# Patient Record
Sex: FEMALE | Race: WHITE | NOT HISPANIC OR LATINO | Employment: FULL TIME | URBAN - METROPOLITAN AREA
[De-identification: names, ages, dates, MRNs, and addresses within clinical notes are randomized per-mention and may not be internally consistent; named-entity substitution may affect disease eponyms.]

---

## 2018-10-05 ENCOUNTER — OFFICE VISIT (OUTPATIENT)
Dept: FAMILY MEDICINE CLINIC | Facility: CLINIC | Age: 38
End: 2018-10-05

## 2018-10-05 VITALS
BODY MASS INDEX: 29.86 KG/M2 | TEMPERATURE: 98 F | WEIGHT: 185.8 LBS | DIASTOLIC BLOOD PRESSURE: 60 MMHG | RESPIRATION RATE: 16 BRPM | HEIGHT: 66 IN | HEART RATE: 105 BPM | SYSTOLIC BLOOD PRESSURE: 118 MMHG | OXYGEN SATURATION: 97 %

## 2018-10-05 DIAGNOSIS — F41.9 ANXIETY: Primary | ICD-10-CM

## 2018-10-05 DIAGNOSIS — Z23 IMMUNIZATION DUE: ICD-10-CM

## 2018-10-05 DIAGNOSIS — F51.01 PRIMARY INSOMNIA: ICD-10-CM

## 2018-10-05 DIAGNOSIS — Z87.891 PERSONAL HISTORY OF TOBACCO USE, PRESENTING HAZARDS TO HEALTH: ICD-10-CM

## 2018-10-05 PROCEDURE — 99406 BEHAV CHNG SMOKING 3-10 MIN: CPT | Performed by: NURSE PRACTITIONER

## 2018-10-05 PROCEDURE — 90471 IMMUNIZATION ADMIN: CPT | Performed by: NURSE PRACTITIONER

## 2018-10-05 PROCEDURE — 90715 TDAP VACCINE 7 YRS/> IM: CPT | Performed by: NURSE PRACTITIONER

## 2018-10-05 PROCEDURE — 99204 OFFICE O/P NEW MOD 45 MIN: CPT | Performed by: NURSE PRACTITIONER

## 2018-10-05 RX ORDER — METOPROLOL SUCCINATE 25 MG/1
25 TABLET, EXTENDED RELEASE ORAL DAILY
Qty: 30 TABLET | Refills: 5 | Status: SHIPPED | OUTPATIENT
Start: 2018-10-05 | End: 2018-11-08 | Stop reason: SINTOL

## 2018-10-05 RX ORDER — TRAZODONE HYDROCHLORIDE 50 MG/1
50 TABLET ORAL NIGHTLY
Qty: 30 TABLET | Refills: 2 | Status: SHIPPED | OUTPATIENT
Start: 2018-10-05 | End: 2018-10-26 | Stop reason: SINTOL

## 2018-10-05 RX ORDER — FLUCONAZOLE 150 MG/1
150 TABLET ORAL ONCE
COMMUNITY
End: 2018-10-26

## 2018-10-05 NOTE — PROGRESS NOTES
Subjective   Evelia Foster is a 38 y.o. female. Here to establish care.    History of Present Illness Last month seen at [facility] for chest pain. Diagnosed with anxiety and insomnia. Prescribed hydroxyzine but she has itching with it.   Has had anxiety for years. Has been on multiple meds including zoloft, prozac, and buspar in the past and she had side effects or meds were ineffective. Describes episodes of palpitations, sob, diaphoresis and anxiety with duration of 5 minutes. This is interfering with her work. 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  Has had insomnia for years. Has tried multiple OTC meds without relief. Has a hard time going to sleep and can't stay a sleep.  Has been diagnosed with RLS treated with ropinorole in the past. It did not help.  She is a smoker and would like to quit but is very addictive. One previous attempt while pregnant and only lasted one day.    The following portions of the patient's history were reviewed and updated as appropriate: allergies, current medications, past family history, past medical history, past social history, past surgical history and problem list.    Review of Systems   Constitutional: Negative for appetite change, fever, unexpected weight gain and unexpected weight loss.   HENT: Negative for congestion, nosebleeds, sore throat and trouble swallowing.    Eyes: Negative for visual disturbance.   Respiratory: Negative for cough, shortness of breath and wheezing.    Cardiovascular: Positive for palpitations. Negative for chest pain and leg swelling.   Gastrointestinal: Negative for abdominal pain, blood in stool, constipation, diarrhea, nausea and vomiting.   Endocrine: Negative for polydipsia, polyphagia and polyuria.   Genitourinary: Negative for dysuria, frequency and hematuria.   Musculoskeletal: Negative for arthralgias, joint swelling and myalgias.   Skin: Negative for rash.   Neurological: Negative for dizziness, seizures, syncope and numbness.    Hematological: Negative for adenopathy. Does not bruise/bleed easily.   Psychiatric/Behavioral: Positive for sleep disturbance. Negative for behavioral problems and depressed mood. The patient is nervous/anxious.        Objective   Physical Exam   Constitutional: She is oriented to person, place, and time. She appears well-developed and well-nourished. No distress.   HENT:   Head: Normocephalic and atraumatic.   Right Ear: Tympanic membrane and external ear normal.   Left Ear: Tympanic membrane and external ear normal.   Nose: Nose normal.   Mouth/Throat: Oropharynx is clear and moist. No oropharyngeal exudate.   Eyes: Pupils are equal, round, and reactive to light. Conjunctivae are normal. Right eye exhibits no discharge. Left eye exhibits no discharge. No scleral icterus.   Neck: Neck supple. No tracheal deviation present. No thyromegaly present.   Cardiovascular: Normal rate, regular rhythm and normal heart sounds.  Exam reveals no gallop and no friction rub.    No murmur heard.  Pulmonary/Chest: Effort normal and breath sounds normal. No respiratory distress. She has no wheezes.   Abdominal: Soft. Bowel sounds are normal. She exhibits no distension and no mass. There is no tenderness.   Musculoskeletal: She exhibits no edema or deformity.   Lymphadenopathy:     She has no cervical adenopathy.   Neurological: She is alert and oriented to person, place, and time. Coordination normal.   Skin: Skin is warm and dry. Capillary refill takes less than 2 seconds. No rash noted. No erythema.   Psychiatric: She has a normal mood and affect. Her speech is normal and behavior is normal. Judgment and thought content normal.   Nursing note and vitals reviewed.        Assessment/Plan   Evelia was seen today for establish care.    Diagnoses and all orders for this visit:    Anxiety  -     metoprolol succinate XL (TOPROL-XL) 25 MG 24 hr tablet; Take 1 tablet by mouth Daily.    Immunization due  -     Tdap Vaccine Greater Than or  Equal To 6yo IM    Primary insomnia  -     traZODone (DESYREL) 50 MG tablet; Take 1 tablet by mouth Every Night.    Personal history of tobacco use, presenting hazards to health    Discussed the nature of the disease including, risks, complications, implications, management, safe and proper use of medications. Encouraged therapeutic lifestyle changes including low calorie diet with plenty of fruits and vegetables, daily exercise, medication compliance, and keeping scheduled follow up appointments with me and any other providers. Encouraged patient to have appointment for complete physical, fasting labs, appropriate screenings, and immunizations on an annual basis.  Discuss extended office hours and appropriate use of the ER. Discussed no controlled substances prescribed from this office. Appropriate referrals will be made to pain management and psychiatry if needed. Stressed the importance and expectation of medical compliance with plan of care, medications, and follow up appointments.  Encouraged smoking cessation. Patient counseled for 3-10 minutes on the risks, complications and implications of long term use including heart disease, stroke and increased risk of many cancers. Offered behavioral therapy, social support, and medication therapies. Patient will consider and discuss at next visit.  VIS provided.  Trial of beta blocker and trazodone. Discussed she may need to see psych.  Follow up for cpx and labs.

## 2018-10-26 ENCOUNTER — TELEPHONE (OUTPATIENT)
Dept: FAMILY MEDICINE CLINIC | Facility: CLINIC | Age: 38
End: 2018-10-26

## 2018-10-26 ENCOUNTER — OFFICE VISIT (OUTPATIENT)
Dept: FAMILY MEDICINE CLINIC | Facility: CLINIC | Age: 38
End: 2018-10-26

## 2018-10-26 VITALS
TEMPERATURE: 98.6 F | DIASTOLIC BLOOD PRESSURE: 70 MMHG | WEIGHT: 188 LBS | OXYGEN SATURATION: 99 % | RESPIRATION RATE: 16 BRPM | BODY MASS INDEX: 30.22 KG/M2 | SYSTOLIC BLOOD PRESSURE: 120 MMHG | HEART RATE: 88 BPM | HEIGHT: 66 IN

## 2018-10-26 DIAGNOSIS — D50.8 OTHER IRON DEFICIENCY ANEMIA: Primary | ICD-10-CM

## 2018-10-26 DIAGNOSIS — Z12.31 VISIT FOR SCREENING MAMMOGRAM: ICD-10-CM

## 2018-10-26 DIAGNOSIS — F51.01 PRIMARY INSOMNIA: ICD-10-CM

## 2018-10-26 DIAGNOSIS — Z80.3 FAMILY HISTORY OF BREAST CANCER IN MOTHER: ICD-10-CM

## 2018-10-26 DIAGNOSIS — Z12.4 ENCOUNTER FOR PAPANICOLAOU SMEAR FOR CERVICAL CANCER SCREENING: ICD-10-CM

## 2018-10-26 DIAGNOSIS — E55.9 VITAMIN D DEFICIENCY: Primary | ICD-10-CM

## 2018-10-26 DIAGNOSIS — N85.8 UTERINE MASS: ICD-10-CM

## 2018-10-26 DIAGNOSIS — N63.20 LEFT BREAST MASS: ICD-10-CM

## 2018-10-26 DIAGNOSIS — D50.8 OTHER IRON DEFICIENCY ANEMIA: ICD-10-CM

## 2018-10-26 DIAGNOSIS — Z23 IMMUNIZATION DUE: ICD-10-CM

## 2018-10-26 DIAGNOSIS — Z00.00 HEALTH CARE MAINTENANCE: Primary | ICD-10-CM

## 2018-10-26 LAB
25(OH)D3 SERPL-MCNC: 14.6 NG/ML
ALBUMIN SERPL-MCNC: 4.39 G/DL (ref 3.2–4.8)
ALBUMIN/GLOB SERPL: 2.1 G/DL (ref 1.5–2.5)
ALP SERPL-CCNC: 64 U/L (ref 25–100)
ALT SERPL W P-5'-P-CCNC: 19 U/L (ref 7–40)
ANION GAP SERPL CALCULATED.3IONS-SCNC: 7 MMOL/L (ref 3–11)
ARTICHOKE IGE QN: 101 MG/DL (ref 0–130)
AST SERPL-CCNC: 31 U/L (ref 0–33)
BILIRUB BLD-MCNC: NEGATIVE MG/DL
BILIRUB SERPL-MCNC: 0.4 MG/DL (ref 0.3–1.2)
BUN BLD-MCNC: 5 MG/DL (ref 9–23)
BUN/CREAT SERPL: 7.4 (ref 7–25)
CALCIUM SPEC-SCNC: 9.2 MG/DL (ref 8.7–10.4)
CHLORIDE SERPL-SCNC: 105 MMOL/L (ref 99–109)
CHOLEST SERPL-MCNC: 173 MG/DL (ref 0–200)
CLARITY, POC: CLEAR
CO2 SERPL-SCNC: 25 MMOL/L (ref 20–31)
COLOR UR: YELLOW
CREAT BLD-MCNC: 0.68 MG/DL (ref 0.6–1.3)
DEPRECATED RDW RBC AUTO: 48 FL (ref 37–54)
ERYTHROCYTE [DISTWIDTH] IN BLOOD BY AUTOMATED COUNT: 18.7 % (ref 11.3–14.5)
FOLATE SERPL-MCNC: 7.95 NG/ML (ref 3.2–20)
GFR SERPL CREATININE-BSD FRML MDRD: 98 ML/MIN/1.73
GLOBULIN UR ELPH-MCNC: 2.1 GM/DL
GLUCOSE BLD-MCNC: 80 MG/DL (ref 70–100)
GLUCOSE UR STRIP-MCNC: NEGATIVE MG/DL
HAV IGM SERPL QL IA: NORMAL
HBV CORE IGM SERPL QL IA: NORMAL
HBV SURFACE AG SERPL QL IA: NORMAL
HCT VFR BLD AUTO: 29.1 % (ref 34.5–44)
HCV AB SER DONR QL: NORMAL
HDLC SERPL-MCNC: 70 MG/DL (ref 40–60)
HGB BLD-MCNC: 8.4 G/DL (ref 11.5–15.5)
HIV1+2 AB SER QL: NORMAL
HYPOCHROMIA BLD QL: NORMAL
IRON 24H UR-MRATE: 12 MCG/DL (ref 50–175)
IRON 24H UR-MRATE: 12 MCG/DL (ref 50–175)
IRON SATN MFR SERPL: 3 % (ref 15–50)
KETONES UR QL: NEGATIVE
LEUKOCYTE EST, POC: ABNORMAL
MCH RBC QN AUTO: 20.4 PG (ref 27–31)
MCHC RBC AUTO-ENTMCNC: 28.9 G/DL (ref 32–36)
MCV RBC AUTO: 70.8 FL (ref 80–99)
MICROCYTES BLD QL: NORMAL
NITRITE UR-MCNC: NEGATIVE MG/ML
PH UR: 7.5 [PH] (ref 5–8)
PLAT MORPH BLD: NORMAL
PLATELET # BLD AUTO: 369 10*3/MM3 (ref 150–450)
PMV BLD AUTO: 9.2 FL (ref 6–12)
POTASSIUM BLD-SCNC: 4.4 MMOL/L (ref 3.5–5.5)
PROT SERPL-MCNC: 6.5 G/DL (ref 5.7–8.2)
PROT UR STRIP-MCNC: NEGATIVE MG/DL
RBC # BLD AUTO: 4.11 10*6/MM3 (ref 3.89–5.14)
RBC # UR STRIP: NEGATIVE /UL
RETICS/RBC NFR AUTO: 1.63 % (ref 0.5–1.5)
SODIUM BLD-SCNC: 137 MMOL/L (ref 132–146)
SP GR UR: 1.01 (ref 1–1.03)
T4 FREE SERPL-MCNC: 1.18 NG/DL (ref 0.89–1.76)
TIBC SERPL-MCNC: 402 MCG/DL (ref 250–450)
TRIGL SERPL-MCNC: 72 MG/DL (ref 0–150)
TSH SERPL DL<=0.05 MIU/L-ACNC: 1.35 MIU/ML (ref 0.35–5.35)
UROBILINOGEN UR QL: NORMAL
VIT B12 BLD-MCNC: 292 PG/ML (ref 211–911)
WBC MORPH BLD: NORMAL
WBC NRBC COR # BLD: 7.13 10*3/MM3 (ref 3.5–10.8)

## 2018-10-26 PROCEDURE — 82746 ASSAY OF FOLIC ACID SERUM: CPT | Performed by: NURSE PRACTITIONER

## 2018-10-26 PROCEDURE — 86592 SYPHILIS TEST NON-TREP QUAL: CPT | Performed by: NURSE PRACTITIONER

## 2018-10-26 PROCEDURE — 90471 IMMUNIZATION ADMIN: CPT | Performed by: NURSE PRACTITIONER

## 2018-10-26 PROCEDURE — 90632 HEPA VACCINE ADULT IM: CPT | Performed by: NURSE PRACTITIONER

## 2018-10-26 PROCEDURE — 83540 ASSAY OF IRON: CPT | Performed by: NURSE PRACTITIONER

## 2018-10-26 PROCEDURE — 85045 AUTOMATED RETICULOCYTE COUNT: CPT | Performed by: NURSE PRACTITIONER

## 2018-10-26 PROCEDURE — 83550 IRON BINDING TEST: CPT | Performed by: NURSE PRACTITIONER

## 2018-10-26 PROCEDURE — 84443 ASSAY THYROID STIM HORMONE: CPT | Performed by: NURSE PRACTITIONER

## 2018-10-26 PROCEDURE — 85007 BL SMEAR W/DIFF WBC COUNT: CPT | Performed by: NURSE PRACTITIONER

## 2018-10-26 PROCEDURE — 80074 ACUTE HEPATITIS PANEL: CPT | Performed by: NURSE PRACTITIONER

## 2018-10-26 PROCEDURE — 85025 COMPLETE CBC W/AUTO DIFF WBC: CPT | Performed by: NURSE PRACTITIONER

## 2018-10-26 PROCEDURE — G0432 EIA HIV-1/HIV-2 SCREEN: HCPCS | Performed by: NURSE PRACTITIONER

## 2018-10-26 PROCEDURE — 82306 VITAMIN D 25 HYDROXY: CPT | Performed by: NURSE PRACTITIONER

## 2018-10-26 PROCEDURE — 80061 LIPID PANEL: CPT | Performed by: NURSE PRACTITIONER

## 2018-10-26 PROCEDURE — 81003 URINALYSIS AUTO W/O SCOPE: CPT | Performed by: NURSE PRACTITIONER

## 2018-10-26 PROCEDURE — 80053 COMPREHEN METABOLIC PANEL: CPT | Performed by: NURSE PRACTITIONER

## 2018-10-26 PROCEDURE — 36415 COLL VENOUS BLD VENIPUNCTURE: CPT | Performed by: NURSE PRACTITIONER

## 2018-10-26 PROCEDURE — 82607 VITAMIN B-12: CPT | Performed by: NURSE PRACTITIONER

## 2018-10-26 PROCEDURE — 84439 ASSAY OF FREE THYROXINE: CPT | Performed by: NURSE PRACTITIONER

## 2018-10-26 PROCEDURE — 99395 PREV VISIT EST AGE 18-39: CPT | Performed by: NURSE PRACTITIONER

## 2018-10-26 RX ORDER — HYDROXYZINE HYDROCHLORIDE 25 MG/1
TABLET, FILM COATED ORAL
Qty: 60 TABLET | Refills: 1 | Status: SHIPPED | OUTPATIENT
Start: 2018-10-26 | End: 2018-11-08 | Stop reason: SINTOL

## 2018-10-26 RX ORDER — FERROUS SULFATE 325(65) MG
325 TABLET ORAL
Qty: 90 TABLET | Refills: 2 | Status: SHIPPED | OUTPATIENT
Start: 2018-10-26 | End: 2019-01-16

## 2018-10-26 NOTE — PROGRESS NOTES
Subjective   Evelia Foster is a 38 y.o. female and is here for a comprehensive physical exam. The patient reports no problems. Patient reports last physical date of     Patient rates their health as fair. Describes diet as Unhealthy Diet. Exercises not at all. Employed employed. Lives with boyfriend. Dental exam every 6 months-no. Brushes teeth twice a day-yes. Vision exam in last 12 months-yes.    Do you take any herbs or supplements that were not prescribed by a doctor? no  Are you taking aspirin daily? no  Family history of ovarian cancer? no  Family history of breast cancer? yes  FH of endometrial cancer? no  FH of cervical cancer? yes  FH of colon cancer? no    Cancer Screening  Mammogram up-to-date?  no  BMD up-to-date? no  Colonoscopy up-to-date? no      History:  LMP: Patient's last menstrual period was 10/01/2018 (within weeks).  Menopause at na years  Last pap date:   Abnormal pap? no  : 2  Para: 2  Method of birth control none    Immunization History  Tdap? yes  HPV? not applicable  Pneumonia? not applicable  Shingles? not applicable    The following portions of the patient's history were reviewed and updated as appropriate: allergies, current medications, past family history, past medical history, past social history, past surgical history and problem list.    Past Medical History:   Diagnosis Date   • Anxiety    • Insomnia    • PTSD (post-traumatic stress disorder)    • Restless leg syndrome        Family History   Problem Relation Age of Onset   • Cervical cancer Mother 30   • Breast cancer Mother    • Lymphoma Mother    • Congenital heart disease Mother    • Cancer Father    • Breast cancer Sister         marker-proph   • No Known Problems Brother    • Dementia Maternal Grandmother    • Graves' disease Sister        Past Surgical History:   Procedure Laterality Date   • TONSILLECTOMY AND ADENOIDECTOMY     • TUBAL ABDOMINAL LIGATION         Social History     Social History   • Marital  status:      Spouse name: N/A   • Number of children: N/A   • Years of education: N/A     Occupational History   • [employer]      Social History Main Topics   • Smoking status: Current Every Day Smoker     Packs/day: 1.00     Years: 24.00     Types: Cigarettes   • Smokeless tobacco: Never Used      Comment: tried w preg   • Alcohol use No   • Drug use: No   • Sexual activity: Yes     Partners: Male     Other Topics Concern   • Not on file     Social History Narrative   • No narrative on file       Review of Systems  Do you have pain that bothers you in your daily life? no  Review of Systems   Constitutional: Negative for fatigue, fever and unexpected weight change.   HENT: Negative for congestion, hearing loss, nosebleeds, rhinorrhea, sore throat, trouble swallowing and voice change.    Eyes: Negative for discharge, redness and visual disturbance.   Respiratory: Negative for cough, chest tightness, shortness of breath and wheezing.    Cardiovascular: Negative for chest pain, palpitations and leg swelling.   Gastrointestinal: Negative for abdominal pain, blood in stool, constipation, diarrhea, nausea and vomiting.   Endocrine: Negative for polydipsia, polyphagia and polyuria.   Genitourinary: Negative for dysuria, flank pain, frequency and hematuria.   Musculoskeletal: Negative for arthralgias, joint swelling and myalgias.   Skin: Negative for color change and rash.   Neurological: Negative for dizziness, seizures, syncope, weakness and headaches.   Hematological: Negative for adenopathy. Does not bruise/bleed easily.   Psychiatric/Behavioral: Positive for sleep disturbance. Negative for dysphoric mood. The patient is not nervous/anxious.        Objective   Physical Exam   Constitutional: She is oriented to person, place, and time. She appears well-developed and well-nourished. She does not appear ill. No distress.   HENT:   Head: Normocephalic and atraumatic.   Right Ear: Hearing, tympanic membrane and external  ear normal.   Left Ear: Hearing, tympanic membrane and external ear normal.   Nose: Nose normal.   Mouth/Throat: Oropharynx is clear and moist. No oropharyngeal exudate.   Eyes: Pupils are equal, round, and reactive to light. Conjunctivae and lids are normal.   Neck: Trachea normal and normal range of motion. Neck supple. No JVD present. Carotid bruit is not present. No thyromegaly present.   Cardiovascular: Normal rate, regular rhythm, S1 normal, S2 normal and intact distal pulses.  Exam reveals no gallop and no friction rub.    No murmur heard.  Pulmonary/Chest: Breath sounds normal. No accessory muscle usage. No respiratory distress. She exhibits no mass. Right breast exhibits no mass, no nipple discharge and no skin change. Left breast exhibits mass. Left breast exhibits no nipple discharge and no skin change. Breasts are symmetrical.   Left breast 12 o'clock 5 cm from nipple is 1 cm firm mass   Abdominal: Soft. Normal appearance and bowel sounds are normal. She exhibits no mass. There is no hepatosplenomegaly. There is no tenderness. There is no rebound, no guarding and no CVA tenderness.   Genitourinary: No breast bleeding. There is no lesion on the right labia. There is no lesion on the left labia. Uterus is enlarged. Cervix exhibits no motion tenderness and no discharge. Right adnexum displays no mass and no tenderness. Left adnexum displays no mass and no tenderness. No bleeding in the vagina. No vaginal discharge found.   Genitourinary Comments: Uterus with large firm mass left posterior aspect   Musculoskeletal: Normal range of motion.   FROM. No joint tenderness or erythema.   Lymphadenopathy:     She has no cervical adenopathy.   Neurological: She is alert and oriented to person, place, and time. She has normal strength. No sensory deficit. Coordination and gait normal.   Reflex Scores:       Patellar reflexes are 2+ on the right side and 2+ on the left side.  Skin: Skin is warm, dry and intact. No rash  noted.   Psychiatric: She has a normal mood and affect. Her speech is normal and behavior is normal. Judgment and thought content normal. Her mood appears not anxious. Cognition and memory are normal.   Vitals reviewed.       Evelia was seen today for annual exam.    Diagnoses and all orders for this visit:    Health care maintenance  -     POC Urinalysis Dipstick, Automated  -     CBC & Differential  -     Comprehensive Metabolic Panel  -     Lipid Panel  -     Vitamin D 25 Hydroxy  -     TSH  -     T4, Free  -     Hepatitis Panel, Acute  -     HIV-1 / O / 2 Ag / Antibody 4th Generation  -     RPR  -     CBC Auto Differential    Encounter for Papanicolaou smear for cervical cancer screening  -     Liquid-based Pap Smear, Screening    Visit for screening mammogram    Immunization due  -     Hepatitis A Vaccine Adult IM    Family history of breast cancer in mother  -     Ambulatory Referral to Genetics  -     Mammo diagnostic digital tomosynthesis bilateral w CAD; Future    Primary insomnia  -     hydrOXYzine (ATARAX) 25 MG tablet; Take 1-2 pills at night    Uterine mass  -     US Non-ob Transvaginal; Future        1. VIS provided.    2. Patient Counseling:  --Nutrition: Stressed importance of moderation in sodium/caffeine intake, saturated fat and cholesterol, caloric balance, sufficient intake of fresh fruits, vegetables, fiber, calcium, iron, and 1 mg of folate supplement per day (for females capable of pregnancy).  --Exercise: Stressed the importance of exercise 5 times a week  --Substance Abuse: Discussed cessation/primary prevention of tobacco, alcohol, or other drug use; driving or other dangerous activities under the influence; availability of treatment for abuse.    --Sexuality: Discussed sexually transmitted diseases, partner selection, use of condoms, avoidance of unintended pregnancy  and contraceptive alternatives.   --Injury prevention: Discussed safety belts, safety helmets, smoke detector, smoking near  bedding or upholstery.   --Dental health: Discussed importance of regular tooth brushing, flossing, and dental visits.  --Immunizations reviewed.  --Discussed benefits of screening colonoscopy.  --Discussed benefits of screening mammogram.  --After hours service discussed with patient, and appropriate use of the ER.  --Discussed the importance of medication compliance, follow up with me and other health care providers, annual physical, fasting labs, and age appropriate screenings.    3. Discussed the patient's BMI with her.  The BMI is above average; BMI management plan is completed  4. Follow up in one year  5. Discussed the nature of the disease including, risks, complications, implications, management, safe and proper use of medications. Encouraged therapeutic lifestyle changes including low calorie diet with plenty of fruits and vegetables, daily exercise, medication compliance, and keeping scheduled follow up appointments with me and any other providers. Encouraged patient to have appointment for complete physical, fasting labs, appropriate screenings, and immunizations on an annual basis.  6. Sister is BRACA positive. Patient has not had genetic testing or mammogram.  7. Follow up in 3 weeks to review labs.

## 2018-10-29 ENCOUNTER — TELEPHONE (OUTPATIENT)
Dept: GENETICS | Facility: HOSPITAL | Age: 38
End: 2018-10-29

## 2018-10-29 LAB — RPR SER QL: NORMAL

## 2018-10-30 ENCOUNTER — TELEPHONE (OUTPATIENT)
Dept: FAMILY MEDICINE CLINIC | Facility: CLINIC | Age: 38
End: 2018-10-30

## 2018-10-30 NOTE — TELEPHONE ENCOUNTER
Spoke with pt. After she takes the Iron pill and she can not keep anything down after she takes it. Should pt stop taking it or would she be able to take something for nausea and vomiting?   Please advise

## 2018-10-30 NOTE — TELEPHONE ENCOUNTER
----- Message from Rosibel Russell MA sent at 10/30/2018  1:15 PM EDT -----  Regarding: FW: PLEASE  Contact: [phone]      ----- Message -----  From: Nupur Horn  Sent: 10/29/2018   4:28 PM  To: Rosibel Russell MA  Subject: PLEASE                                           GUZMAN PUT HER ON AN IRON PILL AND SHE IS THROWING EVERYTHING UP. PLEASE CALL

## 2018-11-01 ENCOUNTER — HOSPITAL ENCOUNTER (OUTPATIENT)
Dept: ULTRASOUND IMAGING | Facility: HOSPITAL | Age: 38
Discharge: HOME | End: 2018-11-01
Admitting: NURSE PRACTITIONER

## 2018-11-01 DIAGNOSIS — N85.8 UTERINE MASS: ICD-10-CM

## 2018-11-01 PROCEDURE — 76830 TRANSVAGINAL US NON-OB: CPT

## 2018-11-02 ENCOUNTER — TELEPHONE (OUTPATIENT)
Dept: FAMILY MEDICINE CLINIC | Facility: CLINIC | Age: 38
End: 2018-11-02

## 2018-11-02 DIAGNOSIS — N85.8 UTERINE MASS: Primary | ICD-10-CM

## 2018-11-02 NOTE — TELEPHONE ENCOUNTER
Pt notified and voiced understanding   ----- Message from Ashtyn Morin sent at 11/2/2018  1:45 PM EDT -----  Regarding: PLEASE CALL   Contact: [phone]  PT IS WONDERING WHY SHE HAS A REFERRAL TO GYN

## 2018-11-06 ENCOUNTER — TELEPHONE (OUTPATIENT)
Dept: FAMILY MEDICINE CLINIC | Facility: CLINIC | Age: 38
End: 2018-11-06

## 2018-11-06 NOTE — TELEPHONE ENCOUNTER
Discussed [facility] pap with patient. Sent a message to Dr Lewis as well. She is scheduled to see him on 11/20 for uterine mass.

## 2018-11-08 ENCOUNTER — OFFICE VISIT (OUTPATIENT)
Dept: FAMILY MEDICINE CLINIC | Facility: CLINIC | Age: 38
End: 2018-11-08

## 2018-11-08 VITALS
HEIGHT: 66 IN | DIASTOLIC BLOOD PRESSURE: 68 MMHG | BODY MASS INDEX: 29.41 KG/M2 | WEIGHT: 183 LBS | HEART RATE: 95 BPM | TEMPERATURE: 97.1 F | RESPIRATION RATE: 18 BRPM | OXYGEN SATURATION: 97 % | SYSTOLIC BLOOD PRESSURE: 104 MMHG

## 2018-11-08 DIAGNOSIS — D50.8 OTHER IRON DEFICIENCY ANEMIA: ICD-10-CM

## 2018-11-08 DIAGNOSIS — E55.9 VITAMIN D DEFICIENCY: Primary | ICD-10-CM

## 2018-11-08 DIAGNOSIS — R55 VASOVAGAL SYNCOPE: ICD-10-CM

## 2018-11-08 PROCEDURE — 99214 OFFICE O/P EST MOD 30 MIN: CPT | Performed by: NURSE PRACTITIONER

## 2018-11-08 RX ORDER — ERGOCALCIFEROL 1.25 MG/1
50000 CAPSULE ORAL WEEKLY
Qty: 12 CAPSULE | Refills: 0 | Status: SHIPPED | OUTPATIENT
Start: 2018-11-08 | End: 2019-01-07

## 2018-11-08 NOTE — PROGRESS NOTES
Subjective   Evelia Foster is a 38 y.o. female. Here to follow up but had a syncopal episode this am.    History of Present Illness Had a syncopal episode this am. This am she woke up feeling shaky. Got up to get a water and walked to the kitchen. Walked about 10 feet and bent over to get a water and blacked out. She had taken her beta blocker 3 hours before she blacked out. No palpitations, chest pain prior to episode that were severe. No injury. Did not hit her head. No seizure activity. No loss of control of bladder or bowels. She is currently having a menstrual cycle.    She has appt with Dr Lewis for uterine mass, abnormal pap.  She was anemic and is to be taking iron bid.  She has not started taking iron yet.     The following portions of the patient's history were reviewed and updated as appropriate: allergies, current medications, past family history, past medical history, past social history, past surgical history and problem list.    Review of Systems   Constitutional: Negative for appetite change, fever, unexpected weight gain and unexpected weight loss.   HENT: Negative for congestion, nosebleeds, sore throat and trouble swallowing.    Eyes: Negative for visual disturbance.   Respiratory: Negative for cough, shortness of breath and wheezing.    Cardiovascular: Negative for chest pain, palpitations and leg swelling.   Gastrointestinal: Negative for abdominal pain, blood in stool, constipation, diarrhea, nausea and vomiting.   Endocrine: Negative for polydipsia, polyphagia and polyuria.   Genitourinary: Negative for dysuria, frequency and hematuria.   Musculoskeletal: Negative for arthralgias, joint swelling and myalgias.   Skin: Negative for rash.   Neurological: Positive for syncope. Negative for dizziness, seizures and numbness.   Hematological: Negative for adenopathy. Does not bruise/bleed easily.   Psychiatric/Behavioral: Negative for behavioral problems, sleep disturbance and depressed mood. The  patient is not nervous/anxious.        Objective   Physical Exam   Constitutional: She is oriented to person, place, and time. She appears well-developed and well-nourished. No distress.   HENT:   Head: Normocephalic and atraumatic.   Right Ear: Tympanic membrane and external ear normal.   Left Ear: Tympanic membrane and external ear normal.   Nose: Nose normal.   Mouth/Throat: Oropharynx is clear and moist. No oropharyngeal exudate.   Eyes: Pupils are equal, round, and reactive to light. Conjunctivae are normal. Right eye exhibits no discharge. Left eye exhibits no discharge. No scleral icterus.   Neck: Neck supple. No tracheal deviation present. No thyromegaly present.   Cardiovascular: Normal rate, regular rhythm and normal heart sounds.  Exam reveals no gallop and no friction rub.    No murmur heard.  Pulmonary/Chest: Effort normal and breath sounds normal. No respiratory distress. She has no wheezes.   Abdominal: Soft. Bowel sounds are normal. She exhibits no distension and no mass. There is no tenderness.   Musculoskeletal: She exhibits no edema or deformity.   Lymphadenopathy:     She has no cervical adenopathy.   Neurological: She is alert and oriented to person, place, and time. Coordination normal.   Skin: Skin is warm and dry. Capillary refill takes less than 2 seconds. No rash noted. No erythema.   Psychiatric: She has a normal mood and affect. Her speech is normal and behavior is normal. Judgment and thought content normal.   Nursing note and vitals reviewed.        Assessment/Plan   Evelia was seen today for follow-up.    Diagnoses and all orders for this visit:    Vitamin D deficiency  -     vitamin D (ERGOCALCIFEROL) 99076 units capsule capsule; Take 1 capsule by mouth 1 (One) Time Per Week.    Vasovagal syncope    Other iron deficiency anemia    I think she had a vasovagal episode from being anemic and having low bp. She has no complications from the syncopal episode. We reviewed her labs. Will have  her stop the beta blocker for now since bp is 104 systolic. Increase fluids to at least 64 oz a day. Change positions slowly. If she has any further episodes will start a more invasive work up.  Talked about taking the iron and importance of follow up labs.  She verbalized understanding.  I personally spent over half of a total 25 minutes face to face with the patient in counseling and discussion and/or coordination of care as described above for syncope, anemia and hypotension.

## 2018-11-20 ENCOUNTER — OFFICE VISIT (OUTPATIENT)
Dept: OBSTETRICS AND GYNECOLOGY | Facility: CLINIC | Age: 38
End: 2018-11-20

## 2018-11-20 VITALS
DIASTOLIC BLOOD PRESSURE: 70 MMHG | WEIGHT: 188 LBS | SYSTOLIC BLOOD PRESSURE: 118 MMHG | RESPIRATION RATE: 16 BRPM | BODY MASS INDEX: 30.34 KG/M2

## 2018-11-20 DIAGNOSIS — B37.9 CANDIDIASIS: ICD-10-CM

## 2018-11-20 DIAGNOSIS — N87.0 MILD DYSPLASIA OF CERVIX: Primary | ICD-10-CM

## 2018-11-20 DIAGNOSIS — N94.6 DYSMENORRHEA: ICD-10-CM

## 2018-11-20 DIAGNOSIS — N92.1 MENORRHAGIA WITH IRREGULAR CYCLE: ICD-10-CM

## 2018-11-20 DIAGNOSIS — N39.3 SUI (STRESS URINARY INCONTINENCE, FEMALE): ICD-10-CM

## 2018-11-20 DIAGNOSIS — D21.9 FIBROID: ICD-10-CM

## 2018-11-20 PROBLEM — D50.9 IRON DEFICIENCY ANEMIA: Status: ACTIVE | Noted: 2018-11-20

## 2018-11-20 PROBLEM — Z98.51 S/P TUBAL LIGATION: Status: ACTIVE | Noted: 2018-11-20

## 2018-11-20 PROCEDURE — 99202 OFFICE O/P NEW SF 15 MIN: CPT | Performed by: OBSTETRICS & GYNECOLOGY

## 2018-11-20 PROCEDURE — 57455 BIOPSY OF CERVIX W/SCOPE: CPT | Performed by: OBSTETRICS & GYNECOLOGY

## 2018-11-20 RX ORDER — FLUCONAZOLE 150 MG/1
150 TABLET ORAL DAILY
Qty: 1 TABLET | Refills: 0 | Status: SHIPPED | OUTPATIENT
Start: 2018-11-20 | End: 2019-01-07

## 2018-11-20 NOTE — PROGRESS NOTES
Subjective   Chief Complaint   Patient presents with   • Colposcopy     LGSIL with positive 18 hpv     Evelia Foster is a 38 y.o. year old No obstetric history on file..  Patient's last menstrual period was 2018 (lmp unknown).  She presents to be seen because of mild dysplasia.  This was her first abnormal Pap smear.  However she had not had a passer about 15 years.  She had been a patient Dr. Eyal Miller before he .  She really didn't know her to go to after that unfortunately.  Upon review she also has fibroid uterus with multiple fibroids on all some largest being 5.6 cm.  Recent blood work but Dangelo Kumar reveals  anemia iron deficiency and low vitamin D.  Patient also has bad cramping during her period which may last from 4-10 days and very in duration.  She is status post tubal ligation.  Options discussed regarding fibroid including follow with multiple vitamins and iron to build blood count back up, birth control pills, IUD, ablation or hysterectomy.    She also has issues with stress urinary incontinence when she coughs last sneezes.  Doesn't do Kegel exercises.  Will asked her to do these along with timed voiding.  She works as a  doesn't have a lot of time to get out and empty her bladder etc. she wears a daily pad because she leaks almost daily.  Not sure she's doing Kegel's properly.    Patient's last menstrual period was 2018 (lmp unknown).  Cycle Frequency: unpredictable   Menstrual cycle character: flow is typically moderately heavy and flow is typically excessive   Cycle Duration: 4 - 10; 4 heavy days.,  Occasionally soils her clothing                    The following portions of the patient's history were reviewed and updated as appropriate:problem list, current medications and allergies   History 3 vaginal deliveries between  and ; weight between 7 lbs. 5 oz. 8 lbs. 3 oz.    Review of Systems normal bowels stress urinary incontinence mild  dysplasia please see note; sexually active.  No pain     Objective   /70   Resp 16   Wt 85.3 kg (188 lb)   LMP 11/08/2018 (LMP Unknown)   BMI 30.34 kg/m²     General:  well developed; well nourished  no acute distress  appears stated age   Skin:  No suspicious lesions seen   Thyroid: not examined   Lungs:  breathing is unlabored   Heart:  Not performed.   Abdomen: soft, non-tender; no masses  no umbilical or inginual hernias are present  no hepato-splenomegaly   Pelvis: Clinical staff was present for exam  External genitalia:  normal appearance of the external genitalia including Bartholin's and [place]'s glands.  :  urethral meatus normal; urethra hypermobile;  Vaginal:  normal pink mucosa without prolapse or lesions. discharge present -  white and thick;  Cervix:  normal appearance. Please see colposcopy note  Uterus:  multiple fibroids are present; Guarding difficult to assess exact size of uterus  Adnexa:  non palpable bilaterally.  Rectal:  digital rectal exam not performed; anus visually normal appearing.   She was able to perform a good Kegel response upon request.       Lab Review   CBC, CMP and TSH    Imaging   Pelvic ultrasound report       Assessment   1. Fibroid uterus largest 5.6 x 5 cm  2. Anemia probably secondary menorrhagia secondary to above  3. Dysmenorrhea secondary #1  4. Probable yeast vaginitis we'll treat with Diflucan  5. Stress urinary incontinence with good Kegel response  6. Status post tubal ligation     Plan   1. We will follow-up biopsy results from colposcopy  2. Information given regarding fibroids I think she is leaning towards hysterectomy.  If so take her tubes out as well  3. Timed voiding, Kegel's for 5 minutes daily and as needed  4. Continue iron and vitamin D replacement  5. Sample of the Balcoltra OCPs to taking her period starts.  Hopefully this will decrease some overflow and let her build her blood count back up.  6. Once the biopsy results are back we can  discuss further what to do about all of her problems including menorrhagia and dysmenorrhea anemia fibroids and stress urinary incontinence.  If surgery she would like to combine the 2 procedures.    Medications Rx this encounter:  New Medications Ordered This Visit   Medications   • fluconazole (DIFLUCAN) 150 MG tablet     Sig: Take 1 tablet by mouth Daily.     Dispense:  1 tablet     Refill:  0          This note was electronically signed.    Markie Lewis MD  November 20, 2018

## 2018-11-26 ENCOUNTER — PREP FOR SURGERY (OUTPATIENT)
Dept: OTHER | Facility: HOSPITAL | Age: 38
End: 2018-11-26

## 2018-11-26 ENCOUNTER — DOCUMENTATION (OUTPATIENT)
Dept: OBSTETRICS AND GYNECOLOGY | Facility: CLINIC | Age: 38
End: 2018-11-26

## 2018-11-26 DIAGNOSIS — D21.9 FIBROIDS: ICD-10-CM

## 2018-11-26 DIAGNOSIS — D50.0 IRON DEFICIENCY ANEMIA DUE TO CHRONIC BLOOD LOSS: ICD-10-CM

## 2018-11-26 DIAGNOSIS — N39.3 SUI (STRESS URINARY INCONTINENCE, FEMALE): Primary | ICD-10-CM

## 2018-11-26 DIAGNOSIS — D06.1 CARCINOMA IN SITU OF EXOCERVIX: Primary | ICD-10-CM

## 2018-11-26 RX ORDER — SODIUM CHLORIDE 0.9 % (FLUSH) 0.9 %
3 SYRINGE (ML) INJECTION EVERY 12 HOURS SCHEDULED
Status: CANCELLED | OUTPATIENT
Start: 2018-11-26

## 2018-11-26 RX ORDER — CEFAZOLIN SODIUM 2 G/100ML
2 INJECTION, SOLUTION INTRAVENOUS
Status: CANCELLED | OUTPATIENT
Start: 2018-11-27

## 2018-11-26 RX ORDER — SODIUM CHLORIDE 0.9 % (FLUSH) 0.9 %
1-10 SYRINGE (ML) INJECTION AS NEEDED
Status: CANCELLED | OUTPATIENT
Start: 2018-11-26

## 2018-11-26 RX ORDER — SODIUM CHLORIDE, SODIUM LACTATE, POTASSIUM CHLORIDE, CALCIUM CHLORIDE 600; 310; 30; 20 MG/100ML; MG/100ML; MG/100ML; MG/100ML
125 INJECTION, SOLUTION INTRAVENOUS CONTINUOUS
Status: CANCELLED | OUTPATIENT
Start: 2018-11-26

## 2018-11-26 RX ORDER — CEFAZOLIN SODIUM 2 G/100ML
2 INJECTION, SOLUTION INTRAVENOUS
Status: CANCELLED | OUTPATIENT
Start: 2018-11-26

## 2018-11-26 NOTE — PROGRESS NOTES
I spoke with Evelia regarding her urinary incontinence issues.  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  Discussed potential side effects of urinary retention and had to catheterize herself.  She's had to do that in the past and is comfortable with that as a potential side effect.  I discussed that have to do some testing in the office such as a Q-tip test postvoid residual test before we went ahead and did the surgery.  The TVT would be about 80-85% effective but there could be some frequency issues and the postop urinary retention.  She is wondering when we can get this all set up.  Also in this to tyree our ; told her she should be getting back with her this week

## 2018-11-28 ENCOUNTER — TELEPHONE (OUTPATIENT)
Dept: OBSTETRICS AND GYNECOLOGY | Facility: CLINIC | Age: 38
End: 2018-11-28

## 2018-11-28 ENCOUNTER — DOCUMENTATION (OUTPATIENT)
Dept: OBSTETRICS AND GYNECOLOGY | Facility: CLINIC | Age: 38
End: 2018-11-28

## 2018-11-28 NOTE — TELEPHONE ENCOUNTER
Spoke with pt re: scheduling surgery. Explained the need to have her see Dr Lewis to evaluate bladder issues in case additional procedures need to be added. Will plan procedure on 01/9/19 at pt request.

## 2018-11-28 NOTE — PROGRESS NOTES
Ann-Marie wanted to know if it is okay for her to use tampons as she started her period.  She also had some gray filmy discharge and tissue which I reassured her problems associated with silver nitrate that I placed on the biopsy site.

## 2018-12-05 ENCOUNTER — OFFICE VISIT (OUTPATIENT)
Dept: OBSTETRICS AND GYNECOLOGY | Facility: CLINIC | Age: 38
End: 2018-12-05

## 2018-12-05 VITALS
SYSTOLIC BLOOD PRESSURE: 130 MMHG | DIASTOLIC BLOOD PRESSURE: 70 MMHG | BODY MASS INDEX: 29.38 KG/M2 | WEIGHT: 182 LBS | RESPIRATION RATE: 16 BRPM

## 2018-12-05 DIAGNOSIS — N39.3 SUI (STRESS URINARY INCONTINENCE, FEMALE): Primary | ICD-10-CM

## 2018-12-05 PROCEDURE — 99212 OFFICE O/P EST SF 10 MIN: CPT | Performed by: OBSTETRICS & GYNECOLOGY

## 2018-12-05 NOTE — PROGRESS NOTES
Subjective   Chief Complaint   Patient presents with   • Follow-up     Q tip test     Evelia Foster is a 38 y.o. year old No obstetric history on file..  Patient's last menstrual period was 11/26/2018.  She presents to be seen because of   History of stress urinary incontinence.  She is planning hysterectomy due to fibroids anemia menorrhagia and dysmenorrhea and carcinoma in situ of the cervix.  Discussed that I would like to be a Q-tip test prior to the addition of TVT cystoscopy to be sure that it is necessary.  She actually reports of doing Kegel exercises have helped that she's not had a problem with stress urinary incontinence.  Told her that may be we will be a little to her she will be able to avoid the TVT procedure.                      The following portions of the patient's history were reviewed and updated as appropriate:problem list, current medications and allergies    Review of Systems   Normal bowels bladder improved as above     Objective   /70   Resp 16   Wt 82.6 kg (182 lb)   LMP 11/26/2018   BMI 29.38 kg/m²     General:  well developed; well nourished  no acute distress  appears stated age   Skin:  No suspicious lesions seen   Thyroid: not examined   Lungs:  breathing is unlabored   Heart:  Not performed.   Abdomen: soft, non-tender; no masses  no umbilical or inguinal hernias are present  no hepato-splenomegaly   Pelvis: Clinical staff was present for exam  External genitalia:  normal appearance of the external genitalia including Bartholin's and [place]'s glands.  :  urethral meatus normal; urethral hypermobility is absent. Sterile Q-tip placed and the bladder tenderness despite Xylocaine jelly.  With Valsalva the Q-tip only went up about 10°.  Vaginal:  normal pink mucosa without prolapse or lesions. she is able to perform a Kegel contraction upon request; Strong response to Kegel.  Uterus:  normal size, shape and consistency. multiple fibroids are present;     Lab Review   No  data reviewed    Imaging   No data reviewed       Assessment   1.  urinary incontinence which has improved with Kegel exercises.  2.   No evidence of urethral hypermobility therefore given these 2 situations I would not do tension-free vaginal taping at this point.     Plan   1.   Continue Kegel exercises  2.  hysterectomy planned in January for above reasons we'll cancel any TVT that may have been scheduled.    Medications Rx this encounter:  No orders of the defined types were placed in this encounter.         This note was electronically signed.    Markie Lewis MD  December 5, 2018

## 2018-12-10 ENCOUNTER — PREP FOR SURGERY (OUTPATIENT)
Dept: OTHER | Facility: HOSPITAL | Age: 38
End: 2018-12-10

## 2019-01-07 ENCOUNTER — OFFICE VISIT (OUTPATIENT)
Dept: OBSTETRICS AND GYNECOLOGY | Facility: CLINIC | Age: 39
End: 2019-01-07

## 2019-01-07 ENCOUNTER — APPOINTMENT (OUTPATIENT)
Dept: PREADMISSION TESTING | Facility: HOSPITAL | Age: 39
End: 2019-01-07

## 2019-01-07 VITALS
SYSTOLIC BLOOD PRESSURE: 118 MMHG | HEIGHT: 66 IN | DIASTOLIC BLOOD PRESSURE: 70 MMHG | BODY MASS INDEX: 29.25 KG/M2 | WEIGHT: 182 LBS

## 2019-01-07 VITALS — WEIGHT: 183.86 LBS | BODY MASS INDEX: 29.55 KG/M2 | HEIGHT: 66 IN

## 2019-01-07 DIAGNOSIS — D06.1 CARCINOMA IN SITU OF EXOCERVIX: ICD-10-CM

## 2019-01-07 DIAGNOSIS — N92.1 MENORRHAGIA WITH IRREGULAR CYCLE: ICD-10-CM

## 2019-01-07 DIAGNOSIS — D50.0 IRON DEFICIENCY ANEMIA DUE TO CHRONIC BLOOD LOSS: ICD-10-CM

## 2019-01-07 DIAGNOSIS — D21.9 FIBROIDS: ICD-10-CM

## 2019-01-07 DIAGNOSIS — Z01.818 PRE-OP EXAM: Primary | ICD-10-CM

## 2019-01-07 LAB
B-HCG UR QL: NEGATIVE
BASOPHILS # BLD AUTO: 0.02 10*3/MM3 (ref 0–0.2)
BASOPHILS NFR BLD AUTO: 0.3 % (ref 0–1)
DEPRECATED RDW RBC AUTO: 46.5 FL (ref 37–54)
EOSINOPHIL # BLD AUTO: 0.1 10*3/MM3 (ref 0–0.3)
EOSINOPHIL NFR BLD AUTO: 1.7 % (ref 0–3)
ERYTHROCYTE [DISTWIDTH] IN BLOOD BY AUTOMATED COUNT: 18.6 % (ref 11.3–14.5)
HBA1C MFR BLD: 5.5 % (ref 4.8–5.6)
HCT VFR BLD AUTO: 28.4 % (ref 34.5–44)
HGB BLD-MCNC: 8.2 G/DL (ref 11.5–15.5)
IMM GRANULOCYTES # BLD AUTO: 0.01 10*3/MM3 (ref 0–0.03)
IMM GRANULOCYTES NFR BLD AUTO: 0.2 % (ref 0–0.6)
LYMPHOCYTES # BLD AUTO: 1.4 10*3/MM3 (ref 0.6–4.8)
LYMPHOCYTES NFR BLD AUTO: 23.3 % (ref 24–44)
MCH RBC QN AUTO: 19.7 PG (ref 27–31)
MCHC RBC AUTO-ENTMCNC: 28.9 G/DL (ref 32–36)
MCV RBC AUTO: 68.1 FL (ref 80–99)
MONOCYTES # BLD AUTO: 0.45 10*3/MM3 (ref 0–1)
MONOCYTES NFR BLD AUTO: 7.5 % (ref 0–12)
NEUTROPHILS # BLD AUTO: 4.03 10*3/MM3 (ref 1.5–8.3)
NEUTROPHILS NFR BLD AUTO: 67 % (ref 41–71)
PLATELET # BLD AUTO: 380 10*3/MM3 (ref 150–450)
PMV BLD AUTO: 8.4 FL (ref 6–12)
RBC # BLD AUTO: 4.17 10*6/MM3 (ref 3.89–5.14)
WBC NRBC COR # BLD: 6.01 10*3/MM3 (ref 3.5–10.8)

## 2019-01-07 PROCEDURE — 36415 COLL VENOUS BLD VENIPUNCTURE: CPT

## 2019-01-07 PROCEDURE — 85025 COMPLETE CBC W/AUTO DIFF WBC: CPT | Performed by: OBSTETRICS & GYNECOLOGY

## 2019-01-07 PROCEDURE — S0260 H&P FOR SURGERY: HCPCS | Performed by: OBSTETRICS & GYNECOLOGY

## 2019-01-07 PROCEDURE — 83036 HEMOGLOBIN GLYCOSYLATED A1C: CPT | Performed by: ANESTHESIOLOGY

## 2019-01-07 PROCEDURE — 81025 URINE PREGNANCY TEST: CPT | Performed by: OBSTETRICS & GYNECOLOGY

## 2019-01-07 RX ORDER — IBUPROFEN 200 MG
600 TABLET ORAL EVERY 6 HOURS PRN
COMMUNITY
End: 2019-01-23

## 2019-01-07 RX ORDER — ERGOCALCIFEROL 1.25 MG/1
50000 CAPSULE ORAL WEEKLY
COMMUNITY
End: 2019-12-02

## 2019-01-07 NOTE — DISCHARGE INSTRUCTIONS

## 2019-01-07 NOTE — H&P
Subjective   Evelia Foster is a 38 y.o. year old No obstetric history on file. who is scheduled  for surgery due to heavy menses interfering with activities of daily living.  She's had an ultrasound confirmed a fibroid uterus.  She also had abnormal Pap testing with colposcopy confirming carcinoma in situ of the cervix.  She is status post tubal ligation not interested in any further childbearing.  Options discussed decision may proceed with more definitive therapy in the form of hysterectomy.  Decision may proceed with laparoscopic hysterectomy with removal of her tubes to reduce risk for tubal cancer in the future.  She had a problem with some stress urinary incontinence but this hasn't improved with Kegel exercises.  Hypermobility was not demonstrated on exam so this will not be addressed at the current surgery.  She had googled the procedure when I asked her what she thought were going to do.  She said that Dr. knutson said we would scramble things up to remove them.  I clarified that we would remove the uterus and tubes.  I may have to  a fibroid or Tuesday depending on the size to remove them vaginally.  Do not want to do a subtotal hysterectomy as she has carcinoma in situ of the cervix.  Same day discharge and discussed.  Postop recovery discussed taking it easy the first 5-7 days and gradual return to normal by the next 10-14 days postop.  She does repetitive heavy lifting at work 100 pounds or more so may need to take a little more time off.  We will schedule 2 week postop appointment and reassess at that time.    Past Medical History:   Diagnosis Date   • Anxiety    • HPV (human papilloma virus) infection     positive 18   • Insomnia    • LGSIL on Pap smear of cervix    • PTSD (post-traumatic stress disorder)    • Restless leg syndrome      Past Surgical History:   Procedure Laterality Date   • TONSILLECTOMY AND ADENOIDECTOMY     • TUBAL ABDOMINAL LIGATION       Social History     Socioeconomic  "History   • Marital status:      Spouse name: Not on file   • Number of children: Not on file   • Years of education: Not on file   • Highest education level: Not on file   Occupational History   • Occupation: [employer]   Tobacco Use   • Smoking status: Current Every Day Smoker     Packs/day: 1.00     Years: 24.00     Pack years: 24.00     Types: Cigarettes   • Smokeless tobacco: Never Used   • Tobacco comment: tried w preg   Substance and Sexual Activity   • Alcohol use: No   • Drug use: No   • Sexual activity: Yes     Partners: Male       Current Outpatient Medications:   •  ferrous sulfate 325 (65 FE) MG tablet, Take 1 tablet by mouth 3 (Three) Times a Day With Meals., Disp: 90 tablet, Rfl: 2    Allergies   Allergen Reactions   • Benadryl [Diphenhydramine Hcl] Itching   • Buspar [Buspirone] Confusion     Social History    Tobacco Use      Smoking status: Current Every Day Smoker        Packs/day: 1.00        Years: 24.00        Pack years: 24        Types: Cigarettes      Smokeless tobacco: Never Used      Tobacco comment: tried w preg    Review of Systems      Objective   /70   Ht 166.4 cm (65.5\")   Wt 82.6 kg (182 lb)   LMP 12/18/2018   BMI 29.83 kg/m²   General: well developed; well nourished  no acute distress  appears stated age   Thyroid within normal limits neck supple    Heart: regular rate and rhythm   Lungs: breathing is unlabored  clear to auscultation bilaterally   Abdomen: soft, non-tender; no masses  no umbilical or inguinal hernias are present  no hepato-splenomegaly   Pelvis:: Not performed.  Multiple fibroids noted on exam and confirmed on prior ultrasound.        Assessment   1. Menorrhagia with fibroid uterus, history of anemia on iron replacement therapy  2. Carcinoma in situ cervix confirmed colposcopically directed biopsies  3. Family completed status post tubal ligation  4. Stress urinary incontinence has improved with behavioral changes     Plan   1. Da Breanne-assisted total " laparoscopic hysterectomy and removal of fallopian tubes scheduled for Wednesday, January 9 at 8 AM  2. Preadmission testing today  3. Nothing by mouth after midnight etc.  4. Schedule 2 week postop appointment      Markie Lewis M.D.  1/7/2019

## 2019-01-09 ENCOUNTER — HOSPITAL ENCOUNTER (OUTPATIENT)
Facility: HOSPITAL | Age: 39
Discharge: HOME | End: 2019-01-09
Attending: OBSTETRICS & GYNECOLOGY | Admitting: OBSTETRICS & GYNECOLOGY

## 2019-01-09 ENCOUNTER — ANESTHESIA EVENT (OUTPATIENT)
Dept: PERIOP | Facility: HOSPITAL | Age: 39
End: 2019-01-09

## 2019-01-09 ENCOUNTER — ANESTHESIA (OUTPATIENT)
Dept: PERIOP | Facility: HOSPITAL | Age: 39
End: 2019-01-09

## 2019-01-09 VITALS
SYSTOLIC BLOOD PRESSURE: 115 MMHG | DIASTOLIC BLOOD PRESSURE: 60 MMHG | OXYGEN SATURATION: 95 % | RESPIRATION RATE: 16 BRPM | HEART RATE: 60 BPM | TEMPERATURE: 97.9 F

## 2019-01-09 DIAGNOSIS — N39.3 SUI (STRESS URINARY INCONTINENCE, FEMALE): ICD-10-CM

## 2019-01-09 DIAGNOSIS — D50.0 IRON DEFICIENCY ANEMIA DUE TO CHRONIC BLOOD LOSS: ICD-10-CM

## 2019-01-09 DIAGNOSIS — D21.9 FIBROIDS: ICD-10-CM

## 2019-01-09 DIAGNOSIS — D06.1 CARCINOMA IN SITU OF EXOCERVIX: ICD-10-CM

## 2019-01-09 PROBLEM — D64.9 ANEMIA: Status: ACTIVE | Noted: 2019-01-09

## 2019-01-09 PROBLEM — N92.0 MENORRHAGIA: Status: ACTIVE | Noted: 2019-01-09

## 2019-01-09 PROBLEM — D06.9 CIS (CARCINOMA IN SITU OF CERVIX): Status: ACTIVE | Noted: 2019-01-09

## 2019-01-09 LAB
ABO GROUP BLD: NORMAL
BLD GP AB SCN SERPL QL: NEGATIVE
RH BLD: POSITIVE
T&S EXPIRATION DATE: NORMAL

## 2019-01-09 PROCEDURE — 25010000002 PROPOFOL 10 MG/ML EMULSION: Performed by: NURSE ANESTHETIST, CERTIFIED REGISTERED

## 2019-01-09 PROCEDURE — P9041 ALBUMIN (HUMAN),5%, 50ML: HCPCS | Performed by: NURSE ANESTHETIST, CERTIFIED REGISTERED

## 2019-01-09 PROCEDURE — 86920 COMPATIBILITY TEST SPIN: CPT

## 2019-01-09 PROCEDURE — 86901 BLOOD TYPING SEROLOGIC RH(D): CPT | Performed by: ANESTHESIOLOGY

## 2019-01-09 PROCEDURE — 86900 BLOOD TYPING SEROLOGIC ABO: CPT | Performed by: ANESTHESIOLOGY

## 2019-01-09 PROCEDURE — 25010000002 ALBUMIN HUMAN 5% PER 50 ML: Performed by: NURSE ANESTHETIST, CERTIFIED REGISTERED

## 2019-01-09 PROCEDURE — 88309 TISSUE EXAM BY PATHOLOGIST: CPT | Performed by: OBSTETRICS & GYNECOLOGY

## 2019-01-09 PROCEDURE — 25010000002 DEXAMETHASONE PER 1 MG: Performed by: NURSE ANESTHETIST, CERTIFIED REGISTERED

## 2019-01-09 PROCEDURE — 25010000002 FENTANYL CITRATE (PF) 100 MCG/2ML SOLUTION: Performed by: NURSE ANESTHETIST, CERTIFIED REGISTERED

## 2019-01-09 PROCEDURE — 25010000002 PROPOFOL 1000 MG/ML EMULSION: Performed by: NURSE ANESTHETIST, CERTIFIED REGISTERED

## 2019-01-09 PROCEDURE — 25010000002 NEOSTIGMINE 10 MG/10ML SOLUTION: Performed by: NURSE ANESTHETIST, CERTIFIED REGISTERED

## 2019-01-09 PROCEDURE — 25010000002 ONDANSETRON PER 1 MG: Performed by: NURSE ANESTHETIST, CERTIFIED REGISTERED

## 2019-01-09 PROCEDURE — 86850 RBC ANTIBODY SCREEN: CPT | Performed by: ANESTHESIOLOGY

## 2019-01-09 PROCEDURE — 58573 TLH W/T/O UTERUS OVER 250 G: CPT | Performed by: OBSTETRICS & GYNECOLOGY

## 2019-01-09 PROCEDURE — 25010000002 MIDAZOLAM PER 1 MG: Performed by: ANESTHESIOLOGY

## 2019-01-09 DEVICE — SEALANT FIBRIN TISSEEL FZ 4ML: Type: IMPLANTABLE DEVICE | Status: FUNCTIONAL

## 2019-01-09 RX ORDER — SODIUM CHLORIDE 9 MG/ML
INJECTION, SOLUTION INTRAVENOUS AS NEEDED
Status: DISCONTINUED | OUTPATIENT
Start: 2019-01-09 | End: 2019-01-09 | Stop reason: HOSPADM

## 2019-01-09 RX ORDER — SODIUM CHLORIDE 0.9 % (FLUSH) 0.9 %
3 SYRINGE (ML) INJECTION EVERY 12 HOURS SCHEDULED
Status: DISCONTINUED | OUTPATIENT
Start: 2019-01-09 | End: 2019-01-09 | Stop reason: HOSPADM

## 2019-01-09 RX ORDER — ATRACURIUM BESYLATE 10 MG/ML
INJECTION, SOLUTION INTRAVENOUS AS NEEDED
Status: DISCONTINUED | OUTPATIENT
Start: 2019-01-09 | End: 2019-01-09 | Stop reason: SURG

## 2019-01-09 RX ORDER — PROMETHAZINE HYDROCHLORIDE 25 MG/1
25 SUPPOSITORY RECTAL ONCE AS NEEDED
Status: DISCONTINUED | OUTPATIENT
Start: 2019-01-09 | End: 2019-01-09 | Stop reason: HOSPADM

## 2019-01-09 RX ORDER — LIDOCAINE HYDROCHLORIDE 10 MG/ML
0.5 INJECTION, SOLUTION EPIDURAL; INFILTRATION; INTRACAUDAL; PERINEURAL ONCE AS NEEDED
Status: COMPLETED | OUTPATIENT
Start: 2019-01-09 | End: 2019-01-09

## 2019-01-09 RX ORDER — FENTANYL CITRATE 50 UG/ML
50 INJECTION, SOLUTION INTRAMUSCULAR; INTRAVENOUS
Status: DISCONTINUED | OUTPATIENT
Start: 2019-01-09 | End: 2019-01-09 | Stop reason: HOSPADM

## 2019-01-09 RX ORDER — IBUPROFEN 600 MG/1
600 TABLET ORAL EVERY 6 HOURS PRN
Status: DISCONTINUED | OUTPATIENT
Start: 2019-01-09 | End: 2019-01-09 | Stop reason: HOSPADM

## 2019-01-09 RX ORDER — DEXAMETHASONE SODIUM PHOSPHATE 4 MG/ML
INJECTION, SOLUTION INTRA-ARTICULAR; INTRALESIONAL; INTRAMUSCULAR; INTRAVENOUS; SOFT TISSUE AS NEEDED
Status: DISCONTINUED | OUTPATIENT
Start: 2019-01-09 | End: 2019-01-09 | Stop reason: SURG

## 2019-01-09 RX ORDER — PROMETHAZINE HYDROCHLORIDE 25 MG/ML
6.25 INJECTION, SOLUTION INTRAMUSCULAR; INTRAVENOUS ONCE AS NEEDED
Status: DISCONTINUED | OUTPATIENT
Start: 2019-01-09 | End: 2019-01-09 | Stop reason: HOSPADM

## 2019-01-09 RX ORDER — FAMOTIDINE 20 MG/1
20 TABLET, FILM COATED ORAL
Status: DISCONTINUED | OUTPATIENT
Start: 2019-01-09 | End: 2019-01-09 | Stop reason: HOSPADM

## 2019-01-09 RX ORDER — NEOSTIGMINE METHYLSULFATE 1 MG/ML
INJECTION, SOLUTION INTRAVENOUS AS NEEDED
Status: DISCONTINUED | OUTPATIENT
Start: 2019-01-09 | End: 2019-01-09 | Stop reason: SURG

## 2019-01-09 RX ORDER — ONDANSETRON 2 MG/ML
INJECTION INTRAMUSCULAR; INTRAVENOUS AS NEEDED
Status: DISCONTINUED | OUTPATIENT
Start: 2019-01-09 | End: 2019-01-09 | Stop reason: SURG

## 2019-01-09 RX ORDER — GLYCOPYRROLATE 0.2 MG/ML
INJECTION INTRAMUSCULAR; INTRAVENOUS AS NEEDED
Status: DISCONTINUED | OUTPATIENT
Start: 2019-01-09 | End: 2019-01-09 | Stop reason: SURG

## 2019-01-09 RX ORDER — HYDROMORPHONE HYDROCHLORIDE 1 MG/ML
0.5 INJECTION, SOLUTION INTRAMUSCULAR; INTRAVENOUS; SUBCUTANEOUS
Status: DISCONTINUED | OUTPATIENT
Start: 2019-01-09 | End: 2019-01-09 | Stop reason: HOSPADM

## 2019-01-09 RX ORDER — IBUPROFEN 600 MG/1
600 TABLET ORAL EVERY 6 HOURS PRN
Qty: 30 TABLET | Refills: 1 | Status: SHIPPED | OUTPATIENT
Start: 2019-01-09 | End: 2019-12-02

## 2019-01-09 RX ORDER — FENTANYL CITRATE 50 UG/ML
INJECTION, SOLUTION INTRAMUSCULAR; INTRAVENOUS AS NEEDED
Status: DISCONTINUED | OUTPATIENT
Start: 2019-01-09 | End: 2019-01-09 | Stop reason: SURG

## 2019-01-09 RX ORDER — CEFAZOLIN SODIUM 2 G/100ML
2 INJECTION, SOLUTION INTRAVENOUS
Status: DISCONTINUED | OUTPATIENT
Start: 2019-01-09 | End: 2019-01-09 | Stop reason: HOSPADM

## 2019-01-09 RX ORDER — BUPIVACAINE HYDROCHLORIDE AND EPINEPHRINE 5; 5 MG/ML; UG/ML
INJECTION, SOLUTION PERINEURAL AS NEEDED
Status: DISCONTINUED | OUTPATIENT
Start: 2019-01-09 | End: 2019-01-09 | Stop reason: HOSPADM

## 2019-01-09 RX ORDER — HYDROCODONE BITARTRATE AND ACETAMINOPHEN 5; 325 MG/1; MG/1
1 TABLET ORAL EVERY 6 HOURS PRN
Qty: 15 TABLET | Refills: 0 | Status: SHIPPED | OUTPATIENT
Start: 2019-01-09 | End: 2019-01-16

## 2019-01-09 RX ORDER — MIDAZOLAM HYDROCHLORIDE 1 MG/ML
2 INJECTION INTRAMUSCULAR; INTRAVENOUS ONCE
Status: COMPLETED | OUTPATIENT
Start: 2019-01-09 | End: 2019-01-09

## 2019-01-09 RX ORDER — PROPOFOL 10 MG/ML
VIAL (ML) INTRAVENOUS AS NEEDED
Status: DISCONTINUED | OUTPATIENT
Start: 2019-01-09 | End: 2019-01-09 | Stop reason: SURG

## 2019-01-09 RX ORDER — PROMETHAZINE HYDROCHLORIDE 25 MG/1
25 TABLET ORAL ONCE AS NEEDED
Status: DISCONTINUED | OUTPATIENT
Start: 2019-01-09 | End: 2019-01-09 | Stop reason: HOSPADM

## 2019-01-09 RX ORDER — SODIUM CHLORIDE, SODIUM LACTATE, POTASSIUM CHLORIDE, CALCIUM CHLORIDE 600; 310; 30; 20 MG/100ML; MG/100ML; MG/100ML; MG/100ML
125 INJECTION, SOLUTION INTRAVENOUS CONTINUOUS
Status: DISCONTINUED | OUTPATIENT
Start: 2019-01-09 | End: 2019-01-09 | Stop reason: HOSPADM

## 2019-01-09 RX ORDER — SODIUM CHLORIDE 0.9 % (FLUSH) 0.9 %
1-10 SYRINGE (ML) INJECTION AS NEEDED
Status: DISCONTINUED | OUTPATIENT
Start: 2019-01-09 | End: 2019-01-09 | Stop reason: HOSPADM

## 2019-01-09 RX ORDER — MEPERIDINE HYDROCHLORIDE 25 MG/ML
12.5 INJECTION INTRAMUSCULAR; INTRAVENOUS; SUBCUTANEOUS
Status: DISCONTINUED | OUTPATIENT
Start: 2019-01-09 | End: 2019-01-09 | Stop reason: HOSPADM

## 2019-01-09 RX ORDER — MAGNESIUM HYDROXIDE 1200 MG/15ML
LIQUID ORAL AS NEEDED
Status: DISCONTINUED | OUTPATIENT
Start: 2019-01-09 | End: 2019-01-09 | Stop reason: HOSPADM

## 2019-01-09 RX ORDER — SODIUM CHLORIDE, SODIUM LACTATE, POTASSIUM CHLORIDE, CALCIUM CHLORIDE 600; 310; 30; 20 MG/100ML; MG/100ML; MG/100ML; MG/100ML
9 INJECTION, SOLUTION INTRAVENOUS CONTINUOUS PRN
Status: DISCONTINUED | OUTPATIENT
Start: 2019-01-09 | End: 2019-01-09 | Stop reason: HOSPADM

## 2019-01-09 RX ORDER — ALBUMIN, HUMAN INJ 5% 5 %
SOLUTION INTRAVENOUS CONTINUOUS PRN
Status: DISCONTINUED | OUTPATIENT
Start: 2019-01-09 | End: 2019-01-09 | Stop reason: SURG

## 2019-01-09 RX ORDER — HYDROCODONE BITARTRATE AND ACETAMINOPHEN 5; 325 MG/1; MG/1
1 TABLET ORAL ONCE AS NEEDED
Status: DISCONTINUED | OUTPATIENT
Start: 2019-01-09 | End: 2019-01-09 | Stop reason: HOSPADM

## 2019-01-09 RX ORDER — LIDOCAINE HYDROCHLORIDE 10 MG/ML
INJECTION, SOLUTION EPIDURAL; INFILTRATION; INTRACAUDAL; PERINEURAL AS NEEDED
Status: DISCONTINUED | OUTPATIENT
Start: 2019-01-09 | End: 2019-01-09 | Stop reason: SURG

## 2019-01-09 RX ORDER — SODIUM CHLORIDE 0.9 % (FLUSH) 0.9 %
3-10 SYRINGE (ML) INJECTION AS NEEDED
Status: DISCONTINUED | OUTPATIENT
Start: 2019-01-09 | End: 2019-01-09 | Stop reason: HOSPADM

## 2019-01-09 RX ADMIN — FENTANYL CITRATE 50 MCG: 50 INJECTION, SOLUTION INTRAMUSCULAR; INTRAVENOUS at 10:14

## 2019-01-09 RX ADMIN — PROPOFOL 30 MCG/KG/MIN: 10 INJECTION, EMULSION INTRAVENOUS at 08:02

## 2019-01-09 RX ADMIN — HYDROCODONE BITARTRATE AND ACETAMINOPHEN 1 TABLET: 5; 325 TABLET ORAL at 12:20

## 2019-01-09 RX ADMIN — DEXAMETHASONE SODIUM PHOSPHATE 8 MG: 4 INJECTION, SOLUTION INTRAMUSCULAR; INTRAVENOUS at 08:08

## 2019-01-09 RX ADMIN — MIDAZOLAM HYDROCHLORIDE 2 MG: 1 INJECTION, SOLUTION INTRAMUSCULAR; INTRAVENOUS at 07:34

## 2019-01-09 RX ADMIN — SODIUM CHLORIDE, POTASSIUM CHLORIDE, SODIUM LACTATE AND CALCIUM CHLORIDE: 600; 310; 30; 20 INJECTION, SOLUTION INTRAVENOUS at 08:00

## 2019-01-09 RX ADMIN — FENTANYL CITRATE 50 MCG: 50 INJECTION, SOLUTION INTRAMUSCULAR; INTRAVENOUS at 10:16

## 2019-01-09 RX ADMIN — SODIUM CHLORIDE, POTASSIUM CHLORIDE, SODIUM LACTATE AND CALCIUM CHLORIDE: 600; 310; 30; 20 INJECTION, SOLUTION INTRAVENOUS at 10:17

## 2019-01-09 RX ADMIN — ALBUMIN HUMAN: 0.05 INJECTION, SOLUTION INTRAVENOUS at 08:54

## 2019-01-09 RX ADMIN — FENTANYL CITRATE 50 MCG: 50 INJECTION, SOLUTION INTRAMUSCULAR; INTRAVENOUS at 11:15

## 2019-01-09 RX ADMIN — ALBUMIN HUMAN: 0.05 INJECTION, SOLUTION INTRAVENOUS at 08:46

## 2019-01-09 RX ADMIN — LIDOCAINE HYDROCHLORIDE 0.5 ML: 10 INJECTION, SOLUTION EPIDURAL; INFILTRATION; INTRACAUDAL; PERINEURAL at 07:15

## 2019-01-09 RX ADMIN — ONDANSETRON 4 MG: 2 INJECTION INTRAMUSCULAR; INTRAVENOUS at 10:06

## 2019-01-09 RX ADMIN — FENTANYL CITRATE 50 MCG: 50 INJECTION, SOLUTION INTRAMUSCULAR; INTRAVENOUS at 11:48

## 2019-01-09 RX ADMIN — LIDOCAINE HYDROCHLORIDE 50 MG: 10 INJECTION, SOLUTION EPIDURAL; INFILTRATION; INTRACAUDAL; PERINEURAL at 08:00

## 2019-01-09 RX ADMIN — SODIUM CHLORIDE, POTASSIUM CHLORIDE, SODIUM LACTATE AND CALCIUM CHLORIDE 125 ML/HR: 600; 310; 30; 20 INJECTION, SOLUTION INTRAVENOUS at 07:15

## 2019-01-09 RX ADMIN — PROPOFOL 150 MG: 10 INJECTION, EMULSION INTRAVENOUS at 08:00

## 2019-01-09 RX ADMIN — ATRACURIUM BESYLATE 40 MG: 10 INJECTION, SOLUTION INTRAVENOUS at 08:00

## 2019-01-09 RX ADMIN — GLYCOPYRROLATE 0.4 MG: 0.2 INJECTION, SOLUTION INTRAMUSCULAR; INTRAVENOUS at 10:14

## 2019-01-09 RX ADMIN — FAMOTIDINE 20 MG: 20 TABLET, FILM COATED ORAL at 07:23

## 2019-01-09 RX ADMIN — FENTANYL CITRATE 100 MCG: 50 INJECTION, SOLUTION INTRAMUSCULAR; INTRAVENOUS at 08:00

## 2019-01-09 RX ADMIN — NEOSTIGMINE METHYLSULFATE 4 MG: 1 INJECTION, SOLUTION INTRAVENOUS at 10:14

## 2019-01-09 NOTE — ANESTHESIA PREPROCEDURE EVALUATION
Anesthesia Evaluation     Patient summary reviewed and Nursing notes reviewed   no history of anesthetic complications:  NPO Solid Status: > 8 hours             Airway   Mallampati: II  TM distance: >3 FB  Neck ROM: full  No difficulty expected  Dental    (+) edentulous    Pulmonary    (+) a smoker Current Abstained day of surgery, COPD mild, decreased breath sounds,   Cardiovascular   Exercise tolerance: good (4-7 METS)    Rhythm: regular  Rate: normal        Neuro/Psych  (+) headaches, dizziness/light headedness, psychiatric history Anxiety,     GI/Hepatic/Renal/Endo    (+) obesity,       Musculoskeletal     Abdominal   (+) obese,     Abdomen: soft.   Substance History      OB/GYN          Other   (+) blood dyscrasia   history of cancer active    ROS/Med Hx Other: ANEMIA                  Anesthesia Plan    ASA 3     general     intravenous induction   Anesthetic plan, all risks, benefits, and alternatives have been provided, discussed and informed consent has been obtained with: patient.    Plan discussed with CRNA.

## 2019-01-09 NOTE — ANESTHESIA POSTPROCEDURE EVALUATION
Patient: Evelia Foster    Procedure Summary     Date:  01/09/19 Room / Location:   [facility] /  [facility]    Anesthesia Start:  0753 Anesthesia Stop:  1030    Procedure:  TOTAL LAPAROSCOPIC HYSTERECTOMY, BILATERAL SALPINGECTOMY WITH DAVINCI ROBOT (N/A Abdomen) Diagnosis:       ELLEN (stress urinary incontinence, female)      (ELLEN (stress urinary incontinence, female) [N39.3])    Surgeon:  Markie Lewis MD Provider:  Markie Forde MD    Anesthesia Type:  general ASA Status:  3          Anesthesia Type: general  Last vitals  BP   102/50   Temp   98.1   Pulse   70   Resp   18    SpO2   99     Post Anesthesia Care and Evaluation    Patient location during evaluation: PACU  Patient participation: complete - patient participated  Level of consciousness: awake and alert  Pain score: 0  Pain management: adequate  Airway patency: patent  Anesthetic complications: No anesthetic complications  PONV Status: none  Cardiovascular status: hemodynamically stable and acceptable  Respiratory status: nonlabored ventilation, acceptable and nasal cannula  Hydration status: acceptable

## 2019-01-09 NOTE — ANESTHESIA PROCEDURE NOTES
ANESTHESIA INTUBATION  Urgency: elective    Airway not difficult    General Information and Staff    Patient location during procedure: OR  CRNA: Neelima Christopher CRNA    Indications and Patient Condition  Indications for airway management: airway protection    Preoxygenated: yes  MILS not maintained throughout  Mask difficulty assessment: 1 - vent by mask    Final Airway Details  Final airway type: endotracheal airway      Successful airway: ETT  Cuffed: yes   Successful intubation technique: direct laryngoscopy  Endotracheal tube insertion site: oral  Blade: Meng  Blade size: 3  ETT size (mm): 7.0  Cormack-Lehane Classification: grade I - full view of glottis  Placement verified by: chest auscultation and capnometry   Cuff volume (mL): 5  Measured from: lips  ETT to lips (cm): 20  Number of attempts at approach: 1    Additional Comments  Negative epigastric sounds, Breath sound equal bilaterally with symmetric chest rise and fall

## 2019-01-11 ENCOUNTER — TELEPHONE (OUTPATIENT)
Dept: OBSTETRICS AND GYNECOLOGY | Facility: CLINIC | Age: 39
End: 2019-01-11

## 2019-01-11 RX ORDER — TRAMADOL HYDROCHLORIDE 50 MG/1
50 TABLET ORAL EVERY 8 HOURS PRN
Qty: 21 TABLET | Refills: 0 | Status: SHIPPED | OUTPATIENT
Start: 2019-01-11 | End: 2019-01-16 | Stop reason: SDUPTHER

## 2019-01-11 NOTE — TELEPHONE ENCOUNTER
Please call pharmacy check to see if tramadol electronic prescription went through am not sure if it did or not

## 2019-01-11 NOTE — TELEPHONE ENCOUNTER
"I called Evelia [phone].  She is doing much better with the tramadol.  She reports the [place] for Lortab culture to become \"wired \"and also causes itching.  She has not able to tolerate Benadryl.  I suggested she might try Claritin or Zyrtec if she still having some itching.  Also mentioned that she can take Motrin 600 mg 4 times a day and now that she is on tramadol she did take 2 extra strength Tylenol 4 times a day as well for pain otherwise she's not passed a lot of gas.  She is on Gas-X.  Suggested that if she does have a bowel movement or pass more gas but tomorrow she could try Dulcolax suppository.  Discussed pathology report is not back yet.  We'll see if we get that back early next week.  She does not have a postop appointment may get so we'll need to do that over the next 10 days or so.  "

## 2019-01-11 NOTE — TELEPHONE ENCOUNTER
PT CALLING SHE HAS TAKEN 4 OF HER PAIN PILLS AND IT MAKES HER VERY ITCHY AND MAKES HER WIRED--- PREVIOUSLY BEEN GIVEN TRAMADOL AND IT WORKS CAN DR RANDOLPH SWITCH THIS     PLEASE CALL [phone]

## 2019-01-13 LAB
ABO + RH BLD: NORMAL
ABO + RH BLD: NORMAL
BH BB BLOOD EXPIRATION DATE: NORMAL
BH BB BLOOD EXPIRATION DATE: NORMAL
BH BB BLOOD TYPE BARCODE: 5100
BH BB BLOOD TYPE BARCODE: 5100
BH BB DISPENSE STATUS: NORMAL
BH BB DISPENSE STATUS: NORMAL
BH BB PRODUCT CODE: NORMAL
BH BB PRODUCT CODE: NORMAL
BH BB UNIT NUMBER: NORMAL
BH BB UNIT NUMBER: NORMAL
CROSSMATCH INTERPRETATION: NORMAL
CROSSMATCH INTERPRETATION: NORMAL
UNIT  ABO: NORMAL
UNIT  ABO: NORMAL
UNIT  RH: NORMAL
UNIT  RH: NORMAL

## 2019-01-14 LAB
CYTO UR: NORMAL
LAB AP CASE REPORT: NORMAL
LAB AP CLINICAL INFORMATION: NORMAL
PATH REPORT.FINAL DX SPEC: NORMAL
PATH REPORT.GROSS SPEC: NORMAL

## 2019-01-16 ENCOUNTER — OFFICE VISIT (OUTPATIENT)
Dept: OBSTETRICS AND GYNECOLOGY | Facility: CLINIC | Age: 39
End: 2019-01-16

## 2019-01-16 ENCOUNTER — APPOINTMENT (OUTPATIENT)
Dept: LAB | Facility: HOSPITAL | Age: 39
End: 2019-01-16

## 2019-01-16 VITALS
SYSTOLIC BLOOD PRESSURE: 116 MMHG | RESPIRATION RATE: 16 BRPM | BODY MASS INDEX: 29.99 KG/M2 | WEIGHT: 183 LBS | DIASTOLIC BLOOD PRESSURE: 70 MMHG

## 2019-01-16 DIAGNOSIS — R30.0 DYSURIA: ICD-10-CM

## 2019-01-16 DIAGNOSIS — Z09 SURGERY FOLLOW-UP: Primary | ICD-10-CM

## 2019-01-16 PROBLEM — Z84.2: Status: ACTIVE | Noted: 2019-01-16

## 2019-01-16 PROBLEM — N87.0 MILD DYSPLASIA OF CERVIX: Status: RESOLVED | Noted: 2018-11-20 | Resolved: 2019-01-16

## 2019-01-16 LAB
BACTERIA UR QL AUTO: NORMAL /HPF
BILIRUB UR QL STRIP: NEGATIVE
CLARITY UR: CLEAR
COLOR UR: YELLOW
GLUCOSE UR STRIP-MCNC: NEGATIVE MG/DL
HGB UR QL STRIP.AUTO: NEGATIVE
HYALINE CASTS UR QL AUTO: NORMAL /LPF
KETONES UR QL STRIP: NEGATIVE
LEUKOCYTE ESTERASE UR QL STRIP.AUTO: NEGATIVE
NITRITE UR QL STRIP: NEGATIVE
PH UR STRIP.AUTO: 6.5 [PH] (ref 5–8)
PROT UR QL STRIP: NEGATIVE
RBC # UR: NORMAL /HPF
REF LAB TEST METHOD: NORMAL
SP GR UR STRIP: <=1.005 (ref 1–1.03)
SQUAMOUS #/AREA URNS HPF: NORMAL /HPF
UROBILINOGEN UR QL STRIP: NORMAL
WBC UR QL AUTO: NORMAL /HPF

## 2019-01-16 PROCEDURE — 99024 POSTOP FOLLOW-UP VISIT: CPT | Performed by: OBSTETRICS & GYNECOLOGY

## 2019-01-16 PROCEDURE — 81001 URINALYSIS AUTO W/SCOPE: CPT | Performed by: OBSTETRICS & GYNECOLOGY

## 2019-01-16 RX ORDER — PHENAZOPYRIDINE HYDROCHLORIDE 200 MG/1
200 TABLET, FILM COATED ORAL 3 TIMES DAILY PRN
Qty: 9 TABLET | Refills: 0 | Status: SHIPPED | OUTPATIENT
Start: 2019-01-16 | End: 2019-02-20

## 2019-01-16 RX ORDER — TRAMADOL HYDROCHLORIDE 50 MG/1
50 TABLET ORAL EVERY 8 HOURS PRN
Qty: 15 TABLET | Refills: 0 | Status: SHIPPED | OUTPATIENT
Start: 2019-01-16 | End: 2019-05-09

## 2019-01-16 RX ORDER — NITROFURANTOIN 25; 75 MG/1; MG/1
100 CAPSULE ORAL 2 TIMES DAILY
Qty: 14 CAPSULE | Refills: 0 | Status: SHIPPED | OUTPATIENT
Start: 2019-01-16 | End: 2019-01-23

## 2019-01-16 NOTE — PROGRESS NOTES
Subjective   Chief Complaint   Patient presents with   • Post-op     s/p TLH B&S 1/9/19     Evelia Foster is a 38 y.o. year old No obstetric history on file. presenting to be seen for her post-operative visit.  She had a TLH.  The operative findings were reviewed.  Pathology report  were reviewed .  Currently she reports abdominal pain -constant cramping, constipation-first bowel movement this morning.  Postoperative Day #7 and slow urinary stream, she complains of dribbling urine.  Passed a chunk of tissue - looked like chicken ~ 2 cm by looks of cellphone picture.  This does not look like gauze etc. does not look like blood clot.  Path report benign - fibroids no carcinoma in situ seen on cervix.    The following portions of the patient's history were reviewed and updated as appropriate:problem list, current medications and allergies    Review of Systems : Please see above     Objective   /70   Resp 16   Wt 83 kg (183 lb)   LMP 12/18/2018   BMI 29.99 kg/m²     General:  well developed; well nourished  no acute distress  appears stated age   Abdomen: no umbilical or inguinal hernias are present  no hepato-splenomegaly  incision is clean, dry, intact, without drainage and soft but tender in the lower midline   Pelvis: Clinical staff was present for exam  External genitalia:  normal appearance of the external genitalia including Bartholin's and [place]'s glands.  :  urethral meatus normal;  Vaginal:  normal pink mucosa without prolapse or lesions. Slight watery light pinkish brown discharge and minimal odor cuff appears intact  Uterus:  absent. Tenderness on examination difficult to accurately tell about the cuff but I don't feel a mass.  Adnexa:  non palpable bilaterally.   Last by system to do in and out catheter urine for volume and sent for urinalysis.  She voided about 20 minutes ago.    ~ 225 ml I&O cath urine urine sent          Assessment   1. Slow recovery status post hysterectomy for fibroids  and carcinoma in situ.  Pathology was benign.  2. Some constipation issues with first bowel movement today.  Crampy Pain that she doesn't really relate to her bowels  3. Urinary frequency and incomplete emptying.  She's voided maybe 15 times a day possible UTI.     Plan   1. We'll send urine offer of a question and treat with Macrobid and pyridium pending results   2. Okay to gradually increase activity as tolerated no intercourse for 6 weeks  3. Stool softer one twice daily  4. One week   5. She will need Pap testing due to CIS    Medications Rx this encounter:  New Medications Ordered This Visit   Medications   • nitrofurantoin, macrocrystal-monohydrate, (MACROBID) 100 MG capsule     Sig: Take 1 capsule by mouth 2 (Two) Times a Day for 7 days. 1 po BID for 1 week     Dispense:  14 capsule     Refill:  0   • phenazopyridine (PYRIDIUM) 200 MG tablet     Sig: Take 1 tablet by mouth 3 (Three) Times a Day As Needed for bladder spasms.     Dispense:  9 tablet     Refill:  0   • traMADol (ULTRAM) 50 MG tablet     Sig: Take 1 tablet by mouth Every 8 (Eight) Hours As Needed for Severe Pain .     Dispense:  15 tablet     Refill:  0          This note was electronically signed.    Markie Lewis MD  January 16, 2019

## 2019-01-23 ENCOUNTER — OFFICE VISIT (OUTPATIENT)
Dept: OBSTETRICS AND GYNECOLOGY | Facility: CLINIC | Age: 39
End: 2019-01-23

## 2019-01-23 VITALS
RESPIRATION RATE: 16 BRPM | SYSTOLIC BLOOD PRESSURE: 114 MMHG | WEIGHT: 182 LBS | DIASTOLIC BLOOD PRESSURE: 60 MMHG | BODY MASS INDEX: 29.83 KG/M2

## 2019-01-23 DIAGNOSIS — Z09 SURGERY FOLLOW-UP: Primary | ICD-10-CM

## 2019-01-23 PROBLEM — Z90.710 HISTORY OF ROBOT-ASSISTED LAPAROSCOPIC HYSTERECTOMY: Status: ACTIVE | Noted: 2019-01-23

## 2019-01-23 PROBLEM — D06.9 CIS (CARCINOMA IN SITU OF CERVIX): Status: RESOLVED | Noted: 2019-01-09 | Resolved: 2019-01-23

## 2019-01-23 PROCEDURE — 99024 POSTOP FOLLOW-UP VISIT: CPT | Performed by: OBSTETRICS & GYNECOLOGY

## 2019-01-23 RX ORDER — METRONIDAZOLE 500 MG/1
500 TABLET ORAL 2 TIMES DAILY
Qty: 14 TABLET | Refills: 0 | Status: SHIPPED | OUTPATIENT
Start: 2019-01-23 | End: 2019-01-30

## 2019-01-23 NOTE — PROGRESS NOTES
Subjective   Chief Complaint   Patient presents with   • Post-op     TLH B&S 1/9/19     Evelia Foster is a 39 y.o. year old No obstetric history on file. presenting to be seen for her post-operative visit.  She had a TLH.  The operative findings were reviewed.  Pathology report and operative pictures were reviewed if appropriate.  Currently she reports pain is well controlled. Her bladder is still bothering her.  She is having a vaginal discharge with some odor she is not sure whether his urine or not.  Her urinalysis was negative last week on the 16th.  This was reviewed again today.  This is only thing is really causing her any problems with any pain.  She wonders about the glue falling off on the incisions I told her that is perfectly normal and she may peel it away    The following portions of the patient's history were reviewed and updated as appropriate:current medications and allergies    Review of Systems : Please see above     Objective   /60   Resp 16   Wt 82.6 kg (182 lb)   LMP 12/18/2018   BMI 29.83 kg/m²     General:  well developed; well nourished  no acute distress  appears stated age  mildly distressed   Abdomen: soft, non-tender; no masses  no umbilical or inguinal hernias are present  no hepato-splenomegaly  incision is clean, dry, intact and without drainage   Pelvis: Clinical staff was present for exam  External genitalia:  normal appearance of the external genitalia including Bartholin's and [place]'s glands.  :  urethral meatus normal;  Vaginal:  normal pink mucosa without prolapse or lesions. discharge present -  watery and green;  Cervix:  absent. Cuff difficult to visualize due to tenderness and discharge swabs x2 slight odor  Uterus:  absent.          Assessment   1. Postop cuff cellulitis history of regular or normal urinalysis last week  2. Incisions etc. are healing in well she may return to normal activities     Plan   1. As above no intercourse for 6 weeks start antibiotics  today  2. Return to office in 4 weeks assuming discharge improves.    Medications Rx this encounter:  New Medications Ordered This Visit   Medications   • metroNIDAZOLE (FLAGYL) 500 MG tablet     Sig: Take 1 tablet by mouth 2 (Two) Times a Day for 7 days.     Dispense:  14 tablet     Refill:  0          This note was electronically signed.    Markie Lewis MD  January 23, 2019

## 2019-02-05 ENCOUNTER — HOSPITAL ENCOUNTER (OUTPATIENT)
Dept: ULTRASOUND IMAGING | Facility: HOSPITAL | Age: 39
Discharge: HOME | End: 2019-02-05

## 2019-02-05 ENCOUNTER — HOSPITAL ENCOUNTER (OUTPATIENT)
Dept: MAMMOGRAPHY | Facility: HOSPITAL | Age: 39
Discharge: HOME | End: 2019-02-05
Admitting: NURSE PRACTITIONER

## 2019-02-05 ENCOUNTER — TRANSCRIBE ORDERS (OUTPATIENT)
Dept: MAMMOGRAPHY | Facility: HOSPITAL | Age: 39
End: 2019-02-05

## 2019-02-05 DIAGNOSIS — R92.8 ABNORMAL MAMMOGRAM: ICD-10-CM

## 2019-02-05 DIAGNOSIS — Z80.3 FAMILY HISTORY OF BREAST CANCER IN MOTHER: ICD-10-CM

## 2019-02-05 DIAGNOSIS — N63.20 LEFT BREAST MASS: ICD-10-CM

## 2019-02-05 DIAGNOSIS — R92.8 ABNORMAL MAMMOGRAM: Primary | ICD-10-CM

## 2019-02-05 PROCEDURE — 77066 DX MAMMO INCL CAD BI: CPT | Performed by: RADIOLOGY

## 2019-02-05 PROCEDURE — 77062 BREAST TOMOSYNTHESIS BI: CPT | Performed by: RADIOLOGY

## 2019-02-05 PROCEDURE — 76642 ULTRASOUND BREAST LIMITED: CPT

## 2019-02-05 PROCEDURE — 76642 ULTRASOUND BREAST LIMITED: CPT | Performed by: RADIOLOGY

## 2019-02-05 PROCEDURE — G0279 TOMOSYNTHESIS, MAMMO: HCPCS

## 2019-02-05 PROCEDURE — 77066 DX MAMMO INCL CAD BI: CPT

## 2019-02-11 ENCOUNTER — TELEPHONE (OUTPATIENT)
Dept: FAMILY MEDICINE CLINIC | Facility: CLINIC | Age: 39
End: 2019-02-11

## 2019-02-11 DIAGNOSIS — Z91.89 AT HIGH RISK FOR BREAST CANCER: Primary | ICD-10-CM

## 2019-02-20 ENCOUNTER — APPOINTMENT (OUTPATIENT)
Dept: LAB | Facility: HOSPITAL | Age: 39
End: 2019-02-20

## 2019-02-20 ENCOUNTER — HOSPITAL ENCOUNTER (OUTPATIENT)
Dept: MRI IMAGING | Facility: HOSPITAL | Age: 39
Discharge: HOME | End: 2019-02-20
Admitting: NURSE PRACTITIONER

## 2019-02-20 ENCOUNTER — TELEPHONE (OUTPATIENT)
Dept: MRI IMAGING | Facility: HOSPITAL | Age: 39
End: 2019-02-20

## 2019-02-20 ENCOUNTER — OFFICE VISIT (OUTPATIENT)
Dept: OBSTETRICS AND GYNECOLOGY | Facility: CLINIC | Age: 39
End: 2019-02-20

## 2019-02-20 DIAGNOSIS — N76.0 ACUTE VAGINITIS: ICD-10-CM

## 2019-02-20 DIAGNOSIS — Z91.89 AT HIGH RISK FOR BREAST CANCER: ICD-10-CM

## 2019-02-20 DIAGNOSIS — D50.8 OTHER IRON DEFICIENCY ANEMIA: ICD-10-CM

## 2019-02-20 DIAGNOSIS — Z90.710 HISTORY OF ROBOT-ASSISTED LAPAROSCOPIC HYSTERECTOMY: Primary | ICD-10-CM

## 2019-02-20 DIAGNOSIS — Z09 S/P GYNECOLOGICAL SURGERY, FOLLOW-UP EXAM: ICD-10-CM

## 2019-02-20 PROBLEM — N94.6 DYSMENORRHEA: Status: RESOLVED | Noted: 2018-11-20 | Resolved: 2019-02-20

## 2019-02-20 PROBLEM — Z98.51 S/P TUBAL LIGATION: Status: RESOLVED | Noted: 2018-11-20 | Resolved: 2019-02-20

## 2019-02-20 PROBLEM — D21.9 FIBROIDS: Status: RESOLVED | Noted: 2019-01-09 | Resolved: 2019-02-20

## 2019-02-20 PROBLEM — N92.1 MENORRHAGIA WITH IRREGULAR CYCLE: Status: RESOLVED | Noted: 2018-11-20 | Resolved: 2019-02-20

## 2019-02-20 LAB
DEPRECATED RDW RBC AUTO: 48.9 FL (ref 37–54)
ERYTHROCYTE [DISTWIDTH] IN BLOOD BY AUTOMATED COUNT: 19.8 % (ref 11.3–14.5)
HCT VFR BLD AUTO: 30.1 % (ref 34.5–44)
HGB BLD-MCNC: 8.5 G/DL (ref 11.5–15.5)
MCH RBC QN AUTO: 19.5 PG (ref 27–31)
MCHC RBC AUTO-ENTMCNC: 28.2 G/DL (ref 32–36)
MCV RBC AUTO: 68.9 FL (ref 80–99)
PLATELET # BLD AUTO: 358 10*3/MM3 (ref 150–450)
PMV BLD AUTO: 9.5 FL (ref 6–12)
RBC # BLD AUTO: 4.37 10*6/MM3 (ref 3.89–5.14)
WBC NRBC COR # BLD: 9.27 10*3/MM3 (ref 3.5–10.8)

## 2019-02-20 PROCEDURE — A9577 INJ MULTIHANCE: HCPCS | Performed by: NURSE PRACTITIONER

## 2019-02-20 PROCEDURE — 87210 SMEAR WET MOUNT SALINE/INK: CPT | Performed by: OBSTETRICS & GYNECOLOGY

## 2019-02-20 PROCEDURE — 77049 MRI BREAST C-+ W/CAD BI: CPT

## 2019-02-20 PROCEDURE — 36415 COLL VENOUS BLD VENIPUNCTURE: CPT | Performed by: OBSTETRICS & GYNECOLOGY

## 2019-02-20 PROCEDURE — 0 GADOBENATE DIMEGLUMINE 529 MG/ML SOLUTION: Performed by: NURSE PRACTITIONER

## 2019-02-20 PROCEDURE — 77049 MRI BREAST C-+ W/CAD BI: CPT | Performed by: RADIOLOGY

## 2019-02-20 PROCEDURE — 83986 ASSAY PH BODY FLUID NOS: CPT | Performed by: OBSTETRICS & GYNECOLOGY

## 2019-02-20 PROCEDURE — 99024 POSTOP FOLLOW-UP VISIT: CPT | Performed by: OBSTETRICS & GYNECOLOGY

## 2019-02-20 PROCEDURE — C8908 MRI W/O FOL W/CONT, BREAST,: HCPCS

## 2019-02-20 PROCEDURE — 85027 COMPLETE CBC AUTOMATED: CPT | Performed by: OBSTETRICS & GYNECOLOGY

## 2019-02-20 RX ORDER — METRONIDAZOLE 500 MG/1
500 TABLET ORAL 2 TIMES DAILY
Qty: 14 TABLET | Refills: 0 | Status: SHIPPED | OUTPATIENT
Start: 2019-02-20 | End: 2019-02-27

## 2019-02-20 RX ADMIN — GADOBENATE DIMEGLUMINE 16 ML: 529 INJECTION, SOLUTION INTRAVENOUS at 09:14

## 2019-02-20 NOTE — TELEPHONE ENCOUNTER
Called pt with Breast MRI results. Recommended yearly high risk screening. Pt expressed understanding and was encouraged to call with any further questions or concerns.

## 2019-02-20 NOTE — PROGRESS NOTES
Subjective   Chief Complaint   Patient presents with   • Post-op     s/p TLH B&S 19     Evelia Foster is a 39 y.o. year old  presenting to be seen for her post-operative visit.  She had a TLH.  The operative findings were reviewed.  Pathology report and operative pictures were reviewed if appropriate.  Currently she reports no problems with eating, bowel movements, voiding, or wound drainage and pain is well controlled.    The following portions of the patient's history were reviewed and updated as appropriate:problem list, current medications and allergies    Review of Systems : Please see above     Objective   LMP 2018   Breastfeeding? No     General:  well developed; well nourished  no acute distress  appears stated age   Abdomen: soft, non-tender; no masses  no umbilical or inguinal hernias are present  no hepato-splenomegaly  incision is healing, clean, dry, intact and without drainage   Pelvis: Clinical staff was present for exam  External genitalia:  normal appearance of the external genitalia including Bartholin's and [place]'s glands.  :  urethral meatus normal;  Vaginal:  normal pink mucosa without prolapse or lesions. discharge present -  white and thick; pH = 6.0 wet prep done: clue cells are present;  Cervix:  absent. Cuff appears intact tender with the speculum open.  Uterus:  absent. Vaginal cuff is somewhat tender suture still palpable which is not unexpected.  Adnexa:  normal bimanual exam of the adnexa.          Assessment   1. 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  She did have a cuff cellulitis was treated with Flagyl.  Still has a bit of a discharge with increased pH the cuff appears intact and noninfected.  On bimanual examination there is some guarding in general but I do not think the cuff is thickened unusually.  I would like to treat this with Flagyl and have her wait a couple weeks before attempting  intercourse.  2. She is feels a little lightheaded and tired.  Had a history of anemia preoperatively 8 in about 24-26%.  She is on iron therapy I think be a good idea to check a CBC to be sure that this is improving.  She will continue her iron therapy.     Orders Placed This Encounter   Procedures   • CBC (No Diff)     Plan   1. Increase activity with exception of waiting 2 weeks for intercourse.  2. Continue iron for now we will get in touch with her after the CBC results.  3. 6 months or sooner if any problems; she may need a Pap test given carcinoma in situ.    Medications Rx this encounter:  New Medications Ordered This Visit   Medications   • metroNIDAZOLE (FLAGYL) 500 MG tablet     Sig: Take 1 tablet by mouth 2 (Two) Times a Day for 7 days.     Dispense:  14 tablet     Refill:  0          This note was electronically signed.    Markie Lewis MD  February 20, 2019

## 2019-02-21 ENCOUNTER — APPOINTMENT (OUTPATIENT)
Dept: LAB | Facility: HOSPITAL | Age: 39
End: 2019-02-21

## 2019-02-21 ENCOUNTER — CLINICAL SUPPORT (OUTPATIENT)
Dept: GENETICS | Facility: HOSPITAL | Age: 39
End: 2019-02-21

## 2019-02-21 DIAGNOSIS — Z80.3 FAMILY HISTORY OF BREAST CANCER: Primary | ICD-10-CM

## 2019-02-21 DIAGNOSIS — Z13.79 GENETIC TESTING: Primary | ICD-10-CM

## 2019-02-21 PROCEDURE — 96040: CPT | Performed by: GENETIC COUNSELOR, MS

## 2019-02-21 NOTE — PROGRESS NOTES
Please let her know that although her H&H is improved it is still low at 8.5 and 30%.  She needs to remain on her iron daily for another few months.

## 2019-02-22 NOTE — PROGRESS NOTES
Evelia Foster, a 39-year-old female, was referred for genetic counseling due to a family history of breast cancer.  She is premenopausal, has had a hysterectomy and retains her ovaries. She was 13 at menarche and 20 years old when she had her first child.  She was interested in discussing her risk for a hereditary cancer syndrome.  Ms. Foster was interested in pursuing comprehensive genetic testing to evaluate her risk of cancer, therefore the CancerNext panel was ordered through [facility] which analyzes 34 genes associated with an increased cancer risk. The genes on this panel include APC, BEHZAD, BARD1, BMPR1A, BRCA1, BRCA2, BRIP1, CDH1, CDK4, CDKN2A, CHEK2, DICER1, EPCAM, GREM1, HOXB13, MLH1, MRE11A, MSH2, MSH6, MUTYH, NBN, NF1, PALB2, PMS2, POLD1, POLE, PTEN, RAD50, RAD51C, RAD51D, SMAD4, SMARCA4, STK11, and TP53. Results are expected in approximately 2-3 weeks.     PERTINENT FAMILY HISTORY: (See attached pedigree)   Sister:  Breast cancer, 35 (reported positive genetic testing, copy of report not available)  Mother: Breast cancer, early 30s (bilateral)    Uterine/Cervical cancer    Non-Hodgkins lymphoma  Mat. Great-aunts (x2): Breast cancer    Paternal family history is unknown.   We do not have medical records regarding any of these diagnoses.      RISK ASSESSMENT:  Ms. Foster’s family history of breast cancer raised the question of a hereditary cancer syndrome. Ms. Foster clearly meets NCCN guidelines criteria for BRCA1/2 testing based on having a close relative diagnosed with breast cancer under age 50.  In cases where an affected relative is not available or willing to pursue testing, it is appropriate to offer testing to an unaffected individual.  Based on the presence of other clinically significant breast cancer related genes, the standard approach to hereditary cancer genetic testing at this time is via multi-gene panel, which evaluates for BRCA1/2 as well as several additional genes  associated with a hereditary risk for breast cancer and other cancers. If genetic testing is negative, management should be guided by a family history-based risk assessment.    GENETIC COUNSELING (35 minutes):  We reviewed the family history information in detail. Cases of cancer follow three general patterns: sporadic, familial, and hereditary.  While most cancer is sporadic, some cases appear to occur in family clusters.  These cases are said to be familial and account for 10-20% of cancer cases.  Familial cases may be due to a combination of shared genes and environmental factors among family members.  In even fewer families, the cancer is said to be inherited, and the genes responsible for the cancer are known.      Family histories typical of hereditary cancer syndromes usually include multiple first- and second-degree relatives diagnosed with cancer types that define a syndrome.  These cases tend to be diagnosed at younger-than-expected ages and can be bilateral or multifocal.  The cancer in these families follows an autosomal dominant inheritance pattern, which indicates the likely presence of a mutation in a cancer susceptibility gene.  Children and siblings of an individual believed to carry this mutation have a 50% chance of inheriting that mutation, thereby inheriting the increased risk to develop cancer.  These mutations can be passed down from the maternal or the paternal lineage.    Hereditary breast cancer accounts for 5-10% of all cases of breast cancer.  A significant proportion (50%) of hereditary breast cancer can be attributed to mutations in the BRCA1 and BRCA2 genes.  Mutations in these genes confer an increased risk for breast cancer, ovarian cancer, male breast cancer, prostate cancer, and pancreatic cancer.  Women with a BRCA1 or BRCA2 mutation have up to an 87% lifetime risk of breast cancer and up to a 44% risk of ovarian cancer.  There are other clinically significant breast cancer  related genes in addition to BRCA1/2, including PALB2, BEHZAD, and CHEK2.     There are other, more rare, hereditary cancer syndromes. Some of these conditions have well defined cancer risks and established management guidelines.  Other genes that can be tested for have been more recently described, and may not have as well understood risks or established management guidelines.  We discussed these limitations at length. Based on Ms. Foster’s family history and her desire to get more information regarding her personal risks, she opted to pursue testing through a panel evaluating several other genes known to increase the risk for cancer.    GENETIC TESTING:  The risks, benefits, and limitations of genetic testing and implications for clinical management following testing were reviewed.  DNA test results can influence decisions regarding screening and prevention.  Genetic testing can have significant psychological implications for both individuals and families. Also discussed was the possibility of employment and insurance discrimination based on genetic test results and the laws in place to prevent this, as well as the limitations of these laws.      We discussed panel testing, which would involve testing 34 genes associated with increased cancer risk. The implications of a positive or negative test result were discussed.  We also discussed the importance of testing on an affected relative. In general, a negative genetic test result is most informative if a mutation has first been established in an affected member of the family.  In cases where an affected individual is not available or interested in testing, it is appropriate to offer testing to an unaffected individual. We discussed the possibility that, in some cases, genetic test results may be ambiguous due to the identification of a genetic variant. These variants may or may not be associated with an increased cancer risk. With multigene panel testing, it is not  uncommon for a variant of uncertain significance (VUS) to be identified.  If a VUS is identified, testing family members is typically not recommended and screening recommendations are made based on the family history.  The laboratories that perform genetic testing work to reclassify the VUS and send out an amended report if and when a VUS is reclassified.  The majority of variant findings are ultimately reclassified to a negative result. Given her family history, a negative test result does not eliminate all cancer risk, although the risk would not be as high as it would with positive genetic testing. We also discussed that some of the genes on this particular panel have not been well studied yet and there may not be clear implications or guidelines for some of the genes included on this comprehensive panel.    PLAN: Genetic testing via the CancerNext panel through [facility] was ordered and results are expected in 2-3 weeks.  [employer] will preauthorize the testing with Ms. Foster’s insurance and will then contact her if the OOP exceed $100, and screen for financial assistance as needed.  In most cases, testing is covered by insurance when the patient meets NCCN guidelines criteria, which Ms. Foster does.   We will contact Ms. Foster with her results once they are received.     Doris Mckeon MS, [facility] [place], [facility]  Licensed Certified Genetic Counselor

## 2019-03-12 ENCOUNTER — DOCUMENTATION (OUTPATIENT)
Dept: GENETICS | Facility: HOSPITAL | Age: 39
End: 2019-03-12

## 2019-03-12 NOTE — PROGRESS NOTES
Evelia Foster, a 39-year-old female, was referred for genetic counseling due to a family history of breast cancer.  She is premenopausal, has had a hysterectomy and retains her ovaries. She was 13 at menarche and 20 years old when she had her first child.  She was interested in discussing her risk for a hereditary cancer syndrome.  Ms. Foster was interested in pursuing comprehensive genetic testing to evaluate her risk of cancer, therefore the CancerNext panel was ordered through [facility] which analyzes 34 genes associated with an increased cancer risk. The genes on this panel include APC, BEHZAD, BARD1, BMPR1A, BRCA1, BRCA2, BRIP1, CDH1, CDK4, CDKN2A, CHEK2, DICER1, EPCAM, GREM1, HOXB13, MLH1, MRE11A, MSH2, MSH6, MUTYH, NBN, NF1, PALB2, PMS2, POLD1, POLE, PTEN, RAD50, RAD51C, RAD51D, SMAD4, SMARCA4, STK11, and TP53. Genetic testing was negative by sequencing and rearrangement testing for deleterious mutations in the BRCA1/2 genes and 32 additional genes included on the CancerNext Panel (see attached results). These normal results were discussed with Ms. Foster by telephone on 3/12/19.    PERTINENT FAMILY HISTORY: (See attached pedigree)   Mat. Half-Sister:   Breast cancer, 35 (reported positive genetic testing, copy of report not available)  Mother:    Breast cancer, early 30s (bilateral)       Uterine/Cervical cancer       Non-Hodgkins lymphoma  Mat. Great-aunts (x2):   Breast cancer  Father:     Liver cancer    Paternal family history is largely unknown.   We do not have medical records regarding any of these diagnoses.      RISK ASSESSMENT:  Ms. Foster’s family history of breast cancer raised the question of a hereditary cancer syndrome. Ms. Foster clearly meets NCCN guidelines criteria for BRCA1/2 testing based on having a close relative diagnosed with breast cancer under age 50.  In cases where an affected relative is not available or willing to pursue testing, or test results for the affected  individual are not available, it is appropriate to offer testing to an unaffected individual.  Based on the presence of other clinically significant breast cancer related genes, the standard approach to hereditary cancer genetic testing at this time is via multi-gene panel, which evaluates for BRCA1/2 as well as several additional genes associated with a hereditary risk for breast cancer and other cancers. If genetic testing is negative, management should be guided by a family history-based risk assessment.    Since genetic testing was negative, Ms. Foster’s risk was estimated using family history-based risk models taking into account her family history, age at menarche, age at first birth, etc. Ms. Foster’s lifetime risk for breast cancer was estimated to be 20.1% (Tyrer-Shantelck/DEMARCUS), in comparison to the general population risk of 12.5%.   A lifetime breast cancer risk of 20% or greater warrants consideration of increased screening.  If Ms. Foster’ maternal half-sister were to have had positive genetic testing and we were able to confirm through review of a report, this risk assessment could be updated to include that information.  This risk assessment is based on the family history information provided at the time of the appointment.      GENETIC COUNSELING:  We reviewed the family history information in detail. Cases of cancer follow three general patterns: sporadic, familial, and hereditary.  While most cancer is sporadic, some cases appear to occur in family clusters.  These cases are said to be familial and account for 10-20% of cancer cases.  Familial cases may be due to a combination of shared genes and environmental factors among family members.  In even fewer families, the cancer is said to be inherited, and the genes responsible for the cancer are known.      Family histories typical of hereditary cancer syndromes usually include multiple first- and second-degree relatives diagnosed with cancer types  that define a syndrome.  These cases tend to be diagnosed at younger-than-expected ages and can be bilateral or multifocal.  The cancer in these families follows an autosomal dominant inheritance pattern, which indicates the likely presence of a mutation in a cancer susceptibility gene.  Children and siblings of an individual believed to carry this mutation have a 50% chance of inheriting that mutation, thereby inheriting the increased risk to develop cancer.  These mutations can be passed down from the maternal or the paternal lineage.    Hereditary breast cancer accounts for 5-10% of all cases of breast cancer.  A significant proportion (50%) of hereditary breast cancer can be attributed to mutations in the BRCA1 and BRCA2 genes.  Mutations in these genes confer an increased risk for breast cancer, ovarian cancer, male breast cancer, prostate cancer, and pancreatic cancer.  Women with a BRCA1 or BRCA2 mutation have up to an 87% lifetime risk of breast cancer and up to a 44% risk of ovarian cancer.  There are other clinically significant breast cancer related genes in addition to BRCA1/2, including PALB2, BEHZAD, and CHEK2.     There are other, more rare, hereditary cancer syndromes. Some of these conditions have well defined cancer risks and established management guidelines.  Other genes that can be tested for have been more recently described, and may not have as well understood risks or established management guidelines.  We discussed these limitations at length. Based on Ms. Foster’s family history and her desire to get more information regarding her personal risks, she opted to pursue testing through a panel evaluating several other genes known to increase the risk for cancer.    GENETIC TESTING:  The risks, benefits, and limitations of genetic testing and implications for clinical management following testing were reviewed.  DNA test results can influence decisions regarding screening and prevention.  Genetic  testing can have significant psychological implications for both individuals and families. Also discussed was the possibility of employment and insurance discrimination based on genetic test results and the laws in place to prevent this, as well as the limitations of these laws.      We discussed panel testing, which would involve testing 34 genes associated with increased cancer risk. The implications of a positive or negative test result were discussed.  We also discussed the importance of testing on an affected relative. In general, a negative genetic test result is most informative if a mutation has first been established in an affected member of the family.  In cases where an affected individual is not available or interested in testing, it is appropriate to offer testing to an unaffected individual. We discussed the possibility that, in some cases, genetic test results may be ambiguous due to the identification of a genetic variant. These variants may or may not be associated with an increased cancer risk. With multigene panel testing, it is not uncommon for a variant of uncertain significance (VUS) to be identified.  If a VUS is identified, testing family members is typically not recommended and screening recommendations are made based on the family history.  The laboratories that perform genetic testing work to reclassify the VUS and send out an amended report if and when a VUS is reclassified.  The majority of variant findings are ultimately reclassified to a negative result. Given her family history, a negative test result does not eliminate all cancer risk, although the risk would not be as high as it would with positive genetic testing. We also discussed that some of the genes on this particular panel have not been well studied yet and there may not be clear implications or guidelines for some of the genes included on this comprehensive panel.    TEST RESULTS:  Genetic testing was negative for known  deleterious mutations by sequencing and rearrangement testing for the 34 genes on the CancerNext panel.    This greatly reduces the risk for Ms. Foster to have a hereditary cancer syndrome.  Since an affected relative has not been testing, It is possible that there could be a genetic mutation present in the family that Ms. Foster did not inherit.  If we were able to confirm that her maternal half-sister did have a causative mutation, that information could be included in an updated risk assessment. At this time, Ms. Foster’s breast cancer risk is still estimated to be elevated based on family history-based risk assessments.  This assessment is based on the information provided at the time of the consultation.    Cancer Prevention:  Despite the negative genetic test results, Ms. Foster’s lifetime risk for breast cancer is estimated to be 20.1% based on her family history. Given Ms. Foster’s increased risk, options available to individuals with a high lifetime risk for breast cancer were discussed, including increased surveillance.     Increased surveillance, based on NCCN guidelines, would consist of annual mammography (starting 10 years before the earliest diagnosis in the family), semi-annual clinical breast exam and monthly self-breast exam. According to NCCN guidelines and an American Cancer Society expert panel, annual breast MRI should be offered to women whose lifetime risk of breast cancer is 20-25 percent or more. These recommendations could change should an affected relative have positive genetic testing.  In that case, Ms. Foster’s risk assessment should be updated.      Plan:  Genetic counseling remains available to Ms. Foster and she is welcome to contact us at [phone] with any questions she may have.        Doris Mckeon, MS, [facility] [place], [facility]  Licensed Certified Genetic Counselor    Cc: OG Cid

## 2019-05-09 ENCOUNTER — LAB (OUTPATIENT)
Dept: LAB | Facility: HOSPITAL | Age: 39
End: 2019-05-09

## 2019-05-09 ENCOUNTER — OFFICE VISIT (OUTPATIENT)
Dept: OBSTETRICS AND GYNECOLOGY | Facility: CLINIC | Age: 39
End: 2019-05-09

## 2019-05-09 VITALS
BODY MASS INDEX: 29.5 KG/M2 | RESPIRATION RATE: 16 BRPM | SYSTOLIC BLOOD PRESSURE: 116 MMHG | WEIGHT: 180 LBS | DIASTOLIC BLOOD PRESSURE: 70 MMHG

## 2019-05-09 DIAGNOSIS — F51.01 PRIMARY INSOMNIA: ICD-10-CM

## 2019-05-09 DIAGNOSIS — D50.8 OTHER IRON DEFICIENCY ANEMIA: ICD-10-CM

## 2019-05-09 DIAGNOSIS — D64.9 FATIGUE ASSOCIATED WITH ANEMIA: ICD-10-CM

## 2019-05-09 DIAGNOSIS — R45.86 MOOD SWINGS: ICD-10-CM

## 2019-05-09 DIAGNOSIS — D64.9 FATIGUE ASSOCIATED WITH ANEMIA: Primary | ICD-10-CM

## 2019-05-09 DIAGNOSIS — R30.0 DYSURIA: ICD-10-CM

## 2019-05-09 LAB
BACTERIA UR QL AUTO: ABNORMAL /HPF
BILIRUB UR QL STRIP: NEGATIVE
CLARITY UR: CLEAR
COLOR UR: YELLOW
FSH SERPL-ACNC: 2.78 MIU/ML
GLUCOSE UR STRIP-MCNC: NEGATIVE MG/DL
HGB UR QL STRIP.AUTO: NEGATIVE
HYALINE CASTS UR QL AUTO: ABNORMAL /LPF
IRON 24H UR-MRATE: 25 MCG/DL (ref 37–145)
IRON SATN MFR SERPL: 5 % (ref 20–50)
KETONES UR QL STRIP: NEGATIVE
LEUKOCYTE ESTERASE UR QL STRIP.AUTO: NEGATIVE
LH SERPL-ACNC: 2.82 MIU/ML
NITRITE UR QL STRIP: NEGATIVE
PH UR STRIP.AUTO: 7 [PH] (ref 5–8)
PROT UR QL STRIP: NEGATIVE
RBC # UR: ABNORMAL /HPF
REF LAB TEST METHOD: ABNORMAL
SP GR UR STRIP: 1.01 (ref 1–1.03)
SQUAMOUS #/AREA URNS HPF: ABNORMAL /HPF
TIBC SERPL-MCNC: 498 MCG/DL (ref 298–536)
TRANSFERRIN SERPL-MCNC: 334 MG/DL (ref 200–360)
TSH SERPL DL<=0.05 MIU/L-ACNC: 1.4 MIU/ML (ref 0.27–4.2)
UROBILINOGEN UR QL STRIP: NORMAL
WBC UR QL AUTO: ABNORMAL /HPF

## 2019-05-09 PROCEDURE — 85014 HEMATOCRIT: CPT

## 2019-05-09 PROCEDURE — 83002 ASSAY OF GONADOTROPIN (LH): CPT | Performed by: OBSTETRICS & GYNECOLOGY

## 2019-05-09 PROCEDURE — 84466 ASSAY OF TRANSFERRIN: CPT

## 2019-05-09 PROCEDURE — 83540 ASSAY OF IRON: CPT

## 2019-05-09 PROCEDURE — 36415 COLL VENOUS BLD VENIPUNCTURE: CPT | Performed by: OBSTETRICS & GYNECOLOGY

## 2019-05-09 PROCEDURE — 99214 OFFICE O/P EST MOD 30 MIN: CPT | Performed by: OBSTETRICS & GYNECOLOGY

## 2019-05-09 PROCEDURE — 83001 ASSAY OF GONADOTROPIN (FSH): CPT | Performed by: OBSTETRICS & GYNECOLOGY

## 2019-05-09 PROCEDURE — 81001 URINALYSIS AUTO W/SCOPE: CPT | Performed by: OBSTETRICS & GYNECOLOGY

## 2019-05-09 PROCEDURE — 84443 ASSAY THYROID STIM HORMONE: CPT

## 2019-05-09 PROCEDURE — 85018 HEMOGLOBIN: CPT

## 2019-05-09 RX ORDER — METRONIDAZOLE 500 MG/1
500 TABLET ORAL 2 TIMES DAILY
Qty: 14 TABLET | Refills: 0 | Status: SHIPPED | OUTPATIENT
Start: 2019-05-09 | End: 2019-05-16

## 2019-05-09 NOTE — PROGRESS NOTES
Subjective   Chief Complaint   Patient presents with   • Dyspareunia   • Irritable   • Hot Flashes   • Vaginal Bleeding     Evelia Foster is a 39 y.o. year old .  Patient's last menstrual period was 2018.  She presents to be seen because of multiple complaints.  One is painful intercourse.  This occurs at entry and with deeper penetration.  She is always had some problems but has not gotten any better.  She is also been more irritable lately along with some hot flashes mood swings etc. status post hysterectomy recently with preservation of ovaries.  She is also complaining of a bald spot on her head but when examined it looks like her hair may be a little thin she is has dark hair but I do not see any alopecia per se.  She reports she is been having some vaginal discharge not really bleeding.  May have an odor to it not sure.  Worse after intercourse.  She is fatigued.  She has stress urinary incontinence when she coughs laughs sneezes.  Been wearing a liner lately.                      The following portions of the patient's history were reviewed and updated as appropriate:problem list, current medications and allergies    Review of Systems ELLEN with cough so wears aliner; bowels okay     Objective   /70   Resp 16   Wt 81.6 kg (180 lb)   LMP 2018   Breastfeeding? No   BMI 29.50 kg/m²     General:  well developed; well nourished  no acute distress  appears stated age   Skin:  No suspicious lesions seen I do not see any alopecia areas on her scalp   Thyroid: normal to inspection and palpation   Lungs:  breathing is unlabored   Heart:  Not performed.   Abdomen: soft, non-tender; no masses  no umbilical or inguinal hernias are present  no hepato-splenomegaly   Pelvis: Clinical staff was present for exam  External genitalia:  normal appearance of the external genitalia including Bartholin's and [place]'s glands.  :  urethral meatus normal; bladder tender to palpation;  Vaginal:  normal  pink mucosa without prolapse or lesions. discharge present -  watery and white; pH = 7 wet prep done: clue cells are present; Vagina was tender with just speculum placement and actually single digit placement.She was able to do Kegel exercise.  Uterus:  absent.  Adnexa:  normal bimanual exam of the adnexa.     Lab Review   CBC and Iron profile    Imaging   No data reviewed       Assessment   1. Multiple problems as listed above  2. Stress incontinence we will check urinalysis have her do Kegel's 5 minutes daily and as needed she did have a strong Kegel response.  Check urinalysis   3. due to tender bladder we will have her take puff questionnaire and give her IC food diet information PUF score 22   4. Hot flashes etc. check TSH FSH LH.  5. History of anemia fatigue will check an additional iron and H&H  6. Bacterial vaginosis     Plan   1.  We will check labs as listed  2.    Time voiding Kegel's.  IC diet information  Follow-up 3 months check response to above      Orders Placed This Encounter   Procedures   • Hemoglobin & Hematocrit, Blood     Standing Status:   Future     Standing Expiration Date:   5/9/2020   • Iron Profile     Standing Status:   Future     Standing Expiration Date:   5/9/2020   • TSH     Standing Status:   Future     Standing Expiration Date:   5/9/2020   • Luteinizing Hormone   • Follicle Stimulating Hormone   • Urinalysis With Microscopic - Urine, Clean Catch     New Medications Ordered This Visit   Medications   • metroNIDAZOLE (FLAGYL) 500 MG tablet     Sig: Take 1 tablet by mouth 2 (Two) Times a Day for 7 days.     Dispense:  14 tablet     Refill:  0            I spent a total of 25 minutes, greater than half the time was in counseling the patient.  This note was electronically signed.    Markie Lewis MD  May 9, 2019

## 2019-05-10 LAB
HCT VFR BLD AUTO: 32.9 % (ref 34–46.6)
HGB BLD-MCNC: 9.2 G/DL (ref 12–15.9)

## 2019-05-10 NOTE — PROGRESS NOTES
Please let her know that her anemia is slightly improved but she needs to remain on iron as her iron stores are still very low at 25 with normal being .  Iron saturation is 5% normal being 20 to 50%.  I think she probably needs to stay on this for at least another 3 months.  H&H increased to 9.2/32.9%.  Thyroid level was normal.  Thank you

## 2019-05-10 NOTE — PROGRESS NOTES
Sorry for confusion other labs for urine was negative she is not postmenopausal she was having some symptoms.  Thyroid and iron studies in previous note thank you

## 2019-07-22 ENCOUNTER — OFFICE VISIT (OUTPATIENT)
Dept: FAMILY MEDICINE CLINIC | Facility: CLINIC | Age: 39
End: 2019-07-22

## 2019-07-22 VITALS
RESPIRATION RATE: 17 BRPM | WEIGHT: 184.2 LBS | HEIGHT: 66 IN | DIASTOLIC BLOOD PRESSURE: 73 MMHG | HEART RATE: 102 BPM | SYSTOLIC BLOOD PRESSURE: 141 MMHG | OXYGEN SATURATION: 97 % | TEMPERATURE: 98 F | BODY MASS INDEX: 29.6 KG/M2

## 2019-07-22 DIAGNOSIS — Z83.3 FAMILY HISTORY OF DIABETES MELLITUS: ICD-10-CM

## 2019-07-22 DIAGNOSIS — F41.9 ANXIETY: ICD-10-CM

## 2019-07-22 DIAGNOSIS — R53.83 FATIGUE, UNSPECIFIED TYPE: Primary | ICD-10-CM

## 2019-07-22 DIAGNOSIS — D50.9 IRON DEFICIENCY ANEMIA, UNSPECIFIED IRON DEFICIENCY ANEMIA TYPE: ICD-10-CM

## 2019-07-22 DIAGNOSIS — R53.1 WEAKNESS: ICD-10-CM

## 2019-07-22 PROCEDURE — 84466 ASSAY OF TRANSFERRIN: CPT | Performed by: NURSE PRACTITIONER

## 2019-07-22 PROCEDURE — 84439 ASSAY OF FREE THYROXINE: CPT | Performed by: NURSE PRACTITIONER

## 2019-07-22 PROCEDURE — 99214 OFFICE O/P EST MOD 30 MIN: CPT | Performed by: NURSE PRACTITIONER

## 2019-07-22 PROCEDURE — 84443 ASSAY THYROID STIM HORMONE: CPT | Performed by: NURSE PRACTITIONER

## 2019-07-22 PROCEDURE — 83036 HEMOGLOBIN GLYCOSYLATED A1C: CPT | Performed by: NURSE PRACTITIONER

## 2019-07-22 PROCEDURE — 85025 COMPLETE CBC W/AUTO DIFF WBC: CPT | Performed by: NURSE PRACTITIONER

## 2019-07-22 PROCEDURE — 80053 COMPREHEN METABOLIC PANEL: CPT | Performed by: NURSE PRACTITIONER

## 2019-07-22 PROCEDURE — 36415 COLL VENOUS BLD VENIPUNCTURE: CPT | Performed by: NURSE PRACTITIONER

## 2019-07-22 PROCEDURE — 82607 VITAMIN B-12: CPT | Performed by: NURSE PRACTITIONER

## 2019-07-22 PROCEDURE — 82306 VITAMIN D 25 HYDROXY: CPT | Performed by: NURSE PRACTITIONER

## 2019-07-22 PROCEDURE — 83540 ASSAY OF IRON: CPT | Performed by: NURSE PRACTITIONER

## 2019-07-22 PROCEDURE — 85045 AUTOMATED RETICULOCYTE COUNT: CPT | Performed by: NURSE PRACTITIONER

## 2019-07-22 NOTE — PATIENT INSTRUCTIONS
Fatigue  If you have fatigue, you feel tired all the time and have a lack of energy or a lack of motivation. Fatigue may make it difficult to start or complete tasks because of exhaustion. In general, occasional or mild fatigue is often a normal response to activity or life. However, long-lasting (chronic) or extreme fatigue may be a symptom of a medical condition.  Follow these instructions at home:  General instructions  · Watch your fatigue for any changes.  · Go to bed and get up at the same time every day.  · Avoid fatigue by pacing yourself during the day and getting enough sleep at night.  · Maintain a healthy weight.  Medicines  · Take over-the-counter and prescription medicines only as told by your health care provider.  · Take a multivitamin, if told by your health care provider.   · Do not use herbal or dietary supplements unless they are approved by your health care provider.  Activity  · Exercise regularly, as told by your health care provider.  · Use or practice techniques to help you relax, such as yoga, vicki chi, meditation, or massage therapy.  Eating and drinking  · Avoid heavy meals in the evening.  · Eat a well-balanced diet, which includes lean proteins, whole grains, plenty of fruits and vegetables, and low-fat dairy products.  · Avoid consuming too much caffeine.  · Avoid the use of alcohol.  · Drink enough fluid to keep your urine pale yellow.  Lifestyle  · Change situations that cause you stress. Try to keep your work and personal schedule in balance.  · Do not use any products that contain nicotine or tobacco, such as cigarettes and e-cigarettes. If you need help quitting, ask your health care provider.  · Do not use drugs.  Contact a health care provider if:  · Your fatigue does not get better.  · You have a fever.  · You suddenly lose or gain weight.  · You have headaches.  · You have trouble falling asleep or sleeping through the night.  · You feel angry, guilty, anxious, or sad.  · You  are unable to have a bowel movement (constipation).  · Your skin is dry.  · You have swelling in your legs or another part of your body.  Get help right away if:  · You feel confused.  · Your vision is blurry.  · You feel faint or you pass out.  · You have a severe headache.  · You have severe pain in your abdomen, your back, or the area between your waist and hips (pelvis).  · You have chest pain, shortness of breath, or an irregular or fast heartbeat.  · You are unable to urinate, or you urinate less than normal.  · You have abnormal bleeding, such as bleeding from the rectum, vagina, nose, lungs, or nipples.  · You vomit blood.  · You have thoughts about hurting yourself or others.  If you ever feel like you may hurt yourself or others, or have thoughts about taking your own life, get help right away. You can go to your nearest emergency department or call:  · Your local emergency services (911 in the [place]  · A suicide crisis helpline, such as the National Suicide Prevention Lifeline at 1-424.987.2341. This is open 24 hours a day.    Summary  · If you have fatigue, you feel tired all the time and have a lack of energy or a lack of motivation.  · Fatigue may make it difficult to start or complete tasks because of exhaustion.  · Long-lasting (chronic) or extreme fatigue may be a symptom of a medical condition.  · Exercise regularly, as told by your health care provider.  · Change situations that cause you stress. Try to keep your work and personal schedule in balance.  This information is not intended to replace advice given to you by your health care provider. Make sure you discuss any questions you have with your health care provider.  Document Released: 10/14/2008 Document Revised: 09/12/2018 Document Reviewed: 09/12/2018  [employer] Interactive Patient Education © 2019 [employer]    Iron Deficiency Anemia, Adult  Iron deficiency anemia is a condition in which the concentration of red blood cells or  hemoglobin in the blood is below normal because of too little iron. Hemoglobin is a substance in red blood cells that carries oxygen to the body's tissues. When the concentration of red blood cells or hemoglobin is too low, not enough oxygen reaches these tissues.  Iron deficiency anemia is usually long-lasting (chronic) and it develops over time. It may or may not cause symptoms. It is a common type of anemia.  What are the causes?  This condition may be caused by:  · Not enough iron in the diet.  · Blood loss caused by bleeding in the intestine.  · Blood loss from a gastrointestinal condition like Crohn disease.  · Frequent blood draws, such as from blood donation.  · Abnormal absorption in the gut.  · Heavy menstrual periods in women.  · Cancers of the gastrointestinal system, such as colon cancer.    What are the signs or symptoms?  Symptoms of this condition may include:  · Fatigue.  · Headache.  · Pale skin, lips, and nail beds.  · Poor appetite.  · Weakness.  · Shortness of breath.  · Dizziness.  · Cold hands and feet.  · Fast or irregular heartbeat.  · Irritability. This is more common in severe anemia.  · Rapid breathing. This is more common in severe anemia.    Mild anemia may not cause any symptoms.  How is this diagnosed?  This condition is diagnosed based on:  · Your medical history.  · A physical exam.  · Blood tests.    You may have additional tests to find the underlying cause of your anemia, such as:  · Testing for blood in the stool (fecal occult blood test).  · A procedure to see inside your colon and rectum (colonoscopy).  · A procedure to see inside your esophagus and stomach (endoscopy).  · A test in which cells are removed from bone marrow (bone marrow aspiration) or fluid is removed from the bone marrow to be examined (biopsy). This is rarely needed.    How is this treated?  This condition is treated by correcting the cause of your iron deficiency. Treatment may involve:  · Adding iron-rich  foods to your diet.  · Taking iron supplements. If you are pregnant or breastfeeding, you may need to take extra iron because your normal diet usually does not provide the amount of iron that you need.  · Increasing vitamin C intake. Vitamin C helps your body absorb iron. Your health care provider may recommend that you take iron supplements along with a glass of orange juice or a vitamin C supplement.  · Medicines to make heavy menstrual flow lighter.  · Surgery.    You may need repeat blood tests to determine whether treatment is working. Depending on the underlying cause, the anemia should be corrected within 2 months of starting treatment. If the treatment does not seem to be working, you may need more testing.  Follow these instructions at home:  Medicines  · Take over-the-counter and prescription medicines only as told by your health care provider. This includes iron supplements and vitamins.  · If you cannot tolerate taking iron supplements by mouth, talk with your health care provider about taking them through a vein (intravenously) or an injection into a muscle.  · For the best iron absorption, you should take iron supplements when your stomach is empty. If you cannot tolerate them on an empty stomach, you may need to take them with food.  · Do not drink milk or take antacids at the same time as your iron supplements. Milk and antacids may interfere with iron absorption.  · Iron supplements can cause constipation. To prevent constipation, include fiber in your diet as told by your health care provider. A stool softener may also be recommended.  Eating and drinking  · Talk with your health care provider before changing your diet. He or she may recommend that you eat foods that contain a lot of iron, such as:  ? Liver.  ? Low-fat (lean) beef.  ? Breads and cereals that have iron added to them (are fortified).  ? Eggs.  ? Dried fruit.  ? Dark green, leafy vegetables.  · To help your body use the iron from  iron-rich foods, eat those foods at the same time as fresh fruits and vegetables that are high in vitamin C. Foods that are high in vitamin C include:  ? Oranges.  ? Peppers.  ? Tomatoes.  ? Mangoes.  · Drink enough fluid to keep your urine clear or pale yellow.  General instructions  · Return to your normal activities as told by your health care provider. Ask your health care provider what activities are safe for you.  · Practice good hygiene. Anemia can make you more prone to illness and infection.  · Keep all follow-up visits as told by your health care provider. This is important.  Contact a health care provider if:  · You feel nauseous or you vomit.  · You feel weak.  · You have unexplained sweating.  · You develop symptoms of constipation, such as:  ? Having fewer than three bowel movements a week.  ? Straining to have a bowel movement.  ? Having stools that are hard, dry, or larger than normal.  ? Feeling full or bloated.  ? Pain in the lower abdomen.  ? Not feeling relief after having a bowel movement.  Get help right away if:  · You faint. If this happens, do not drive yourself to the hospital. Call your local emergency services (911 in the [place]  · You have chest pain.  · You have shortness of breath that:  ? Is severe.  ? Gets worse with physical activity.  · You have a rapid heartbeat.  · You become light-headed when getting up from a sitting or lying down position.  This information is not intended to replace advice given to you by your health care provider. Make sure you discuss any questions you have with your health care provider.  Document Released: 12/15/2001 Document Revised: 09/06/2017 Document Reviewed: 09/06/2017  [employer] Interactive Patient Education © 2019 [employer]  Complete Blood Count  Why am I having this test?  A complete blood count is a group of tests that measures characteristics of three types of blood cells: white blood cells, red blood cells, and platelets. The following  blood tests are included in a complete blood count:  · White blood cell count. This test measures the number of white blood cells you have.  · White blood cell differential. This test identifies the types of white blood cells you have and the concentration of each. It also identifies immature white blood cells.  · Red blood cell count. This test measures the number of red blood cells you have.  · Hemoglobin. This test measures the amount of hemoglobin in your blood.  · Hematocrit. This test measures the percentage of space that red blood cells take up in your blood.  · Mean corpuscular volume. This test measures the average size of your red blood cells.  · Mean corpuscular hemoglobin. This test measures of the average amount of hemoglobin inside each of your red blood cells.  · Mean corpuscular hemoglobin concentration. This test calculates the average concentration of hemoglobin inside each of your red blood cells.  · Red blood cell distribution width. This test measures the variation in the size of your red blood cells.  · Platelet count. This test measures the concentration of platelets in your blood.  · Mean platelet volume. This test measures the average size of the platelets in your blood.    What kind of sample is taken?  A blood sample is required for this test. It is usually collected by inserting a needle into a vein.  How do I prepare for this test?  There is no preparation required for this test. If this test is being performed in addition to other tests, your health care provider may ask you to fast before testing.  What are the reference ranges?  Reference ranges are considered healthy ranges established after testing a large group of healthy people. Reference ranges may vary among different people, labs, and hospitals. These ranges will be provided by the lab or department performing your tests. It is your responsibility to obtain your test results. Ask the lab or department performing the test when  and how you will get your results.  What do the results mean?  If your results are outside of the reference ranges, you may have a condition or illness, such as anemia, an infection, a bleeding problem, or cancer. The following are some examples of abnormal results and their possible causes.  Abnormal Results Related to White Blood Cells  · An abnormally low concentration of white blood cells can be caused by certain infections and by conditions that interfere with the production of white blood cells in your bone marrow.  · An abnormally high concentration of white blood cells may be related to infections and conditions that cause inflammation or a blood-related cancer.  · The presence of immature white blood cells may be related to an infection or a condition affecting your bone marrow.  Abnormal Results Related to Red Blood Cells  · An abnormally low concentration of red blood cells, hemoglobin, or hematocrit is called anemia.  · An abnormally high concentration of red blood cells, hemoglobin, or hematocrit is called polycythemia. It may be related to mild thalassemia. Thalassemia is a type of anemia that is passed down through families (hereditary).  · An abnormally low mean corpuscular volume means that your red blood cells are smaller than normal. It may be related to thalassemia or iron deficiency anemia. Iron deficiency anemia is a type of anemia that results from a lack of iron.  · An abnormally high mean corpuscular volume means that your red blood cells are larger than normal. This may be related to anemia caused by a lack of vitamin B12. It can also be caused by a lot of new red blood cells in your blood. This can happen after you suddenly lose a lot of blood.  · An abnormally low mean corpuscular hemoglobin concentration can mean that you have a condition in which your hemoglobin is abnormally diluted inside the red cells. Examples of these types of conditions are iron deficiency anemia and  thalassemia.  · An abnormally high mean corpuscular hemoglobin concentration can mean that you have certain hemolytic anemias. Hemolytic anemia is anemia that results from the abnormal breakdown of your red blood cells.  · An abnormally increased red cell distribution width may be related to certain anemias, sudden blood loss, or severe thalassemia.  Abnormal Results Related to Platelets  · An abnormally low concentration of platelets can be a sign of a bleeding disorder.  · An abnormally high concentration of platelets can occur with iron deficiency anemia, inflammatory disorders, and cancers. It can result from physical stresses, such as exercise or blood loss. It may also be a sign of a clotting disorder.  · An abnormally high mean platelet volume can occur with certain bone marrow cancers and with a large increase in the number of new platelets being produced by your bone marrow. This can happen after the loss of a large amount of blood or the destruction of your platelets by antibodies.  Talk with your health care provider to discuss your results, treatment options, and if necessary, the need for more tests. Talk with your health care provider if you have any questions about your results.  Talk with your health care provider to discuss your results, treatment options, and if necessary, the need for more tests. Talk with your health care provider if you have any questions about your results.  This information is not intended to replace advice given to you by your health care provider. Make sure you discuss any questions you have with your health care provider.  Document Released: 01/20/2006 Document Revised: 08/22/2017 Document Reviewed: 05/14/2015  [employer] Interactive Patient Education © 2019 [employer]    Living With Anxiety  After being diagnosed with an anxiety disorder, you may be relieved to know why you have felt or behaved a certain way. It is natural to also feel overwhelmed about the treatment  ahead and what it will mean for your life. With care and support, you can manage this condition and recover from it.  How to cope with anxiety  Dealing with stress  Stress is your body’s reaction to life changes and events, both good and bad. Stress can last just a few hours or it can be ongoing. Stress can play a major role in anxiety, so it is important to learn both how to cope with stress and how to think about it differently.  Talk with your health care provider or a counselor to learn more about stress reduction. He or she may suggest some stress reduction techniques, such as:  · Music therapy. This can include creating or listening to music that you enjoy and that inspires you.  · Mindfulness-based meditation. This involves being aware of your normal breaths, rather than trying to control your breathing. It can be done while sitting or walking.  · Centering prayer. This is a kind of meditation that involves focusing on a word, phrase, or sacred image that is meaningful to you and that brings you peace.  · Deep breathing. To do this, expand your stomach and inhale slowly through your nose. Hold your breath for 3-5 seconds. Then exhale slowly, allowing your stomach muscles to relax.  · Self-talk. This is a skill where you identify thought patterns that lead to anxiety reactions and correct those thoughts.  · Muscle relaxation. This involves tensing muscles then relaxing them.    Choose a stress reduction technique that fits your lifestyle and personality. Stress reduction techniques take time and practice. Set aside 5-15 minutes a day to do them. Therapists can offer training in these techniques. The training may be covered by some insurance plans. Other things you can do to manage stress include:  · Keeping a stress diary. This can help you learn what triggers your stress and ways to control your response.  · Thinking about how you respond to certain situations. You may not be able to control everything, but  you can control your reaction.  · Making time for activities that help you relax, and not feeling guilty about spending your time in this way.    Therapy combined with coping and stress-reduction skills provides the best chance for successful treatment.  Medicines  Medicines can help ease symptoms. Medicines for anxiety include:  · Anti-anxiety drugs.  · Antidepressants.  · Beta-blockers.    Medicines may be used as the main treatment for anxiety disorder, along with therapy, or if other treatments are not working. Medicines should be prescribed by a health care provider.  Relationships  Relationships can play a big part in helping you recover. Try to spend more time connecting with trusted friends and family members. Consider going to couples counseling, taking family education classes, or going to family therapy. Therapy can help you and others better understand the condition.  How to recognize changes in your condition  Everyone has a different response to treatment for anxiety. Recovery from anxiety happens when symptoms decrease and stop interfering with your daily activities at home or work. This may mean that you will start to:  · Have better concentration and focus.  · Sleep better.  · Be less irritable.  · Have more energy.  · Have improved memory.    It is important to recognize when your condition is getting worse. Contact your health care provider if your symptoms interfere with home or work and you do not feel like your condition is improving.  Where to find help and support:  You can get help and support from these sources:  · Self-help groups.  · Online and community organizations.  · A trusted spiritual leader.  · Couples counseling.  · Family education classes.  · Family therapy.    Follow these instructions at home:  · Eat a healthy diet that includes plenty of vegetables, fruits, whole grains, low-fat dairy products, and lean protein. Do not eat a lot of foods that are high in solid fats, added  sugars, or salt.  · Exercise. Most adults should do the following:  ? Exercise for at least 150 minutes each week. The exercise should increase your heart rate and make you sweat (moderate-intensity exercise).  ? Strengthening exercises at least twice a week.  · Cut down on caffeine, tobacco, alcohol, and other potentially harmful substances.  · Get the right amount and quality of sleep. Most adults need 7-9 hours of sleep each night.  · Make choices that simplify your life.  · Take over-the-counter and prescription medicines only as told by your health care provider.  · Avoid caffeine, alcohol, and certain over-the-counter cold medicines. These may make you feel worse. Ask your pharmacist which medicines to avoid.  · Keep all follow-up visits as told by your health care provider. This is important.  Questions to ask your health care provider  · Would I benefit from therapy?  · How often should I follow up with a health care provider?  · How long do I need to take medicine?  · Are there any long-term side effects of my medicine?  · Are there any alternatives to taking medicine?  Contact a health care provider if:  · You have a hard time staying focused or finishing daily tasks.  · You spend many hours a day feeling worried about everyday life.  · You become exhausted by worry.  · You start to have headaches, feel tense, or have nausea.  · You urinate more than normal.  · You have diarrhea.  Get help right away if:  · You have a racing heart and shortness of breath.  · You have thoughts of hurting yourself or others.  If you ever feel like you may hurt yourself or others, or have thoughts about taking your own life, get help right away. You can go to your nearest emergency department or call:  · Your local emergency services (911 in the [place]  · A suicide crisis helpline, such as the National Suicide Prevention Lifeline at 1-493.658.3223. This is open 24-hours a day.    Summary  · Taking steps to deal with stress  can help calm you.  · Medicines cannot cure anxiety disorders, but they can help ease symptoms.  · Family, friends, and partners can play a big part in helping you recover from an anxiety disorder.  This information is not intended to replace advice given to you by your health care provider. Make sure you discuss any questions you have with your health care provider.  Document Released: 12/12/2017 Document Revised: 12/12/2017 Document Reviewed: 12/12/2017  [employer] Interactive Patient Education © 2019 [employer]  Sertraline tablets  What is this medicine?  SERTRALINE (SER tra homero) is used to treat depression. It may also be used to treat obsessive compulsive disorder, panic disorder, post-trauma stress, premenstrual dysphoric disorder (PMDD) or social anxiety.  This medicine may be used for other purposes; ask your health care provider or pharmacist if you have questions.  COMMON BRAND NAME(S): Zoloft  What should I tell my health care provider before I take this medicine?  They need to know if you have any of these conditions:  -bleeding disorders  -bipolar disorder or a family history of bipolar disorder  -glaucoma  -heart disease  -high blood pressure  -history of irregular heartbeat  -history of low levels of calcium, magnesium, or potassium in the blood  -if you often drink alcohol  -liver disease  -receiving electroconvulsive therapy  -seizures  -suicidal thoughts, plans, or attempt; a previous suicide attempt by you or a family member  -take medicines that treat or prevent blood clots  -thyroid disease  -an unusual or allergic reaction to sertraline, other medicines, foods, dyes, or preservatives  -pregnant or trying to get pregnant  -breast-feeding  How should I use this medicine?  Take this medicine by mouth with a glass of water. Follow the directions on the prescription label. You can take it with or without food. Take your medicine at regular intervals. Do not take your medicine more often than  directed. Do not stop taking this medicine suddenly except upon the advice of your doctor. Stopping this medicine too quickly may cause serious side effects or your condition may worsen.  A special MedGuide will be given to you by the pharmacist with each prescription and refill. Be sure to read this information carefully each time.  Talk to your pediatrician regarding the use of this medicine in children. While this drug may be prescribed for children as young as 7 years for selected conditions, precautions do apply.  Overdosage: If you think you have taken too much of this medicine contact a poison control center or emergency room at once.  NOTE: This medicine is only for you. Do not share this medicine with others.  What if I miss a dose?  If you miss a dose, take it as soon as you can. If it is almost time for your next dose, take only that dose. Do not take double or extra doses.  What may interact with this medicine?  Do not take this medicine with any of the following medications:  -cisapride  -dofetilide  -dronedarone  -linezolid  -MAOIs like Carbex, Eldepryl, Marplan, Nardil, and Parnate  -methylene blue (injected into a vein)  -pimozide  -thioridazine  This medicine may also interact with the following medications:  -alcohol  -amphetamines  -aspirin and aspirin-like medicines  -certain medicines for depression, anxiety, or psychotic disturbances  -certain medicines for fungal infections like ketoconazole, fluconazole, posaconazole, and itraconazole  -certain medicines for irregular heart beat like flecainide, quinidine, propafenone  -certain medicines for migraine headaches like almotriptan, eletriptan, frovatriptan, naratriptan, rizatriptan, sumatriptan, zolmitriptan  -certain medicines for sleep  -certain medicines for seizures like carbamazepine, valproic acid, phenytoin  -certain medicines that treat or prevent blood clots like warfarin, enoxaparin,  dalteparin  -cimetidine  -digoxin  -diuretics  -fentanyl  -isoniazid  -lithium  -NSAIDs, medicines for pain and inflammation, like ibuprofen or naproxen  -other medicines that prolong the QT interval (cause an abnormal heart rhythm)  -rasagiline  -safinamide  -supplements like Nadia's wort, kava kava, valerian  -tolbutamide  -tramadol  -tryptophan  This list may not describe all possible interactions. Give your health care provider a list of all the medicines, herbs, non-prescription drugs, or dietary supplements you use. Also tell them if you smoke, drink alcohol, or use illegal drugs. Some items may interact with your medicine.  What should I watch for while using this medicine?  Tell your doctor if your symptoms do not get better or if they get worse. Visit your doctor or health care professional for regular checks on your progress. Because it may take several weeks to see the full effects of this medicine, it is important to continue your treatment as prescribed by your doctor.  Patients and their families should watch out for new or worsening thoughts of suicide or depression. Also watch out for sudden changes in feelings such as feeling anxious, agitated, panicky, irritable, hostile, aggressive, impulsive, severely restless, overly excited and hyperactive, or not being able to sleep. If this happens, especially at the beginning of treatment or after a change in dose, call your health care professional.  You may get drowsy or dizzy. Do not drive, use machinery, or do anything that needs mental alertness until you know how this medicine affects you. Do not stand or sit up quickly, especially if you are an older patient. This reduces the risk of dizzy or fainting spells. Alcohol may interfere with the effect of this medicine. Avoid alcoholic drinks.  Your mouth may get dry. Chewing sugarless gum or sucking hard candy, and drinking plenty of water may help. Contact your doctor if the problem does not go away or  is severe.  What side effects may I notice from receiving this medicine?  Side effects that you should report to your doctor or health care professional as soon as possible:  -allergic reactions like skin rash, itching or hives, swelling of the face, lips, or tongue  -anxious  -black, tarry stools  -changes in vision  -confusion  -elevated mood, decreased need for sleep, racing thoughts, impulsive behavior  -eye pain  -fast, irregular heartbeat  -feeling faint or lightheaded, falls  -feeling agitated, angry, or irritable  -hallucination, loss of contact with reality  -loss of balance or coordination  -loss of memory  -painful or prolonged erections  -restlessness, pacing, inability to keep still  -seizures  -stiff muscles  -suicidal thoughts or other mood changes  -trouble sleeping  -unusual bleeding or bruising  -unusually weak or tired  -vomiting  Side effects that usually do not require medical attention (report to your doctor or health care professional if they continue or are bothersome):  -change in appetite or weight  -change in sex drive or performance  -diarrhea  -increased sweating  -indigestion, nausea  -tremors  This list may not describe all possible side effects. Call your doctor for medical advice about side effects. You may report side effects to FDA at 5-919-FDA-0783.  Where should I keep my medicine?  Keep out of the reach of children.  Store at room temperature between 15 and 30 degrees C (59 and 86 degrees F). Throw away any unused medicine after the expiration date.  NOTE: This sheet is a summary. It may not cover all possible information. If you have questions about this medicine, talk to your doctor, pharmacist, or health care provider.  © 2019 [employer]vier/Gold Standard (2017-12-22 14:17:49)

## 2019-07-22 NOTE — PROGRESS NOTES
Subjective   Evelia Foster is a 39 y.o. female.     Ms. Foster presents today with c/o excessive fatigue, feeling weak and shaky at times. States she falls asleep during the day a lot. Patient has a history of iron deficiency anemia-she would like to have her labs checked today. She is s/p total hysterectomy in January of this year for dysfunctional uterine bleeding, uterine fibroid and cervical cancer. She continues to be followed by her gynecologist Dr. Markie Lewis.      Fatigue   This is a new problem. The current episode started more than 1 month ago. The problem occurs daily. The problem has been gradually worsening. Associated symptoms include fatigue, nausea and weakness. Pertinent negatives include no abdominal pain, chest pain, chills, coughing, diaphoresis, fever, joint swelling, myalgias, rash, sore throat, swollen glands or vomiting.   Anxiety   Presents for initial visit. Onset was 1 to 6 months ago. The problem has been gradually worsening. Symptoms include depressed mood, excessive worry, malaise, nausea and nervous/anxious behavior. Patient reports no chest pain, dizziness, panic or suicidal ideas. Symptoms occur most days. The severity of symptoms is moderate. The quality of sleep is fair.     Risk factors: health problems. Her past medical history is significant for anemia. Past treatments include non-benzodiazephine anxiolytics. The treatment provided no relief. Prior compliance problems include medication issues (Buspar made her feel confused).       The following portions of the patient's history were reviewed and updated as appropriate: allergies, current medications, past family history, past medical history, past social history, past surgical history and problem list.    Allergies as of 07/22/2019 - Reviewed 07/22/2019   Allergen Reaction Noted   • Benadryl [diphenhydramine hcl] Itching 11/08/2018   • Buspar [buspirone] Confusion 11/08/2018   • Lortab [hydrocodone-acetaminophen] Rash  02/20/2019       Current Outpatient Medications on File Prior to Visit   Medication Sig   • ibuprofen (ADVIL,MOTRIN) 600 MG tablet Take 1 tablet by mouth Every 6 (Six) Hours As Needed for Mild Pain  or Moderate Pain .   • vitamin D (ERGOCALCIFEROL) 64858 units capsule capsule Take 50,000 Units by mouth 1 (One) Time Per Week.     No current facility-administered medications on file prior to visit.        Review of Systems   Constitutional: Positive for activity change and fatigue. Negative for chills, diaphoresis and fever.   HENT: Negative for sore throat.    Respiratory: Negative.  Negative for cough.    Cardiovascular: Negative.  Negative for chest pain.   Gastrointestinal: Positive for nausea. Negative for abdominal pain, diarrhea and vomiting.   Genitourinary: Negative for dysuria.   Musculoskeletal: Negative for back pain, joint swelling and myalgias.   Skin: Negative for rash.   Neurological: Positive for weakness. Negative for dizziness and syncope.   Psychiatric/Behavioral: Negative for suicidal ideas. The patient is nervous/anxious.        Objective   Physical Exam   Constitutional: She is oriented to person, place, and time. Vital signs are normal. She appears well-developed and well-nourished. She is cooperative. She does not appear ill. No distress.   HENT:   Right Ear: Tympanic membrane normal.   Left Ear: Tympanic membrane normal.   Mouth/Throat: Uvula is midline and mucous membranes are normal.   Neck: Normal range of motion. Neck supple. No thyromegaly present.   Cardiovascular: Normal rate, regular rhythm and normal heart sounds.   Pulmonary/Chest: Effort normal and breath sounds normal.   Lymphadenopathy:     She has no cervical adenopathy.   Neurological: She is alert and oriented to person, place, and time.   Skin: Skin is warm, dry and intact. No rash noted. She is not diaphoretic.   Psychiatric: Her speech is normal and behavior is normal. Judgment and thought content normal. Her mood appears  anxious. Cognition and memory are normal.   Vitals reviewed.    Vitals:    07/22/19 1343   BP: 141/73   Pulse: 102   Resp: 17   Temp: 98 °F (36.7 °C)   SpO2: 97%     Body mass index is 30.19 kg/m².    Assessment/Plan   Evelia was seen today for fatigue.    Diagnoses and all orders for this visit:    Fatigue, unspecified type  -     CBC Auto Differential  -     TSH  -     T4, Free  -     Vitamin B12  -     Vitamin D 25 Hydroxy  -     Comprehensive Metabolic Panel  -     Reticulocytes    Weakness  -     CBC Auto Differential  -     Reticulocytes    Iron deficiency anemia, unspecified iron deficiency anemia type  -     CBC Auto Differential  -     Iron Profile  -     Reticulocytes    Family history of diabetes mellitus  -     Hemoglobin A1c    Anxiety  -     sertraline (ZOLOFT) 50 MG tablet; Take 1 tablet by mouth Daily.     Discussed the nature of the medical condition(s) risks, complications, implications, management, safe and proper use of medications. Encouraged medication compliance, and keeping scheduled follow up appointments with me and any other providers.     Laboratory testing ordered today. Further recommendations after lab evaluation.    RTC if symptoms fail to improve, to ER if symptoms worsen.  F/U with PCP in 4-6 weeks.

## 2019-07-23 LAB
25(OH)D3 SERPL-MCNC: 27.9 NG/ML (ref 30–100)
ALBUMIN SERPL-MCNC: 4.6 G/DL (ref 3.5–5.2)
ALBUMIN/GLOB SERPL: 1.6 G/DL
ALP SERPL-CCNC: 64 U/L (ref 39–117)
ALT SERPL W P-5'-P-CCNC: 15 U/L (ref 1–33)
ANION GAP SERPL CALCULATED.3IONS-SCNC: 14.3 MMOL/L (ref 5–15)
AST SERPL-CCNC: 21 U/L (ref 1–32)
BASOPHILS # BLD AUTO: 0.05 10*3/MM3 (ref 0–0.2)
BASOPHILS NFR BLD AUTO: 0.6 % (ref 0–1.5)
BILIRUB SERPL-MCNC: 0.2 MG/DL (ref 0.2–1.2)
BUN BLD-MCNC: 12 MG/DL (ref 6–20)
BUN/CREAT SERPL: 14.8 (ref 7–25)
CALCIUM SPEC-SCNC: 10.5 MG/DL (ref 8.6–10.5)
CHLORIDE SERPL-SCNC: 103 MMOL/L (ref 98–107)
CO2 SERPL-SCNC: 26.7 MMOL/L (ref 22–29)
CREAT BLD-MCNC: 0.81 MG/DL (ref 0.57–1)
DEPRECATED RDW RBC AUTO: 59 FL (ref 37–54)
EOSINOPHIL # BLD AUTO: 0.11 10*3/MM3 (ref 0–0.4)
EOSINOPHIL NFR BLD AUTO: 1.3 % (ref 0.3–6.2)
ERYTHROCYTE [DISTWIDTH] IN BLOOD BY AUTOMATED COUNT: 22.1 % (ref 12.3–15.4)
GFR SERPL CREATININE-BSD FRML MDRD: 80 ML/MIN/1.73
GLOBULIN UR ELPH-MCNC: 2.9 GM/DL
GLUCOSE BLD-MCNC: 101 MG/DL (ref 65–99)
HBA1C MFR BLD: 5.31 % (ref 4.8–5.6)
HCT VFR BLD AUTO: 36.1 % (ref 34–46.6)
HGB BLD-MCNC: 10.3 G/DL (ref 12–15.9)
IMM GRANULOCYTES # BLD AUTO: 0.02 10*3/MM3 (ref 0–0.05)
IMM GRANULOCYTES NFR BLD AUTO: 0.2 % (ref 0–0.5)
IRON 24H UR-MRATE: 33 MCG/DL (ref 37–145)
IRON SATN MFR SERPL: 7 % (ref 20–50)
LYMPHOCYTES # BLD AUTO: 2.5 10*3/MM3 (ref 0.7–3.1)
LYMPHOCYTES NFR BLD AUTO: 28.7 % (ref 19.6–45.3)
MCH RBC QN AUTO: 21.8 PG (ref 26.6–33)
MCHC RBC AUTO-ENTMCNC: 28.5 G/DL (ref 31.5–35.7)
MCV RBC AUTO: 76.3 FL (ref 79–97)
MONOCYTES # BLD AUTO: 0.78 10*3/MM3 (ref 0.1–0.9)
MONOCYTES NFR BLD AUTO: 8.9 % (ref 5–12)
NEUTROPHILS # BLD AUTO: 5.26 10*3/MM3 (ref 1.7–7)
NEUTROPHILS NFR BLD AUTO: 60.3 % (ref 42.7–76)
NRBC BLD AUTO-RTO: 0 /100 WBC (ref 0–0.2)
PLATELET # BLD AUTO: 335 10*3/MM3 (ref 140–450)
PMV BLD AUTO: 9.7 FL (ref 6–12)
POTASSIUM BLD-SCNC: 5.3 MMOL/L (ref 3.5–5.2)
PROT SERPL-MCNC: 7.5 G/DL (ref 6–8.5)
RBC # BLD AUTO: 4.73 10*6/MM3 (ref 3.77–5.28)
RETICS # AUTO: 0.05 10*6/MM3 (ref 0.02–0.13)
RETICS/RBC NFR AUTO: 1.12 % (ref 0.7–1.9)
SODIUM BLD-SCNC: 144 MMOL/L (ref 136–145)
T4 FREE SERPL-MCNC: 1.07 NG/DL (ref 0.93–1.7)
TIBC SERPL-MCNC: 490 MCG/DL (ref 298–536)
TRANSFERRIN SERPL-MCNC: 329 MG/DL (ref 200–360)
TSH SERPL DL<=0.05 MIU/L-ACNC: 1.98 MIU/ML (ref 0.27–4.2)
VIT B12 BLD-MCNC: 225 PG/ML (ref 211–946)
WBC NRBC COR # BLD: 8.72 10*3/MM3 (ref 3.4–10.8)

## 2019-07-24 DIAGNOSIS — E53.8 B12 DEFICIENCY: Primary | ICD-10-CM

## 2019-07-24 RX ORDER — CYANOCOBALAMIN 1000 UG/ML
1000 INJECTION, SOLUTION INTRAMUSCULAR; SUBCUTANEOUS
Status: DISCONTINUED | OUTPATIENT
Start: 2019-08-26 | End: 2019-12-02

## 2019-07-24 RX ORDER — CYANOCOBALAMIN 1000 UG/ML
1000 INJECTION, SOLUTION INTRAMUSCULAR; SUBCUTANEOUS WEEKLY
Status: SHIPPED | OUTPATIENT
Start: 2019-07-24 | End: 2019-08-21

## 2019-07-27 ENCOUNTER — CLINICAL SUPPORT (OUTPATIENT)
Dept: FAMILY MEDICINE CLINIC | Facility: CLINIC | Age: 39
End: 2019-07-27

## 2019-07-27 ENCOUNTER — TELEPHONE (OUTPATIENT)
Dept: FAMILY MEDICINE CLINIC | Facility: CLINIC | Age: 39
End: 2019-07-27

## 2019-07-27 DIAGNOSIS — D50.9 IRON DEFICIENCY ANEMIA, UNSPECIFIED IRON DEFICIENCY ANEMIA TYPE: ICD-10-CM

## 2019-07-27 PROCEDURE — 96372 THER/PROPH/DIAG INJ SC/IM: CPT | Performed by: NURSE PRACTITIONER

## 2019-07-27 RX ADMIN — CYANOCOBALAMIN 1000 MCG: 1000 INJECTION, SOLUTION INTRAMUSCULAR; SUBCUTANEOUS at 15:08

## 2019-07-27 NOTE — TELEPHONE ENCOUNTER
Called patient and let her know she could start her B12 injection anytime she was ready.   ----- Message from OG Stokes sent at 7/24/2019  3:17 PM EDT -----  Regarding: RE: PLEASE CALL REGARDING B12  Contact: [phone]  I put in order. It is once a week for 4 weeks then once a month thereafter. She can come get injection anytime now that is convenient for her.  ----- Message -----  From: Juana Kern MA  Sent: 7/24/2019   1:22 PM  To: OG Stokes  Subject: FW: PLEASE CALL REGARDING B12                        ----- Message -----  From: Ashtyn Morin  Sent: 7/24/2019  12:38 PM  To: Juana Kern MA  Subject: PLEASE CALL REGARDING B12                        PT STATES SHE WAS TOLD TO LET [facility] KNOW IF SHE WANTS TO HAVE B12 INJECTIONS. PT WOULD LIKE TO HAVE THOSE DONE, AND ASKED IF YOU WOULD CALL HER WHEN SHE CAN COME IN TO GET THEM.

## 2019-08-06 ENCOUNTER — APPOINTMENT (OUTPATIENT)
Dept: MAMMOGRAPHY | Facility: HOSPITAL | Age: 39
End: 2019-08-06

## 2019-08-07 ENCOUNTER — CLINICAL SUPPORT (OUTPATIENT)
Dept: FAMILY MEDICINE CLINIC | Facility: CLINIC | Age: 39
End: 2019-08-07

## 2019-08-07 DIAGNOSIS — D50.8 OTHER IRON DEFICIENCY ANEMIA: Primary | ICD-10-CM

## 2019-08-07 PROCEDURE — 96372 THER/PROPH/DIAG INJ SC/IM: CPT | Performed by: NURSE PRACTITIONER

## 2019-08-07 RX ORDER — CYANOCOBALAMIN 1000 UG/ML
1000 INJECTION, SOLUTION INTRAMUSCULAR; SUBCUTANEOUS
Status: DISCONTINUED | OUTPATIENT
Start: 2019-08-07 | End: 2019-12-02

## 2019-08-07 RX ADMIN — CYANOCOBALAMIN 1000 MCG: 1000 INJECTION, SOLUTION INTRAMUSCULAR; SUBCUTANEOUS at 15:05

## 2019-08-16 ENCOUNTER — CLINICAL SUPPORT (OUTPATIENT)
Dept: FAMILY MEDICINE CLINIC | Facility: CLINIC | Age: 39
End: 2019-08-16

## 2019-08-16 DIAGNOSIS — D50.9 IRON DEFICIENCY ANEMIA, UNSPECIFIED IRON DEFICIENCY ANEMIA TYPE: ICD-10-CM

## 2019-08-16 PROCEDURE — 96372 THER/PROPH/DIAG INJ SC/IM: CPT | Performed by: NURSE PRACTITIONER

## 2019-08-16 RX ADMIN — CYANOCOBALAMIN 1000 MCG: 1000 INJECTION, SOLUTION INTRAMUSCULAR; SUBCUTANEOUS at 17:17

## 2019-08-19 ENCOUNTER — HOSPITAL ENCOUNTER (OUTPATIENT)
Dept: ULTRASOUND IMAGING | Facility: HOSPITAL | Age: 39
Discharge: HOME | End: 2019-08-19

## 2019-08-19 ENCOUNTER — HOSPITAL ENCOUNTER (OUTPATIENT)
Dept: MAMMOGRAPHY | Facility: HOSPITAL | Age: 39
Discharge: HOME | End: 2019-08-19
Admitting: NURSE PRACTITIONER

## 2019-08-19 DIAGNOSIS — R92.8 ABNORMAL MAMMOGRAM: ICD-10-CM

## 2019-08-19 PROCEDURE — 77061 BREAST TOMOSYNTHESIS UNI: CPT | Performed by: RADIOLOGY

## 2019-08-19 PROCEDURE — 77065 DX MAMMO INCL CAD UNI: CPT

## 2019-08-19 PROCEDURE — 77065 DX MAMMO INCL CAD UNI: CPT | Performed by: RADIOLOGY

## 2019-08-19 PROCEDURE — 76642 ULTRASOUND BREAST LIMITED: CPT | Performed by: RADIOLOGY

## 2019-08-19 PROCEDURE — 76642 ULTRASOUND BREAST LIMITED: CPT

## 2019-08-19 PROCEDURE — G0279 TOMOSYNTHESIS, MAMMO: HCPCS

## 2019-11-19 ENCOUNTER — TELEPHONE (OUTPATIENT)
Dept: OBSTETRICS AND GYNECOLOGY | Facility: CLINIC | Age: 39
End: 2019-11-19

## 2019-11-19 NOTE — TELEPHONE ENCOUNTER
PT IS CALLING STATES HAS HAD CRAMPING LIKE PERIOD CRAMPING SHE'S HAD A HYSTERECTOMY AND WANTS TO KNOW WHAT SHE CAN DO ABOUT THIS   PLEASE CALL [phone]

## 2019-11-19 NOTE — TELEPHONE ENCOUNTER
S/p hyst 1/19, cramping and a sharp pain on left side by ribs.  Only constipation and diarrhea for 1 wk.  Some bloating.  Vag discharge with odor.

## 2019-11-20 RX ORDER — METRONIDAZOLE 500 MG/1
500 TABLET ORAL 2 TIMES DAILY
Qty: 14 TABLET | Refills: 0 | Status: SHIPPED | OUTPATIENT
Start: 2019-11-20 | End: 2019-11-27

## 2019-11-20 NOTE — TELEPHONE ENCOUNTER
She has missed appointment in June and August.  She was seen I think by primary care in July and had lab done.  Also had carcinoma in situ on her Pap specimen in January so she needs an appointment to be seen follow-up Pap test etc. could try Flagyl for vaginal discharge thank you

## 2019-12-02 ENCOUNTER — OFFICE VISIT (OUTPATIENT)
Dept: OBSTETRICS AND GYNECOLOGY | Facility: CLINIC | Age: 39
End: 2019-12-02

## 2019-12-02 VITALS
SYSTOLIC BLOOD PRESSURE: 122 MMHG | BODY MASS INDEX: 31.82 KG/M2 | HEIGHT: 65 IN | WEIGHT: 191 LBS | DIASTOLIC BLOOD PRESSURE: 70 MMHG

## 2019-12-02 DIAGNOSIS — Z01.411 ENCOUNTER FOR GYNECOLOGICAL EXAMINATION WITH ABNORMAL FINDING: Primary | ICD-10-CM

## 2019-12-02 DIAGNOSIS — N76.0 ACUTE VAGINITIS: ICD-10-CM

## 2019-12-02 PROBLEM — R79.89 LOW VITAMIN D LEVEL: Status: ACTIVE | Noted: 2019-12-02

## 2019-12-02 PROBLEM — Z90.710 HISTORY OF ROBOT-ASSISTED LAPAROSCOPIC HYSTERECTOMY: Status: RESOLVED | Noted: 2019-01-23 | Resolved: 2019-12-02

## 2019-12-02 PROCEDURE — 99395 PREV VISIT EST AGE 18-39: CPT | Performed by: OBSTETRICS & GYNECOLOGY

## 2019-12-02 NOTE — PROGRESS NOTES
Subjective   Chief Complaint   Patient presents with   • Annual Exam   • Vaginal Discharge     Evelia Foster is a 39 y.o. year old  who is S/P hysterectomy presenting to be seen for her annual exam.  Feels bloated in general; feels full faster after eating   Right hip sore for 2 days  She still has menstrual cramps despite the hysterectomy.    SEXUAL Hx:  She is currently sexually active.   She doeshave pain or problems with sex entry and more with deeper penetration; discussed the incision is present rather than cervix which may be more mobile or elastic.  Concerns about domestic violence:no  HEALTH Hx:  Level of weekly physical activity:3 hours  She wears her seat belt: yes  Self breast awareness: yes  Caffeine intake : 2 coffee cup equivalents; and 5 Big gulp diet sotero 40 oz  Calcium intake: noneservings per day  Social History    Tobacco Use      Smoking status: Current Every Day Smoker        Packs/day: 1.00        Years: 24.00        Pack years: 24        Types: Cigarettes      Smokeless tobacco: Never Used      Tobacco comment: tried w preg    Social History     Substance and Sexual Activity   Alcohol Use Yes    Comment: social              The following portions of the patient's history were reviewed and updated as appropriate:problem list, current medications, allergies, past family history, past medical history, past social history and past surgical history.  No current outpatient medications on file.  No current facility-administered medications for this visit.     Review of Systems  Constitutional   POS: nothing reported    NEG: anorexia, fatigue or night sweats   Gastointestinal POS: nothing reported    NEG: bloating, change in bowel habits, melena or reflux symptoms   Genitourinary POS: frequency and urgency    NEG:dysuria or hematuria   Breast                POS: nothing reported    NEG: persistent breast lump, skin dimpling or nipple discharge                Objective   /70   Ht  "165.7 cm (65.25\")   Wt 86.6 kg (191 lb)   LMP 12/18/2018   BMI 31.54 kg/m²     General:  well developed; well nourished  no acute distress  appears stated age   Skin:  No suspicious lesions seen   Thyroid: not examined   Breasts:  Not performed.   Abdomen: no umbilical or inguinal hernias are present  no hepato-splenomegaly  Abnormal findings: Tender in general lower quadrants no CVAT soft no rebound   Pelvis: Clinical staff was present for exam  External genitalia:  normal appearance of the external genitalia including Bartholin's and Barnsdall's glands.  :  urethral meatus normal;  Vaginal:  normal pink mucosa without prolapse or lesions. White watery fluid present consistent with douche solution  Uterus:  absent. Pap smear taken at cuff due to history of CIS  Adnexa:  tenderness of the bilateral adnexa to  superficial and deep palpation; there was good support to the vagina no masses noted I could not palpate the vaginal cuff due to guardING       Lab Review   CBC, CMP, LIPIDS, TSH and Vitamin D  Imaging Review   Breast ultrasound report  Mammogram report       Assessment   1.  Annual examination status post hysterectomy.  History of carcinoma in situ Pap smear taken today probably need follow-up for a few years given above history.  History of acute vaginitis but she douche last night rendering exam today unhelpful  High risk breast cancer she is due for MRI in February 2020  Dyspareunia with deeper penetration.  Discussed could be related to male size and may need a vulvar cushion for intercourse.  Guarding noted on examination today but overall good support  Low vitamin D suggest 1000 IU daily over-the-counter       Plan     1. 1000 mg calcium in divided doses ; ideally in her diet  2. Regular exercise  3. Self breast awareness, mammogram protocols discussed she will need an MRI.  4.   Follow up for annual exam or sooner for any problems.  If vaginitis persist I would need to see her without using anything " per vagina for 3 nights ideally 4-5.      No orders of the defined types were placed in this encounter.    No orders of the defined types were placed in this encounter.           This note was electronically signed.    Markie Lewis M.D.  December 2, 2019

## 2020-03-11 ENCOUNTER — TRANSCRIBE ORDERS (OUTPATIENT)
Dept: FAMILY MEDICINE CLINIC | Facility: CLINIC | Age: 40
End: 2020-03-11

## 2020-03-11 DIAGNOSIS — R92.8 ABNORMAL MAMMOGRAPHY: Primary | ICD-10-CM

## 2020-03-12 ENCOUNTER — OFFICE VISIT (OUTPATIENT)
Dept: FAMILY MEDICINE CLINIC | Facility: CLINIC | Age: 40
End: 2020-03-12

## 2020-03-12 VITALS
TEMPERATURE: 98.3 F | OXYGEN SATURATION: 97 % | RESPIRATION RATE: 18 BRPM | WEIGHT: 200 LBS | DIASTOLIC BLOOD PRESSURE: 70 MMHG | BODY MASS INDEX: 35.44 KG/M2 | HEIGHT: 63 IN | SYSTOLIC BLOOD PRESSURE: 130 MMHG | HEART RATE: 94 BPM

## 2020-03-12 DIAGNOSIS — E86.0 DEHYDRATION: ICD-10-CM

## 2020-03-12 DIAGNOSIS — D50.0 IRON DEFICIENCY ANEMIA DUE TO CHRONIC BLOOD LOSS: ICD-10-CM

## 2020-03-12 DIAGNOSIS — J11.1 INFLUENZA: Primary | ICD-10-CM

## 2020-03-12 LAB
EXPIRATION DATE: NORMAL
FLUAV AG NPH QL: NEGATIVE
FLUBV AG NPH QL: NEGATIVE
INTERNAL CONTROL: NORMAL
Lab: NORMAL

## 2020-03-12 PROCEDURE — 87804 INFLUENZA ASSAY W/OPTIC: CPT | Performed by: FAMILY MEDICINE

## 2020-03-12 PROCEDURE — 99213 OFFICE O/P EST LOW 20 MIN: CPT | Performed by: FAMILY MEDICINE

## 2020-03-12 RX ORDER — ONDANSETRON 4 MG/1
4 TABLET, ORALLY DISINTEGRATING ORAL EVERY 8 HOURS PRN
Qty: 5 TABLET | Refills: 0 | Status: SHIPPED | OUTPATIENT
Start: 2020-03-12 | End: 2020-07-13

## 2020-03-12 RX ORDER — OSELTAMIVIR PHOSPHATE 75 MG/1
75 CAPSULE ORAL 2 TIMES DAILY
Qty: 10 CAPSULE | Refills: 0 | Status: SHIPPED | OUTPATIENT
Start: 2020-03-12 | End: 2020-07-13

## 2020-03-12 NOTE — PROGRESS NOTES
"Subjective   Evelia Foster is a 40 y.o. female seen today for Flu Symptoms.     Flu Symptoms   This is a new problem. The current episode started in the past 7 days (Tuesday. No recent travel.). Associated symptoms include a change in bowel habit (diarrhea), chills, coughing (mostly dry. Some production in the am.), fatigue, a fever (102), myalgias and nausea. Pertinent negatives include no chest pain or vomiting. Nothing aggravates the symptoms. She has tried rest and NSAIDs for the symptoms. The treatment provided no relief.      Hgb today is 12.2 today.    The following portions of the patient's history were reviewed and updated as appropriate: allergies, current medications, past social history and problem list.    Review of Systems   Constitutional: Positive for chills, fatigue and fever (102).   Respiratory: Positive for cough (mostly dry. Some production in the am.).    Cardiovascular: Negative for chest pain.   Gastrointestinal: Positive for change in bowel habit (diarrhea), diarrhea and nausea. Negative for anal bleeding, blood in stool, constipation and vomiting.   Musculoskeletal: Positive for myalgias.   Neurological: Positive for dizziness.       Objective   /70   Pulse 94   Temp 98.3 °F (36.8 °C)   Resp 18   Ht 158.8 cm (62.5\")   Wt 90.7 kg (200 lb)   LMP 12/18/2018   SpO2 97%   BMI 36.00 kg/m²   Physical Exam   Constitutional: She appears well-developed and well-nourished.   HENT:   Right Ear: External ear normal.   Left Ear: External ear normal.   Mouth/Throat: Oropharynx is clear and moist.   Cardiovascular: Normal rate and regular rhythm.   Pulmonary/Chest: Effort normal and breath sounds normal.   Nursing note and vitals reviewed.      Assessment/Plan   Problem List Items Addressed This Visit        Hematopoietic and Hemostatic    Iron deficiency anemia      Other Visit Diagnoses     Influenza    -  Primary    Relevant Orders    POCT Influenza A/B (Completed)    Dehydration     "              Rest and Drink plenty fluids.  Use OTC Tylenol as needed for fever and aches.    Rx for Tamiflu 75 mg twice a day #10+0.  Rx for Ondansetron 4 mg every 8 hours as needed #5+0.    Use OTC Imodium if diarrhea persists.    Follow up as needed.            Scribed for Dr Jerrell Birch by Ermelinda Borja CMA.          I, Jerrell Birch MD, personally performed the services described in this documentation, as scribed by Ermelinda Borja in my presence, and is both accurate and complete.        (Please note that portions of this note were completed with a voice recognition program. Efforts were made to edit the dictations,but occasionally words are mis transcribed.)

## 2020-04-07 ENCOUNTER — NURSE TRIAGE (OUTPATIENT)
Dept: CALL CENTER | Facility: HOSPITAL | Age: 40
End: 2020-04-07

## 2020-04-07 NOTE — TELEPHONE ENCOUNTER
"    Reason for Disposition  • [1] Fever (or feeling feverish) OR cough occurs AND [2] within 14 days of travel from another country (international travel)    Additional Information  • Negative: SEVERE difficulty breathing (e.g., struggling for each breath, speak in single words, bluish lips)  • Negative: Sounds like a life-threatening emergency to the triager  • Negative: [1] Adult has symptoms of COVID-19 (fever, cough, or SOB) AND [2] lab test positive  • Negative: [1] Adult has symptoms of COVID-19 (fever, cough or SOB) AND [2] major community spread where patient lives AND [3] testing not being done for mild symptoms  • Negative: [1] Difficulty breathing (shortness of breath) occurs AND [2] onset > 14 days after COVID-19 EXPOSURE (Close Contact) AND [3] no major community spread  • Negative: [1] Dry cough occurs AND [2] onset > 14 days after COVID-19 EXPOSURE AND [3] no major community spread  • Negative: [1] Wet cough (i.e., white-yellow, yellow, green, or casper colored sputum) AND [2] onset > 14 days after COVID-19 EXPOSURE AND [3] no major community spread  • Negative: [1] Common cold symptoms AND [2] onset > 14 days after COVID-19 EXPOSURE AND [3] no major community spread  • Negative: [1] Difficulty breathing occurs AND [2] within 14 days of COVID-19 EXPOSURE (Close Contact)  • Negative: Patient sounds very sick or weak to the triager  • Negative: [1] Fever (or feeling feverish) OR cough AND [2] within 14 Days of COVID-19 EXPOSURE (Close Contact)    Answer Assessment - Initial Assessment Questions  1. CLOSE CONTACT: \"Who is the person with the confirmed or suspected COVID-19 infection that you were exposed to?\"      Pt has no known exposure to COVID-19. 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 Some stores include [employer], and [employer]. Pt has not had any visitors in her home. Pt has not been visiting others in their " "home. Pt lives with her 16 year old son and boyfriend. Son has stayed home except going to [facility] with mom once for personal reasons. Boyfriend work with public.      2. PLACE of CONTACT: \"Where were you when you were exposed to COVID-19?\" (e.g., home, school, medical waiting room; which city?)      Work, home, and in community      3. TYPE of CONTACT: \"How much contact was there?\" (e.g., sitting next to, live in same house, work in same office, same building)      Pt touches money from lots of different businesses all day while at work.      4. DURATION of CONTACT: \"How long were you in contact with the COVID-19 patient?\" (e.g., a few seconds, passed by person, a few minutes, live with the patient)      Minutes to hours      5. DATE of CONTACT: \"When did you have contact with a COVID-19 patient?\" (e.g., how many days ago)      Last day of work 4/7/20      6. TRAVEL: \"Have you traveled out of the country recently?\" If so, \"When and where?\"      Pt has not traveled outside of 3 cities listed above in the last 3 weeks.      7. COMMUNITY SPREAD: \"Are there lots of cases or COVID-19 (community spread) where you live?\" (See public health department website, if unsure)    Unknown      8. SYMPTOMS: \"Do you have any symptoms?\" (e.g., fever, cough, breathing difficulty)      Fever-T=99.1. Pt feels feverish.       Cough-pt has a dry cough. She's a smoker. She coughed so bad this morning she almost puked. Pt noticed the cough about 3-4 days ago. It's getting worse. Pt has been using her Albuterol inhaler without her spacer.       Breathing difficulty-at night it gets \"pretty bad\". Last night and the night before she woke up and had a panic attack r/t not being able to take a deep enough breath.        Other symptoms-Nausea, increased urination, dizzy      9. PREGNANCY OR POSTPARTUM: \"Is there any chance you are pregnant?\" \"When was your last menstrual period?\" \"Did you deliver in the last 2 weeks?\"      NO      10. HIGH RISK: " "\"Do you have any heart or lung problems? Do you have a weak immune system?\" (e.g., CHF, COPD, asthma, HIV positive, chemotherapy, renal failure, diabetes mellitus, sickle cell anemia)        Pt has no problems with her heart and reports she has asthma. Pt denies having a weakened immune system however she has anemia.    Protocols used: CORONAVIRUS (COVID-19) EXPOSURE-ADULT-AH      "

## 2020-05-08 PROCEDURE — U0003 INFECTIOUS AGENT DETECTION BY NUCLEIC ACID (DNA OR RNA); SEVERE ACUTE RESPIRATORY SYNDROME CORONAVIRUS 2 (SARS-COV-2) (CORONAVIRUS DISEASE [COVID-19]), AMPLIFIED PROBE TECHNIQUE, MAKING USE OF HIGH THROUGHPUT TECHNOLOGIES AS DESCRIBED BY CMS-2020-01-R: HCPCS | Performed by: FAMILY MEDICINE

## 2020-05-11 ENCOUNTER — TELEPHONE (OUTPATIENT)
Dept: URGENT CARE | Facility: CLINIC | Age: 40
End: 2020-05-11

## 2020-05-13 ENCOUNTER — HOSPITAL ENCOUNTER (OUTPATIENT)
Dept: MAMMOGRAPHY | Facility: HOSPITAL | Age: 40
Discharge: HOME | End: 2020-05-13
Admitting: NURSE PRACTITIONER

## 2020-05-13 DIAGNOSIS — R92.8 ABNORMAL MAMMOGRAPHY: ICD-10-CM

## 2020-05-13 PROCEDURE — 77066 DX MAMMO INCL CAD BI: CPT

## 2020-05-13 PROCEDURE — G0279 TOMOSYNTHESIS, MAMMO: HCPCS

## 2020-05-13 PROCEDURE — 77066 DX MAMMO INCL CAD BI: CPT | Performed by: RADIOLOGY

## 2020-05-13 PROCEDURE — 77062 BREAST TOMOSYNTHESIS BI: CPT | Performed by: RADIOLOGY

## 2020-07-08 ENCOUNTER — TELEPHONE (OUTPATIENT)
Dept: FAMILY MEDICINE CLINIC | Facility: CLINIC | Age: 40
End: 2020-07-08

## 2020-07-08 NOTE — TELEPHONE ENCOUNTER
Patient requested an office visit with ERON Vasquez. Patient has numbness and tingling in both hands. This has been ongoing for 2 weeks. Patient stated she has a cough that has been ongoing for about a month but thinks this is due to being a smoker.     Please call and advise. Patient's call back [phone]

## 2020-07-13 ENCOUNTER — OFFICE VISIT (OUTPATIENT)
Dept: FAMILY MEDICINE CLINIC | Facility: CLINIC | Age: 40
End: 2020-07-13

## 2020-07-13 VITALS
HEIGHT: 63 IN | TEMPERATURE: 98.7 F | BODY MASS INDEX: 36.86 KG/M2 | HEART RATE: 106 BPM | DIASTOLIC BLOOD PRESSURE: 94 MMHG | RESPIRATION RATE: 27 BRPM | WEIGHT: 208 LBS | OXYGEN SATURATION: 97 % | SYSTOLIC BLOOD PRESSURE: 144 MMHG

## 2020-07-13 DIAGNOSIS — K21.9 GASTROESOPHAGEAL REFLUX DISEASE WITHOUT ESOPHAGITIS: ICD-10-CM

## 2020-07-13 DIAGNOSIS — R03.0 ELEVATED BLOOD PRESSURE READING: Primary | ICD-10-CM

## 2020-07-13 DIAGNOSIS — G47.09 OTHER INSOMNIA: ICD-10-CM

## 2020-07-13 DIAGNOSIS — R05.9 COUGH: ICD-10-CM

## 2020-07-13 DIAGNOSIS — R20.0 BILATERAL HAND NUMBNESS: ICD-10-CM

## 2020-07-13 PROCEDURE — 99214 OFFICE O/P EST MOD 30 MIN: CPT | Performed by: FAMILY MEDICINE

## 2020-07-13 RX ORDER — PANTOPRAZOLE SODIUM 40 MG/1
40 TABLET, DELAYED RELEASE ORAL DAILY
Qty: 90 TABLET | Refills: 1 | Status: SHIPPED | OUTPATIENT
Start: 2020-07-13 | End: 2021-01-13

## 2020-07-13 RX ORDER — QUETIAPINE FUMARATE 25 MG/1
25 TABLET, FILM COATED ORAL NIGHTLY
Qty: 30 TABLET | Refills: 1 | Status: SHIPPED | OUTPATIENT
Start: 2020-07-13 | End: 2021-01-13

## 2020-07-13 RX ORDER — CETIRIZINE HYDROCHLORIDE 10 MG/1
10 TABLET ORAL DAILY
Qty: 90 TABLET | Refills: 1 | Status: ON HOLD | OUTPATIENT
Start: 2020-07-13 | End: 2021-01-14

## 2020-07-13 RX ORDER — PHENTERMINE HYDROCHLORIDE 37.5 MG/1
37.5 TABLET ORAL
COMMUNITY
End: 2021-01-13

## 2020-07-13 NOTE — PROGRESS NOTES
Subjective   Evelia Foster is a 40 y.o. female.   Er follow up from 7/9/2020    History of Present Illness she was initially seen 5/2020 and was tested for covid and that was neg for cough.  She presented to [facility] on 7/9/2020 for neck pain numbness in hands and cough.  She had cervical xray that showed DDD on c5-c6 and cxr was normal.  She was given a rx for albuterol and pepcid which she did not get filled.  Today she is here for follow up of neck pain hand numbness and persistent cough.     Numbness in both hands in 1st 2nd and 3rd joints in both hands.  She reports a cough for 1.5 months and brain zaps and now numbness in both hands including pinky finger.      bp has been slightly elevated lately.  150/99 at home.  She reports she has been drinking more alcohol in last 2-3 months.  Worse depression and anxiety and diff sleeping.  She is drinking 3-4 shots at night.  She is not having neck pain.      She reports she is not having any heart burn and was given gi coctail at ED.  She is having some lower back pain.      She is requesting a ct chest given hand numbness and family history of cancer.  And cough.      Cough is productive and worse in mornings in some throat clearing after eating.  She denies heart burn.       She has tried benadryl and melatonin    She just started taking the phentermine today for weight loss.  She reports she has taken it in the past and tolerated it well.        The following portions of the patient's history were reviewed and updated as appropriate: allergies, current medications, past family history, past medical history, past social history, past surgical history and problem list.    Review of Systems   Constitutional: Positive for activity change and fatigue. Negative for fever.   HENT: Positive for congestion and sore throat. Negative for facial swelling, nosebleeds, postnasal drip, sinus pressure, sinus pain and sneezing.    Eyes: Negative for pain, discharge, redness  "and itching.   Respiratory: Positive for cough, shortness of breath and wheezing. Negative for apnea, choking, chest tightness and stridor.    Cardiovascular: Negative for chest pain, palpitations and leg swelling.   Gastrointestinal: Negative.    Endocrine: Negative.    Genitourinary: Negative.    Musculoskeletal: Positive for back pain.   Skin: Negative.    Neurological: Positive for numbness. Negative for dizziness, tremors, seizures, facial asymmetry, speech difficulty, weakness and headaches.   Psychiatric/Behavioral: Positive for agitation and sleep disturbance. Negative for behavioral problems, confusion, decreased concentration, dysphoric mood, hallucinations, self-injury and suicidal ideas. The patient is nervous/anxious. The patient is not hyperactive.        Objective     Vitals:    07/13/20 1424   BP: 144/94   Pulse: 106   Resp: 27   Temp: 98.7 °F (37.1 °C)   SpO2: 97%   Weight: 94.3 kg (208 lb)   Height: 158.8 cm (62.5\")       Physical Exam   Constitutional: She is oriented to person, place, and time. She appears well-developed and well-nourished.   HENT:   Head: Normocephalic and atraumatic.   Eyes: Pupils are equal, round, and reactive to light. EOM are normal. Right eye exhibits no discharge. Left eye exhibits no discharge.   Neck: Normal range of motion. Neck supple.   Cardiovascular: Normal rate, regular rhythm, normal heart sounds and intact distal pulses.   Pulmonary/Chest: Effort normal and breath sounds normal.   Abdominal: Soft. Bowel sounds are normal. She exhibits no mass. There is no tenderness.   Musculoskeletal: Normal range of motion.        Right shoulder: She exhibits no swelling.   Neurological: She is alert and oriented to person, place, and time. She has normal reflexes.   Skin: Skin is warm and dry. No cyanosis. Nails show no clubbing.   Psychiatric: She has a normal mood and affect. Her behavior is normal. Judgment and thought content normal.   Nursing note and vitals " reviewed.      Assessment/Plan     Problem List Items Addressed This Visit        Cardiovascular and Mediastinum    Elevated blood pressure reading - Primary       Respiratory    Cough    Relevant Medications    pantoprazole (PROTONIX) 40 MG EC tablet    cetirizine (zyrTEC) 10 MG tablet    Other Relevant Orders    CT chest w contrast       Digestive    Gastroesophageal reflux disease without esophagitis    Relevant Medications    pantoprazole (PROTONIX) 40 MG EC tablet       Nervous and Auditory    Bilateral hand numbness    Relevant Orders    EMG & Nerve Conduction Test       Other    Other insomnia    Relevant Medications    QUEtiapine (SEROquel) 25 MG tablet        Greater than 25 minutes were spent with patient, with greater than 95% of time spent in face-to-face communication.  Dc phentermine.   Cut back on alcohol   seroquel at night starting at 25 mg.   protonix 40 mg daily for gerd that can cause cough and start zyrtec for pnd allergie sx  She is to get albuterol filled for wheezing.   Ordered ct scan chest.  We will need to eventually start something for anx and deep.

## 2020-08-28 ENCOUNTER — HOSPITAL ENCOUNTER (OUTPATIENT)
Dept: CT IMAGING | Facility: HOSPITAL | Age: 40
Discharge: HOME | End: 2020-08-28

## 2020-08-28 ENCOUNTER — HOSPITAL ENCOUNTER (OUTPATIENT)
Dept: NEUROLOGY | Facility: HOSPITAL | Age: 40
Discharge: HOME | End: 2020-08-28
Admitting: FAMILY MEDICINE

## 2020-08-28 DIAGNOSIS — R05.9 COUGH: ICD-10-CM

## 2020-08-28 DIAGNOSIS — R20.0 BILATERAL HAND NUMBNESS: ICD-10-CM

## 2020-08-28 PROCEDURE — 95910 NRV CNDJ TEST 7-8 STUDIES: CPT

## 2020-08-28 PROCEDURE — 25010000002 IOPAMIDOL 61 % SOLUTION: Performed by: FAMILY MEDICINE

## 2020-08-28 PROCEDURE — 95886 MUSC TEST DONE W/N TEST COMP: CPT

## 2020-08-28 PROCEDURE — 71260 CT THORAX DX C+: CPT

## 2020-08-28 RX ADMIN — IOPAMIDOL 75 ML: 612 INJECTION, SOLUTION INTRAVENOUS at 09:28

## 2020-08-31 ENCOUNTER — TELEPHONE (OUTPATIENT)
Dept: FAMILY MEDICINE CLINIC | Facility: CLINIC | Age: 40
End: 2020-08-31

## 2020-08-31 NOTE — PROGRESS NOTES
Notify the patient that her CT scan results look reassuring.  There is what appears to be a healing granulomatous 6 mm nodule in the left lower lobe.  We could recheck this in 6 months if her sick symptoms persist

## 2020-09-01 DIAGNOSIS — R20.0 BILATERAL HAND NUMBNESS: Primary | ICD-10-CM

## 2020-09-13 ENCOUNTER — HOSPITAL ENCOUNTER (EMERGENCY)
Facility: HOSPITAL | Age: 40
Discharge: HOME | End: 2020-09-13
Attending: EMERGENCY MEDICINE | Admitting: EMERGENCY MEDICINE

## 2020-09-13 VITALS
DIASTOLIC BLOOD PRESSURE: 88 MMHG | RESPIRATION RATE: 18 BRPM | WEIGHT: 204 LBS | HEART RATE: 99 BPM | SYSTOLIC BLOOD PRESSURE: 126 MMHG | TEMPERATURE: 98.4 F | HEIGHT: 65 IN | BODY MASS INDEX: 33.99 KG/M2 | OXYGEN SATURATION: 95 %

## 2020-09-13 DIAGNOSIS — H18.821 CORNEAL ABRASION OF RIGHT EYE DUE TO CONTACT LENS: Primary | ICD-10-CM

## 2020-09-13 PROCEDURE — 99283 EMERGENCY DEPT VISIT LOW MDM: CPT

## 2020-09-13 RX ORDER — TRAMADOL HYDROCHLORIDE 50 MG/1
50 TABLET ORAL EVERY 6 HOURS PRN
Qty: 12 TABLET | Refills: 0 | Status: SHIPPED | OUTPATIENT
Start: 2020-09-13 | End: 2021-03-24

## 2020-09-13 RX ORDER — MOXIFLOXACIN 5 MG/ML
1 SOLUTION/ DROPS OPHTHALMIC 3 TIMES DAILY
Qty: 2 ML | Refills: 0 | Status: SHIPPED | OUTPATIENT
Start: 2020-09-13 | End: 2020-09-20

## 2020-09-13 RX ORDER — TETRACAINE HYDROCHLORIDE 5 MG/ML
2 SOLUTION OPHTHALMIC ONCE
Status: COMPLETED | OUTPATIENT
Start: 2020-09-13 | End: 2020-09-13

## 2020-09-13 RX ADMIN — TETRACAINE HYDROCHLORIDE 2 DROP: 5 SOLUTION OPHTHALMIC at 15:07

## 2020-09-13 NOTE — DISCHARGE INSTRUCTIONS
Apply antibiotic eyedrops as prescribed.    Take ultram as needed for severe pain not controlled by Tylenol ibuprofen.    Follow-up with ophthalmology for recheck within the next week.    Avoid wearing contact lenses for 1 to 2 weeks

## 2020-09-13 NOTE — ED PROVIDER NOTES
Subjective   40-year-old female presents with complaint of right eye pain.  She reports changing her contacts yesterday because an old contact appeared to have something on it.  She placed in her eye last night and per usual or through the night.  She only replaced the contact in the right eye.  She woke this morning with right eye swelling, redness, and mild clear drainage.  She reports some blurriness to the vision due to excessive tearing.  No reported infectious symptoms including no fever, body aches or chills.  No reported trauma to her eye.  She denies getting anything in her right eye.  No reported respiratory or infectious symptoms.  No other reported aggravating, alleviating, or associated symptoms.          Review of Systems   Constitutional: Negative for chills, fatigue and fever.   HENT: Negative for congestion, ear pain, postnasal drip, sinus pressure and sore throat.    Eyes: Positive for photophobia, pain and redness. Negative for visual disturbance.   Respiratory: Negative for cough, chest tightness and shortness of breath.    Cardiovascular: Negative for chest pain, palpitations and leg swelling.   Gastrointestinal: Negative for abdominal pain, anal bleeding, blood in stool, diarrhea, nausea and vomiting.   Endocrine: Negative for polydipsia and polyuria.   Genitourinary: Negative for difficulty urinating, dysuria, frequency and urgency.   Musculoskeletal: Negative for arthralgias, back pain and neck pain.   Skin: Negative for pallor and rash.   Allergic/Immunologic: Negative for environmental allergies and immunocompromised state.   Neurological: Negative for dizziness, weakness and headaches.   Hematological: Negative for adenopathy.   Psychiatric/Behavioral: Negative for confusion, self-injury and suicidal ideas. The patient is not nervous/anxious.    All other systems reviewed and are negative.      Past Medical History:   Diagnosis Date   • Anxiety    • Dizzy    • Full dentures    • Headache     • HPV (human papilloma virus) infection     positive 20   • Insomnia    • PTSD (post-traumatic stress disorder)    • Restless leg syndrome    • Severe dysplasia of cervix    • Uses contact lenses        Allergies   Allergen Reactions   • Benadryl [Diphenhydramine Hcl] Itching   • Buspar [Buspirone] Confusion   • Lortab [Hydrocodone-Acetaminophen] Rash       Past Surgical History:   Procedure Laterality Date   • COLONOSCOPY     • ENDOSCOPY     • TONSILLECTOMY AND ADENOIDECTOMY     • TOTAL LAPAROSCOPIC HYSTERECTOMY N/A 1/9/2019    Procedure: TOTAL LAPAROSCOPIC HYSTERECTOMY, BILATERAL SALPINGECTOMY WITH DAVINCI ROBOT;  Surgeon: Markie Lewis MD;  Location: [facility];  Service: DaVinci   • TUBAL ABDOMINAL LIGATION         Family History   Problem Relation Age of Onset   • Cervical cancer Mother 30   • Breast cancer Mother 30   • Lymphoma Mother    • Congenital heart disease Mother    • Cancer Father    • Breast cancer Sister         mastectomy -proph?   • Ovarian cancer Sister 30   • No Known Problems Brother    • Dementia Maternal Grandmother    • Graves' disease Sister    • Breast cancer Maternal Aunt 35       Social History     Socioeconomic History   • Marital status: Single     Spouse name: Not on file   • Number of children: Not on file   • Years of education: Not on file   • Highest education level: Not on file   Occupational History   • Occupation: [employer]   Tobacco Use   • Smoking status: Current Every Day Smoker     Packs/day: 1.00     Years: 24.00     Pack years: 24.00     Types: Cigarettes   • Smokeless tobacco: Never Used   • Tobacco comment: tried w preg   Substance and Sexual Activity   • Alcohol use: Yes     Comment: social    • Drug use: No   • Sexual activity: Yes     Partners: Male     Birth control/protection: Surgical           Objective   Physical Exam  Constitutional:       General: She is not in acute distress.     Appearance: Normal appearance. She is not ill-appearing.   HENT:      Head:  Normocephalic and atraumatic.      Jaw: There is normal jaw occlusion.      Right Ear: External ear normal.      Left Ear: External ear normal.      Nose: Nose normal. No nasal deformity or signs of injury.      Mouth/Throat:      Lips: Pink.      Mouth: Mucous membranes are moist. No injury.      Pharynx: Oropharynx is clear.   Eyes:      General: Lids are normal. Lids are everted, no foreign bodies appreciated. Vision grossly intact.         Right eye: No foreign body.         Left eye: No foreign body.      Extraocular Movements: Extraocular movements intact.      Conjunctiva/sclera:      Right eye: Right conjunctiva is injected. Chemosis present.      Pupils: Pupils are equal, round, and reactive to light.     Neck:      Musculoskeletal: Normal range of motion and neck supple.      Trachea: Trachea normal.   Cardiovascular:      Rate and Rhythm: Normal rate.      Pulses: Normal pulses.      Heart sounds: Normal heart sounds.   Pulmonary:      Effort: Pulmonary effort is normal.      Breath sounds: Normal breath sounds.   Abdominal:      General: Abdomen is flat. Bowel sounds are normal.      Palpations: Abdomen is soft.   Musculoskeletal: Normal range of motion.         General: No deformity or signs of injury.   Lymphadenopathy:      Cervical: No cervical adenopathy.   Skin:     General: Skin is warm and dry.   Neurological:      General: No focal deficit present.      Mental Status: She is alert and oriented to person, place, and time. Mental status is at baseline.   Psychiatric:         Mood and Affect: Mood normal.         Speech: Speech normal.         Behavior: Behavior normal.         Thought Content: Thought content normal.         Judgment: Judgment normal.         Procedures           ED Course                                           MDM  Number of Diagnoses or Management Options  Corneal abrasion of right eye due to contact lens: new and requires workup  Diagnosis management comments: On exam there  is a small corneal abrasion identified at the 3 o'clock position.  It does not obstruct the visual axis.    The patient was discharged with antibiotic eyedrops in the form of Vigamox, and Ultram to take as needed for pain.    She will be referred to ophthalmology for outpatient follow-up.    Vies to not wear contact lens in the right eye for at least 1 to 2 weeks.    Advised return to the ER with any further concern.       Amount and/or Complexity of Data Reviewed  Review and summarize past medical records: yes  Independent visualization of images, tracings, or specimens: yes        Final diagnoses:   Corneal abrasion of right eye due to contact lens            Shiva Rubio MD  09/13/20 0635

## 2020-10-21 ENCOUNTER — OFFICE VISIT (OUTPATIENT)
Dept: NEUROLOGY | Facility: CLINIC | Age: 40
End: 2020-10-21

## 2020-10-21 ENCOUNTER — LAB (OUTPATIENT)
Dept: LAB | Facility: HOSPITAL | Age: 40
End: 2020-10-21

## 2020-10-21 VITALS
BODY MASS INDEX: 33.99 KG/M2 | RESPIRATION RATE: 16 BRPM | HEIGHT: 65 IN | TEMPERATURE: 98.4 F | HEART RATE: 81 BPM | WEIGHT: 204 LBS | OXYGEN SATURATION: 100 %

## 2020-10-21 DIAGNOSIS — R20.0 NUMBNESS AND TINGLING: Primary | ICD-10-CM

## 2020-10-21 DIAGNOSIS — R20.2 NUMBNESS AND TINGLING: Primary | ICD-10-CM

## 2020-10-21 LAB
ALBUMIN SERPL-MCNC: 4.4 G/DL (ref 3.5–5.2)
ALBUMIN/GLOB SERPL: 2.2 G/DL
ALP SERPL-CCNC: 83 U/L (ref 39–117)
ALT SERPL W P-5'-P-CCNC: 24 U/L (ref 1–33)
ANION GAP SERPL CALCULATED.3IONS-SCNC: 7.8 MMOL/L (ref 5–15)
AST SERPL-CCNC: 26 U/L (ref 1–32)
BASOPHILS # BLD AUTO: 0.02 10*3/MM3 (ref 0–0.2)
BASOPHILS NFR BLD AUTO: 0.2 % (ref 0–1.5)
BILIRUB SERPL-MCNC: 0.4 MG/DL (ref 0–1.2)
BUN SERPL-MCNC: 6 MG/DL (ref 6–20)
BUN/CREAT SERPL: 8.1 (ref 7–25)
CALCIUM SPEC-SCNC: 9.7 MG/DL (ref 8.6–10.5)
CHLORIDE SERPL-SCNC: 101 MMOL/L (ref 98–107)
CO2 SERPL-SCNC: 29.2 MMOL/L (ref 22–29)
CREAT SERPL-MCNC: 0.74 MG/DL (ref 0.57–1)
DEPRECATED RDW RBC AUTO: 46 FL (ref 37–54)
EOSINOPHIL # BLD AUTO: 0.11 10*3/MM3 (ref 0–0.4)
EOSINOPHIL NFR BLD AUTO: 1.3 % (ref 0.3–6.2)
ERYTHROCYTE [DISTWIDTH] IN BLOOD BY AUTOMATED COUNT: 14 % (ref 12.3–15.4)
GFR SERPL CREATININE-BSD FRML MDRD: 88 ML/MIN/1.73
GLOBULIN UR ELPH-MCNC: 2 GM/DL
GLUCOSE SERPL-MCNC: 117 MG/DL (ref 65–99)
HBA1C MFR BLD: 5.4 % (ref 4.8–5.6)
HCT VFR BLD AUTO: 40.1 % (ref 34–46.6)
HGB BLD-MCNC: 14.1 G/DL (ref 12–15.9)
IMM GRANULOCYTES # BLD AUTO: 0.02 10*3/MM3 (ref 0–0.05)
IMM GRANULOCYTES NFR BLD AUTO: 0.2 % (ref 0–0.5)
LYMPHOCYTES # BLD AUTO: 2.16 10*3/MM3 (ref 0.7–3.1)
LYMPHOCYTES NFR BLD AUTO: 25.9 % (ref 19.6–45.3)
MCH RBC QN AUTO: 31.8 PG (ref 26.6–33)
MCHC RBC AUTO-ENTMCNC: 35.2 G/DL (ref 31.5–35.7)
MCV RBC AUTO: 90.5 FL (ref 79–97)
MONOCYTES # BLD AUTO: 0.51 10*3/MM3 (ref 0.1–0.9)
MONOCYTES NFR BLD AUTO: 6.1 % (ref 5–12)
NEUTROPHILS NFR BLD AUTO: 5.53 10*3/MM3 (ref 1.7–7)
NEUTROPHILS NFR BLD AUTO: 66.3 % (ref 42.7–76)
NRBC BLD AUTO-RTO: 0 /100 WBC (ref 0–0.2)
PLATELET # BLD AUTO: 280 10*3/MM3 (ref 140–450)
PMV BLD AUTO: 10.2 FL (ref 6–12)
POTASSIUM SERPL-SCNC: 4.1 MMOL/L (ref 3.5–5.2)
PROT SERPL-MCNC: 6.4 G/DL (ref 6–8.5)
RBC # BLD AUTO: 4.43 10*6/MM3 (ref 3.77–5.28)
SODIUM SERPL-SCNC: 138 MMOL/L (ref 136–145)
WBC # BLD AUTO: 8.35 10*3/MM3 (ref 3.4–10.8)

## 2020-10-21 PROCEDURE — 85025 COMPLETE CBC W/AUTO DIFF WBC: CPT | Performed by: PHYSICIAN ASSISTANT

## 2020-10-21 PROCEDURE — 36415 COLL VENOUS BLD VENIPUNCTURE: CPT | Performed by: PHYSICIAN ASSISTANT

## 2020-10-21 PROCEDURE — 83036 HEMOGLOBIN GLYCOSYLATED A1C: CPT | Performed by: PHYSICIAN ASSISTANT

## 2020-10-21 PROCEDURE — 84207 ASSAY OF VITAMIN B-6: CPT | Performed by: PHYSICIAN ASSISTANT

## 2020-10-21 PROCEDURE — 84443 ASSAY THYROID STIM HORMONE: CPT | Performed by: PHYSICIAN ASSISTANT

## 2020-10-21 PROCEDURE — 84165 PROTEIN E-PHORESIS SERUM: CPT | Performed by: PHYSICIAN ASSISTANT

## 2020-10-21 PROCEDURE — 99204 OFFICE O/P NEW MOD 45 MIN: CPT | Performed by: PHYSICIAN ASSISTANT

## 2020-10-21 PROCEDURE — 80053 COMPREHEN METABOLIC PANEL: CPT | Performed by: PHYSICIAN ASSISTANT

## 2020-10-21 PROCEDURE — 82607 VITAMIN B-12: CPT | Performed by: PHYSICIAN ASSISTANT

## 2020-10-21 PROCEDURE — 82746 ASSAY OF FOLIC ACID SERUM: CPT | Performed by: PHYSICIAN ASSISTANT

## 2020-10-21 NOTE — PROGRESS NOTES
Subjective     Chief Complaint: numbness and tingling      History of Present Illness   Evelia Foster is a 40 y.o. female who comes to clinic today for evaluation of numbness and tingling. She initially noted symptoms in Summer 2020 marked by numbness and tingling in her hands bilaterally (primarily in her 3-5th digits). This has worsened over time and is now constant. There is associated burning, possible swelling, and stiffness. She also notes possible associated decreased  strength (L>R) as well as neck pain. Her symptoms are worse with increased activity and after lifting heavy objects at work.     Prior evaluation has included an EMG of the upper extremities, which was unremarkable.       I have reviewed and confirmed the past family, social and medical history as accurate on 10/21/2020.     Review of Systems   Constitutional: Negative.    HENT: Negative.    Eyes: Negative.    Respiratory: Negative.    Cardiovascular: Negative.    Gastrointestinal: Negative.    Endocrine: Negative.    Genitourinary: Negative.    Musculoskeletal: Negative.    Skin: Negative.    Allergic/Immunologic: Negative.    Hematological: Negative.    Psychiatric/Behavioral: Negative.        Objective     LMP 12/18/2018     General appearance today is normal.   Peripheral pulses were present and symmetric.  The ophthalmoscopic exam today is unremarkable. The discs and posterior elements are unremarkable.      Physical Exam  Neurological:      Coordination: Finger-Nose-Finger Test and Heel to Shin Test normal.      Gait: Gait is intact.      Deep Tendon Reflexes: Strength normal.      Reflex Scores:       Patellar reflexes are 2+ on the right side and 2+ on the left side.  Psychiatric:         Speech: Speech normal.          Neurologic Exam     Mental Status   Speech: speech is normal   Level of consciousness: alert  Normal comprehension.     Cranial Nerves   Cranial nerves II through XII intact.     Motor Exam   Muscle bulk:  normal  Overall muscle tone: normal    Strength   Strength 5/5 throughout.     Sensory Exam   Decreased sensation to light touch in digits 3-5 bilaterally      Gait, Coordination, and Reflexes     Gait  Gait: normal    Coordination   Finger to nose coordination: normal  Heel to shin coordination: normal    Tremor   Resting tremor: absent    Reflexes   Right patellar: 2+  Left patellar: 2+        Assessment/Plan   Diagnoses and all orders for this visit:    1. Numbness and tingling (Primary)  -     CBC & Differential  -     Comprehensive Metabolic Panel  -     Folate  -     MRI Brain Without Contrast  -     TSH  -     Vitamin B12  -     Hemoglobin A1c  -     Vitamin B6  -     Protein Elec + Interp, Serum  -     MRI Cervical Spine Without Contrast  -     CBC Auto Differential          Discussion/Summary   Evelia Foster comes to clinic today for evaluation of numbness and tingling in her hands bilaterally. The etiology is a bit unclear. This was discussed. It was elected to obtain screening blood work and an MRI of the brain as well as an MRI of the cervical spine given her associated neck pain. She will then follow up in our clinic based on the results of her diagnostic testing.   I spent 45 minutes face to face with the patient with 25 minutes spent on discussing diagnosis, prognosis, diagnostic testing, evaluation, current status, treatment options and management as discussed above.       As part of this visit I discussed the history with the patient .      Vanessa Norwood PA-C

## 2020-10-22 ENCOUNTER — TELEPHONE (OUTPATIENT)
Dept: NEUROLOGY | Facility: CLINIC | Age: 40
End: 2020-10-22

## 2020-10-22 LAB
FOLATE SERPL-MCNC: 5.07 NG/ML (ref 4.78–24.2)
TSH SERPL DL<=0.05 MIU/L-ACNC: 1.55 UIU/ML (ref 0.27–4.2)
VIT B12 BLD-MCNC: 231 PG/ML (ref 211–946)

## 2020-10-23 LAB
ALBUMIN SERPL ELPH-MCNC: 3.7 G/DL (ref 2.9–4.4)
ALBUMIN/GLOB SERPL: 1.3 {RATIO} (ref 0.7–1.7)
ALPHA1 GLOB SERPL ELPH-MCNC: 0.3 G/DL (ref 0–0.4)
ALPHA2 GLOB SERPL ELPH-MCNC: 0.7 G/DL (ref 0.4–1)
B-GLOBULIN SERPL ELPH-MCNC: 0.9 G/DL (ref 0.7–1.3)
GAMMA GLOB SERPL ELPH-MCNC: 0.8 G/DL (ref 0.4–1.8)
GLOBULIN SER CALC-MCNC: 2.8 G/DL (ref 2.2–3.9)
LABORATORY COMMENT REPORT: NORMAL
M PROTEIN SERPL ELPH-MCNC: NORMAL G/DL
PROT PATTERN SERPL ELPH-IMP: NORMAL
PROT SERPL-MCNC: 6.5 G/DL (ref 6–8.5)

## 2020-10-30 LAB — VIT B6 SERPL-MCNC: 17.6 UG/L (ref 2–32.8)

## 2020-11-09 ENCOUNTER — HOSPITAL ENCOUNTER (OUTPATIENT)
Dept: MRI IMAGING | Facility: HOSPITAL | Age: 40
Discharge: HOME | End: 2020-11-09

## 2020-11-09 PROCEDURE — 70551 MRI BRAIN STEM W/O DYE: CPT

## 2020-11-09 PROCEDURE — 72141 MRI NECK SPINE W/O DYE: CPT

## 2020-11-20 DIAGNOSIS — M48.02 SPINAL STENOSIS OF CERVICAL REGION: Primary | ICD-10-CM

## 2020-12-15 ENCOUNTER — PREP FOR SURGERY (OUTPATIENT)
Dept: OTHER | Facility: HOSPITAL | Age: 40
End: 2020-12-15

## 2020-12-15 ENCOUNTER — OFFICE VISIT (OUTPATIENT)
Dept: NEUROSURGERY | Facility: CLINIC | Age: 40
End: 2020-12-15

## 2020-12-15 VITALS — TEMPERATURE: 97.2 F | BODY MASS INDEX: 32.69 KG/M2 | WEIGHT: 203.4 LBS | HEIGHT: 66 IN

## 2020-12-15 DIAGNOSIS — M50.30 DDD (DEGENERATIVE DISC DISEASE), CERVICAL: ICD-10-CM

## 2020-12-15 DIAGNOSIS — M50.10 HERNIATION OF CERVICAL INTERVERTEBRAL DISC WITH RADICULOPATHY: Primary | ICD-10-CM

## 2020-12-15 DIAGNOSIS — M47.12 CERVICAL SPONDYLOSIS WITH MYELOPATHY: Primary | ICD-10-CM

## 2020-12-15 PROCEDURE — 99244 OFF/OP CNSLTJ NEW/EST MOD 40: CPT | Performed by: NEUROLOGICAL SURGERY

## 2020-12-15 RX ORDER — CEFAZOLIN SODIUM 2 G/100ML
2 INJECTION, SOLUTION INTRAVENOUS ONCE
Status: CANCELLED | OUTPATIENT
Start: 2020-12-15 | End: 2020-12-15

## 2020-12-15 RX ORDER — IBUPROFEN 600 MG/1
600 TABLET ORAL EVERY 6 HOURS PRN
COMMUNITY
End: 2021-02-03

## 2020-12-15 RX ORDER — LANOLIN ALCOHOL/MO/W.PET/CERES
1000 CREAM (GRAM) TOPICAL DAILY
COMMUNITY
End: 2021-02-03

## 2020-12-15 NOTE — H&P
Patient: Evelia Foster  : 1980     Primary Care Provider: Josh Kumar, DNP, APRN     Requesting Provider: Dr. Jordi Boyer           History     Chief Complaint: Neck pain with numbness in the hands.     History of Present Illness: Ms. Foster is a 40-year-old [employer]  who describes a 6-month history of what began as a tingling in the ulnar fingers of each hand.  That has gone on to be a jose numbness.  She complains of neck pain.  She has no significant weakness.  With coughing or sneezing she has a Lhermitte's phenomenon that occurs.  Over the last week she has had some urinary urgency.  She reports no bowel difficulties.  Nothing makes her symptoms better or worse.  The symptoms have become constant.  She has done chiropractic to no avail.  She smokes about 1/2 pack of tobacco daily.     Review of Systems   Constitutional: Positive for fatigue. Negative for activity change, appetite change, chills, diaphoresis, fever and unexpected weight change.   HENT: Positive for hearing loss. Negative for congestion, dental problem, drooling, ear discharge, ear pain, facial swelling, mouth sores, nosebleeds, postnasal drip, rhinorrhea, sinus pressure, sinus pain, sneezing, sore throat, tinnitus, trouble swallowing and voice change.    Eyes: Positive for discharge and itching. Negative for photophobia, pain, redness and visual disturbance.   Respiratory: Negative for apnea, cough, choking, chest tightness, shortness of breath, wheezing and stridor.    Cardiovascular: Positive for palpitations. Negative for chest pain and leg swelling.   Gastrointestinal: Negative for abdominal distention, abdominal pain, anal bleeding, blood in stool, constipation, diarrhea, nausea, rectal pain and vomiting.   Endocrine: Negative for cold intolerance, heat intolerance, polydipsia, polyphagia and polyuria.   Genitourinary: Positive for frequency and urgency. Negative for decreased urine volume, difficulty  urinating, dyspareunia, dysuria, enuresis, flank pain, genital sores, hematuria, menstrual problem, pelvic pain, vaginal bleeding, vaginal discharge and vaginal pain.   Musculoskeletal: Positive for back pain, neck pain and neck stiffness. Negative for arthralgias, gait problem, joint swelling and myalgias.   Skin: Negative for color change, pallor, rash and wound.   Allergic/Immunologic: Negative for environmental allergies, food allergies and immunocompromised state.   Neurological: Positive for dizziness, light-headedness, numbness and headaches. Negative for tremors, seizures, syncope, facial asymmetry, speech difficulty and weakness.   Hematological: Negative for adenopathy. Does not bruise/bleed easily.   Psychiatric/Behavioral: Positive for agitation, decreased concentration and dysphoric mood. Negative for behavioral problems, confusion, hallucinations, self-injury, sleep disturbance and suicidal ideas. The patient is nervous/anxious. The patient is not hyperactive.          The patient's past medical history, past surgical history, family history, and social history have been reviewed at length in the electronic medical record.     Past Medical History:   Diagnosis Date   • Anemia    • Anxiety    • Asthma    • Dizzy    • Full dentures    • Headache    • HPV (human papilloma virus) infection     positive 20   • Hypertension    • Insomnia    • Low back pain    • Pneumonia    • PTSD (post-traumatic stress disorder)    • Restless leg syndrome    • Severe dysplasia of cervix    • Uses contact lenses      Past Surgical History:   Procedure Laterality Date   • COLONOSCOPY     • ENDOSCOPY     • TONSILLECTOMY AND ADENOIDECTOMY     • TOTAL LAPAROSCOPIC HYSTERECTOMY N/A 1/9/2019    Procedure: TOTAL LAPAROSCOPIC HYSTERECTOMY, BILATERAL SALPINGECTOMY WITH DAVINCI ROBOT;  Surgeon: Markie Lewis MD;  Location: [facility];  Service: DaVinci   • TUBAL ABDOMINAL LIGATION       Family History   Problem Relation Age of Onset   •  Cervical cancer Mother 30   • Breast cancer Mother 30   • Lymphoma Mother    • Congenital heart disease Mother    • Cancer Father    • Breast cancer Sister         mastectomy -proph?   • Ovarian cancer Sister 30   • No Known Problems Brother    • Dementia Maternal Grandmother    • Graves' disease Sister    • Breast cancer Maternal Aunt 35     Social History     Socioeconomic History   • Marital status: Single     Spouse name: Not on file   • Number of children: Not on file   • Years of education: Not on file   • Highest education level: Not on file   Occupational History   • Occupation: [employer]   Tobacco Use   • Smoking status: Current Every Day Smoker     Packs/day: 1.00     Years: 24.00     Pack years: 24.00     Types: Cigarettes   • Smokeless tobacco: Never Used   • Tobacco comment: tried w preg   Substance and Sexual Activity   • Alcohol use: Yes     Comment: social    • Drug use: No   • Sexual activity: Yes     Partners: Male     Birth control/protection: Surgical       Allergies   Allergen Reactions   • Benadryl [Diphenhydramine Hcl] Itching   • Buspar [Buspirone] Confusion   • Lortab [Hydrocodone-Acetaminophen] Rash       Current Outpatient Medications on File Prior to Visit   Medication Sig Dispense Refill   • cetirizine (zyrTEC) 10 MG tablet Take 1 tablet by mouth Daily. 90 tablet 1   • ibuprofen (ADVIL,MOTRIN) 600 MG tablet Take 600 mg by mouth Every 6 (Six) Hours As Needed for Mild Pain .     • pantoprazole (PROTONIX) 40 MG EC tablet Take 1 tablet by mouth Daily. 90 tablet 1   • phentermine (ADIPEX-P) 37.5 MG tablet Take 37.5 mg by mouth Every Morning Before Breakfast.     • QUEtiapine (SEROquel) 25 MG tablet Take 1 tablet by mouth Every Night. 30 tablet 1   • traMADol (ULTRAM) 50 MG tablet Take 1 tablet by mouth Every 6 (Six) Hours As Needed for Moderate Pain  or Severe Pain  for up to 12 doses. 12 tablet 0   • vitamin B-12 (CYANOCOBALAMIN) 1000 MCG tablet Take 1,000 mcg by mouth Daily.       No current  "facility-administered medications on file prior to visit.         Physical Exam:   Temp 97.2 °F (36.2 °C)   Ht 166.4 cm (65.5\")   Wt 92.3 kg (203 lb 6.4 oz)   LMP 12/18/2018   BMI 33.33 kg/m²   CONSTITUTIONAL: Patient is well-nourished, pleasant and appears stated age.  CV: Heart regular rate and rhythm without murmur, rub, or gallop.  PULMONARY: Lungs are clear to ascultation.  MUSCULOSKELETAL:  Neck tenderness to palpation is not observed.   ROM in neck is normal.  There is no tenderness over the ulnar groove.  NEUROLOGICAL:  Orientation, memory, attention span, language function, and cognition have been examined and are intact.  Strength is intact in the upper and lower extremities to direct testing.  Muscle tone is normal throughout.  Station and gait are normal.  Sensation is altered to light touch testing in the medial 3 digits of each hand.  Deep tendon reflexes are 3+ and symmetrical.  Nicholas's Sign is quite positive bilaterally. No clonus is elicited.  Coordination is intact.  CRANIAL NERVES:  Cranial Nerve II:  Visual fields are full to confrontation.  Cranial Nerve III, IV, and VI: PERRLADC. Extraocular movements are intact.  Nystagmus is not present.  Cranial Nerve V: Facial sensation is intact to light touch.  Cranial Nerve VII: Muscles of facial expression demonstate no weakness or asymmetry.  Cranial Nerve VIII: Hearing is intact to finger rub bilaterally.  Cranial Nerve IX and X: Palate elevates symmetrically.  Cranial Nerve XI: Shoulder shrug is intact bilaterally.  Cranial Nerve XII: Tongue is midline without evidence of atrophy or fasciculation.     Medical Decision Making     Data Review:   MRI of the cervical spine dated 11/9/2020 demonstrates some diffuse degenerative disc disease and spondylosis with straightening of the cervical spine.  There is a large broad-based disc protrusion at C5-6 that crimps the spinal cord.  There is flattening of the cord with signal change within the cord " itself.     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     Treatment Options:   I have recommended ACDF at C5-6.  The goal of surgery is to prevent progression of her myelopathy.  This may not reverse all of her current neurologic difficulties.  I have discussed the merits of smoking cessation as it relates to a successful fusion.  The nature of the procedure as well as the potential risks, complications, limitations, and alternatives to the procedure were discussed at length with the patient and the patient has agreed to proceed with surgery.          Diagnosis Plan   1. Cervical spondylosis with myelopathy      2. DDD (degenerative disc disease), cervical

## 2020-12-15 NOTE — PROGRESS NOTES
Patient: Evelia Foster  : 1980    Primary Care Provider: Josh Kumar, DNP, APRN    Requesting Provider: Dr. Jordi Boyer        History    Chief Complaint: Neck pain with numbness in the hands.    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  That has gone on to be a jose numbness.  She complains of neck pain.  She has no significant weakness.  With coughing or sneezing she has a Lhermitte's phenomenon that occurs.  Over the last week she has had some urinary urgency.  She reports no bowel difficulties.  Nothing makes her symptoms better or worse.  The symptoms have become constant.  She has done chiropractic to no avail.  She smokes about 1/2 pack of tobacco daily.    Review of Systems   Constitutional: Positive for fatigue. Negative for activity change, appetite change, chills, diaphoresis, fever and unexpected weight change.   HENT: Positive for hearing loss. Negative for congestion, dental problem, drooling, ear discharge, ear pain, facial swelling, mouth sores, nosebleeds, postnasal drip, rhinorrhea, sinus pressure, sinus pain, sneezing, sore throat, tinnitus, trouble swallowing and voice change.    Eyes: Positive for discharge and itching. Negative for photophobia, pain, redness and visual disturbance.   Respiratory: Negative for apnea, cough, choking, chest tightness, shortness of breath, wheezing and stridor.    Cardiovascular: Positive for palpitations. Negative for chest pain and leg swelling.   Gastrointestinal: Negative for abdominal distention, abdominal pain, anal bleeding, blood in stool, constipation, diarrhea, nausea, rectal pain and vomiting.   Endocrine: Negative for cold intolerance, heat intolerance, polydipsia, polyphagia and polyuria.   Genitourinary: Positive for frequency and urgency. Negative for decreased urine volume, difficulty  "urinating, dyspareunia, dysuria, enuresis, flank pain, genital sores, hematuria, menstrual problem, pelvic pain, vaginal bleeding, vaginal discharge and vaginal pain.   Musculoskeletal: Positive for back pain, neck pain and neck stiffness. Negative for arthralgias, gait problem, joint swelling and myalgias.   Skin: Negative for color change, pallor, rash and wound.   Allergic/Immunologic: Negative for environmental allergies, food allergies and immunocompromised state.   Neurological: Positive for dizziness, light-headedness, numbness and headaches. Negative for tremors, seizures, syncope, facial asymmetry, speech difficulty and weakness.   Hematological: Negative for adenopathy. Does not bruise/bleed easily.   Psychiatric/Behavioral: Positive for agitation, decreased concentration and dysphoric mood. Negative for behavioral problems, confusion, hallucinations, self-injury, sleep disturbance and suicidal ideas. The patient is nervous/anxious. The patient is not hyperactive.        The patient's past medical history, past surgical history, family history, and social history have been reviewed at length in the electronic medical record.    Physical Exam:   Temp 97.2 °F (36.2 °C)   Ht 166.4 cm (65.5\")   Wt 92.3 kg (203 lb 6.4 oz)   LMP 12/18/2018   BMI 33.33 kg/m²   CONSTITUTIONAL: Patient is well-nourished, pleasant and appears stated age.  CV: Heart regular rate and rhythm without murmur, rub, or gallop.  PULMONARY: Lungs are clear to ascultation.  MUSCULOSKELETAL:  Neck tenderness to palpation is not observed.   ROM in neck is normal.  There is no tenderness over the ulnar groove.  NEUROLOGICAL:  Orientation, memory, attention span, language function, and cognition have been examined and are intact.  Strength is intact in the upper and lower extremities to direct testing.  Muscle tone is normal throughout.  Station and gait are normal.  Sensation is altered to light touch testing in the medial 3 digits of each " hand.  Deep tendon reflexes are 3+ and symmetrical.  Nicholas's Sign is quite positive bilaterally. No clonus is elicited.  Coordination is intact.  CRANIAL NERVES:  Cranial Nerve II:  Visual fields are full to confrontation.  Cranial Nerve III, IV, and VI: PERRLADC. Extraocular movements are intact.  Nystagmus is not present.  Cranial Nerve V: Facial sensation is intact to light touch.  Cranial Nerve VII: Muscles of facial expression demonstate no weakness or asymmetry.  Cranial Nerve VIII: Hearing is intact to finger rub bilaterally.  Cranial Nerve IX and X: Palate elevates symmetrically.  Cranial Nerve XI: Shoulder shrug is intact bilaterally.  Cranial Nerve XII: Tongue is midline without evidence of atrophy or fasciculation.    Medical Decision Making    Data Review:   MRI of the cervical spine dated 11/9/2020 demonstrates some diffuse degenerative disc disease and spondylosis with straightening of the cervical spine.  There is a large broad-based disc protrusion at C5-6 that crimps the spinal cord.  There is flattening of the cord with signal change within the cord itself.    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    Treatment Options:   I have recommended ACDF at C5-6.  The goal of surgery is to prevent progression of her myelopathy.  This may not reverse all of her current neurologic difficulties.  I have discussed the merits of smoking cessation as it relates to a successful fusion.  The nature of the procedure as well as the potential risks, complications, limitations, and alternatives to the procedure were discussed at length with the patient and the patient has agreed to proceed with surgery.       Diagnosis Plan   1. Cervical spondylosis with myelopathy     2. DDD (degenerative disc disease), cervical         Scribed for Kalyan Vasquez MD by Liz Abreu ERASMO 12/15/2020 10:28 EST      I, Dr. Vasquez, personally performed the services described in the documentation, as scribed in my  presence, and it is both accurate and complete.

## 2020-12-16 RX ORDER — VARENICLINE TARTRATE 1 MG/1
1 TABLET, FILM COATED ORAL 2 TIMES DAILY
Qty: 56 TABLET | Refills: 1 | Status: ON HOLD | OUTPATIENT
Start: 2021-01-13 | End: 2021-01-14

## 2020-12-16 NOTE — TELEPHONE ENCOUNTER
Provider:  Pedro  Caller: patient  Time of call:   1:48  Phone #:  [phone]  Surgery:  Upcoming cervical discectomy  Surgery Date:  01/28/20  Last visit:   yesterday  Next visit: surgery    JARED:         Reason for call:   Patient called and said she would like to stop smoking.      Wants to know if we will order Chantix.    Order pended if approved.

## 2021-01-13 ENCOUNTER — APPOINTMENT (OUTPATIENT)
Dept: PREADMISSION TESTING | Facility: HOSPITAL | Age: 41
End: 2021-01-13

## 2021-01-13 ENCOUNTER — ANESTHESIA EVENT (OUTPATIENT)
Dept: PERIOP | Facility: HOSPITAL | Age: 41
End: 2021-01-13

## 2021-01-13 VITALS — BODY MASS INDEX: 33.68 KG/M2 | HEIGHT: 66 IN | WEIGHT: 209.6 LBS

## 2021-01-13 LAB
DEPRECATED RDW RBC AUTO: 49.2 FL (ref 37–54)
ERYTHROCYTE [DISTWIDTH] IN BLOOD BY AUTOMATED COUNT: 14.1 % (ref 12.3–15.4)
HCT VFR BLD AUTO: 44.6 % (ref 34–46.6)
HGB BLD-MCNC: 14.5 G/DL (ref 12–15.9)
MCH RBC QN AUTO: 31 PG (ref 26.6–33)
MCHC RBC AUTO-ENTMCNC: 32.5 G/DL (ref 31.5–35.7)
MCV RBC AUTO: 95.3 FL (ref 79–97)
MRSA DNA SPEC QL NAA+PROBE: NEGATIVE
PLATELET # BLD AUTO: 276 10*3/MM3 (ref 140–450)
PMV BLD AUTO: 9.2 FL (ref 6–12)
QT INTERVAL: 342 MS
QTC INTERVAL: 420 MS
RBC # BLD AUTO: 4.68 10*6/MM3 (ref 3.77–5.28)
SARS-COV-2 RNA RESP QL NAA+PROBE: NOT DETECTED
WBC # BLD AUTO: 11.92 10*3/MM3 (ref 3.4–10.8)

## 2021-01-13 PROCEDURE — 93005 ELECTROCARDIOGRAM TRACING: CPT

## 2021-01-13 PROCEDURE — C9803 HOPD COVID-19 SPEC COLLECT: HCPCS

## 2021-01-13 PROCEDURE — 36415 COLL VENOUS BLD VENIPUNCTURE: CPT

## 2021-01-13 PROCEDURE — 85027 COMPLETE CBC AUTOMATED: CPT

## 2021-01-13 PROCEDURE — 87641 MR-STAPH DNA AMP PROBE: CPT

## 2021-01-13 PROCEDURE — 93010 ELECTROCARDIOGRAM REPORT: CPT | Performed by: INTERNAL MEDICINE

## 2021-01-13 PROCEDURE — U0004 COV-19 TEST NON-CDC HGH THRU: HCPCS

## 2021-01-13 RX ORDER — SODIUM CHLORIDE 0.9 % (FLUSH) 0.9 %
10 SYRINGE (ML) INJECTION AS NEEDED
Status: CANCELLED | OUTPATIENT
Start: 2021-01-13

## 2021-01-13 RX ORDER — ALBUTEROL SULFATE 90 UG/1
2 AEROSOL, METERED RESPIRATORY (INHALATION) EVERY 4 HOURS PRN
COMMUNITY
End: 2022-07-26 | Stop reason: SDUPTHER

## 2021-01-13 RX ORDER — FAMOTIDINE 10 MG/ML
20 INJECTION, SOLUTION INTRAVENOUS ONCE
Status: CANCELLED | OUTPATIENT
Start: 2021-01-13 | End: 2021-01-13

## 2021-01-13 RX ORDER — SODIUM CHLORIDE 0.9 % (FLUSH) 0.9 %
10 SYRINGE (ML) INJECTION EVERY 12 HOURS SCHEDULED
Status: CANCELLED | OUTPATIENT
Start: 2021-01-13

## 2021-01-13 NOTE — DISCHARGE INSTRUCTIONS
Patient to apply Chlorhexadine wipes  to surgical area (as instructed) the night before procedure and the AM of procedure. Wipes provided.  The following information and instructions were given:    Do not eat, drink, smoke or chew gum after midnight the night before surgery. This includes no mints.  Take all routine, prescribed medications including heart and blood pressure medicines with a sip of water unless otherwise instructed by your physician.   Do NOT take diabetic medication unless instructed by your physician.    DO NOT shave for two days before your procedure.  Do not wear makeup.      DO NOT wear fingernail polish (gel/regular) and/or acrylic/artificial nails on the day of surgery.   If you had a recent manicure and would rather not remove polish or artificial nails, the minimum requirement is that the polish/artificial nails must be removed from the middle finger on each hand.      If you are having surgery/procedure on an upper extremity, fingernail polish/artificial fingernails must be removed for surgery.  NO EXCEPTIONS.      If you are having surgery/procedure on a lower extremity, toenail polish on both extremities must be removed for surgery.  NO EXCEPTIONS.    Remove all jewelry (advise to go to jeweler if unable to remove).  Jewelry, especially rings, can no longer be taped for surgery.    Leave anything you consider valuable at home.    Leave your suitcase in the car until after your surgery.    Bring the following with you the day of your procedure (when applicable):       -Picture ID and insurance cards       -Co-pay/deductible required by insurance       -Medications in the original bottles (not a list) including all over-the-counter medications if not brought to PAT       -Copy of advance directive, living will or power of  documents if not brought to PAT       -CPAP or BIPAP mask and tubing (do not bring machine)       -Skin prep instruction(s) sheet       -PAT Pass    Education  booklet, brochure, handout or binder related to procedure given to patient.  Education booklet also includes general information related to their recovery that mentions signs and symptoms of infection and when to call the doctor.    When applicable, an ERAS handout/booklet was given to patient.    Pain Control After Surgery handout given to patient.    Respirex use (handout given to patient) and pneumonia prevention education provided.    Signs and Symptoms of infection discussed with patient in Pre Admission Testing.  Patient instructed to call their doctor if any of the following symptoms are noted during recovery:  Fever of 100.4 F or higher, incision that is warm or has increasing bleeding, redness or drainage.    DVT Prevention instructions given verbally during Pre Admission Testing appointment that stress the importance of ambulation to improve blood circulation.  Also encouraged patient to perform foot exercises when in bed and application of a sequential device may be applied to lower extremities to improve circulation.      Please apply Chlorhexidine wipes to surgical area (if instructed) the night before procedure and the AM of procedure and document date/time of applications on skin prep instruction sheet.

## 2021-01-13 NOTE — PAT
Patient to apply Chlorhexadine wipes  to surgical area (as instructed) the night before procedure and the AM of procedure. Wipes provided.    Patient instructed to drink 20 ounces (or until full) of Gatorade and it needs to be completed 1 hour before given arrival time for procedure (NO RED Gatorade)    Patient verbalized understanding.    An arrival time for procedure was not given during PAT visit. If patient had any questions or concerns about their arrival time, they were instructed to contact their surgeon/physician.  Additionally, if the patient referred to an arrival time that was acquired from their my chart account, patient was encouraged to verify that time with their surgeon/physician.  NO arrival times given in Pre Admission Testing Department.    covid in pat

## 2021-01-14 ENCOUNTER — ANESTHESIA (OUTPATIENT)
Dept: PERIOP | Facility: HOSPITAL | Age: 41
End: 2021-01-14

## 2021-01-14 ENCOUNTER — APPOINTMENT (OUTPATIENT)
Dept: GENERAL RADIOLOGY | Facility: HOSPITAL | Age: 41
End: 2021-01-14

## 2021-01-14 ENCOUNTER — HOSPITAL ENCOUNTER (OUTPATIENT)
Facility: HOSPITAL | Age: 41
Discharge: HOME | End: 2021-01-14
Attending: NEUROLOGICAL SURGERY | Admitting: NEUROLOGICAL SURGERY

## 2021-01-14 VITALS
BODY MASS INDEX: 33.75 KG/M2 | RESPIRATION RATE: 18 BRPM | TEMPERATURE: 98.4 F | WEIGHT: 210 LBS | HEART RATE: 99 BPM | DIASTOLIC BLOOD PRESSURE: 68 MMHG | OXYGEN SATURATION: 92 % | SYSTOLIC BLOOD PRESSURE: 125 MMHG | HEIGHT: 66 IN

## 2021-01-14 DIAGNOSIS — M50.10 HERNIATION OF CERVICAL INTERVERTEBRAL DISC WITH RADICULOPATHY: ICD-10-CM

## 2021-01-14 LAB
BACTERIA UR QL AUTO: ABNORMAL /HPF
BILIRUB UR QL STRIP: NEGATIVE
CLARITY UR: CLEAR
COLOR UR: YELLOW
GLUCOSE UR STRIP-MCNC: NEGATIVE MG/DL
HGB UR QL STRIP.AUTO: NEGATIVE
HYALINE CASTS UR QL AUTO: ABNORMAL /LPF
KETONES UR QL STRIP: NEGATIVE
LEUKOCYTE ESTERASE UR QL STRIP.AUTO: ABNORMAL
NITRITE UR QL STRIP: NEGATIVE
PH UR STRIP.AUTO: 7 [PH] (ref 5–8)
PROT UR QL STRIP: NEGATIVE
RBC # UR: ABNORMAL /HPF
REF LAB TEST METHOD: ABNORMAL
SP GR UR STRIP: <=1.005 (ref 1–1.03)
SQUAMOUS #/AREA URNS HPF: ABNORMAL /HPF
UROBILINOGEN UR QL STRIP: ABNORMAL
WBC UR QL AUTO: ABNORMAL /HPF

## 2021-01-14 PROCEDURE — 22845 INSERT SPINE FIXATION DEVICE: CPT | Performed by: PHYSICIAN ASSISTANT

## 2021-01-14 PROCEDURE — C1713 ANCHOR/SCREW BN/BN,TIS/BN: HCPCS | Performed by: NEUROLOGICAL SURGERY

## 2021-01-14 PROCEDURE — 25010000002 ONDANSETRON PER 1 MG: Performed by: NURSE ANESTHETIST, CERTIFIED REGISTERED

## 2021-01-14 PROCEDURE — 25010000002 NEOSTIGMINE 10 MG/10ML SOLUTION: Performed by: NURSE ANESTHETIST, CERTIFIED REGISTERED

## 2021-01-14 PROCEDURE — 25010000002 FENTANYL CITRATE (PF) 100 MCG/2ML SOLUTION: Performed by: NURSE ANESTHETIST, CERTIFIED REGISTERED

## 2021-01-14 PROCEDURE — 25010000003 LIDOCAINE 1 % SOLUTION: Performed by: NURSE ANESTHETIST, CERTIFIED REGISTERED

## 2021-01-14 PROCEDURE — 25010000002 HYDROMORPHONE PER 4 MG: Performed by: NURSE ANESTHETIST, CERTIFIED REGISTERED

## 2021-01-14 PROCEDURE — 81001 URINALYSIS AUTO W/SCOPE: CPT | Performed by: NEUROLOGICAL SURGERY

## 2021-01-14 PROCEDURE — 22551 ARTHRD ANT NTRBDY CERVICAL: CPT | Performed by: PHYSICIAN ASSISTANT

## 2021-01-14 PROCEDURE — 25010000002 PROPOFOL 10 MG/ML EMULSION: Performed by: NURSE ANESTHETIST, CERTIFIED REGISTERED

## 2021-01-14 PROCEDURE — 87086 URINE CULTURE/COLONY COUNT: CPT | Performed by: NEUROLOGICAL SURGERY

## 2021-01-14 PROCEDURE — 25010000002 DEXAMETHASONE: Performed by: NEUROLOGICAL SURGERY

## 2021-01-14 PROCEDURE — 25010000003 CEFAZOLIN IN DEXTROSE 2-4 GM/100ML-% SOLUTION: Performed by: NEUROLOGICAL SURGERY

## 2021-01-14 PROCEDURE — 20931 SP BONE ALGRFT STRUCT ADD-ON: CPT | Performed by: NEUROLOGICAL SURGERY

## 2021-01-14 PROCEDURE — 22551 ARTHRD ANT NTRBDY CERVICAL: CPT | Performed by: NEUROLOGICAL SURGERY

## 2021-01-14 PROCEDURE — 87077 CULTURE AEROBIC IDENTIFY: CPT | Performed by: NEUROLOGICAL SURGERY

## 2021-01-14 PROCEDURE — 76000 FLUOROSCOPY <1 HR PHYS/QHP: CPT

## 2021-01-14 PROCEDURE — 87186 SC STD MICRODIL/AGAR DIL: CPT | Performed by: NEUROLOGICAL SURGERY

## 2021-01-14 PROCEDURE — 22845 INSERT SPINE FIXATION DEVICE: CPT | Performed by: NEUROLOGICAL SURGERY

## 2021-01-14 DEVICE — FLOSEAL HEMOSTATIC MATRIX, 10ML
Type: IMPLANTABLE DEVICE | Site: SPINE CERVICAL | Status: FUNCTIONAL
Brand: FLOSEAL HEMOSTATIC MATRIX

## 2021-01-14 DEVICE — SCREW 7723513 ZEVO VAR ST 3.5MM X 13MM
Type: IMPLANTABLE DEVICE | Site: SPINE CERVICAL | Status: FUNCTIONAL
Brand: ZEVO™ ANTERIOR CERVICAL PLATE SYSTEM

## 2021-01-14 DEVICE — HEMOST ABS SURGIFOAM SZ100 8X12 10MM: Type: IMPLANTABLE DEVICE | Site: SPINE CERVICAL | Status: FUNCTIONAL

## 2021-01-14 DEVICE — BONE LORDOTIC ASR 5X14X11 FZD: Type: IMPLANTABLE DEVICE | Site: SPINE CERVICAL | Status: FUNCTIONAL

## 2021-01-14 RX ORDER — FAMOTIDINE 20 MG/1
20 TABLET, FILM COATED ORAL ONCE
Status: DISCONTINUED | OUTPATIENT
Start: 2021-01-14 | End: 2021-01-14

## 2021-01-14 RX ORDER — SODIUM CHLORIDE, SODIUM LACTATE, POTASSIUM CHLORIDE, CALCIUM CHLORIDE 600; 310; 30; 20 MG/100ML; MG/100ML; MG/100ML; MG/100ML
9 INJECTION, SOLUTION INTRAVENOUS CONTINUOUS
Status: DISCONTINUED | OUTPATIENT
Start: 2021-01-14 | End: 2021-01-14

## 2021-01-14 RX ORDER — LIDOCAINE HYDROCHLORIDE 10 MG/ML
0.5 INJECTION, SOLUTION EPIDURAL; INFILTRATION; INTRACAUDAL; PERINEURAL ONCE AS NEEDED
Status: DISCONTINUED | OUTPATIENT
Start: 2021-01-14 | End: 2021-01-14

## 2021-01-14 RX ORDER — SODIUM CHLORIDE 0.9 % (FLUSH) 0.9 %
10 SYRINGE (ML) INJECTION AS NEEDED
Status: DISCONTINUED | OUTPATIENT
Start: 2021-01-14 | End: 2021-01-14

## 2021-01-14 RX ORDER — NEOSTIGMINE METHYLSULFATE 1 MG/ML
INJECTION, SOLUTION INTRAVENOUS AS NEEDED
Status: DISCONTINUED | OUTPATIENT
Start: 2021-01-14 | End: 2021-01-14 | Stop reason: SURG

## 2021-01-14 RX ORDER — FENTANYL CITRATE 50 UG/ML
50 INJECTION, SOLUTION INTRAMUSCULAR; INTRAVENOUS
Status: DISCONTINUED | OUTPATIENT
Start: 2021-01-14 | End: 2021-01-14 | Stop reason: HOSPADM

## 2021-01-14 RX ORDER — DROPERIDOL 2.5 MG/ML
0.62 INJECTION, SOLUTION INTRAMUSCULAR; INTRAVENOUS ONCE AS NEEDED
Status: DISCONTINUED | OUTPATIENT
Start: 2021-01-14 | End: 2021-01-14 | Stop reason: HOSPADM

## 2021-01-14 RX ORDER — FAMOTIDINE 10 MG/ML
20 INJECTION, SOLUTION INTRAVENOUS ONCE
Status: DISCONTINUED | OUTPATIENT
Start: 2021-01-14 | End: 2021-01-14

## 2021-01-14 RX ORDER — MEPERIDINE HYDROCHLORIDE 25 MG/ML
12.5 INJECTION INTRAMUSCULAR; INTRAVENOUS; SUBCUTANEOUS
Status: DISCONTINUED | OUTPATIENT
Start: 2021-01-14 | End: 2021-01-14 | Stop reason: HOSPADM

## 2021-01-14 RX ORDER — SULFAMETHOXAZOLE AND TRIMETHOPRIM 800; 160 MG/1; MG/1
1 TABLET ORAL 2 TIMES DAILY
Qty: 14 TABLET | Refills: 0 | Status: SHIPPED | OUTPATIENT
Start: 2021-01-14 | End: 2021-02-03

## 2021-01-14 RX ORDER — ONDANSETRON 2 MG/ML
4 INJECTION INTRAMUSCULAR; INTRAVENOUS ONCE AS NEEDED
Status: DISCONTINUED | OUTPATIENT
Start: 2021-01-14 | End: 2021-01-14 | Stop reason: HOSPADM

## 2021-01-14 RX ORDER — SODIUM CHLORIDE, SODIUM LACTATE, POTASSIUM CHLORIDE, CALCIUM CHLORIDE 600; 310; 30; 20 MG/100ML; MG/100ML; MG/100ML; MG/100ML
9 INJECTION, SOLUTION INTRAVENOUS CONTINUOUS
Status: DISCONTINUED | OUTPATIENT
Start: 2021-01-14 | End: 2021-01-14 | Stop reason: HOSPADM

## 2021-01-14 RX ORDER — SODIUM CHLORIDE 0.9 % (FLUSH) 0.9 %
3-10 SYRINGE (ML) INJECTION AS NEEDED
Status: DISCONTINUED | OUTPATIENT
Start: 2021-01-14 | End: 2021-01-14 | Stop reason: HOSPADM

## 2021-01-14 RX ORDER — SODIUM CHLORIDE 0.9 % (FLUSH) 0.9 %
10 SYRINGE (ML) INJECTION EVERY 12 HOURS SCHEDULED
Status: DISCONTINUED | OUTPATIENT
Start: 2021-01-14 | End: 2021-01-14

## 2021-01-14 RX ORDER — LIDOCAINE HYDROCHLORIDE 20 MG/ML
JELLY TOPICAL AS NEEDED
Status: DISCONTINUED | OUTPATIENT
Start: 2021-01-14 | End: 2021-01-14 | Stop reason: SURG

## 2021-01-14 RX ORDER — HYDROMORPHONE HYDROCHLORIDE 1 MG/ML
0.5 INJECTION, SOLUTION INTRAMUSCULAR; INTRAVENOUS; SUBCUTANEOUS
Status: DISCONTINUED | OUTPATIENT
Start: 2021-01-14 | End: 2021-01-14 | Stop reason: HOSPADM

## 2021-01-14 RX ORDER — PROPOFOL 10 MG/ML
VIAL (ML) INTRAVENOUS AS NEEDED
Status: DISCONTINUED | OUTPATIENT
Start: 2021-01-14 | End: 2021-01-14 | Stop reason: SURG

## 2021-01-14 RX ORDER — FAMOTIDINE 20 MG/1
20 TABLET, FILM COATED ORAL ONCE
Status: COMPLETED | OUTPATIENT
Start: 2021-01-14 | End: 2021-01-14

## 2021-01-14 RX ORDER — CEFAZOLIN SODIUM 2 G/100ML
2 INJECTION, SOLUTION INTRAVENOUS ONCE
Status: COMPLETED | OUTPATIENT
Start: 2021-01-14 | End: 2021-01-14

## 2021-01-14 RX ORDER — NALOXONE HCL 0.4 MG/ML
0.4 VIAL (ML) INJECTION AS NEEDED
Status: DISCONTINUED | OUTPATIENT
Start: 2021-01-14 | End: 2021-01-14 | Stop reason: HOSPADM

## 2021-01-14 RX ORDER — FENTANYL CITRATE 50 UG/ML
INJECTION, SOLUTION INTRAMUSCULAR; INTRAVENOUS AS NEEDED
Status: DISCONTINUED | OUTPATIENT
Start: 2021-01-14 | End: 2021-01-14 | Stop reason: SURG

## 2021-01-14 RX ORDER — GLYCOPYRROLATE 0.2 MG/ML
INJECTION INTRAMUSCULAR; INTRAVENOUS AS NEEDED
Status: DISCONTINUED | OUTPATIENT
Start: 2021-01-14 | End: 2021-01-14 | Stop reason: SURG

## 2021-01-14 RX ORDER — ROCURONIUM BROMIDE 10 MG/ML
INJECTION, SOLUTION INTRAVENOUS AS NEEDED
Status: DISCONTINUED | OUTPATIENT
Start: 2021-01-14 | End: 2021-01-14 | Stop reason: SURG

## 2021-01-14 RX ORDER — OXYCODONE HYDROCHLORIDE AND ACETAMINOPHEN 5; 325 MG/1; MG/1
1 TABLET ORAL 3 TIMES DAILY PRN
Qty: 20 TABLET | Refills: 0 | Status: SHIPPED | OUTPATIENT
Start: 2021-01-14 | End: 2021-02-03

## 2021-01-14 RX ORDER — DROPERIDOL 2.5 MG/ML
0.62 INJECTION, SOLUTION INTRAMUSCULAR; INTRAVENOUS AS NEEDED
Status: DISCONTINUED | OUTPATIENT
Start: 2021-01-14 | End: 2021-01-14 | Stop reason: HOSPADM

## 2021-01-14 RX ORDER — LIDOCAINE HYDROCHLORIDE 40 MG/ML
SOLUTION TOPICAL AS NEEDED
Status: DISCONTINUED | OUTPATIENT
Start: 2021-01-14 | End: 2021-01-14 | Stop reason: SURG

## 2021-01-14 RX ORDER — ESMOLOL HYDROCHLORIDE 10 MG/ML
INJECTION INTRAVENOUS AS NEEDED
Status: DISCONTINUED | OUTPATIENT
Start: 2021-01-14 | End: 2021-01-14 | Stop reason: SURG

## 2021-01-14 RX ORDER — LIDOCAINE HYDROCHLORIDE 10 MG/ML
0.5 INJECTION, SOLUTION EPIDURAL; INFILTRATION; INTRACAUDAL; PERINEURAL ONCE AS NEEDED
Status: COMPLETED | OUTPATIENT
Start: 2021-01-14 | End: 2021-01-14

## 2021-01-14 RX ORDER — OXYCODONE HYDROCHLORIDE AND ACETAMINOPHEN 5; 325 MG/1; MG/1
1 TABLET ORAL ONCE AS NEEDED
Status: COMPLETED | OUTPATIENT
Start: 2021-01-14 | End: 2021-01-14

## 2021-01-14 RX ORDER — SODIUM CHLORIDE 0.9 % (FLUSH) 0.9 %
3 SYRINGE (ML) INJECTION EVERY 12 HOURS SCHEDULED
Status: DISCONTINUED | OUTPATIENT
Start: 2021-01-14 | End: 2021-01-14 | Stop reason: HOSPADM

## 2021-01-14 RX ORDER — IPRATROPIUM BROMIDE AND ALBUTEROL SULFATE 2.5; .5 MG/3ML; MG/3ML
3 SOLUTION RESPIRATORY (INHALATION) ONCE AS NEEDED
Status: DISCONTINUED | OUTPATIENT
Start: 2021-01-14 | End: 2021-01-14 | Stop reason: HOSPADM

## 2021-01-14 RX ORDER — LIDOCAINE HYDROCHLORIDE 10 MG/ML
INJECTION, SOLUTION INFILTRATION; PERINEURAL AS NEEDED
Status: DISCONTINUED | OUTPATIENT
Start: 2021-01-14 | End: 2021-01-14 | Stop reason: SURG

## 2021-01-14 RX ORDER — ONDANSETRON 2 MG/ML
INJECTION INTRAMUSCULAR; INTRAVENOUS AS NEEDED
Status: DISCONTINUED | OUTPATIENT
Start: 2021-01-14 | End: 2021-01-14 | Stop reason: SURG

## 2021-01-14 RX ORDER — MAGNESIUM HYDROXIDE 1200 MG/15ML
LIQUID ORAL AS NEEDED
Status: DISCONTINUED | OUTPATIENT
Start: 2021-01-14 | End: 2021-01-14 | Stop reason: HOSPADM

## 2021-01-14 RX ADMIN — SODIUM CHLORIDE, POTASSIUM CHLORIDE, SODIUM LACTATE AND CALCIUM CHLORIDE: 600; 310; 30; 20 INJECTION, SOLUTION INTRAVENOUS at 13:40

## 2021-01-14 RX ADMIN — HYDROMORPHONE HYDROCHLORIDE 0.5 MG: 1 INJECTION, SOLUTION INTRAMUSCULAR; INTRAVENOUS; SUBCUTANEOUS at 18:32

## 2021-01-14 RX ADMIN — FENTANYL CITRATE 50 MCG: 50 INJECTION, SOLUTION INTRAMUSCULAR; INTRAVENOUS at 13:51

## 2021-01-14 RX ADMIN — HYDROMORPHONE HYDROCHLORIDE 0.5 MG: 1 INJECTION, SOLUTION INTRAMUSCULAR; INTRAVENOUS; SUBCUTANEOUS at 16:52

## 2021-01-14 RX ADMIN — LIDOCAINE HYDROCHLORIDE 50 MG: 10 INJECTION, SOLUTION INFILTRATION; PERINEURAL at 13:51

## 2021-01-14 RX ADMIN — SODIUM CHLORIDE, POTASSIUM CHLORIDE, SODIUM LACTATE AND CALCIUM CHLORIDE 9 ML/HR: 600; 310; 30; 20 INJECTION, SOLUTION INTRAVENOUS at 10:30

## 2021-01-14 RX ADMIN — DEXAMETHASONE SODIUM PHOSPHATE 10 MG: 10 INJECTION INTRAMUSCULAR; INTRAVENOUS at 13:55

## 2021-01-14 RX ADMIN — LIDOCAINE HYDROCHLORIDE 1 EACH: 40 SOLUTION TOPICAL at 13:53

## 2021-01-14 RX ADMIN — FENTANYL CITRATE 50 MCG: 50 INJECTION, SOLUTION INTRAMUSCULAR; INTRAVENOUS at 15:40

## 2021-01-14 RX ADMIN — OXYCODONE HYDROCHLORIDE AND ACETAMINOPHEN 1 TABLET: 5; 325 TABLET ORAL at 18:32

## 2021-01-14 RX ADMIN — ESMOLOL HYDROCHLORIDE 40 MG: 10 INJECTION, SOLUTION INTRAVENOUS at 13:51

## 2021-01-14 RX ADMIN — NEOSTIGMINE 3 MG: 1 INJECTION INTRAVENOUS at 15:13

## 2021-01-14 RX ADMIN — FENTANYL CITRATE 50 MCG: 50 INJECTION, SOLUTION INTRAMUSCULAR; INTRAVENOUS at 15:58

## 2021-01-14 RX ADMIN — CEFAZOLIN SODIUM 2 G: 2 INJECTION, SOLUTION INTRAVENOUS at 13:46

## 2021-01-14 RX ADMIN — HYDROMORPHONE HYDROCHLORIDE 0.5 MG: 1 INJECTION, SOLUTION INTRAMUSCULAR; INTRAVENOUS; SUBCUTANEOUS at 16:32

## 2021-01-14 RX ADMIN — FENTANYL CITRATE 50 MCG: 50 INJECTION, SOLUTION INTRAMUSCULAR; INTRAVENOUS at 15:15

## 2021-01-14 RX ADMIN — ONDANSETRON 4 MG: 2 INJECTION INTRAMUSCULAR; INTRAVENOUS at 15:15

## 2021-01-14 RX ADMIN — PROPOFOL 200 MG: 10 INJECTION, EMULSION INTRAVENOUS at 13:51

## 2021-01-14 RX ADMIN — LIDOCAINE HYDROCHLORIDE 2 ML: 20 JELLY TOPICAL at 13:53

## 2021-01-14 RX ADMIN — ROCURONIUM BROMIDE 40 MG: 10 INJECTION INTRAVENOUS at 13:51

## 2021-01-14 RX ADMIN — GLYCOPYRROLATE 0.4 MG: 0.4 INJECTION INTRAMUSCULAR; INTRAVENOUS at 15:13

## 2021-01-14 RX ADMIN — LIDOCAINE HYDROCHLORIDE 0.5 ML: 10 INJECTION, SOLUTION EPIDURAL; INFILTRATION; INTRACAUDAL; PERINEURAL at 10:30

## 2021-01-14 RX ADMIN — FAMOTIDINE 20 MG: 20 TABLET, FILM COATED ORAL at 10:34

## 2021-01-14 RX ADMIN — ROCURONIUM BROMIDE 10 MG: 10 INJECTION INTRAVENOUS at 14:28

## 2021-01-14 NOTE — OP NOTE
NEUROSURGICAL OPERATIVE NOTE        PREOPERATIVE DIAGNOSIS:    Cervical spondylosis with myelopathy      POSTOPERATIVE DIAGNOSIS:  Same      PROCEDURE:  1.  Arthrodesis at C5-6  2.  Anterior cervical discectomy with microdissection at C5-6  3.  Zevo anterior plating C5-6  4.  Composite allografting C5-6      SURGEON:  Kalyan Vasquez M.D.      ASSISTANT: Tarah Kothari PA-C    PAC assisted with:   Suctioning   Retraction   Tying   Suturing   Closing   Application of dressing   Skilled neurosurgery PA assistance was necessary to perform this procedure.        ANESTHESIA:  General      ESTIMATED BLOOD LOSS: Minimal      SPECIMEN: None      DRAINS: None      COMPLICATIONS:  None      CLINICAL NOTE:  The patient is a 40-year-old woman with progressive numbness involving her upper extremities.  She is noted to be frankly myelopathic on examination.  Studies demonstrate spondylosis at C5-6 with cord compromise.  There is jose signal change within the cord.  As such, she presents at this time for anterior cervical discectomy with allograft and plating at C5-6.  The nature of the procedure as well as the potential risks, complications, limitations, and alternatives to the procedure were discussed at length with the patient and the patient has agreed to proceed with surgery.      TECHNICAL NOTE:  The patient was brought to the operating room and placed on the operating table in supine position. General endotracheal anesthesia was achieved. A small roll was placed under her shoulders and her neck was maintained in the neutral position. Her anterior neck was prepared and draped in the usual fashion. A minor curvilinear incision was placed within the skin crease on the right side at the level of the cricothyroid membrane. After preparation and draping, the incision was fashioned, subcutaneous tissues were undermined. The platysma was divided longitudinally medial to the belly of the sternocleidomastoid muscle was pursued  to provide exposure to the anterior spine. Disc space was marked and a localizing radiograph was obtained. With the C-arm it was confirmed to be the C5-C6 level. Longus colli muscle was mobilized in the inferior spine with cautery. Self-retaining retractor provided side-to-side exposure. The disc was incised with the scalpel and evacuated piecemeal with pituitaries, rongeurs, Yovana curettes and Kerrison punches. Distraction screws were utilized to provide additional working room. After subtotal discectomy, the operative microscope was brought into use, and discectomy completed. Substantial endplate osteophytes were resected with the drill and Kerrison punches. Angled curettes were utilized to undercut the posterior endplates. Thickened posterior longitudinal ligament was taken down with Yovana knife and Yvoana curettes. Ultimately, good decompression of the thecal sac was accomplished and neural foramina were widely decompressed, and an angled microball probe could easily be passed along the nerve roots into the foramina. Endplates were decorticated and a small wedge of bone was left posteriorly on each endplate. A 5 mm lordotic composite allograft was impacted into place. Anterior osteophytes were resected with the drill, and then a 15 mm ZEVO plate was then affixed to the anterior spine using 13 mm variable angle screws bilaterally at C5 and C6. Locking cam was engaged. The wound was washed out with a saline solution, modest bleeding points were controlled with bipolar cautery. The platysma was reapproximated in interrupted fashion with 3.0 Vicryl suture. The skin was then closed in a running subcuticular fashion with 3.0 Vicryl. A dermal sealant was applied. She did receive preoperative antibiotics and Decadron.             Kalyan Vasquez M.D.

## 2021-01-14 NOTE — ANESTHESIA PROCEDURE NOTES
Airway  Urgency: elective    Date/Time: 1/14/2021 1:53 PM  Airway not difficult    General Information and Staff    Patient location during procedure: OR  CRNA: Aline Mckeon CRNA    Indications and Patient Condition  Indications for airway management: airway protection    Preoxygenated: yes  MILS not maintained throughout  Mask difficulty assessment: 1 - vent by mask    Final Airway Details  Final airway type: endotracheal airway      Successful airway: ETT  Cuffed: yes   Successful intubation technique: video laryngoscopy  Facilitating devices/methods: intubating stylet  Endotracheal tube insertion site: oral  Blade: Verdugo  Blade size: 3  ETT size (mm): 7.0  Cormack-Lehane Classification: grade I - full view of glottis  Placement verified by: chest auscultation and capnometry   Measured from: lips  ETT/EBT  to lips (cm): 22  Number of attempts at approach: 1  Assessment: lips, teeth, and gum same as pre-op and atraumatic intubation    Additional Comments  Elective verdugo intubation d/t limited neck ROM.  Negative epigastric sounds, Breath sound equal bilaterally with symmetric chest rise and fall

## 2021-01-14 NOTE — INTERVAL H&P NOTE
"[facility] Pre-op    Full history and physical note from office is attached.    /86 (BP Location: Right arm, Patient Position: Lying)   Pulse 85   Temp 98.7 °F (37.1 °C) (Temporal)   Resp 16   Ht 166.4 cm (65.5\")   Wt 95.3 kg (210 lb)   LMP 12/18/2018 Comment: last mammogram 2020  SpO2 96%   BMI 34.41 kg/m²     Immunizations:  Influenza:  No  Pneumococcal:  UTD  Tetanus:  UTD    LAB Results:  Lab Results   Component Value Date    WBC 11.92 (H) 01/13/2021    HGB 14.5 01/13/2021    HCT 44.6 01/13/2021    MCV 95.3 01/13/2021     01/13/2021    NEUTROABS 5.53 10/21/2020    GLUCOSE 117 (H) 10/21/2020    BUN 6 10/21/2020    CREATININE 0.74 10/21/2020    EGFRIFNONA 88 10/21/2020     10/21/2020    K 4.1 10/21/2020     10/21/2020    CO2 29.2 (H) 10/21/2020    CALCIUM 9.7 10/21/2020    ALBUMIN 4.40 10/21/2020    ALBUMIN 3.7 10/21/2020    AST 26 10/21/2020    ALT 24 10/21/2020    BILITOT 0.4 10/21/2020     11/9/2020 MRI  Study Result    EXAMINATION: MRI CERVICAL SPINE WO CONTRAST-      INDICATION: neck pain, numbness and tingling; R20.0-Anesthesia of skin;  R20.2-Paresthesia of skin     TECHNIQUE: Multiplanar multisequence MRI of the cervical spine performed  without IV contrast     COMPARISON: NONE     FINDINGS: There is focal marrow edema involving the endplates about the  C5-C6 disc space compatible with acute inflammatory degenerative change.  There is also some mild edema in the C4 superior endplate. There is  otherwise no focal marrow signal abnormality to suggest acute fracture  or aggressive osseous lesion. The paraspinal soft tissues are  unremarkable and the lung apices are clear. There is mild retrolisthesis  of C5 on C6, alignment is otherwise anatomic. Multilevel spondylosis  changes are present with areas of involvement noted including     C2-3, bilateral facet arthropathy, without associated spinal canal or  neuroforaminal involvement.     C3-4, small disc osteophyte " complex and bilateral facet arthropathy with  mild left neuroforaminal narrowing and mild spinal canal stenosis.     C4-5, small disc osteophyte complex and bilateral facet arthropathy with  mild spinal canal narrowing.     C5-6, prominent disc osteophyte complex and probable focal herniation  producing severe narrowing of the spinal canal and moderate to severe  bilateral neural foraminal narrowing. At this level there is compression  of the cervical spinal cord which demonstrates some intermediate T2  signal compatible with myelomalacic change with questionable additional  component of some focal acute edema.     C6-7, mild bilateral facet arthropathy without spinal canal or  neuroforaminal involvement.     IMPRESSION:  Multilevel cervical spondylosis, focally severe at C5-C6  where there is mild retrolisthesis, acute endplate edema, disc  osteophyte complex and probable component of focal disc herniation  producing severe spinal canal narrowing, moderate to severe bilateral  neuroforaminal narrowing. There is associated flattening of the cervical  spinal cord at this level with intermediate T2 signal favored to  represent myelomalacia with a component of acute cord edema not entirely  excluded.       Cancer Staging (if applicable)  Cancer Patient: __ yes __no __unknown__N/A; If yes, clinical stage T:__ N:__M:__, stage group or __N/A      Impression: Herniation of cervical intervertebral disc with radiculopathy      Plan: CERVICAL DISCECTOMY ANTERIOR WITH FUSION C5-6      Anne Montez, OG   1/14/2021   10:42 EST

## 2021-01-14 NOTE — ANESTHESIA POSTPROCEDURE EVALUATION
Patient: Evelia Foster    Procedure Summary     Date: 01/14/21 [facility]  [facility]    Anesthesia Start: 1346 Anesthesia Stop: 1529    Procedure: CERVICAL DISCECTOMY ANTERIOR WITH FUSION C5-6 (N/A Spine Cervical) Diagnosis:       Herniation of cervical intervertebral disc with radiculopathy      (Herniation of cervical intervertebral disc with radiculopathy [M50.10])    Surgeon: Kalyan Vasquez MD Provider: Avtar Lemon MD    Anesthesia Type: general ASA Status: 2          Anesthesia Type: general    Vitals  Vitals Value Taken Time   /67 01/14/21 1535   Temp 97.6 °F (36.4 °C) 01/14/21 1525   Pulse 110 01/14/21 1537   Resp 16 01/14/21 1525   SpO2 90 % 01/14/21 1537   Vitals shown include unvalidated device data.        Post Anesthesia Care and Evaluation    Patient location during evaluation: PACU  Patient participation: complete - patient participated  Level of consciousness: awake and alert  Pain management: adequate  Airway patency: patent  Anesthetic complications: No anesthetic complications  PONV Status: none  Cardiovascular status: hemodynamically stable and acceptable  Respiratory status: nonlabored ventilation, acceptable and nasal cannula  Hydration status: acceptable

## 2021-01-14 NOTE — ANESTHESIA PREPROCEDURE EVALUATION
Anesthesia Evaluation     Patient summary reviewed and Nursing notes reviewed   NPO Solid Status: > 8 hours  NPO Liquid Status: > 8 hours           Airway   Mallampati: I  TM distance: >3 FB  Neck ROM: full  No difficulty expected  Dental    (+) upper dentures    Pulmonary     breath sounds clear to auscultation  Cardiovascular   Exercise tolerance: good (4-7 METS)    Rhythm: regular  Rate: normal        Neuro/Psych  GI/Hepatic/Renal/Endo      Musculoskeletal     Abdominal    Substance History      OB/GYN          Other                        Anesthesia Plan    ASA 2     general     intravenous induction

## 2021-01-16 LAB — BACTERIA SPEC AEROBE CULT: ABNORMAL

## 2021-01-18 ENCOUNTER — TELEPHONE (OUTPATIENT)
Dept: NEUROSURGERY | Facility: CLINIC | Age: 41
End: 2021-01-18

## 2021-01-18 RX ORDER — ONDANSETRON 4 MG/1
4 TABLET, FILM COATED ORAL EVERY 12 HOURS PRN
Qty: 30 TABLET | Refills: 1 | OUTPATIENT
Start: 2021-01-18 | End: 2021-09-07

## 2021-01-18 RX ORDER — CYCLOBENZAPRINE HCL 10 MG
10 TABLET ORAL 3 TIMES DAILY PRN
Qty: 30 TABLET | Refills: 1 | Status: SHIPPED | OUTPATIENT
Start: 2021-01-18 | End: 2021-03-24

## 2021-01-22 ENCOUNTER — TELEPHONE (OUTPATIENT)
Dept: NEUROSURGERY | Facility: CLINIC | Age: 41
End: 2021-01-22

## 2021-01-22 DIAGNOSIS — M50.10 HERNIATION OF CERVICAL INTERVERTEBRAL DISC WITH RADICULOPATHY: Primary | ICD-10-CM

## 2021-01-22 NOTE — TELEPHONE ENCOUNTER
Provider: Pedro   Time of call: 12:48   Caller: Patient   Phone #: [phone]   Surgery: Cervical Discectomy anterior with fusion C5-6.    Surgery Date:  1/14/2021  Last visit: 12/15/2020     Next visit: NA       Reason for call:  Patient called stating that she had questions about her medication.

## 2021-01-22 NOTE — TELEPHONE ENCOUNTER
Spoke with patient. She has been taking flexeril only. I advised her to alternate tylenol and ibuprofen along with flexeril TID. I let her know that neck pain is normal after the surgery.   Some glue coming off is ok and I discussed wound care with her. She denies dehiscence, swelling or erythema. She will call with an update Monday if things worsen.    Patient also called about her first postop follow up. It doesn't appear that she is scheduled. Can we please schedule her for a 3 week postop with a PA with xrays? Thanks.

## 2021-01-22 NOTE — TELEPHONE ENCOUNTER
I called the patient to obtain the questions about her medications.     Patient states that Flexeril that was prescribed to her is not helping the pain. Patient states that she is not sure if the pain is apart of the healing process but she is in a lot of pain from her recent sx.     Patient's stated that she took a shower last night and the glue from the incision site was coming off. Patient would like to know if this is normal?     Patient would like a call back about her medication and the incision site.

## 2021-01-25 ENCOUNTER — APPOINTMENT (OUTPATIENT)
Dept: PREADMISSION TESTING | Facility: HOSPITAL | Age: 41
End: 2021-01-25

## 2021-01-28 RX ORDER — METHYLPREDNISOLONE 4 MG/1
TABLET ORAL
Qty: 21 TABLET | Refills: 0 | Status: SHIPPED | OUTPATIENT
Start: 2021-01-28 | End: 2021-02-03

## 2021-02-02 ENCOUNTER — HOSPITAL ENCOUNTER (OUTPATIENT)
Dept: GENERAL RADIOLOGY | Facility: HOSPITAL | Age: 41
Discharge: HOME | End: 2021-02-02
Admitting: PHYSICIAN ASSISTANT

## 2021-02-02 DIAGNOSIS — M50.10 HERNIATION OF CERVICAL INTERVERTEBRAL DISC WITH RADICULOPATHY: ICD-10-CM

## 2021-02-02 PROCEDURE — 72040 X-RAY EXAM NECK SPINE 2-3 VW: CPT

## 2021-02-03 ENCOUNTER — OFFICE VISIT (OUTPATIENT)
Dept: NEUROSURGERY | Facility: CLINIC | Age: 41
End: 2021-02-03

## 2021-02-03 VITALS
WEIGHT: 205 LBS | DIASTOLIC BLOOD PRESSURE: 85 MMHG | SYSTOLIC BLOOD PRESSURE: 155 MMHG | HEIGHT: 66 IN | BODY MASS INDEX: 32.95 KG/M2

## 2021-02-03 DIAGNOSIS — M47.12 CERVICAL SPONDYLOSIS WITH MYELOPATHY: ICD-10-CM

## 2021-02-03 DIAGNOSIS — M50.10 HERNIATION OF CERVICAL INTERVERTEBRAL DISC WITH RADICULOPATHY: ICD-10-CM

## 2021-02-03 DIAGNOSIS — E66.9 OBESITY (BMI 30-39.9): ICD-10-CM

## 2021-02-03 DIAGNOSIS — M50.30 DDD (DEGENERATIVE DISC DISEASE), CERVICAL: ICD-10-CM

## 2021-02-03 DIAGNOSIS — Z98.1 S/P CERVICAL SPINAL FUSION: Primary | ICD-10-CM

## 2021-02-03 PROCEDURE — 99024 POSTOP FOLLOW-UP VISIT: CPT | Performed by: PHYSICIAN ASSISTANT

## 2021-02-03 NOTE — PROGRESS NOTES
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  She was noted to be frankly myelopathic on examination.  Preoperative studies demonstrated spondylosis at C5-6 with signal change within the cord.  As such patient presented for ACDF with allografting and plating from C5-6 on 2021.    Presently, Ms. Foster is 3 weeks postop.  She is overall pleased with her current postoperative progress.  She continues to note a moderate amount of posterior neck pain.  Her right leg strength has improved slightly, however she still reports \"dragging her foot\" especially with going up/down stairs.  Over the past week she developed a outburst of hives, which was treated conservatively with a Medrol Dosepak without lasting benefit.  After a Google search for underlying conditions related to hives-she discontinued all medication due to unfounded concerns for liver disease.  She denies associated dysphagia, anterior neck swelling, or voice alteration.  She has no other complaints at this time.    Review of Systems   Constitutional: Positive for chills. Negative for activity change, appetite change, diaphoresis, fatigue, fever and unexpected weight change.   HENT: Positive for ear discharge and trouble swallowing. Negative for congestion, dental problem, drooling, ear pain, facial swelling, hearing loss, mouth sores, nosebleeds, postnasal drip, rhinorrhea, sinus pressure, sinus pain, sneezing, sore throat, tinnitus and voice change.    Eyes: Positive for redness. Negative for photophobia, pain, discharge, " itching and visual disturbance.   Respiratory: Positive for apnea and chest tightness. Negative for cough, choking, shortness of breath, wheezing and stridor.    Cardiovascular: Positive for chest pain. Negative for palpitations and leg swelling.   Gastrointestinal: Negative for abdominal distention, abdominal pain, anal bleeding, blood in stool, constipation, diarrhea, nausea, rectal pain and vomiting.   Endocrine: Positive for polydipsia. Negative for cold intolerance, heat intolerance, polyphagia and polyuria.   Genitourinary: Negative for decreased urine volume, difficulty urinating, dyspareunia, dysuria, enuresis, flank pain, frequency, genital sores, hematuria, menstrual problem, pelvic pain, urgency, vaginal bleeding, vaginal discharge and vaginal pain.   Musculoskeletal: Positive for gait problem, neck pain and neck stiffness. Negative for arthralgias, back pain, joint swelling and myalgias.   Skin: Positive for rash. Negative for color change, pallor and wound.   Allergic/Immunologic: Negative for environmental allergies, food allergies and immunocompromised state.   Neurological: Positive for numbness. Negative for dizziness, tremors, seizures, syncope, facial asymmetry, speech difficulty, weakness, light-headedness and headaches.   Hematological: Negative for adenopathy. Does not bruise/bleed easily.   Psychiatric/Behavioral: Positive for sleep disturbance. Negative for agitation, behavioral problems, confusion, decreased concentration, dysphoric mood, hallucinations, self-injury and suicidal ideas. The patient is not nervous/anxious and is not hyperactive.        Past Medical History:   Diagnosis Date   • Anemia    • Anxiety    • Asthma     mild, rescue inhaler prn    • Dizzy    • Full dentures    • Headache    • History of cervical dysplasia     s/p hsyterectomy   • HPV (human papilloma virus) infection     positive 18   • Hypertension     never started on meds but levels have been elevated lately    •  "Insomnia    • Low back pain    • Neck pain    • Pneumonia    • PTSD (post-traumatic stress disorder)    • Restless leg syndrome    • Spinal headache    • Uses contact lenses      Past Surgical History:   Procedure Laterality Date   • ANTERIOR CERVICAL DISCECTOMY W/ FUSION N/A 1/14/2021    Procedure: CERVICAL DISCECTOMY ANTERIOR WITH FUSION C5-6;  Surgeon: Kalyan Vasquez MD;  [facility];  Service: Neurosurgery;  Laterality: N/A;   • COLONOSCOPY     • ENDOSCOPY     • TONSILLECTOMY AND ADENOIDECTOMY     • TOTAL LAPAROSCOPIC HYSTERECTOMY N/A 1/9/2019    Procedure: TOTAL LAPAROSCOPIC HYSTERECTOMY, BILATERAL SALPINGECTOMY WITH DAVINCI ROBOT;  Surgeon: Markie Lewis MD;  [facility]  [facility];  Service: DaVinci   • TUBAL ABDOMINAL LIGATION         Current Outpatient Medications:   •  albuterol sulfate  (90 Base) MCG/ACT inhaler, Inhale 2 puffs Every 4 (Four) Hours As Needed for Wheezing., Disp: , Rfl:   •  cyclobenzaprine (FLEXERIL) 10 MG tablet, Take 1 tablet by mouth 3 (Three) Times a Day As Needed for Muscle Spasms., Disp: 30 tablet, Rfl: 1  •  ondansetron (Zofran) 4 MG tablet, Take 1 tablet by mouth Every 12 (Twelve) Hours As Needed for Nausea or Vomiting. (Patient taking differently: Take 4 mg by mouth As Needed for Nausea or Vomiting.), Disp: 30 tablet, Rfl: 1  •  traMADol (ULTRAM) 50 MG tablet, Take 1 tablet by mouth Every 6 (Six) Hours As Needed for Moderate Pain  or Severe Pain  for up to 12 doses., Disp: 12 tablet, Rfl: 0    Allergies   Allergen Reactions   • Benadryl [Diphenhydramine Hcl] Itching   • Buspar [Buspirone] Confusion   • Lortab [Hydrocodone-Acetaminophen] Itching       The patient's review of systems, past medical history, past surgical history, family history, and social history have been reviewed at length in the electronic medical record.    Physical Exam:   /85 (BP Location: Right arm, Patient Position: Sitting, Cuff Size: Adult)   Ht 166.4 cm (65.51\")   Wt 93 kg (205 lb)  "  LMP 12/18/2018 Comment: last mammogram 2020  BMI 33.58 kg/m²   Consitutional: A&Ox3, pleasant, no acute distress  Skin:   - Well healed surgical incision   - No evidence of wound dehiscence  - NSOI   - Non-TTP    Patient's Body mass index is 33.58 kg/m². BMI is above normal parameters. Recommendations include: educational material, exercise counseling and nutrition counseling.         Medical Decision Making    Data Review:   1. XRAY Cervical Spine (2/02/2021):  Demonstrates postsurgical changes consistent with ACDF at C5-6.  Construct is maintained in excellent alignment.  No evidence of hardware failure or compromise.       Diagnosis/Treatment Options:  1. S/P cervical spinal fusion  2. Cervical spondylosis with myelopathy  3. Herniation of cervical intervertebral disc with radiculopathy  4. DDD (degenerative disc disease), cervical  5. Obesity (BMI 30-39.9)       Follow up:  Ms. Foster is seen today in follow-up 3 weeks after undergoing ACDF from C5-6 to stabilize and prevent a progressing myelopathy on 1/14/2021.  Patient is overall pleased with her current postoperative progress.  We went over some general do's/don'ts moving forward.  Patient verbalized understanding.  She will continue to observe and will be seen back in the office with Dr. Vasquez for reassessment in 6-8 weeks.    Tarah Kothari PA-C   2/3/2021   12:05 EST

## 2021-02-03 NOTE — PATIENT INSTRUCTIONS

## 2021-02-09 ENCOUNTER — TELEPHONE (OUTPATIENT)
Dept: NEUROSURGERY | Facility: CLINIC | Age: 41
End: 2021-02-09

## 2021-02-09 DIAGNOSIS — Z98.1 S/P CERVICAL SPINAL FUSION: Primary | ICD-10-CM

## 2021-02-09 NOTE — TELEPHONE ENCOUNTER
"Caller: ZACHARY PATIÑO    Relationship to patient: SELF    Best call back number: [phone]    Patient is needing: PATIENT IS CALLING REQUESTING TO SPEAK TO BRENT EM PA-C. PATIENT STATED SHE WAS AT THE GROCERY STORE TODAY AND WAS PICKING UP 2 LITERS FROM HER GROCERY CART TO PUT IN HER CAR, SHE WAS ABLE TO GET THE FIRST 2 LITER IN HER CAR BUT WHEN SHE WENT TO  THE SECOND ONE UP SHE HAD EXCRUTIONATING PAIN IN HER NECK. PATIENT DESCRIBED IT AS A \"HOT POKER.\" PATIENT STATED THAT THE PAIN HAS BEEN CONSISTENT SINCE THE INCIDENT, AND IS CONTEMPLATING GOING TO ER.     PATIENT LAST SAW BRENT EM PA-C ON 2/3/21 AND HAD SURGERY WITH DR. PORRAS ON 1/14/21. PATIENT IS CONCERNED ABOUT A TIMMY POTENTIALLY BEING MOVED DUE TO THE WEIGHT OF THE 2 LITER. PATIENT WOULD LIKE TO RECEIVE A CALL BACK ASAP. PLEASE ADVISE.       "

## 2021-02-10 ENCOUNTER — HOSPITAL ENCOUNTER (OUTPATIENT)
Dept: GENERAL RADIOLOGY | Facility: HOSPITAL | Age: 41
Discharge: HOME | End: 2021-02-10
Admitting: PHYSICIAN ASSISTANT

## 2021-02-10 ENCOUNTER — TELEPHONE (OUTPATIENT)
Dept: NEUROSURGERY | Facility: CLINIC | Age: 41
End: 2021-02-10

## 2021-02-10 DIAGNOSIS — Z98.1 S/P CERVICAL SPINAL FUSION: ICD-10-CM

## 2021-02-10 PROCEDURE — 72040 X-RAY EXAM NECK SPINE 2-3 VW: CPT

## 2021-02-10 RX ORDER — METHYLPREDNISOLONE 4 MG/1
TABLET ORAL
Qty: 21 TABLET | Refills: 0 | Status: SHIPPED | OUTPATIENT
Start: 2021-02-10 | End: 2021-03-24

## 2021-02-10 NOTE — TELEPHONE ENCOUNTER
Called and spoke with patient.  She describes bilateral upper extremity weakness after picking up a 2 L of soda yesterday.  She additionally describes severe posterior neck pain that radiates into her right shoulder.  She is unable to lift her arms past shoulder height due to weakness and severe pain.    After discussing with Dr. Vasquez we have referred her for an x-ray of her cervical spine to check on her construct.  Patient will follow-up in the office thereafter for reassessment.  Treatment recommendations to follow pending findings.

## 2021-02-10 NOTE — TELEPHONE ENCOUNTER
I reviewed imaging with Dr. Vasquez.  Her construct remains intact and unchanged from her previous imaging.  Patient needs to continue to use over-the-counter ibuprofen and Tylenol as needed.  I will touch base with her next week to check on her progress.

## 2021-02-10 NOTE — TELEPHONE ENCOUNTER
"Patient contacted the on-call service yesterday evening.  She states that earlier in the day she was lifting some 2 liters at the store when she developed sudden worsening pain in her neck that she describes as a burning \"hot poker\" sensation.  She reports some vague radiation to her arms in a nondermatomal distribution occurred transiently.  She denies gait disturbance, bowel or bladder difficulties.  Advised her to continue symptomatic treatment with Flexeril, Tylenol/ibuprofen and ice or heat to the back of her neck.    Discussed with Tarah, she is going to call and check on the patient today.  "

## 2021-03-05 ENCOUNTER — PATIENT MESSAGE (OUTPATIENT)
Dept: NEUROSURGERY | Facility: CLINIC | Age: 41
End: 2021-03-05

## 2021-03-08 NOTE — TELEPHONE ENCOUNTER
From: Evelia Foster  To: Tarah Kothari PA-C  Sent: 3/5/2021 11:51 AM EST  Subject: Non-Urgent Medical Question    I forgot to ask about whether or not I will need another x-ray before the next appointment on 3/24.   Still in quite a lot of pain almost all the time in the back of my neck.     Thank you,    Evelia Foster

## 2021-03-24 ENCOUNTER — OFFICE VISIT (OUTPATIENT)
Dept: NEUROSURGERY | Facility: CLINIC | Age: 41
End: 2021-03-24

## 2021-03-24 VITALS — RESPIRATION RATE: 15 BRPM | BODY MASS INDEX: 33.72 KG/M2 | HEIGHT: 66 IN | TEMPERATURE: 97 F | WEIGHT: 209.8 LBS

## 2021-03-24 DIAGNOSIS — Z98.1 S/P CERVICAL SPINAL FUSION: Primary | ICD-10-CM

## 2021-03-24 DIAGNOSIS — Z72.0 TOBACCO ABUSE: ICD-10-CM

## 2021-03-24 DIAGNOSIS — M50.30 DDD (DEGENERATIVE DISC DISEASE), CERVICAL: ICD-10-CM

## 2021-03-24 DIAGNOSIS — M47.12 CERVICAL SPONDYLOSIS WITH MYELOPATHY: ICD-10-CM

## 2021-03-24 PROCEDURE — 99024 POSTOP FOLLOW-UP VISIT: CPT | Performed by: NEUROLOGICAL SURGERY

## 2021-03-24 NOTE — PROGRESS NOTES
Patient: Evelia Foster  : 1980    Primary Care Provider: Josh Kumar, DNP, APRN    Requesting Provider: As above        History    Chief Complaint: Neck pain with upper extremity sensory alteration.    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  She was noted to be frankly myelopathic on examination.  Preoperative studies demonstrated spondylosis at C5-6 with signal change within the cord.  As such patient presented for ACDF with allografting and plating from C5-6 on 2021.  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  Her neck feels fairly good.  Her main complaint is ongoing numbness in her hands and arms as well as an awkward or weak feeling in her legs.  She has a good deal of trouble descending stairs.  The leg symptoms seem to be most bothersome.    Review of Systems   Constitutional: Negative for activity change, appetite change, chills, diaphoresis, fatigue, fever and unexpected weight change.   HENT: Positive for trouble swallowing. Negative for congestion, dental problem, drooling, ear discharge, ear pain, facial swelling, hearing loss, mouth sores, nosebleeds, postnasal drip, rhinorrhea, sinus pressure, sneezing, sore throat, tinnitus and voice change.    Eyes: Negative for photophobia, pain, discharge, redness, itching and visual disturbance.   Respiratory: Positive for choking. Negative for apnea, cough, chest tightness, shortness of breath, wheezing and stridor.    Cardiovascular: Negative for chest pain, palpitations and leg swelling.   Gastrointestinal: Positive for nausea. Negative for abdominal distention, abdominal pain, anal bleeding, blood in stool, constipation, diarrhea, rectal pain and vomiting.   Endocrine: Positive for polydipsia. Negative for cold  "intolerance, heat intolerance, polyphagia and polyuria.   Genitourinary: Negative for decreased urine volume, difficulty urinating, dysuria, enuresis, flank pain, frequency, genital sores, hematuria and urgency.   Musculoskeletal: Positive for gait problem, neck pain and neck stiffness. Negative for arthralgias, back pain, joint swelling and myalgias.   Skin: Negative for color change, pallor, rash and wound.   Allergic/Immunologic: Negative for environmental allergies, food allergies and immunocompromised state.   Neurological: Positive for numbness and headaches. Negative for dizziness, tremors, seizures, syncope, facial asymmetry, speech difficulty, weakness and light-headedness.   Hematological: Negative for adenopathy. Does not bruise/bleed easily.   Psychiatric/Behavioral: Positive for agitation. Negative for behavioral problems, confusion, decreased concentration, dysphoric mood, hallucinations, self-injury, sleep disturbance and suicidal ideas. The patient is nervous/anxious. The patient is not hyperactive.    All other systems reviewed and are negative.      The patient's past medical history, past surgical history, family history, and social history have been reviewed at length in the electronic medical record.    Physical Exam:   Temp 97 °F (36.1 °C) (Infrared)   Resp 15   Ht 166.4 cm (65.51\")   Wt 95.2 kg (209 lb 12.8 oz)   LMP 12/18/2018 Comment: last mammogram 2020  BMI 34.37 kg/m²   Cervical incision looks good.  Her gait is mildly unsteady.  Reflexes remain quite brisk.  Nicholas signs are dramatically positive bilaterally.    Medical Decision Making    Data Review:   Plain films of her cervical spine from mid February show her construct to be intact.    Diagnosis:   Cervical spondylosis and disc herniation with severe myelopathy status post decompression.    Treatment Options:   Ms. Foster is recovering from surgery nicely.  Recovery of her neurologic function could take a long time and it is " unclear of how much her difficulties will actually improve.  She should continue her light duty status at work.  She will follow-up in our clinic in approximately 2.5 months.  I have ordered some physical therapy for leg strengthening and gait work.       Diagnosis Plan   1. S/P cervical spinal fusion  XR spine cervical 2 or 3 vw    Ambulatory Referral to Physical Therapy Evaluate and treat; Stretching, ROM, Strengthening   2. Cervical spondylosis with myelopathy     3. DDD (degenerative disc disease), cervical     4. Tobacco abuse         Scribed for Kalyan Vasquez MD by Marina Avila CMA on 3/24/2021 11:34 EDT       I, Dr. Vasquez, personally performed the services described in the documentation, as scribed in my presence, and it is both accurate and complete.

## 2021-03-30 ENCOUNTER — TREATMENT (OUTPATIENT)
Dept: PHYSICAL THERAPY | Facility: CLINIC | Age: 41
End: 2021-03-30

## 2021-03-30 DIAGNOSIS — R29.898 WEAKNESS OF BOTH LOWER EXTREMITIES: ICD-10-CM

## 2021-03-30 DIAGNOSIS — Z98.1 S/P CERVICAL SPINAL FUSION: Primary | ICD-10-CM

## 2021-03-30 PROCEDURE — 97162 PT EVAL MOD COMPLEX 30 MIN: CPT | Performed by: PHYSICAL THERAPIST

## 2021-03-30 PROCEDURE — 97110 THERAPEUTIC EXERCISES: CPT | Performed by: PHYSICAL THERAPIST

## 2021-03-30 NOTE — PROGRESS NOTES
Physical Therapy Initial Evaluation and Plan of Care      Patient: Evelia Foster   : 1980  Diagnosis/ICD-10 Code:  S/P cervical spinal fusion [Z98.1]  Referring practitioner: Kalyan Vasquez MD    Subjective Evaluation    History of Present Illness  Mechanism of injury: Patient was having a lot of numbness in both hands and lower extremity weakness. They found significant changes at C5-6 level of cervical spine. Patient had cervical fusion at C5-6 on 21.     Currently:   Both hands are still numb and burning on palm and 3-5th fingers. Can feel textures but other sensation is dulled. Neck pain is off and on, depending on what she does in the day and more so on the left.      Feels like gait is off. Right leg shakes and feels weak, tends to trip.       Patient Occupation: [employer]    Precautions and Work Restrictions: Currently off work Pain  Current pain ratin  At best pain ratin  At worst pain ratin  Location: cervical   Quality: sharp, needle-like, dull ache and tight  Aggravating factors: lifting, movement, sleeping, prolonged positioning, squatting, ambulation, stairs, standing and overhead activity    Hand dominance: right    Patient Goals  Patient goals for therapy: decreased edema, decreased pain, improved balance, increased motion, independence with ADLs/IADLs, increased strength, return to sport/leisure activities and return to work             Objective          Postural Observations  Seated posture: fair  Standing posture: fair    Additional Postural Observation Details  Slumped posture     Palpation   Left   Hypertonic in the cervical paraspinals, levator scapulae and scalenes.   Tenderness of the cervical paraspinals, levator scapulae and scalenes.     Right   Hypertonic in the cervical paraspinals. Tenderness of the cervical paraspinals.     Neurological Testing     Sensation   Cervical/Thoracic   Left   Paresthesia: light touch  Hyposensation: light  touch    Right   Paresthesia: light touch  Hyposensation: light touch    Comments   Left light touch: b/l palm and fingers 3-5.     Active Range of Motion   Cervical/Thoracic Spine   Cervical    Left lateral flexion: 28 (pain on L ) degrees with pain  Right lateral flexion: 22 degrees   Left rotation: 44 degrees with pain  Right rotation: 65 degrees     Strength/Myotome Testing     Left Shoulder     Planes of Motion   Flexion: 4+   Abduction: 4+   External rotation at 0°: 4+   Internal rotation at 0°: 4+     Right Shoulder     Planes of Motion   Flexion: 4+   Abduction: 4+   External rotation at 0°: 4+   Internal rotation at 0°: 4+     Left Wrist/Hand      (2nd hand position)     Trial 1: 65 lbs    Right Wrist/Hand      (2nd hand position)     Trial 1: 85 lbs    Left Hip   Planes of Motion   Flexion: 4-  Extension: 4-  Abduction: 4+    Right Hip   Planes of Motion   Flexion: 4  Extension: 4  Abduction: 4+    Additional Strength Details  Trendelenburg noted in Left SLS indicating left glute med/min weakness. This is noted in gait as well.     Tests   Cervical     Left   Negative ULTT1, ULTT2, ULTT3 and ULTT4.     Right   Negative ULTT1, ULTT2, ULTT3 and ULTT4.     Ambulation     Quality of Movement During Gait   Trunk    Trunk (Left): Positive left lateral lean over stance limb.     Hip    Hip (Left): Positive hike.     Functional Assessment     Single Leg Stance   Left: 20 seconds  Right: 10 seconds          Assessment & Plan     Assessment  Impairments: abnormal coordination, abnormal gait, abnormal muscle firing, abnormal muscle tone, abnormal or restricted ROM, activity intolerance, impaired balance, impaired physical strength, lacks appropriate home exercise program and pain with function  Assessment details: Patient is a 41 year old female presenting with s/p cervical fusion of C5-6 and lower extremity weakness and altered gait pattern. Patient presents with bilateral hand numbness of fingers 3-5, no  indication so far of nerve compression at any peripheral space. Cervical spine is mildly limited with pain, particularly left sided with left musculare pain. Lower extremity weakness of bilateral hip flexors and left gluteus medius/minimus. This is found in gait pattern as well with mild trendelenburg and lateral lean over left stance. Patient is appropriate for physical therapy to address above issues.   Barriers to therapy: s/p fushion   Prognosis: good  Prognosis details: Short Term Goals (3 weeks):  1. Patient will be independent with home exercise program.  2. Patient will demonstrate improved hip strength by 50%.  3. Patient will demonstrate improved cervical rotation by 50%.     Long Term Goals (8 weeks):  1. Patient will be able to balance in SLS on unsteady surface for at least 20 seconds.   2. Patient will demonstrate cervical mobility within functional limites without pain.   3. Patient will be able to return to full work/home duty with no falls for 2 weeks.   4. Patient will be able to lift at least 20 lbs with both hands to chest height shelf.       Functional Limitations: carrying objects, lifting, walking, pulling, pushing, uncomfortable because of pain and standing  Plan  Therapy options: will be seen for skilled physical therapy services  Planned modality interventions: ultrasound, thermotherapy (hydrocollator packs), TENS, high voltage pulsed current (spasm management), high voltage pulsed current (pain management), cryotherapy, dry needling and electrical stimulation/Russian stimulation  Planned therapy interventions: therapeutic activities, stretching, strengthening, spinal/joint mobilization, prosthetic fitting/training, orthotic fitting/training, neuromuscular re-education, motor coordination training, manual therapy, joint mobilization, IADL retraining, home exercise program, gait training, flexibility, functional ROM exercises, fine motor coordination training, body mechanics training,  balance/weight-bearing training, ADL retraining and abdominal trunk stabilization  Frequency: 1-2x/week.  Duration in visits: 16  Treatment plan discussed with: patient        Manual Therapy:         mins  69152;  Therapeutic Exercise:    23     mins  32884;     Neuromuscular Angélica:        mins  14777;    Therapeutic Activity:          mins  14131;     Gait Training:           mins  36082;     Ultrasound:         mins  75535;    Electrical Stimulation:         mins  45607 ( );  Dry Needling          mins self-pay    Timed Treatment:   23   mins   Total Treatment:     48   mins    PT SIGNATURE: Radha Mckeon, PT   DATE TREATMENT INITIATED: 3/30/2021    Initial Certification  Certification Period: 6/28/2021  I certify that the therapy services are furnished while this patient is under my care.  The services outlined above are required by this patient, and will be reviewed every 90 days.     PHYSICIAN: Kalyan Vasquez MD      DATE:     Please sign and return via fax to [phone].. Thank you, [facility].

## 2021-04-08 ENCOUNTER — TREATMENT (OUTPATIENT)
Dept: PHYSICAL THERAPY | Facility: CLINIC | Age: 41
End: 2021-04-08

## 2021-04-08 DIAGNOSIS — Z98.1 S/P CERVICAL SPINAL FUSION: Primary | ICD-10-CM

## 2021-04-08 DIAGNOSIS — R29.898 WEAKNESS OF BOTH LOWER EXTREMITIES: ICD-10-CM

## 2021-04-08 PROCEDURE — 97112 NEUROMUSCULAR REEDUCATION: CPT | Performed by: PHYSICAL THERAPIST

## 2021-04-08 PROCEDURE — 97110 THERAPEUTIC EXERCISES: CPT | Performed by: PHYSICAL THERAPIST

## 2021-04-08 PROCEDURE — 97140 MANUAL THERAPY 1/> REGIONS: CPT | Performed by: PHYSICAL THERAPIST

## 2021-04-08 NOTE — PROGRESS NOTES
Visit #: 2    Subjective   Evelia Foster reports: Did more lifting today which has made her neck have more shooting pain. Pain in right forearm that is burning. Otherwise unchanged.     Objective   See Exercise, Manual, and Modality Logs for complete treatment.       Assessment/Plan  Tolerated exercises well. Will continue to address cervical nerve compression, desensitization, and LE weakness.   Progress per Plan of Care           Manual Therapy:    15     mins  73160;  Therapeutic Exercise:    32     mins  32810;     Neuromuscular Angélica:    10    mins  42100;    Therapeutic Activity:          mins  32559;     Gait Training:           mins  06798;     Ultrasound:          mins  73719;   Iontophoresis          mins  33342   Electrical Stimulation:         mins  42424 ( );  Dry Needling         mins self-pay  Fluidotherapy          mins 02992    Timed Treatment:   57   mins   Total Treatment:     57   mins    Radha Mckeon PT  Physical Therapist

## 2021-04-16 ENCOUNTER — TREATMENT (OUTPATIENT)
Dept: PHYSICAL THERAPY | Facility: CLINIC | Age: 41
End: 2021-04-16

## 2021-04-16 DIAGNOSIS — R29.898 WEAKNESS OF BOTH LOWER EXTREMITIES: ICD-10-CM

## 2021-04-16 DIAGNOSIS — Z98.1 S/P CERVICAL SPINAL FUSION: Primary | ICD-10-CM

## 2021-04-16 PROCEDURE — 97110 THERAPEUTIC EXERCISES: CPT | Performed by: PHYSICAL THERAPIST

## 2021-04-16 PROCEDURE — 97112 NEUROMUSCULAR REEDUCATION: CPT | Performed by: PHYSICAL THERAPIST

## 2021-04-16 PROCEDURE — 97140 MANUAL THERAPY 1/> REGIONS: CPT | Performed by: PHYSICAL THERAPIST

## 2021-04-16 NOTE — PROGRESS NOTES
Visit #: 3    Subjective   Evelia Foster reports: Balance seems to be getting better.     Objective   Cervical sensitivity and hypomobility.       See Exercise, Manual, and Modality Logs for complete treatment.       Assessment/Plan  Will continue working on cervical mobility and nerve decompression. Would like to progress to  balance next visit.   Progress per Plan of Care           Manual Therapy:    15     mins  40343;  Therapeutic Exercise:    32     mins  98096;     Neuromuscular Angélica:    10    mins  12342;    Therapeutic Activity:          mins  64552;     Gait Training:           mins  88047;     Ultrasound:          mins  19370;   Iontophoresis          mins  30420   Electrical Stimulation:        mins  22250 ( );  Dry Needling          mins self-pay  Fluidotherapy          mins 01746    Timed Treatment:   57   mins   Total Treatment:     57   mins    Radha Mckeon PT  Physical Therapist

## 2021-04-20 ENCOUNTER — OFFICE VISIT (OUTPATIENT)
Dept: FAMILY MEDICINE CLINIC | Facility: CLINIC | Age: 41
End: 2021-04-20

## 2021-04-20 VITALS
WEIGHT: 216.2 LBS | BODY MASS INDEX: 34.75 KG/M2 | SYSTOLIC BLOOD PRESSURE: 128 MMHG | HEIGHT: 66 IN | HEART RATE: 90 BPM | RESPIRATION RATE: 16 BRPM | DIASTOLIC BLOOD PRESSURE: 78 MMHG | TEMPERATURE: 98.6 F | OXYGEN SATURATION: 97 %

## 2021-04-20 DIAGNOSIS — F32.1 CURRENT MODERATE EPISODE OF MAJOR DEPRESSIVE DISORDER, UNSPECIFIED WHETHER RECURRENT (HCC): Primary | ICD-10-CM

## 2021-04-20 DIAGNOSIS — Z12.39 BREAST CANCER SCREENING, HIGH RISK PATIENT: ICD-10-CM

## 2021-04-20 DIAGNOSIS — R23.2 HOT FLASHES: ICD-10-CM

## 2021-04-20 DIAGNOSIS — R82.90 FOUL SMELLING URINE: ICD-10-CM

## 2021-04-20 DIAGNOSIS — R03.0 ELEVATED BLOOD PRESSURE READING: ICD-10-CM

## 2021-04-20 PROBLEM — K21.9 GASTROESOPHAGEAL REFLUX DISEASE WITHOUT ESOPHAGITIS: Status: RESOLVED | Noted: 2020-07-13 | Resolved: 2021-04-20

## 2021-04-20 PROCEDURE — 87086 URINE CULTURE/COLONY COUNT: CPT | Performed by: FAMILY MEDICINE

## 2021-04-20 PROCEDURE — 85025 COMPLETE CBC W/AUTO DIFF WBC: CPT | Performed by: FAMILY MEDICINE

## 2021-04-20 PROCEDURE — 83002 ASSAY OF GONADOTROPIN (LH): CPT | Performed by: FAMILY MEDICINE

## 2021-04-20 PROCEDURE — 99214 OFFICE O/P EST MOD 30 MIN: CPT | Performed by: FAMILY MEDICINE

## 2021-04-20 PROCEDURE — 83001 ASSAY OF GONADOTROPIN (FSH): CPT | Performed by: FAMILY MEDICINE

## 2021-04-20 PROCEDURE — 84443 ASSAY THYROID STIM HORMONE: CPT | Performed by: FAMILY MEDICINE

## 2021-04-20 PROCEDURE — 80053 COMPREHEN METABOLIC PANEL: CPT | Performed by: FAMILY MEDICINE

## 2021-04-20 RX ORDER — VENLAFAXINE HYDROCHLORIDE 37.5 MG/1
37.5 CAPSULE, EXTENDED RELEASE ORAL DAILY
Qty: 30 CAPSULE | Refills: 5 | Status: SHIPPED | OUTPATIENT
Start: 2021-04-20 | End: 2021-05-12 | Stop reason: CLARIF

## 2021-04-20 RX ORDER — IBUPROFEN 200 MG
400 TABLET ORAL DAILY PRN
COMMUNITY
End: 2021-09-07

## 2021-04-20 NOTE — PROGRESS NOTES
Evelia Foster is a 41 y.o. female who presents today for Urinary Frequency (odor and dark urine), other (order for mammogram), and Hypertension    Pt was previously established under [facility]. Pt's current medical issues include asthma. She has struggled with depression for years which seems to be getting worse. She has seen counselor which did not help. She has tried fluoxitine, zoloft, Seroquel, and buspar with no relief of symptoms.     Hypertension- Pt states that every time she visits [facility] her BP readings are high. She states that she currently has no cardiology issues to her knowledge and does not take any medications. She states that she has access to a BP cuff at home and that it is usually high when she take it at home (130s/90s).    Mammogram- Pt states that she has been getting breast imaging every 6 months (sister dx at age 35 and having to have a mastectomy) through [facility]. She was trying to schedule her latest appointment but was told she needed a new order to continue imaging.    Urinary symptoms- Pt states that prior to a cervical neck fusion in January she had been noticing that her urine has been dark and foul smelling. She states that this also accompanied with occasional bilateral flank pain, dysuria, and foul smell after intercourse. Her urine was cultured during her inpatient stay which was positive for Klebsiella. Pt was Rx'd Bactrim but states that this did not help. She was then treated with Flagyl that she finished at the beginning of March and states that this too did not help her symptoms. She states that her  has been having the same symptoms for the past 3 months.      PHQ-2/PHQ-9 Depression Screening 4/20/2021   Little interest or pleasure in doing things 2   Feeling down, depressed, or hopeless 3   Trouble falling or staying asleep, or sleeping too much 3   Feeling tired or having little energy 3   Poor appetite or overeating 1   Feeling bad  about yourself - or that you are a failure or have let yourself or your family down 3   Trouble concentrating on things, such as reading the newspaper or watching television 0   Moving or speaking so slowly that other people could have noticed. Or the opposite - being so fidgety or restless that you have been moving around a lot more than usual 3   Thoughts that you would be better off dead, or of hurting yourself in some way 1   Total Score 19   If you checked off any problems, how difficult have these problems made it for you to do your work, take care of things at home, or get along with other people? Somewhat difficult   Occasional thought of being better off dead. Has no intent or plan.     Review of Systems   Constitutional: Negative for fever.   Respiratory: Negative for chest tightness and shortness of breath.    Cardiovascular: Negative for chest pain.   Gastrointestinal: Negative for abdominal pain, diarrhea, nausea and vomiting.   Genitourinary: Positive for decreased urine volume, dysuria, flank pain, frequency and urgency. Negative for difficulty urinating, dyspareunia, hematuria, pelvic pain, urinary incontinence, vaginal discharge and vaginal pain.   Neurological: Negative for dizziness, syncope, weakness, light-headedness, numbness and headache.        The following portions of the patient's history were reviewed and updated as appropriate: allergies, current medications, past family history, past medical history, past social history, past surgical history and problem list.    Current Outpatient Medications on File Prior to Visit   Medication Sig Dispense Refill   • albuterol sulfate  (90 Base) MCG/ACT inhaler Inhale 2 puffs Every 4 (Four) Hours As Needed for Wheezing.     • ibuprofen (ADVIL,MOTRIN) 200 MG tablet Take 400 mg by mouth Daily As Needed for Mild Pain .     • ondansetron (Zofran) 4 MG tablet Take 1 tablet by mouth Every 12 (Twelve) Hours As Needed for Nausea or Vomiting. (Patient  "taking differently: Take 4 mg by mouth As Needed for Nausea or Vomiting.) 30 tablet 1     No current facility-administered medications on file prior to visit.       Allergies   Allergen Reactions   • Benadryl [Diphenhydramine Hcl] Itching   • Buspar [Buspirone] Confusion   • Lortab [Hydrocodone-Acetaminophen] Itching        Visit Vitals  /78 (BP Location: Right arm, Patient Position: Sitting, Cuff Size: Large Adult)   Pulse 90   Temp 98.6 °F (37 °C)   Resp 16   Ht 166.4 cm (65.5\")   Wt 98.1 kg (216 lb 3.2 oz)   LMP 12/18/2018 Comment: last mammogram 2020   SpO2 97%   BMI 35.43 kg/m²        Physical Exam  Vitals reviewed.   Constitutional:       General: She is not in acute distress.     Appearance: Normal appearance. She is normal weight. She is not ill-appearing, toxic-appearing or diaphoretic.   HENT:      Head: Normocephalic and atraumatic.      Right Ear: External ear normal.      Left Ear: External ear normal.      Nose: Nose normal.      Mouth/Throat:      Mouth: Mucous membranes are moist.   Eyes:      Extraocular Movements: Extraocular movements intact.   Cardiovascular:      Rate and Rhythm: Normal rate and regular rhythm.      Pulses: Normal pulses.      Heart sounds: Normal heart sounds. No murmur heard.     Pulmonary:      Effort: Pulmonary effort is normal. No respiratory distress.      Breath sounds: Normal breath sounds. No wheezing, rhonchi or rales.   Abdominal:      General: Bowel sounds are normal.      Palpations: Abdomen is soft.      Tenderness: There is no right CVA tenderness, left CVA tenderness or guarding.   Musculoskeletal:         General: No signs of injury. Normal range of motion.      Cervical back: Normal range of motion and neck supple.      Right lower leg: No edema.      Left lower leg: No edema.   Skin:     General: Skin is warm and dry.      Capillary Refill: Capillary refill takes less than 2 seconds.      Findings: No rash.   Neurological:      General: No focal deficit " present.      Mental Status: She is oriented to person, place, and time.   Psychiatric:         Mood and Affect: Mood normal.         Behavior: Behavior normal.          Problems Addressed this Visit        Cardiac and Vasculature    Elevated blood pressure reading     Patient will continue to monitor blood pressure at home.  She will bring her blood pressure cuff and to her follow-up visit to compare against the cuff in the office.  If they both are reading high at that time we will discuss starting antihypertensive medications.            Genitourinary and Reproductive     Foul smelling urine     Patient reports foul smell after sex as well as foul-smelling urine specifically after sexual activity.  Patient's urine culture grew Klebsiella that was sensitive to Bactrim recently she was treated with Bactrim.  It is unknown what is causing current symptoms she was also treated with Flagyl at the beginning of March.  Will order urine culture and one swab for further evaluation.  Will treat appropriately pending results.         Relevant Orders    Urine Culture - Urine, Urine, Clean Catch    OneSwab - Kit, Vagina    Breast cancer screening, high risk patient    Relevant Orders    Mammo Screening Digital Tomosynthesis Bilateral With CAD       Mental Health    Current moderate episode of major depressive disorder (CMS/HCC) - Primary     Patient's depression is recurrent and is severe without psychosis. Their depression is currently active and the condition is worsening. This will be reassessed 8 weeks. F/U as described:patient was prescribed an antidepressant medicine.         Relevant Medications    venlafaxine XR (EFFEXOR-XR) 37.5 MG 24 hr capsule       Symptoms and Signs    Hot flashes     Patient will continue to take over-the-counter supplements for hot flashes.  Order labs to assess cause as well as to screen for menopause this patient does not have menstrual cycle due to hysterectomy.  Patient was recommended to  try black cohosh for hot flashes.  Effexor that she was started on today for depression may also provide some relief.         Relevant Orders    FSH & LH (Completed)    CBC & Differential (Completed)    Comprehensive Metabolic Panel (Completed)    TSH Rfx On Abnormal To Free T4 (Completed)      Diagnoses       Codes Comments    Current moderate episode of major depressive disorder, unspecified whether recurrent (CMS/HCC)    -  Primary ICD-10-CM: F32.1  ICD-9-CM: 296.22     Hot flashes     ICD-10-CM: R23.2  ICD-9-CM: 782.62     Breast cancer screening, high risk patient     ICD-10-CM: Z12.39  ICD-9-CM: V76.10     Foul smelling urine     ICD-10-CM: R82.90  ICD-9-CM: 791.9     Elevated blood pressure reading     ICD-10-CM: R03.0  ICD-9-CM: 796.2           Return in about 8 weeks (around 6/15/2021) for Follow-up depression.    Parts of this office note have been dictated by voice recognition software. Grammatical and/or spelling errors may be present.    Nabil Green MD   4/21/2021

## 2021-04-21 PROBLEM — R23.2 HOT FLASHES: Status: ACTIVE | Noted: 2021-04-21

## 2021-04-21 PROBLEM — F32.1 CURRENT MODERATE EPISODE OF MAJOR DEPRESSIVE DISORDER: Status: ACTIVE | Noted: 2021-04-21

## 2021-04-21 PROBLEM — Z12.39 BREAST CANCER SCREENING, HIGH RISK PATIENT: Status: ACTIVE | Noted: 2021-04-21

## 2021-04-21 LAB
ALBUMIN SERPL-MCNC: 4.2 G/DL (ref 3.5–5.2)
ALBUMIN/GLOB SERPL: 1.8 G/DL
ALP SERPL-CCNC: 97 U/L (ref 39–117)
ALT SERPL W P-5'-P-CCNC: 22 U/L (ref 1–33)
ANION GAP SERPL CALCULATED.3IONS-SCNC: 12.1 MMOL/L (ref 5–15)
AST SERPL-CCNC: 21 U/L (ref 1–32)
BACTERIA SPEC AEROBE CULT: NO GROWTH
BASOPHILS # BLD AUTO: 0.05 10*3/MM3 (ref 0–0.2)
BASOPHILS NFR BLD AUTO: 0.5 % (ref 0–1.5)
BILIRUB SERPL-MCNC: 0.2 MG/DL (ref 0–1.2)
BUN SERPL-MCNC: 11 MG/DL (ref 6–20)
BUN/CREAT SERPL: 15.1 (ref 7–25)
CALCIUM SPEC-SCNC: 9.5 MG/DL (ref 8.6–10.5)
CHLORIDE SERPL-SCNC: 102 MMOL/L (ref 98–107)
CO2 SERPL-SCNC: 24.9 MMOL/L (ref 22–29)
CREAT SERPL-MCNC: 0.73 MG/DL (ref 0.57–1)
DEPRECATED RDW RBC AUTO: 46.9 FL (ref 37–54)
EOSINOPHIL # BLD AUTO: 0.2 10*3/MM3 (ref 0–0.4)
EOSINOPHIL NFR BLD AUTO: 1.9 % (ref 0.3–6.2)
ERYTHROCYTE [DISTWIDTH] IN BLOOD BY AUTOMATED COUNT: 13.8 % (ref 12.3–15.4)
FSH SERPL-ACNC: 2.03 MIU/ML
GFR SERPL CREATININE-BSD FRML MDRD: 89 ML/MIN/1.73
GLOBULIN UR ELPH-MCNC: 2.4 GM/DL
GLUCOSE SERPL-MCNC: 77 MG/DL (ref 65–99)
HCT VFR BLD AUTO: 41.9 % (ref 34–46.6)
HGB BLD-MCNC: 14.4 G/DL (ref 12–15.9)
IMM GRANULOCYTES # BLD AUTO: 0.03 10*3/MM3 (ref 0–0.05)
IMM GRANULOCYTES NFR BLD AUTO: 0.3 % (ref 0–0.5)
LH SERPL-ACNC: 2.74 MIU/ML
LYMPHOCYTES # BLD AUTO: 2.64 10*3/MM3 (ref 0.7–3.1)
LYMPHOCYTES NFR BLD AUTO: 25.4 % (ref 19.6–45.3)
MCH RBC QN AUTO: 32 PG (ref 26.6–33)
MCHC RBC AUTO-ENTMCNC: 34.4 G/DL (ref 31.5–35.7)
MCV RBC AUTO: 93.1 FL (ref 79–97)
MONOCYTES # BLD AUTO: 0.86 10*3/MM3 (ref 0.1–0.9)
MONOCYTES NFR BLD AUTO: 8.3 % (ref 5–12)
NEUTROPHILS NFR BLD AUTO: 6.61 10*3/MM3 (ref 1.7–7)
NEUTROPHILS NFR BLD AUTO: 63.6 % (ref 42.7–76)
NRBC BLD AUTO-RTO: 0 /100 WBC (ref 0–0.2)
PLATELET # BLD AUTO: 285 10*3/MM3 (ref 140–450)
PMV BLD AUTO: 9.9 FL (ref 6–12)
POTASSIUM SERPL-SCNC: 4.9 MMOL/L (ref 3.5–5.2)
PROT SERPL-MCNC: 6.6 G/DL (ref 6–8.5)
RBC # BLD AUTO: 4.5 10*6/MM3 (ref 3.77–5.28)
SODIUM SERPL-SCNC: 139 MMOL/L (ref 136–145)
TSH SERPL DL<=0.05 MIU/L-ACNC: 3.15 UIU/ML (ref 0.27–4.2)
WBC # BLD AUTO: 10.39 10*3/MM3 (ref 3.4–10.8)

## 2021-04-21 NOTE — ASSESSMENT & PLAN NOTE
Patient will continue to take over-the-counter supplements for hot flashes.  Order labs to assess cause as well as to screen for menopause this patient does not have menstrual cycle due to hysterectomy.  Patient was recommended to try black cohosh for hot flashes.  Effexor that she was started on today for depression may also provide some relief.

## 2021-04-21 NOTE — ASSESSMENT & PLAN NOTE
Patient will continue to monitor blood pressure at home.  She will bring her blood pressure cuff and to her follow-up visit to compare against the cuff in the office.  If they both are reading high at that time we will discuss starting antihypertensive medications.

## 2021-04-21 NOTE — ASSESSMENT & PLAN NOTE
Patient reports foul smell after sex as well as foul-smelling urine specifically after sexual activity.  Patient's urine culture grew Klebsiella that was sensitive to Bactrim recently she was treated with Bactrim.  It is unknown what is causing current symptoms she was also treated with Flagyl at the beginning of March.  Will order urine culture and one swab for further evaluation.  Will treat appropriately pending results.

## 2021-04-21 NOTE — ASSESSMENT & PLAN NOTE
Patient's depression is recurrent and is severe without psychosis. Their depression is currently active and the condition is worsening. This will be reassessed 8 weeks. F/U as described:patient was prescribed an antidepressant medicine.

## 2021-04-27 ENCOUNTER — TREATMENT (OUTPATIENT)
Dept: PHYSICAL THERAPY | Facility: CLINIC | Age: 41
End: 2021-04-27

## 2021-04-27 DIAGNOSIS — R29.898 WEAKNESS OF BOTH LOWER EXTREMITIES: ICD-10-CM

## 2021-04-27 DIAGNOSIS — Z98.1 S/P CERVICAL SPINAL FUSION: Primary | ICD-10-CM

## 2021-04-27 PROCEDURE — 97110 THERAPEUTIC EXERCISES: CPT | Performed by: PHYSICAL THERAPIST

## 2021-04-27 PROCEDURE — 97140 MANUAL THERAPY 1/> REGIONS: CPT | Performed by: PHYSICAL THERAPIST

## 2021-04-27 PROCEDURE — 97112 NEUROMUSCULAR REEDUCATION: CPT | Performed by: PHYSICAL THERAPIST

## 2021-04-27 NOTE — PROGRESS NOTES
Visit #: 4    Subjective   Evelia Foster reports: Neck still sore, still feels like a hot poker on side of neck/upper shoulder.     Objective   R med-lat knee instability, relies on knee locked position for balance. Fear avoidance.     See Exercise, Manual, and Modality Logs for complete treatment.       Assessment/Plan  Progressing into SL activity, patient is untrusting of right leg and does demonstrate some med-lat instability but not have any buckling. Trial with left scapular taping. Jose Alfredo has difficulty with volitional scapular depression.   Progress per Plan of Care           Manual Therapy:    15     mins  26988;  Therapeutic Exercise:    32     mins  97623;     Neuromuscular Angélica:    15    mins  84399;    Therapeutic Activity:          mins  04063;     Gait Training:           mins  70295;     Ultrasound:          mins  45339;   Iontophoresis         mins  32034   Electrical Stimulation:         mins  98143 ( );  Dry Needling          mins self-pay  Fluidotherapy          mins 95987    Timed Treatment:   62   mins   Total Treatment:     62   mins    Radha Mckeon PT  Physical Therapist

## 2021-04-28 DIAGNOSIS — B96.89 BV (BACTERIAL VAGINOSIS): Primary | ICD-10-CM

## 2021-04-28 DIAGNOSIS — N76.0 BV (BACTERIAL VAGINOSIS): Primary | ICD-10-CM

## 2021-04-28 RX ORDER — FLUCONAZOLE 150 MG/1
150 TABLET ORAL ONCE
Qty: 2 TABLET | Refills: 0 | Status: SHIPPED | OUTPATIENT
Start: 2021-04-28 | End: 2021-04-28

## 2021-04-28 RX ORDER — CLINDAMYCIN HYDROCHLORIDE 300 MG/1
300 CAPSULE ORAL 2 TIMES DAILY
Qty: 14 CAPSULE | Refills: 0 | Status: SHIPPED | OUTPATIENT
Start: 2021-04-28 | End: 2021-05-05

## 2021-05-12 DIAGNOSIS — F32.1 CURRENT MODERATE EPISODE OF MAJOR DEPRESSIVE DISORDER, UNSPECIFIED WHETHER RECURRENT (HCC): ICD-10-CM

## 2021-05-12 DIAGNOSIS — F10.10 ALCOHOL ABUSE: Primary | ICD-10-CM

## 2021-05-12 DIAGNOSIS — F41.9 ANXIETY: ICD-10-CM

## 2021-05-12 RX ORDER — ESCITALOPRAM OXALATE 10 MG/1
10 TABLET ORAL DAILY
Qty: 30 TABLET | Refills: 3 | Status: SHIPPED | OUTPATIENT
Start: 2021-05-12 | End: 2021-06-22 | Stop reason: SDUPTHER

## 2021-05-12 RX ORDER — NALTREXONE HYDROCHLORIDE 50 MG/1
50 TABLET, FILM COATED ORAL DAILY
Qty: 30 TABLET | Refills: 2 | Status: SHIPPED | OUTPATIENT
Start: 2021-05-12 | End: 2021-06-22 | Stop reason: SDUPTHER

## 2021-05-28 ENCOUNTER — HOSPITAL ENCOUNTER (OUTPATIENT)
Dept: MAMMOGRAPHY | Facility: HOSPITAL | Age: 41
Discharge: HOME | End: 2021-05-28
Admitting: FAMILY MEDICINE

## 2021-05-28 DIAGNOSIS — Z12.39 BREAST CANCER SCREENING, HIGH RISK PATIENT: ICD-10-CM

## 2021-05-28 PROCEDURE — 77067 SCR MAMMO BI INCL CAD: CPT | Performed by: RADIOLOGY

## 2021-05-28 PROCEDURE — 77067 SCR MAMMO BI INCL CAD: CPT

## 2021-05-28 PROCEDURE — 77063 BREAST TOMOSYNTHESIS BI: CPT

## 2021-05-28 PROCEDURE — 77063 BREAST TOMOSYNTHESIS BI: CPT | Performed by: RADIOLOGY

## 2021-06-01 ENCOUNTER — OFFICE VISIT (OUTPATIENT)
Dept: NEUROSURGERY | Facility: CLINIC | Age: 41
End: 2021-06-01

## 2021-06-01 VITALS
DIASTOLIC BLOOD PRESSURE: 78 MMHG | SYSTOLIC BLOOD PRESSURE: 120 MMHG | HEIGHT: 66 IN | TEMPERATURE: 97.6 F | WEIGHT: 211 LBS | BODY MASS INDEX: 33.91 KG/M2

## 2021-06-01 DIAGNOSIS — M47.12 CERVICAL SPONDYLOSIS WITH MYELOPATHY: Primary | ICD-10-CM

## 2021-06-01 DIAGNOSIS — Z98.1 S/P CERVICAL SPINAL FUSION: ICD-10-CM

## 2021-06-01 DIAGNOSIS — Z72.0 TOBACCO ABUSE: ICD-10-CM

## 2021-06-01 DIAGNOSIS — M50.30 DDD (DEGENERATIVE DISC DISEASE), CERVICAL: ICD-10-CM

## 2021-06-01 DIAGNOSIS — E66.9 OBESITY (BMI 30-39.9): ICD-10-CM

## 2021-06-01 PROCEDURE — 99213 OFFICE O/P EST LOW 20 MIN: CPT | Performed by: PHYSICIAN ASSISTANT

## 2021-06-01 NOTE — PROGRESS NOTES
Patient: Evelia Foster  : 1980  Chart #: [id number]    Date of Service: 2021    CHIEF COMPLAINT: Neck pain with upper extremity sensory alteration    History of Present Illness Ms. Foster is seen in follow-up.  She is a 41-year-old [employer]  who presented to our service with a 6-month history of bilateral hand sensory alteration, Lhermitte's phenomenon, and progressive right lower extremity weakness.  Preoperative studies demonstrated signal change within the cord at C5-6.  She had myelopathic findings on exam.  On 2021 she underwent ACDF C5-6.  Patient has done well.  She continues with some numbness in her hands as well as a weak/awkward feeling in the right leg.  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  When last seen she was referred for formal physical therapy to get her in shape for returning without restriction.  Her daughter was diagnosed with Covid which delayed her from being able to get into physical therapy.  She does not feel quite capable of returning to full duty just yet.      Past Medical History:   Diagnosis Date   • Anemia    • Anxiety    • Asthma     mild, rescue inhaler prn    • Dizzy    • Full dentures    • Headache    • History of cervical dysplasia     s/p hsyterectomy   • HPV (human papilloma virus) infection     positive 18   • Insomnia    • Low back pain    • Neck pain    • Pneumonia    • PTSD (post-traumatic stress disorder)    • Restless leg syndrome    • Spinal headache    • Uses contact lenses          Current Outpatient Medications:   •  albuterol sulfate  (90 Base) MCG/ACT inhaler, Inhale 2 puffs Every 4 (Four) Hours As Needed for Wheezing., Disp: , Rfl:   •  escitalopram (Lexapro) 10 MG tablet, Take 1 tablet by mouth Daily., Disp: 30 tablet, Rfl: 3  •  ibuprofen (ADVIL,MOTRIN) 200 MG tablet, Take 400 mg by mouth Daily As Needed for Mild Pain ., Disp: , Rfl:   •  naltrexone  "(DEPADE) 50 MG tablet, Take 1 tablet by mouth Daily., Disp: 30 tablet, Rfl: 2  •  ondansetron (Zofran) 4 MG tablet, Take 1 tablet by mouth Every 12 (Twelve) Hours As Needed for Nausea or Vomiting. (Patient taking differently: Take 4 mg by mouth As Needed for Nausea or Vomiting.), Disp: 30 tablet, Rfl: 1    Past Surgical History:   Procedure Laterality Date   • ADENOIDECTOMY      1989   • ANTERIOR CERVICAL DISCECTOMY W/ FUSION N/A 1/14/2021    Procedure: CERVICAL DISCECTOMY ANTERIOR WITH FUSION C5-6;  Surgeon: Kalyan Vasquez MD;  Location:  [facility];  Service: Neurosurgery;  Laterality: N/A;   • COLONOSCOPY     • ENDOSCOPY     • TONSILLECTOMY AND ADENOIDECTOMY      1989   • TOTAL LAPAROSCOPIC HYSTERECTOMY N/A 1/9/2019    Procedure: TOTAL LAPAROSCOPIC HYSTERECTOMY, BILATERAL SALPINGECTOMY WITH DAVINCI ROBOT;  Surgeon: Markie Lewis MD;  Location:  [facility];  Service: [facility]   • TUBAL ABDOMINAL LIGATION         Social History     Socioeconomic History   • Marital status: Single     Spouse name: Not on file   • Number of children: Not on file   • Years of education: Not on file   • Highest education level: Not on file   Tobacco Use   • Smoking status: Current Every Day Smoker     Packs/day: 0.50     Years: 24.00     Pack years: 12.00     Types: Cigarettes     Start date: 1998   • Smokeless tobacco: Never Used   • Tobacco comment: tried w preg   Vaping Use   • Vaping Use: Never used   Substance and Sexual Activity   • Alcohol use: Yes     Comment: 1-2 drinks per day for the last 3-4 months    • Drug use: No   • Sexual activity: Yes     Partners: Male     Birth control/protection: Surgical         Review of Systems   Musculoskeletal: Positive for neck pain.   All other systems reviewed and are negative.      Objective   Vital Signs: Blood pressure 120/78, temperature 97.6 °F (36.4 °C), height 166.4 cm (65.5\"), weight 95.7 kg (211 lb), last menstrual period 12/18/2018, not currently breastfeeding.  Physical Exam  Vitals " and nursing note reviewed.   Constitutional:       General: She is not in acute distress.     Appearance: She is well-developed.   HENT:      Head: Normocephalic and atraumatic.   Eyes:      Pupils: Pupils are equal, round, and reactive to light.   Cardiovascular:      Heart sounds: Normal heart sounds.   Pulmonary:      Breath sounds: Normal breath sounds.   Psychiatric:         Behavior: Behavior normal.         Thought Content: Thought content normal.     Musculoskeletal:     Strength is intact in upper and lower extremities to direct testing.     Station and gait are normal.  Neurologic:     Muscle tone is normal throughout.     Coordination is intact.     Deep tendon reflexes: 3+ and symmetrical.     Nicholas sign strongly positive bilaterally      Assessment/Plan   Diagnosis: Cervical spondylosis and disc herniation with severe myelopathy status post decompression    Medical Decision Making: I would like Ms. Foster to finish up with her physical therapy over the next month.  At the end of her that, I will see her back at which time she should be able to return to work without restriction. She mentions that her boss may allow her to continue with the position she is in.   When she follows up we will also check some plain films of the cervical spine.    Diagnoses and all orders for this visit:    1. Cervical spondylosis with myelopathy (Primary)  -     XR Spine Cervical 2 View; Future    2. DDD (degenerative disc disease), cervical  -     XR Spine Cervical 2 View; Future    3. Tobacco abuse  -     XR Spine Cervical 2 View; Future    4. Obesity (BMI 30-39.9)  -     XR Spine Cervical 2 View; Future    5. S/P cervical spinal fusion  -     XR Spine Cervical 2 View; Future                          Lucy Donahue PA-C  Patient Care Team:  Nabil Green MD as PCP - General (Family Medicine)  Markie Lewis MD as Gynecologist (Gynecology)

## 2021-06-07 DIAGNOSIS — G47.09 OTHER INSOMNIA: ICD-10-CM

## 2021-06-07 RX ORDER — QUETIAPINE FUMARATE 25 MG/1
25 TABLET, FILM COATED ORAL NIGHTLY
Qty: 30 TABLET | Refills: 1 | Status: SHIPPED | OUTPATIENT
Start: 2021-06-07 | End: 2021-06-22 | Stop reason: SDUPTHER

## 2021-06-07 NOTE — TELEPHONE ENCOUNTER
Last office visit: 3/19/21  Nikolay  Next office visit:  6/22/21 Nikolay    Last labs: 4/20/21  Med last refilled: Was not filled by anyone in this office.

## 2021-06-18 PROCEDURE — U0004 COV-19 TEST NON-CDC HGH THRU: HCPCS | Performed by: FAMILY MEDICINE

## 2021-06-22 ENCOUNTER — OFFICE VISIT (OUTPATIENT)
Dept: FAMILY MEDICINE CLINIC | Facility: CLINIC | Age: 41
End: 2021-06-22

## 2021-06-22 VITALS
BODY MASS INDEX: 33.82 KG/M2 | TEMPERATURE: 98.4 F | HEIGHT: 66 IN | DIASTOLIC BLOOD PRESSURE: 81 MMHG | HEART RATE: 81 BPM | SYSTOLIC BLOOD PRESSURE: 122 MMHG | OXYGEN SATURATION: 97 % | WEIGHT: 210.4 LBS

## 2021-06-22 DIAGNOSIS — G47.09 OTHER INSOMNIA: ICD-10-CM

## 2021-06-22 DIAGNOSIS — R03.0 ELEVATED BLOOD PRESSURE READING: Primary | ICD-10-CM

## 2021-06-22 DIAGNOSIS — F41.9 ANXIETY: ICD-10-CM

## 2021-06-22 DIAGNOSIS — F32.1 CURRENT MODERATE EPISODE OF MAJOR DEPRESSIVE DISORDER, UNSPECIFIED WHETHER RECURRENT (HCC): ICD-10-CM

## 2021-06-22 DIAGNOSIS — F10.10 ALCOHOL ABUSE: ICD-10-CM

## 2021-06-22 PROCEDURE — 99214 OFFICE O/P EST MOD 30 MIN: CPT | Performed by: FAMILY MEDICINE

## 2021-06-22 RX ORDER — NALTREXONE HYDROCHLORIDE 50 MG/1
50 TABLET, FILM COATED ORAL DAILY
Qty: 30 TABLET | Refills: 6 | OUTPATIENT
Start: 2021-06-22 | End: 2021-09-07

## 2021-06-22 RX ORDER — QUETIAPINE FUMARATE 25 MG/1
25 TABLET, FILM COATED ORAL NIGHTLY
Qty: 90 TABLET | Refills: 3 | OUTPATIENT
Start: 2021-06-22 | End: 2021-09-07

## 2021-06-22 RX ORDER — ESCITALOPRAM OXALATE 20 MG/1
20 TABLET ORAL DAILY
Qty: 30 TABLET | Refills: 3 | OUTPATIENT
Start: 2021-06-22 | End: 2021-09-07

## 2021-06-22 NOTE — PROGRESS NOTES
Evelia Foster is a 41 y.o. female who presents today for Depression (fu)    Patient feels like the depression has improved significantly last PHQ-9 was 19.  It is improved to a 6 today.  She reports no adverse effects from medications.  She has not had a drink in over a month since starting naltrexone. She feels like the anxiety is still present but has still improved. Her only complaint is of sleeping too much. She does not feel tired during the day. She can fall asleep easily and sleeps most of the day on the weekends if she can. She does not wake up in the morning feeling refreshed. She does feel like she can fall asleep as soon as she lays down. She has been monitoring her blood pressure at home and is has been staying in the 130s/80s sometimes a little higher and sometimes lower. She forgot to bring her blood pressure cuff and log with her today.        PHQ-2/PHQ-9 Depression Screening 6/22/2021   Little interest or pleasure in doing things 0   Feeling down, depressed, or hopeless 0   Trouble falling or staying asleep, or sleeping too much 3   Feeling tired or having little energy 0   Poor appetite or overeating 3   Feeling bad about yourself - or that you are a failure or have let yourself or your family down 0   Trouble concentrating on things, such as reading the newspaper or watching television 0   Moving or speaking so slowly that other people could have noticed. Or the opposite - being so fidgety or restless that you have been moving around a lot more than usual 0   Thoughts that you would be better off dead, or of hurting yourself in some way 0   Total Score 6   If you checked off any problems, how difficult have these problems made it for you to do your work, take care of things at home, or get along with other people? Not difficult at all     ASHLI-7  Over the last two weeks, how often have you been bothered by the following problems?  Feeling nervous, anxious or on edge: 3  Not being able to stop  or control worryin  Worrying too much about different things: 0  Trouble Relaxing: 3  Being so restless that it is hard to sit still: 0  Becoming easily annoyed or irritable: 2  Feeling afraid as if something awful might happen: 0  ASHLI 7 Total Score: 8  If you checked any problems, how difficult have these problems made it for you to do your work, take care of things at home, or get along with other people: Not difficult at all      Review of Systems   Constitutional: Negative for fever and unexpected weight loss.   HENT: Negative for congestion, ear pain and sore throat.    Eyes: Negative for visual disturbance.   Respiratory: Negative for cough, shortness of breath and wheezing.    Cardiovascular: Negative for chest pain and palpitations.   Gastrointestinal: Negative for abdominal pain, blood in stool, constipation, diarrhea, nausea, vomiting and GERD.   Endocrine: Negative for polydipsia and polyuria.   Genitourinary: Negative for difficulty urinating.   Musculoskeletal: Negative for joint swelling.   Skin: Negative for rash and skin lesions.   Allergic/Immunologic: Negative for environmental allergies.   Neurological: Negative for seizures and syncope.   Hematological: Does not bruise/bleed easily.   Psychiatric/Behavioral: Positive for depressed mood. Negative for suicidal ideas. The patient is nervous/anxious.         The following portions of the patient's history were reviewed and updated as appropriate: allergies, current medications, past family history, past medical history, past social history, past surgical history and problem list.    Current Outpatient Medications on File Prior to Visit   Medication Sig Dispense Refill   • albuterol sulfate  (90 Base) MCG/ACT inhaler Inhale 2 puffs Every 4 (Four) Hours As Needed for Wheezing.     • ibuprofen (ADVIL,MOTRIN) 200 MG tablet Take 400 mg by mouth Daily As Needed for Mild Pain .     • ondansetron (Zofran) 4 MG tablet Take 1 tablet by mouth Every  "12 (Twelve) Hours As Needed for Nausea or Vomiting. (Patient taking differently: Take 4 mg by mouth As Needed for Nausea or Vomiting.) 30 tablet 1     No current facility-administered medications on file prior to visit.       Allergies   Allergen Reactions   • Benadryl [Diphenhydramine Hcl] Itching   • Buspar [Buspirone] Confusion   • Lortab [Hydrocodone-Acetaminophen] Itching        Visit Vitals  /81   Pulse 81   Temp 98.4 °F (36.9 °C)   Ht 167.6 cm (66\")   Wt 95.4 kg (210 lb 6.4 oz)   LMP 12/18/2018 Comment: last mammogram 2020   SpO2 97%   BMI 33.96 kg/m²        Physical Exam  Constitutional:       General: She is not in acute distress.     Appearance: She is well-developed. She is not diaphoretic.   HENT:      Head: Atraumatic.   Cardiovascular:      Rate and Rhythm: Normal rate and regular rhythm.      Heart sounds: Normal heart sounds. No murmur heard.   No friction rub. No gallop.    Pulmonary:      Effort: Pulmonary effort is normal. No respiratory distress.      Breath sounds: Normal breath sounds. No stridor. No wheezing, rhonchi or rales.   Musculoskeletal:      Cervical back: Normal range of motion and neck supple.   Skin:     General: Skin is warm and dry.   Neurological:      Mental Status: She is alert and oriented to person, place, and time.   Psychiatric:         Behavior: Behavior normal.          Results for orders placed or performed during the hospital encounter of 06/18/21   COVID-19 PCR, [facility], NP SWAB IN [facility] TRANSPORT MEDIA 24-30 HR TAT - Swab, Nasopharynx    Specimen: Nasopharynx; Swab   Result Value Ref Range    SARS-CoV-2 MANJULA Not Detected Not Detected        Problems Addressed this Visit        Cardiac and Vasculature    Elevated blood pressure reading - Primary     Blood pressure was within normal range today.  She will continue to monitor blood pressure at home and bring cuff and log with her to the next appointment.            Mental Health    Anxiety     Improving " with treatment.  Patient will increase Lexapro to 20 mg daily.         Relevant Medications    escitalopram (Lexapro) 20 MG tablet    Current moderate episode of major depressive disorder (CMS/HCC)     Patient's depression is recurrent and is moderate without psychosis. Their depression is currently active and the condition is improving with treatment. This will be reassessed in 3 months. F/U as described:Increase Lexapro to 20 mg daily.         Relevant Medications    QUEtiapine (SEROquel) 25 MG tablet    escitalopram (Lexapro) 20 MG tablet       Sleep    Insomnia     Patient will continue Seroquel nightly.  Patient will try to adjust the time she takes her other medications to see if she can improve daytime sleepiness.  If daytime sleepiness continues will refer to sleep medicine for evaluation of sleep apnea.         Relevant Medications    QUEtiapine (SEROquel) 25 MG tablet      Other Visit Diagnoses     Alcohol abuse        Relevant Medications    QUEtiapine (SEROquel) 25 MG tablet    naltrexone (DEPADE) 50 MG tablet    escitalopram (Lexapro) 20 MG tablet      Diagnoses       Codes Comments    Elevated blood pressure reading    -  Primary ICD-10-CM: R03.0  ICD-9-CM: 796.2     Other insomnia     ICD-10-CM: G47.09  ICD-9-CM: 780.52     Alcohol abuse     ICD-10-CM: F10.10  ICD-9-CM: 305.00     Anxiety     ICD-10-CM: F41.9  ICD-9-CM: 300.00     Current moderate episode of major depressive disorder, unspecified whether recurrent (CMS/HCC)     ICD-10-CM: F32.1  ICD-9-CM: 296.22           Return in about 3 months (around 9/22/2021) for Follow-up depression, anxiety, insomnia, and elevated blood pressure.    Parts of this office note have been dictated by voice recognition software. Grammatical and/or spelling errors may be present.    Nabil Green MD   6/23/2021

## 2021-06-23 NOTE — ASSESSMENT & PLAN NOTE
Blood pressure was within normal range today.  She will continue to monitor blood pressure at home and bring cuff and log with her to the next appointment.

## 2021-06-23 NOTE — ASSESSMENT & PLAN NOTE
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 Their depression is currently active and the condition is improving with treatment. This will be reassessed in 3 months. F/U as described:Increase Lexapro to 20 mg daily.

## 2021-06-23 NOTE — ASSESSMENT & PLAN NOTE
Patient will continue Seroquel nightly.  Patient will try to adjust the time she takes her other medications to see if she can improve daytime sleepiness.  If daytime sleepiness continues will refer to sleep medicine for evaluation of sleep apnea.

## 2021-09-07 PROCEDURE — U0004 COV-19 TEST NON-CDC HGH THRU: HCPCS | Performed by: PHYSICIAN ASSISTANT

## 2021-11-02 ENCOUNTER — OFFICE VISIT (OUTPATIENT)
Dept: FAMILY MEDICINE CLINIC | Facility: CLINIC | Age: 41
End: 2021-11-02

## 2021-11-02 VITALS
HEIGHT: 67 IN | OXYGEN SATURATION: 98 % | HEART RATE: 74 BPM | DIASTOLIC BLOOD PRESSURE: 82 MMHG | WEIGHT: 216.6 LBS | SYSTOLIC BLOOD PRESSURE: 130 MMHG | TEMPERATURE: 98.3 F | BODY MASS INDEX: 34 KG/M2 | RESPIRATION RATE: 18 BRPM

## 2021-11-02 DIAGNOSIS — S62.607D CLOSED NONDISPLACED FRACTURE OF PHALANX OF LEFT LITTLE FINGER WITH ROUTINE HEALING, UNSPECIFIED PHALANX, SUBSEQUENT ENCOUNTER: ICD-10-CM

## 2021-11-02 DIAGNOSIS — N61.1 ABSCESS OF FEMALE BREAST: Primary | ICD-10-CM

## 2021-11-02 PROCEDURE — 99213 OFFICE O/P EST LOW 20 MIN: CPT | Performed by: FAMILY MEDICINE

## 2021-11-02 RX ORDER — CEPHALEXIN 500 MG/1
500 TABLET ORAL 4 TIMES DAILY
Qty: 28 TABLET | Refills: 0 | Status: SHIPPED | OUTPATIENT
Start: 2021-11-02 | End: 2021-11-02

## 2021-11-02 RX ORDER — HYDROCODONE BITARTRATE AND ACETAMINOPHEN 7.5; 325 MG/1; MG/1
TABLET ORAL
COMMUNITY
Start: 2021-10-19 | End: 2022-06-06

## 2021-11-02 RX ORDER — CEPHALEXIN 500 MG/1
500 TABLET ORAL 4 TIMES DAILY
Qty: 28 TABLET | Refills: 0 | Status: SHIPPED | OUTPATIENT
Start: 2021-11-02 | End: 2022-06-06

## 2021-11-02 RX ORDER — NALTREXONE HYDROCHLORIDE 50 MG/1
50 TABLET, FILM COATED ORAL DAILY
COMMUNITY
End: 2022-06-02 | Stop reason: SDUPTHER

## 2021-11-02 RX ORDER — IBUPROFEN 800 MG/1
TABLET ORAL
COMMUNITY
Start: 2021-10-19 | End: 2022-06-06

## 2021-11-02 NOTE — PROGRESS NOTES
"Evelia Foster is a 41 y.o. female who presents today for Hand Pain (broke pinky left hand) and Breast Pain (left )      Presented to the ER on October 19th with a broken left 5th digit and was discharged with Norco and a hard splint. She reports that she took half of the prescription but endorses minimal relief of pain. She states that today the pain is 7/10 at rest. She is unable to bend the digit due to pain and swelling. She denies loss of sensation or pallor in the fingertip. She had a splint placed by Dr. Hernandez at orthopedics approx. 7 days after her ER visit and reports that the splint fell off on its own after several days. She has been using ACE wrap to re-wrap the splint on her own for the past week. She is requesting a referral to a different orthopedist as she was dissatisfied with Dr. Hernandez's care.    She reports a painful lesion under her left breast that began approximately 1 week ago. She states that her fiance brushed against it and it made her jump. It is painless unless touched, non-radiating, and \"feels like being stung by a bee\". She reports that she tried cleaning it with Hibiclens x3 days but found no relief.  She denies fever, discharge, heat in the area.       Review of Systems   Constitutional: Negative for fever and unexpected weight loss.   HENT: Negative for congestion, ear pain and sore throat.    Eyes: Negative for visual disturbance.   Respiratory: Negative for cough, shortness of breath and wheezing.    Cardiovascular: Negative for chest pain and palpitations.   Gastrointestinal: Negative for abdominal pain, blood in stool, constipation, diarrhea, nausea, vomiting and GERD.   Endocrine: Negative for polydipsia and polyuria.   Genitourinary: Negative for difficulty urinating.   Musculoskeletal: Negative for joint swelling.   Skin: Negative for rash and skin lesions.   Allergic/Immunologic: Negative for environmental allergies.   Neurological: Negative for seizures and syncope. " "  Hematological: Does not bruise/bleed easily.   Psychiatric/Behavioral: Negative for suicidal ideas.        The following portions of the patient's history were reviewed and updated as appropriate: allergies, current medications, past family history, past medical history, past social history, past surgical history and problem list.    Current Outpatient Medications on File Prior to Visit   Medication Sig Dispense Refill   • albuterol sulfate  (90 Base) MCG/ACT inhaler Inhale 2 puffs Every 4 (Four) Hours As Needed for Wheezing.     • HYDROcodone-acetaminophen (NORCO) 7.5-325 MG per tablet TAKE 1 TABLET BY MOUTH EVERY 4 HOURS FOR 3 DAYS AS NEEDED FOR PAIN     • ibuprofen (ADVIL,MOTRIN) 800 MG tablet TAKE ONE TABLET BY MOUTH EVERY EIGHT HOURS FOR 10 DAYS.     • naltrexone (DEPADE) 50 MG tablet Take 50 mg by mouth Daily.     • [DISCONTINUED] escitalopram (Lexapro) 20 MG tablet Take 1 tablet by mouth Daily. 30 tablet 3   • [DISCONTINUED] QUEtiapine (SEROquel) 25 MG tablet Take 1 tablet by mouth Every Night. 90 tablet 3     No current facility-administered medications on file prior to visit.       Allergies   Allergen Reactions   • Buspar [Buspirone] Confusion        Visit Vitals  /82   Pulse 74   Temp 98.3 °F (36.8 °C)   Resp 18   Ht 168.9 cm (66.5\")   Wt 98.2 kg (216 lb 9.6 oz)   LMP 12/18/2018 Comment: last mammogram 2020   SpO2 98%   BMI 34.44 kg/m²        Physical Exam  Vitals and nursing note reviewed.   Constitutional:       General: She is not in acute distress.     Appearance: She is well-developed. She is obese. She is not diaphoretic.   HENT:      Head: Atraumatic.   Cardiovascular:      Rate and Rhythm: Normal rate and regular rhythm.      Heart sounds: Normal heart sounds. No murmur heard.  No friction rub. No gallop.    Pulmonary:      Effort: Pulmonary effort is normal. No respiratory distress.      Breath sounds: Normal breath sounds. No stridor. No wheezing, rhonchi or rales.   Abdominal:    "   General: Bowel sounds are normal. There is no distension.      Palpations: Abdomen is soft. There is no mass.      Tenderness: There is no abdominal tenderness. There is no guarding or rebound.      Hernia: No hernia is present.   Musculoskeletal:         General: No swelling or deformity.      Cervical back: Normal range of motion and neck supple.   Skin:     General: Skin is warm and dry.      Comments: Erythematous area on the underside of the left breast which is mildly warm to the touch, tender to the touch, no fluctuance palpated or discharge could be expressed.  Well-demarcated.   Neurological:      Mental Status: She is alert and oriented to person, place, and time.   Psychiatric:         Behavior: Behavior normal.           Problems Addressed this Visit        Genitourinary and Reproductive     Abscess of female breast - Primary     Mild cellulitis versus small abscess of left breast.  Patient was given prescription for Keflex.  RTC/ED precautions given.         Relevant Medications    Cephalexin 500 MG tablet       Musculoskeletal and Injuries    Closed nondisplaced fracture of phalanx of left little finger with routine healing     Patient will continue supportive care at home and wear splint until she is seen by Ortho.  Orthopedic surgery referral was placed.         Relevant Orders    Ambulatory Referral to Orthopedic Surgery      Diagnoses       Codes Comments    Abscess of female breast    -  Primary ICD-10-CM: N61.1  ICD-9-CM: 611.0     Closed nondisplaced fracture of phalanx of left little finger with routine healing, unspecified phalanx, subsequent encounter     ICD-10-CM: S62.607D  ICD-9-CM: V54.19           Return if symptoms worsen or fail to improve.     I attest that I have reviewed the student note and that the components of the history of the present illness, the physical exam, and the assessment and plan documented were performed by me or were performed in my presence by the student and  verified by me.      Nabil Green MD   11/2/2021

## 2021-11-02 NOTE — ASSESSMENT & PLAN NOTE
Patient will continue supportive care at home and wear splint until she is seen by Ortho.  Orthopedic surgery referral was placed.

## 2021-11-02 NOTE — ASSESSMENT & PLAN NOTE
Mild cellulitis versus small abscess of left breast.  Patient was given prescription for Keflex.  RTC/ED precautions given.

## 2021-12-13 ENCOUNTER — TELEPHONE (OUTPATIENT)
Dept: FAMILY MEDICINE CLINIC | Facility: CLINIC | Age: 41
End: 2021-12-13

## 2021-12-13 NOTE — TELEPHONE ENCOUNTER
Caller: Evelia Foster    Relationship to patient: Self    Best call back number: [phone]     Date of exposure: UNSURE     Date of positive COVID19 test: 12/13/2021    Date if possible COVID19 exposure: UNSURE    COVID19 symptoms: COUGH, FEVER      Date of initial quarantine: 12/10/2021    Additional information or concerns: THE PATIENT IS REQUESTING A CALL BACK TO DISCUSS WHAT HER OPTIONS ARE CONCERNING MEDICATION OR INFUSION.THE PATIENT DECLINED AN APPOINTMENT AT THIS TIME DUE TO FINANCIAL REASONS.      What is the patients preferred pharmacy: [id number] [place], [place] - [address] AT [facility] [address] - [phone]  - [phone] FX

## 2022-02-07 ENCOUNTER — HOSPITAL ENCOUNTER (OUTPATIENT)
Dept: GENERAL RADIOLOGY | Facility: HOSPITAL | Age: 42
Discharge: HOME | End: 2022-02-07
Admitting: PHYSICIAN ASSISTANT

## 2022-02-07 DIAGNOSIS — Z98.1 S/P CERVICAL SPINAL FUSION: ICD-10-CM

## 2022-02-07 DIAGNOSIS — E66.9 OBESITY (BMI 30-39.9): ICD-10-CM

## 2022-02-07 DIAGNOSIS — Z72.0 TOBACCO ABUSE: ICD-10-CM

## 2022-02-07 DIAGNOSIS — M50.30 DDD (DEGENERATIVE DISC DISEASE), CERVICAL: ICD-10-CM

## 2022-02-07 DIAGNOSIS — M47.12 CERVICAL SPONDYLOSIS WITH MYELOPATHY: ICD-10-CM

## 2022-02-07 PROCEDURE — 72040 X-RAY EXAM NECK SPINE 2-3 VW: CPT

## 2022-02-08 ENCOUNTER — TELEPHONE (OUTPATIENT)
Dept: NEUROSURGERY | Facility: CLINIC | Age: 42
End: 2022-02-08

## 2022-02-08 NOTE — TELEPHONE ENCOUNTER
Provider:  Rowan  Caller: patient  Time of call:  4:58  Phone #:  [phone]  Surgery:  Cervical Discectomy  Surgery Date: 1-14-21  Last visit:   6-1-21  Next visit: PADMINI COLEMAN:         Reason for call:  Patient called and said that she had got her x-rays done and saw the results yesterday and wanted to know what the next step is. Please Advise. Thank you.            done

## 2022-02-09 NOTE — TELEPHONE ENCOUNTER
I called and spoke with the patient she said that since she was here 6 months ago she said that she has been dealing with pain since then, but dealing with it. She fell this past Monday on ice flat on her back and the pain has been worse. Please Advise. Thank you.      When did it start: 6 months it started hurting worse after she fell Monday    Where is it located:neck and sshoulder    How long has this been going on/is this new or the same as before surgery: 6 months  Same but worse    Characteristics of symptom/severity: hands still numb,pain left side of neck down to shoulder    Timing- Is it constant or intermittent: constant    What makes it worse: being out in the cold    What makes it better:nothing    What therapies/medications have you tried: ibuprofen and tylenol, icy hot

## 2022-02-09 NOTE — TELEPHONE ENCOUNTER
Need more info.. she hasnt been seen in 6 months. If she is doing well then she does not need follow up. If she is having issues, she needs an appt.

## 2022-02-22 ENCOUNTER — TRANSCRIBE ORDERS (OUTPATIENT)
Dept: ADMINISTRATIVE | Facility: HOSPITAL | Age: 42
End: 2022-02-22

## 2022-03-16 ENCOUNTER — TELEPHONE (OUTPATIENT)
Dept: FAMILY MEDICINE CLINIC | Facility: CLINIC | Age: 42
End: 2022-03-16

## 2022-03-16 DIAGNOSIS — N64.4 BREAST PAIN IN FEMALE: Primary | ICD-10-CM

## 2022-03-16 NOTE — TELEPHONE ENCOUNTER
Reason for Call: PT IS REQUESTING AN UPDATE ON HER REQUEST FOR DIAG MAMMO FROM 2/23. SHE STATES THAT SHE IS HAVING PAIN IN BOTH BREASTS, AND THE BREAST CENTER CAN'T USE THE SCREENING MAMMO ORDER IN CHART.     Symptoms: PAIN IN BREASTS, WORSE IN LEFT BREAST.    Onset (when it began):    Does anything make it better /  Worse? PAIN IS WORSE WHEN SHE IS LAYING DOWN.     Have they tried anything?    Other pertinent info:    Okay to wait for PCP to address

## 2022-05-06 ENCOUNTER — HOSPITAL ENCOUNTER (OUTPATIENT)
Dept: ULTRASOUND IMAGING | Facility: HOSPITAL | Age: 42
Discharge: HOME | End: 2022-05-06

## 2022-05-06 ENCOUNTER — HOSPITAL ENCOUNTER (OUTPATIENT)
Dept: MAMMOGRAPHY | Facility: HOSPITAL | Age: 42
Discharge: HOME | End: 2022-05-06

## 2022-05-06 DIAGNOSIS — N64.4 BREAST PAIN IN FEMALE: ICD-10-CM

## 2022-05-06 PROCEDURE — 77062 BREAST TOMOSYNTHESIS BI: CPT | Performed by: RADIOLOGY

## 2022-05-06 PROCEDURE — 76642 ULTRASOUND BREAST LIMITED: CPT

## 2022-05-06 PROCEDURE — 76642 ULTRASOUND BREAST LIMITED: CPT | Performed by: RADIOLOGY

## 2022-05-06 PROCEDURE — G0279 TOMOSYNTHESIS, MAMMO: HCPCS

## 2022-05-06 PROCEDURE — 77066 DX MAMMO INCL CAD BI: CPT

## 2022-05-06 PROCEDURE — 77066 DX MAMMO INCL CAD BI: CPT | Performed by: RADIOLOGY

## 2022-05-09 ENCOUNTER — TELEPHONE (OUTPATIENT)
Dept: FAMILY MEDICINE CLINIC | Facility: CLINIC | Age: 42
End: 2022-05-09

## 2022-05-09 NOTE — TELEPHONE ENCOUNTER
Pharmacy is aware that this is no longer active on patient list, alondra called them and made them aware of this and patient said she did not even request this.

## 2022-05-09 NOTE — TELEPHONE ENCOUNTER
PHARMACY IS REQUESTING REFILL ON MEDICATION EFFEXOR NOT ON MED LIST    [phone]  FAX

## 2022-05-27 ENCOUNTER — HOSPITAL ENCOUNTER (OUTPATIENT)
Dept: MRI IMAGING | Facility: HOSPITAL | Age: 42
Discharge: HOME | End: 2022-05-27
Admitting: FAMILY MEDICINE

## 2022-05-27 DIAGNOSIS — Z80.3 FAMILY HISTORY OF MALIGNANT NEOPLASM OF BREAST: ICD-10-CM

## 2022-05-27 PROCEDURE — 77049 MRI BREAST C-+ W/CAD BI: CPT | Performed by: RADIOLOGY

## 2022-05-27 PROCEDURE — 0 GADOBENATE DIMEGLUMINE 529 MG/ML SOLUTION: Performed by: FAMILY MEDICINE

## 2022-05-27 PROCEDURE — A9577 INJ MULTIHANCE: HCPCS | Performed by: FAMILY MEDICINE

## 2022-05-27 PROCEDURE — 77049 MRI BREAST C-+ W/CAD BI: CPT

## 2022-05-27 RX ADMIN — GADOBENATE DIMEGLUMINE 19 ML: 529 INJECTION, SOLUTION INTRAVENOUS at 09:48

## 2022-06-01 ENCOUNTER — TELEPHONE (OUTPATIENT)
Dept: MRI IMAGING | Facility: HOSPITAL | Age: 42
End: 2022-06-01

## 2022-06-03 RX ORDER — NALTREXONE HYDROCHLORIDE 50 MG/1
50 TABLET, FILM COATED ORAL DAILY
Qty: 90 TABLET | Refills: 1 | Status: SHIPPED | OUTPATIENT
Start: 2022-06-03 | End: 2022-06-06

## 2022-06-06 ENCOUNTER — OFFICE VISIT (OUTPATIENT)
Dept: FAMILY MEDICINE CLINIC | Facility: CLINIC | Age: 42
End: 2022-06-06

## 2022-06-06 VITALS
SYSTOLIC BLOOD PRESSURE: 148 MMHG | HEIGHT: 66 IN | DIASTOLIC BLOOD PRESSURE: 104 MMHG | WEIGHT: 221 LBS | TEMPERATURE: 98.4 F | HEART RATE: 100 BPM | OXYGEN SATURATION: 94 % | BODY MASS INDEX: 35.52 KG/M2

## 2022-06-06 DIAGNOSIS — R07.9 CHEST PAIN, UNSPECIFIED TYPE: ICD-10-CM

## 2022-06-06 DIAGNOSIS — I10 HYPERTENSION, UNSPECIFIED TYPE: Primary | ICD-10-CM

## 2022-06-06 PROCEDURE — 99214 OFFICE O/P EST MOD 30 MIN: CPT | Performed by: PHYSICIAN ASSISTANT

## 2022-06-06 RX ORDER — METOPROLOL SUCCINATE 50 MG/1
50 TABLET, EXTENDED RELEASE ORAL DAILY
Qty: 30 TABLET | Refills: 5 | Status: SHIPPED | OUTPATIENT
Start: 2022-06-06 | End: 2022-08-09 | Stop reason: SDUPTHER

## 2022-06-07 PROCEDURE — 93000 ELECTROCARDIOGRAM COMPLETE: CPT | Performed by: PHYSICIAN ASSISTANT

## 2022-07-08 ENCOUNTER — TELEPHONE (OUTPATIENT)
Dept: FAMILY MEDICINE CLINIC | Facility: CLINIC | Age: 42
End: 2022-07-08

## 2022-07-08 DIAGNOSIS — R07.9 CHEST PAIN, UNSPECIFIED TYPE: Primary | ICD-10-CM

## 2022-07-08 NOTE — TELEPHONE ENCOUNTER
This is for Doreen SUNG she ordered a stress test on Evelia but the insurance will not approve it until you call and do a peer to peer with them     [employer] [phone] that will go directly to [facility] [employer] number [id number] you have 14 days to complete this and her appointment is scheduled for 7/14 and will be cancelled if ptp is not complete.

## 2022-07-08 NOTE — TELEPHONE ENCOUNTER
Unable to obtain authorization from peer to peer - does not meet their criteria. I am going to change this to a regular treadmill test.

## 2022-07-14 ENCOUNTER — HOSPITAL ENCOUNTER (OUTPATIENT)
Dept: CARDIOLOGY | Facility: HOSPITAL | Age: 42
End: 2022-07-14

## 2022-07-26 ENCOUNTER — OFFICE VISIT (OUTPATIENT)
Dept: FAMILY MEDICINE CLINIC | Facility: CLINIC | Age: 42
End: 2022-07-26

## 2022-07-26 VITALS
BODY MASS INDEX: 35.56 KG/M2 | HEIGHT: 67 IN | HEART RATE: 84 BPM | TEMPERATURE: 97.8 F | WEIGHT: 226.6 LBS | DIASTOLIC BLOOD PRESSURE: 90 MMHG | SYSTOLIC BLOOD PRESSURE: 134 MMHG | OXYGEN SATURATION: 95 %

## 2022-07-26 DIAGNOSIS — R03.0 ELEVATED BLOOD PRESSURE READING: ICD-10-CM

## 2022-07-26 DIAGNOSIS — M26.622 ARTHRALGIA OF LEFT TEMPOROMANDIBULAR JOINT: ICD-10-CM

## 2022-07-26 DIAGNOSIS — R07.9 CHEST PAIN, UNSPECIFIED TYPE: Primary | ICD-10-CM

## 2022-07-26 DIAGNOSIS — M54.50 CHRONIC LEFT-SIDED LOW BACK PAIN WITHOUT SCIATICA: ICD-10-CM

## 2022-07-26 DIAGNOSIS — J45.20 MILD INTERMITTENT ASTHMA WITHOUT COMPLICATION: ICD-10-CM

## 2022-07-26 DIAGNOSIS — G89.29 CHRONIC LEFT-SIDED LOW BACK PAIN WITHOUT SCIATICA: ICD-10-CM

## 2022-07-26 PROCEDURE — 99214 OFFICE O/P EST MOD 30 MIN: CPT | Performed by: FAMILY MEDICINE

## 2022-07-26 RX ORDER — IBUPROFEN 200 MG
600 TABLET ORAL AS NEEDED
COMMUNITY

## 2022-07-26 RX ORDER — CYCLOBENZAPRINE HCL 5 MG
5 TABLET ORAL 3 TIMES DAILY PRN
Qty: 90 TABLET | Refills: 3 | Status: SHIPPED | OUTPATIENT
Start: 2022-07-26 | End: 2022-10-21 | Stop reason: SDUPTHER

## 2022-07-26 RX ORDER — ALBUTEROL SULFATE 90 UG/1
2 AEROSOL, METERED RESPIRATORY (INHALATION) EVERY 4 HOURS PRN
Qty: 18 G | Refills: 3 | Status: SHIPPED | OUTPATIENT
Start: 2022-07-26

## 2022-07-26 NOTE — ASSESSMENT & PLAN NOTE
Acute on chronic low back pain.  Patient will continue use ibuprofen as needed.  She is given prescription for Flexeril for symptomatic relief.  Patient does not wish to see physical therapy at this time.  RTC/ED precautions given.

## 2022-07-26 NOTE — PATIENT INSTRUCTIONS
"https://doi.org/10.17591/[email or web address]">   Chronic Back Pain  When back pain lasts longer than 3 months, it is called chronic back pain. The cause of your back pain may not be known. Some common causes include:  Wear and tear (degenerative disease) of the bones, ligaments, or disks in your back.  Inflammation and stiffness in your back (arthritis).  People who have chronic back pain often go through certain periods in which the pain is more intense (flare-ups). Many people can learn to manage the pain with home care.  Follow these instructions at home:  Pay attention to any changes in your symptoms. Take these actions to help with your pain:  Managing pain and stiffness         If directed, apply ice to the painful area. Your health care provider may recommend applying ice during the first 24-48 hours after a flare-up begins. To do this:  Put ice in a plastic bag.  Place a towel between your skin and the bag.  Leave the ice on for 20 minutes, 2-3 times per day.  If directed, apply heat to the affected area as often as told by your health care provider. Use the heat source that your health care provider recommends, such as a moist heat pack or a heating pad.  Place a towel between your skin and the heat source.  Leave the heat on for 20-30 minutes.  Remove the heat if your skin turns bright red. This is especially important if you are unable to feel pain, heat, or cold. You may have a greater risk of getting burned.  Try soaking in a warm tub.  Activity    Avoid bending and other activities that make the problem worse.  Maintain a proper position when standing or sitting:  When standing, keep your upper back and neck straight, with your shoulders pulled back. Avoid slouching.  When sitting, keep your back straight and relax your shoulders. Do not round your shoulders or pull them backward.  Do not sit or  one place for long periods of time.  Take brief periods of rest throughout the day. This will reduce your " pain. Resting in a lying or standing position is usually better than sitting to rest.  When you are resting for longer periods, mix in some mild activity or stretching between periods of rest. This will help to prevent stiffness and pain.  Get regular exercise. Ask your health care provider what activities are safe for you.  Do not lift anything that is heavier than 10 lb (4.5 kg), or the limit that you are told, until your health care provider says that it is safe. Always use proper lifting technique, which includes:  Bending your knees.  Keeping the load close to your body.  Avoiding twisting.  Sleep on a firm mattress in a comfortable position. Try lying on your side with your knees slightly bent. If you lie on your back, put a pillow under your knees.    Medicines  Treatment may include medicines for pain and inflammation taken by mouth or applied to the skin, prescription pain medicine, or muscle relaxants. Take over-the-counter and prescription medicines only as told by your health care provider.  Ask your health care provider if the medicine prescribed to you:  Requires you to avoid driving or using machinery.  Can cause constipation. You may need to take these actions to prevent or treat constipation:  Drink enough fluid to keep your urine pale yellow.  Take over-the-counter or prescription medicines.  Eat foods that are high in fiber, such as beans, whole grains, and fresh fruits and vegetables.  Limit foods that are high in fat and processed sugars, such as fried or sweet foods.  General instructions  Do not use any products that contain nicotine or tobacco, such as cigarettes, e-cigarettes, and chewing tobacco. If you need help quitting, ask your health care provider.  Keep all follow-up visits as told by your health care provider. This is important.  Contact a health care provider if:  You have pain that is not relieved with rest or medicine.  Your pain gets worse, or you have new pain.  You have a high  fever.  You have rapid weight loss.  You have trouble doing your normal activities.  Get help right away if:  You have weakness or numbness in one or both of your legs or feet.  You have trouble controlling your bladder or your bowels.  You have severe back pain and have any of the following:  Nausea or vomiting.  Pain in your abdomen.  Shortness of breath or you faint.  Summary  Chronic back pain is back pain that lasts longer than 3 months.  When a flare-up begins, apply ice to the painful area for the first 24-48 hours.  Apply a moist heat pad or use a heating pad on the painful area as directed by your health care provider.  When you are resting for longer periods, mix in some mild activity or stretching between periods of rest. This will help to prevent stiffness and pain.  This information is not intended to replace advice given to you by your health care provider. Make sure you discuss any questions you have with your health care provider.  Document Revised: 01/27/2021 Document Reviewed: 01/27/2021  Elsevier Patient Education © 2021 [employer]

## 2022-07-26 NOTE — ASSESSMENT & PLAN NOTE
Blood pressure still mildly elevated on metoprolol.  She will continue this medication for the time being as well as home blood pressure monitoring.  She will follow-up in 4 weeks and if she needs further blood pressure medication adjustment we can do so at that time.

## 2022-07-26 NOTE — PROGRESS NOTES
Evelia Foster is a 42 y.o. female who presents today for Follow-up (4 week), Hypertension, Chest Pain (Pt states its off and on), Pain (Left side jaw, lower left back), and Med Refill (Albuterol inhaler)      Patient was seen in June for chest discomfort. She feels like her heart beats really hard at times. She was started on metoprolol and a stress test was order which has been scheduled. Since starting metoprolol she has been  Getting some episodes of feeling light headed in the afternoon which improves is she gets up and walks around. She checks her pulse when this happens and it is in the normal range. She has seen some improvement in heart rate with metoprolol but still gets the dizziness and feelings like her heart is beating hard in her chest. She checks her blood pressure at home and it has been staying in the 130/80 range. She is also having some left sided jaw pain and feeling like it is going lock up on her for the past week. It worseness when she chews, yawns, or opens her jaw. She also has some popping and locking feelings. She does not chew gum or grind her teeth at night but she did chew ice a lot in the past before her hysterectomy. She has also had low left sided back pain for the past 2 weeks. She has not had an injury or been more active than usually. Pain does not radiate. Pain is a dull ache. She has some relief with 600mg of ibuprofen which also helps the jaw pain. She has not noticed anything that makes it worse.        Review of Systems   Constitutional: Negative for fever and unexpected weight loss.   HENT: Negative for congestion, ear pain and sore throat.    Eyes: Negative for visual disturbance.   Respiratory: Negative for cough, shortness of breath and wheezing.    Cardiovascular: Positive for chest pain and palpitations. Negative for leg swelling.   Gastrointestinal: Negative for abdominal pain, blood in stool, constipation, diarrhea, nausea, vomiting and GERD.   Endocrine:  "Negative for polydipsia and polyuria.   Genitourinary: Negative for difficulty urinating.   Musculoskeletal: Positive for arthralgias and back pain. Negative for joint swelling.   Skin: Negative for rash and skin lesions.   Allergic/Immunologic: Negative for environmental allergies.   Neurological: Positive for dizziness. Negative for seizures and syncope.   Hematological: Does not bruise/bleed easily.   Psychiatric/Behavioral: Negative for suicidal ideas.        The following portions of the patient's history were reviewed and updated as appropriate: allergies, current medications, past family history, past medical history, past social history, past surgical history and problem list.    Current Outpatient Medications on File Prior to Visit   Medication Sig Dispense Refill   • ibuprofen (ADVIL,MOTRIN) 200 MG tablet Take 600 mg by mouth As Needed for Mild Pain .     • metoprolol succinate XL (Toprol XL) 50 MG 24 hr tablet Take 1 tablet by mouth Daily. 30 tablet 5   • [DISCONTINUED] albuterol sulfate  (90 Base) MCG/ACT inhaler Inhale 2 puffs Every 4 (Four) Hours As Needed for Wheezing.     • [DISCONTINUED] escitalopram (Lexapro) 20 MG tablet Take 1 tablet by mouth Daily. 30 tablet 3   • [DISCONTINUED] QUEtiapine (SEROquel) 25 MG tablet Take 1 tablet by mouth Every Night. 90 tablet 3     No current facility-administered medications on file prior to visit.       Allergies   Allergen Reactions   • Buspar [Buspirone] Confusion        Visit Vitals  /90 (BP Location: Right arm, Patient Position: Sitting, Cuff Size: Adult)   Pulse 84   Temp 97.8 °F (36.6 °C)   Ht 168.9 cm (66.5\")   Wt 103 kg (226 lb 9.6 oz)   LMP 12/18/2018 Comment: last mammogram 2020   SpO2 95%   BMI 36.03 kg/m²        Physical Exam  Constitutional:       General: She is not in acute distress.     Appearance: She is well-developed. She is not diaphoretic.   HENT:      Head: Atraumatic.      Jaw: There is normal jaw occlusion. Tenderness " (Popping of the right TMJ with tenderness at the left TMJ) and pain on movement present.   Cardiovascular:      Rate and Rhythm: Normal rate and regular rhythm.      Heart sounds: Normal heart sounds. No murmur heard.    No friction rub. No gallop.   Pulmonary:      Effort: Pulmonary effort is normal. No respiratory distress.      Breath sounds: Normal breath sounds. No stridor. No wheezing, rhonchi or rales.   Musculoskeletal:      Cervical back: Normal range of motion and neck supple.      Lumbar back: Spasms (Left paraspinal muscle) and tenderness (Left lower lumbar area) present. No swelling, deformity or bony tenderness. Normal range of motion.   Skin:     General: Skin is warm and dry.   Neurological:      Mental Status: She is alert and oriented to person, place, and time.   Psychiatric:         Behavior: Behavior normal.          Results for orders placed or performed during the hospital encounter of 09/07/21   COVID-19 PCR, [facility], [place] SWAB [place] [facility] [employer]/ORAL SWISH 24-30 HR TAT - Swab, Nasopharynx    Specimen: Nasopharynx; Swab   Result Value Ref Range    SARS-CoV-2 MANJULA Not Detected Not Detected   POCT Rapid Strep A    Specimen: Swab   Result Value Ref Range    Rapid Strep A Screen Negative Negative, VALID, INVALID, Not Performed    Internal Control Passed Passed    Lot Number [id number]     Expiration Date 2,022         Problems Addressed this Visit        Cardiac and Vasculature    Elevated blood pressure reading     Blood pressure still mildly elevated on metoprolol.  She will continue this medication for the time being as well as home blood pressure monitoring.  She will follow-up in 4 weeks and if she needs further blood pressure medication adjustment we can do so at that time.           Chest pain - Primary     Chest pain is stable at this time stress test pending.  RTC/ED precautions given.              ENT    Arthralgia of left temporomandibular joint     Patient is having TMJ pain  with clicking on the right side and pain on the left side.  Patient was given prescription for Flexeril for symptomatic relief.  Patient will continue supportive care at home.  Patient was advised to schedule appoint with dentist to discuss further treatment options and test fit of dentures.           Relevant Medications    cyclobenzaprine (FLEXERIL) 5 MG tablet       Musculoskeletal and Injuries    Low back pain     Acute on chronic low back pain.  Patient will continue use ibuprofen as needed.  She is given prescription for Flexeril for symptomatic relief.  Patient does not wish to see physical therapy at this time.  RTC/ED precautions given.           Relevant Medications    cyclobenzaprine (FLEXERIL) 5 MG tablet       Pulmonary and Pneumonias    Asthma    Relevant Medications    albuterol sulfate  (90 Base) MCG/ACT inhaler      Diagnoses       Codes Comments    Chest pain, unspecified type    -  Primary ICD-10-CM: R07.9  ICD-9-CM: 786.50     Mild intermittent asthma without complication     ICD-10-CM: J45.20  ICD-9-CM: 493.90     Chronic left-sided low back pain without sciatica     ICD-10-CM: M54.50, G89.29  ICD-9-CM: 724.2, 338.29     Arthralgia of left temporomandibular joint     ICD-10-CM: M26.622  ICD-9-CM: 524.62     Elevated blood pressure reading     ICD-10-CM: R03.0  ICD-9-CM: 796.2           Return in about 4 weeks (around 8/23/2022) for Annual.    Nabil Green MD   7/26/2022

## 2022-07-26 NOTE — ASSESSMENT & PLAN NOTE
Patient is having TMJ pain with clicking on the right side and pain on the left side.  Patient was given prescription for Flexeril for symptomatic relief.  Patient will continue supportive care at home.  Patient was advised to schedule appoint with dentist to discuss further treatment options and test fit of dentures.

## 2022-07-29 ENCOUNTER — HOSPITAL ENCOUNTER (OUTPATIENT)
Dept: CARDIOLOGY | Facility: HOSPITAL | Age: 42
Discharge: HOME | End: 2022-07-29
Admitting: PHYSICIAN ASSISTANT

## 2022-07-29 VITALS
WEIGHT: 226 LBS | SYSTOLIC BLOOD PRESSURE: 130 MMHG | HEART RATE: 80 BPM | HEIGHT: 67 IN | DIASTOLIC BLOOD PRESSURE: 92 MMHG | BODY MASS INDEX: 35.47 KG/M2

## 2022-07-29 DIAGNOSIS — R07.9 CHEST PAIN, UNSPECIFIED TYPE: ICD-10-CM

## 2022-07-29 LAB
BH CV STRESS BP STAGE 1: NORMAL
BH CV STRESS BP STAGE 2: NORMAL
BH CV STRESS BP STAGE 3: NORMAL
BH CV STRESS DURATION MIN STAGE 1: 3
BH CV STRESS DURATION MIN STAGE 2: 3
BH CV STRESS DURATION MIN STAGE 3: 2
BH CV STRESS DURATION SEC STAGE 1: 0
BH CV STRESS DURATION SEC STAGE 2: 0
BH CV STRESS DURATION SEC STAGE 3: 58
BH CV STRESS GRADE STAGE 1: 10
BH CV STRESS GRADE STAGE 2: 12
BH CV STRESS GRADE STAGE 3: 14
BH CV STRESS HR STAGE 1: 126
BH CV STRESS HR STAGE 2: 139
BH CV STRESS HR STAGE 3: 164
BH CV STRESS METS STAGE 1: 5
BH CV STRESS METS STAGE 2: 7.5
BH CV STRESS METS STAGE 3: 10
BH CV STRESS O2 STAGE 1: 96
BH CV STRESS O2 STAGE 2: 95
BH CV STRESS O2 STAGE 3: 95
BH CV STRESS PROTOCOL 1: NORMAL
BH CV STRESS RECOVERY BP: NORMAL MMHG
BH CV STRESS RECOVERY HR: 109 BPM
BH CV STRESS RECOVERY O2: 98 %
BH CV STRESS SPEED STAGE 1: 1.7
BH CV STRESS SPEED STAGE 2: 2.5
BH CV STRESS SPEED STAGE 3: 3.4
BH CV STRESS STAGE 1: 1
BH CV STRESS STAGE 2: 2
BH CV STRESS STAGE 3: 3
MAXIMAL PREDICTED HEART RATE: 180 BPM
PERCENT MAX PREDICTED HR: 92.22 %
STRESS BASELINE BP: NORMAL MMHG
STRESS BASELINE HR: 80 BPM
STRESS O2 SAT REST: 97 %
STRESS PERCENT HR: 108 %
STRESS POST ESTIMATED WORKLOAD: 10.1 METS
STRESS POST EXERCISE DUR MIN: 8 MIN
STRESS POST EXERCISE DUR SEC: 58 SEC
STRESS POST O2 SAT PEAK: 95 %
STRESS POST PEAK BP: NORMAL MMHG
STRESS POST PEAK HR: 166 BPM
STRESS TARGET HR: 153 BPM

## 2022-07-29 PROCEDURE — 93017 CV STRESS TEST TRACING ONLY: CPT

## 2022-07-29 PROCEDURE — 93018 CV STRESS TEST I&R ONLY: CPT | Performed by: INTERNAL MEDICINE

## 2022-08-09 DIAGNOSIS — I10 HYPERTENSION, UNSPECIFIED TYPE: ICD-10-CM

## 2022-08-09 RX ORDER — METOPROLOL SUCCINATE 50 MG/1
50 TABLET, EXTENDED RELEASE ORAL DAILY
Qty: 30 TABLET | Refills: 5 | Status: SHIPPED | OUTPATIENT
Start: 2022-08-09 | End: 2022-08-09 | Stop reason: SDUPTHER

## 2022-08-09 RX ORDER — METOPROLOL SUCCINATE 50 MG/1
50 TABLET, EXTENDED RELEASE ORAL DAILY
Qty: 90 TABLET | Refills: 0 | Status: SHIPPED | OUTPATIENT
Start: 2022-08-09 | End: 2022-08-26 | Stop reason: SDUPTHER

## 2022-08-09 NOTE — TELEPHONE ENCOUNTER
Caller: Evelia Foster    Relationship: Self    Best call back number:     [phone]    Requested Prescriptions:   Requested Prescriptions     Pending Prescriptions Disp Refills   • metoprolol succinate XL (Toprol XL) 50 MG 24 hr tablet 30 tablet 5     Sig: Take 1 tablet by mouth Daily.      Pharmacy where request should be sent: [id number] - [place], [place] - [address]  - [phone]  - [phone] FX     Additional details provided by patient:     PATIENT STATED SHE HAS BEEN OUT OF THE MEDICATION FOR (2) DAYS    PATIENT STATED THE MEDICATION WAS SENT TO THE WRONG PHARMACY, [facility]    PATIENT'S INSURANCE WILL ONLY COVER COST IF PRESCRIPTIONS ARE SENT TO [facility] INCLUDED ABOVE    Does the patient have less than a 3 day supply:  [x] Yes  [] No    Concha Moss Rep   08/09/22 10:54 EDT     DR SOUTH

## 2022-08-09 NOTE — TELEPHONE ENCOUNTER
PATIENT IS CALLING BACK BECAUSE  SHE FORGOT TO STATE THAT THE MEDICATION NEEDS TO BE FOR A 90 DAY SUPPLY INSTEAD OF 30

## 2022-08-26 ENCOUNTER — LAB (OUTPATIENT)
Dept: LAB | Facility: HOSPITAL | Age: 42
End: 2022-08-26

## 2022-08-26 ENCOUNTER — OFFICE VISIT (OUTPATIENT)
Dept: FAMILY MEDICINE CLINIC | Facility: CLINIC | Age: 42
End: 2022-08-26

## 2022-08-26 VITALS
SYSTOLIC BLOOD PRESSURE: 118 MMHG | HEIGHT: 66 IN | OXYGEN SATURATION: 98 % | DIASTOLIC BLOOD PRESSURE: 64 MMHG | BODY MASS INDEX: 36.48 KG/M2 | WEIGHT: 227 LBS | HEART RATE: 89 BPM

## 2022-08-26 DIAGNOSIS — I10 HYPERTENSION, UNSPECIFIED TYPE: ICD-10-CM

## 2022-08-26 DIAGNOSIS — Z12.83 SKIN CANCER SCREENING: ICD-10-CM

## 2022-08-26 DIAGNOSIS — Z00.00 WELL ADULT EXAM: Primary | ICD-10-CM

## 2022-08-26 DIAGNOSIS — G47.09 OTHER INSOMNIA: ICD-10-CM

## 2022-08-26 DIAGNOSIS — F41.9 ANXIETY: ICD-10-CM

## 2022-08-26 DIAGNOSIS — L73.2 SUPPURATIVE HIDRADENITIS: ICD-10-CM

## 2022-08-26 DIAGNOSIS — F32.1 CURRENT MODERATE EPISODE OF MAJOR DEPRESSIVE DISORDER, UNSPECIFIED WHETHER RECURRENT: ICD-10-CM

## 2022-08-26 LAB
ALBUMIN SERPL-MCNC: 4.1 G/DL (ref 3.5–5.2)
ALBUMIN/GLOB SERPL: 1.6 G/DL
ALP SERPL-CCNC: 114 U/L (ref 39–117)
ALT SERPL W P-5'-P-CCNC: 24 U/L (ref 1–33)
ANION GAP SERPL CALCULATED.3IONS-SCNC: 8.8 MMOL/L (ref 5–15)
AST SERPL-CCNC: 24 U/L (ref 1–32)
BASOPHILS # BLD AUTO: 0.06 10*3/MM3 (ref 0–0.2)
BASOPHILS NFR BLD AUTO: 0.5 % (ref 0–1.5)
BILIRUB SERPL-MCNC: 0.3 MG/DL (ref 0–1.2)
BUN SERPL-MCNC: 6 MG/DL (ref 6–20)
BUN/CREAT SERPL: 7.9 (ref 7–25)
CALCIUM SPEC-SCNC: 9.4 MG/DL (ref 8.6–10.5)
CHLORIDE SERPL-SCNC: 103 MMOL/L (ref 98–107)
CHOLEST SERPL-MCNC: 270 MG/DL (ref 0–200)
CO2 SERPL-SCNC: 24.2 MMOL/L (ref 22–29)
CREAT SERPL-MCNC: 0.76 MG/DL (ref 0.57–1)
DEPRECATED RDW RBC AUTO: 46.9 FL (ref 37–54)
EGFRCR SERPLBLD CKD-EPI 2021: 101.7 ML/MIN/1.73
EOSINOPHIL # BLD AUTO: 0.12 10*3/MM3 (ref 0–0.4)
EOSINOPHIL NFR BLD AUTO: 1.1 % (ref 0.3–6.2)
ERYTHROCYTE [DISTWIDTH] IN BLOOD BY AUTOMATED COUNT: 13.6 % (ref 12.3–15.4)
GLOBULIN UR ELPH-MCNC: 2.5 GM/DL
GLUCOSE SERPL-MCNC: 95 MG/DL (ref 65–99)
HBA1C MFR BLD: 5.6 % (ref 4.8–5.6)
HCT VFR BLD AUTO: 42.6 % (ref 34–46.6)
HDLC SERPL-MCNC: 42 MG/DL (ref 40–60)
HGB BLD-MCNC: 14.6 G/DL (ref 12–15.9)
IMM GRANULOCYTES # BLD AUTO: 0.06 10*3/MM3 (ref 0–0.05)
IMM GRANULOCYTES NFR BLD AUTO: 0.5 % (ref 0–0.5)
LDLC SERPL CALC-MCNC: 194 MG/DL (ref 0–100)
LDLC/HDLC SERPL: 4.57 {RATIO}
LYMPHOCYTES # BLD AUTO: 2.39 10*3/MM3 (ref 0.7–3.1)
LYMPHOCYTES NFR BLD AUTO: 21.8 % (ref 19.6–45.3)
MCH RBC QN AUTO: 32.5 PG (ref 26.6–33)
MCHC RBC AUTO-ENTMCNC: 34.3 G/DL (ref 31.5–35.7)
MCV RBC AUTO: 94.9 FL (ref 79–97)
MONOCYTES # BLD AUTO: 0.79 10*3/MM3 (ref 0.1–0.9)
MONOCYTES NFR BLD AUTO: 7.2 % (ref 5–12)
NEUTROPHILS NFR BLD AUTO: 68.9 % (ref 42.7–76)
NEUTROPHILS NFR BLD AUTO: 7.54 10*3/MM3 (ref 1.7–7)
NRBC BLD AUTO-RTO: 0 /100 WBC (ref 0–0.2)
PLATELET # BLD AUTO: 279 10*3/MM3 (ref 140–450)
PMV BLD AUTO: 10.1 FL (ref 6–12)
POTASSIUM SERPL-SCNC: 5 MMOL/L (ref 3.5–5.2)
PROT SERPL-MCNC: 6.6 G/DL (ref 6–8.5)
RBC # BLD AUTO: 4.49 10*6/MM3 (ref 3.77–5.28)
SODIUM SERPL-SCNC: 136 MMOL/L (ref 136–145)
TRIGL SERPL-MCNC: 180 MG/DL (ref 0–150)
TSH SERPL DL<=0.05 MIU/L-ACNC: 1.67 UIU/ML (ref 0.27–4.2)
VLDLC SERPL-MCNC: 34 MG/DL (ref 5–40)
WBC NRBC COR # BLD: 10.96 10*3/MM3 (ref 3.4–10.8)

## 2022-08-26 PROCEDURE — 84443 ASSAY THYROID STIM HORMONE: CPT | Performed by: FAMILY MEDICINE

## 2022-08-26 PROCEDURE — 80061 LIPID PANEL: CPT | Performed by: FAMILY MEDICINE

## 2022-08-26 PROCEDURE — 99213 OFFICE O/P EST LOW 20 MIN: CPT | Performed by: FAMILY MEDICINE

## 2022-08-26 PROCEDURE — 80053 COMPREHEN METABOLIC PANEL: CPT | Performed by: FAMILY MEDICINE

## 2022-08-26 PROCEDURE — 83036 HEMOGLOBIN GLYCOSYLATED A1C: CPT | Performed by: FAMILY MEDICINE

## 2022-08-26 PROCEDURE — 99396 PREV VISIT EST AGE 40-64: CPT | Performed by: FAMILY MEDICINE

## 2022-08-26 PROCEDURE — 85025 COMPLETE CBC W/AUTO DIFF WBC: CPT | Performed by: FAMILY MEDICINE

## 2022-08-26 RX ORDER — ESCITALOPRAM OXALATE 20 MG/1
20 TABLET ORAL DAILY
Qty: 30 TABLET | Refills: 3 | Status: SHIPPED | OUTPATIENT
Start: 2022-08-26 | End: 2022-10-21 | Stop reason: SDUPTHER

## 2022-08-26 RX ORDER — METOPROLOL SUCCINATE 50 MG/1
50 TABLET, EXTENDED RELEASE ORAL DAILY
Qty: 90 TABLET | Refills: 3 | Status: SHIPPED | OUTPATIENT
Start: 2022-08-26 | End: 2022-09-20 | Stop reason: SDUPTHER

## 2022-08-26 RX ORDER — QUETIAPINE FUMARATE 25 MG/1
25 TABLET, FILM COATED ORAL NIGHTLY
Qty: 90 TABLET | Refills: 3 | Status: SHIPPED | OUTPATIENT
Start: 2022-08-26 | End: 2023-01-20

## 2022-08-26 RX ORDER — CHLORHEXIDINE GLUCONATE 4 G/100ML
SOLUTION TOPICAL DAILY PRN
Qty: 236 ML | Refills: 1 | Status: SHIPPED | OUTPATIENT
Start: 2022-08-26 | End: 2023-01-17

## 2022-08-26 NOTE — PATIENT INSTRUCTIONS
"BMI for Adults  What is BMI?  Body mass index (BMI) is a number that is calculated from a person's weight and height. BMI can help estimate how much of a person's weight is composed of fat. BMI does not measure body fat directly. Rather, it is an alternative to procedures that directly measure body fat, which can be difficult and expensive.  BMI can help identify people who may be at higher risk for certain medical problems.  What are BMI measurements used for?  BMI is used as a screening tool to identify possible weight problems. It helps determine whether a person is obese, overweight, a healthy weight, or underweight.  BMI is useful for:  Identifying a weight problem that may be related to a medical condition or may increase the risk for medical problems.  Promoting changes, such as changes in diet and exercise, to help reach a healthy weight. BMI screening can be repeated to see if these changes are working.  How is BMI calculated?  BMI involves measuring your weight in relation to your height. Both height and weight are measured, and the BMI is calculated from those numbers. This can be done either in English ([place] or metric measurements. Note that charts and online BMI calculators are available to help you find your BMI quickly and easily without having to do these calculations yourself.  To calculate your BMI in English ([place] measurements:    Measure your weight in pounds (lb).  Multiply the number of pounds by 703.  For example, for a person who weighs 180 lb, multiply that number by 703, which equals 126,540.  Measure your height in inches. Then multiply that number by itself to get a measurement called \"inches squared.\"  For example, for a person who is 70 inches tall, the \"inches squared\" measurement is 70 inches x 70 inches, which equals 4,900 inches squared.  Divide the total from step 2 (number of lb x 703) by the total from step 3 (inches squared): 126,540 ÷ 4,900 = 25.8. This is your BMI.    To " "calculate your BMI in metric measurements:  Measure your weight in kilograms (kg).  Measure your height in meters (m). Then multiply that number by itself to get a measurement called \"meters squared.\"  For example, for a person who is 1.75 m tall, the \"meters squared\" measurement is 1.75 m x 1.75 m, which is equal to 3.1 meters squared.  Divide the number of kilograms (your weight) by the meters squared number. In this example: 70 ÷ 3.1 = 22.6. This is your BMI.  What do the results mean?  BMI charts are used to identify whether you are underweight, normal weight, overweight, or obese. The following guidelines will be used:  Underweight: BMI less than 18.5.  Normal weight: BMI between 18.5 and 24.9.  Overweight: BMI between 25 and 29.9.  Obese: BMI of 30 or above.  Keep these notes in mind:  Weight includes both fat and muscle, so someone with a muscular build, such as an athlete, may have a BMI that is higher than 24.9. In cases like these, BMI is not an accurate measure of body fat.  To determine if excess body fat is the cause of a BMI of 25 or higher, further assessments may need to be done by a health care provider.  BMI is usually interpreted in the same way for men and women.  Where to find more information  For more information about BMI, including tools to quickly calculate your BMI, go to these websites:  Centers for Disease Control and Prevention: www.cdc.gov  American Heart Association: www.heart.org  National Heart, Lung, and [employer] [place]: www.nhlbi.nih.gov  Summary  Body mass index (BMI) is a number that is calculated from a person's weight and height.  BMI may help estimate how much of a person's weight is composed of fat. BMI can help identify those who may be at higher risk for certain medical problems.  BMI can be measured using English measurements or metric measurements.  BMI charts are used to identify whether you are underweight, normal weight, overweight, or obese.  This information is not " "intended to replace advice given to you by your health care provider. Make sure you discuss any questions you have with your health care provider.  Document Revised: 09/09/2020 Document Reviewed: 07/17/2020  Elsevier Patient Education © 2021 [employer]  Hypertension, Adult  High blood pressure (hypertension) is when the force of blood pumping through the arteries is too strong. The arteries are the blood vessels that carry blood from the heart throughout the body. Hypertension forces the heart to work harder to pump blood and may cause arteries to become narrow or stiff. Untreated or uncontrolled hypertension can cause a heart attack, heart failure, a stroke, kidney disease, and other problems.  A blood pressure reading consists of a higher number over a lower number. Ideally, your blood pressure should be below 120/80. The first (\"top\") number is called the systolic pressure. It is a measure of the pressure in your arteries as your heart beats. The second (\"bottom\") number is called the diastolic pressure. It is a measure of the pressure in your arteries as the heart relaxes.  What are the causes?  The exact cause of this condition is not known. There are some conditions that result in or are related to high blood pressure.  What increases the risk?  Some risk factors for high blood pressure are under your control. The following factors may make you more likely to develop this condition:  Smoking.  Having type 2 diabetes mellitus, high cholesterol, or both.  Not getting enough exercise or physical activity.  Being overweight.  Having too much fat, sugar, calories, or salt (sodium) in your diet.  Drinking too much alcohol.  Some risk factors for high blood pressure may be difficult or impossible to change. Some of these factors include:  Having chronic kidney disease.  Having a family history of high blood pressure.  Age. Risk increases with age.  Race. You may be at higher risk if you are  " American.  Gender. Men are at higher risk than women before age 45. After age 65, women are at higher risk than men.  Having obstructive sleep apnea.  Stress.  What are the signs or symptoms?  High blood pressure may not cause symptoms. Very high blood pressure (hypertensive crisis) may cause:  Headache.  Anxiety.  Shortness of breath.  Nosebleed.  Nausea and vomiting.  Vision changes.  Severe chest pain.  Seizures.  How is this diagnosed?  This condition is diagnosed by measuring your blood pressure while you are seated, with your arm resting on a flat surface, your legs uncrossed, and your feet flat on the floor. The cuff of the blood pressure monitor will be placed directly against the skin of your upper arm at the level of your heart. It should be measured at least twice using the same arm. Certain conditions can cause a difference in blood pressure between your right and left arms.  Certain factors can cause blood pressure readings to be lower or higher than normal for a short period of time:  When your blood pressure is higher when you are in a health care provider's office than when you are at home, this is called white coat hypertension. Most people with this condition do not need medicines.  When your blood pressure is higher at home than when you are in a health care provider's office, this is called masked hypertension. Most people with this condition may need medicines to control blood pressure.  If you have a high blood pressure reading during one visit or you have normal blood pressure with other risk factors, you may be asked to:  Return on a different day to have your blood pressure checked again.  Monitor your blood pressure at home for 1 week or longer.  If you are diagnosed with hypertension, you may have other blood or imaging tests to help your health care provider understand your overall risk for other conditions.  How is this treated?  This condition is treated by making healthy lifestyle  changes, such as eating healthy foods, exercising more, and reducing your alcohol intake. Your health care provider may prescribe medicine if lifestyle changes are not enough to get your blood pressure under control, and if:  Your systolic blood pressure is above 130.  Your diastolic blood pressure is above 80.  Your personal target blood pressure may vary depending on your medical conditions, your age, and other factors.  Follow these instructions at home:  Eating and drinking    Eat a diet that is high in fiber and potassium, and low in sodium, added sugar, and fat. An example eating plan is called the DASH (Dietary Approaches to Stop Hypertension) diet. To eat this way:  Eat plenty of fresh fruits and vegetables. Try to fill one half of your plate at each meal with fruits and vegetables.  Eat whole grains, such as whole-wheat pasta, brown rice, or whole-grain bread. Fill about one fourth of your plate with whole grains.  Eat or drink low-fat dairy products, such as skim milk or low-fat yogurt.  Avoid fatty cuts of meat, processed or cured meats, and poultry with skin. Fill about one fourth of your plate with lean proteins, such as fish, chicken without skin, beans, eggs, or tofu.  Avoid pre-made and processed foods. These tend to be higher in sodium, added sugar, and fat.  Reduce your daily sodium intake. Most people with hypertension should eat less than 1,500 mg of sodium a day.  Do not drink alcohol if:  Your health care provider tells you not to drink.  You are pregnant, may be pregnant, or are planning to become pregnant.  If you drink alcohol:  Limit how much you use to:  0-1 drink a day for women.  0-2 drinks a day for men.  Be aware of how much alcohol is in your drink. In the [place] one drink equals one 12 oz bottle of beer (355 mL), one 5 oz glass of wine (148 mL), or one 1½ oz glass of hard liquor (44 mL).    Lifestyle    Work with your health care provider to maintain a healthy body weight or to lose  weight. Ask what an ideal weight is for you.  Get at least 30 minutes of exercise most days of the week. Activities may include walking, swimming, or biking.  Include exercise to strengthen your muscles (resistance exercise), such as Pilates or lifting weights, as part of your weekly exercise routine. Try to do these types of exercises for 30 minutes at least 3 days a week.  Do not use any products that contain nicotine or tobacco, such as cigarettes, e-cigarettes, and chewing tobacco. If you need help quitting, ask your health care provider.  Monitor your blood pressure at home as told by your health care provider.  Keep all follow-up visits as told by your health care provider. This is important.    Medicines  Take over-the-counter and prescription medicines only as told by your health care provider. Follow directions carefully. Blood pressure medicines must be taken as prescribed.  Do not skip doses of blood pressure medicine. Doing this puts you at risk for problems and can make the medicine less effective.  Ask your health care provider about side effects or reactions to medicines that you should watch for.  Contact a health care provider if you:  Think you are having a reaction to a medicine you are taking.  Have headaches that keep coming back (recurring).  Feel dizzy.  Have swelling in your ankles.  Have trouble with your vision.  Get help right away if you:  Develop a severe headache or confusion.  Have unusual weakness or numbness.  Feel faint.  Have severe pain in your chest or abdomen.  Vomit repeatedly.  Have trouble breathing.  Summary  Hypertension is when the force of blood pumping through your arteries is too strong. If this condition is not controlled, it may put you at risk for serious complications.  Your personal target blood pressure may vary depending on your medical conditions, your age, and other factors. For most people, a normal blood pressure is less than 120/80.  Hypertension is treated  with lifestyle changes, medicines, or a combination of both. Lifestyle changes include losing weight, eating a healthy, low-sodium diet, exercising more, and limiting alcohol.  This information is not intended to replace advice given to you by your health care provider. Make sure you discuss any questions you have with your health care provider.  Document Revised: 08/28/2019 Document Reviewed: 08/28/2019  [employer] Patient Education © 2021 [employer]

## 2022-08-26 NOTE — ASSESSMENT & PLAN NOTE
Patient will restart Seroquel.  She will follow-up in 8 weeks to assess effectiveness of treatment.

## 2022-08-26 NOTE — ASSESSMENT & PLAN NOTE
Patient has lesion on the right buttocks which is firm mildly erythematous and tender to the touch.  Is not hot to the touch and no drainage could be expressed.  Lesion is consistent with suppurative hidradenitis.  Patient will continue supportive care at home keeping the area clean and dry.  She was given Hibiclens to wash with.  Patient has several small papular lesions around her bra line consistent with acne.  Patient will work to keep area clean and dry.  She was given referral to dermatology for skin cancer screening as well as further evaluation of suppurative hidradenitis.  RTC/ED precautions given.

## 2022-08-26 NOTE — PROGRESS NOTES
"Evelia Foster is a 42 y.o. female who presents today for a well woman exam.    Chief Complaint   Patient presents with   • Annual Exam   • Rash   • right buttock bleeding      Yesterday morning         Patient has rash around her rib cage and under her breast is has been present a couple weeks and has been unchanged. It is not painful or itchy. She didn't realize it was present until she saw it in the mirror. She states the aissatou is just a bunch of small red/pink bumps. She states she woke up yesterday morning and when she squatted down to get something out of the dryer she felt something wet and had blood on her right buttock. She has a \"boil\" on her right buttock which she thinks the blood is coming from. She sees blood on the toilet paper but non on the stool or in the toilet.      Last pap smear 3 years ago, results were normal. History of abnormal pap smears with partial hysterectomy. Family history of cervical, ovarian, or uterine cancer.     Sexually active with one male partner. No vaginal discharge, itching, dysuria, or pelvic pain. Not interested in screening for STIs.     Last mammogram <1 year ago, results were normal. No history of abnormal mammogram. Family history of breast cancer.     Diet is regular and has been eating healthier lately.     Physical activity includes being active at work but does not have exercise routine.     Sleep problems include trouble staying asleep. She has not been taking Seroquel lately since she ran out but would like to restart. Average hours per night 2-3.     She continues to struggle with anxiety and is unhappy with her weight.   PHQ-2/PHQ-9 Depression Screening 8/26/2022   Retired PHQ-9 Total Score -   Retired Total Score -   Little Interest or Pleasure in Doing Things 0-->not at all   Feeling Down, Depressed or Hopeless 0-->not at all   Trouble Falling or Staying Asleep, or Sleeping Too Much -   Feeling Tired or Having Little Energy -   Poor Appetite or Overeating " -   Feeling Bad about Yourself - or that You are a Failure or Have Let Yourself or Your Family Down -   Trouble Concentrating on Things, Such as Reading the Newspaper or Watching Television -   Moving or Speaking So Slowly that Other People Could Have Noticed? Or the Opposite - Being So Fidgety -   Thoughts that You Would be Better Off Dead or of Hurting Yourself in Some Way -   PHQ-9: Brief Depression Severity Measure Score 0   If You Checked Off Any Problems, How Difficult Have These Problems Made It For You to Do Your Work, Take Care of Things at Home, or Get Along with Other People? -       Review of Systems   Constitutional: Negative for fever and unexpected weight loss.   HENT: Negative for congestion, ear pain and sore throat.    Eyes: Negative for visual disturbance.   Respiratory: Positive for shortness of breath (chronic). Negative for cough and wheezing.    Cardiovascular: Negative for chest pain and palpitations.   Gastrointestinal: Negative for abdominal pain, blood in stool, constipation, diarrhea, nausea, vomiting and GERD.   Endocrine: Negative for polydipsia and polyuria.        Hot flashes   Genitourinary: Negative for difficulty urinating.   Musculoskeletal: Negative for joint swelling.   Skin: Positive for rash and skin lesions.   Allergic/Immunologic: Negative for environmental allergies.   Neurological: Negative for seizures and syncope.   Hematological: Does not bruise/bleed easily.   Psychiatric/Behavioral: Positive for dysphoric mood, sleep disturbance and stress. Negative for decreased concentration, self-injury, suicidal ideas, negative for hyperactivity and depressed mood. The patient is nervous/anxious.         Health Maintenance   Topic Date Due   • INFLUENZA VACCINE  10/01/2022   • PAP SMEAR  2022   • TDAP/TD VACCINES (3 - Td or Tdap) 2031       Obstetric History:  OB History        3    Para   3    Term   3            AB        Living           SAB        IAB         Ectopic        Molar        Multiple        Live Births                   Menstrual History:     Patient's last menstrual period was 12/18/2018.         Past Medical History:   Diagnosis Date   • Anemia    • Anxiety    • Asthma     mild, rescue inhaler prn    • Dizzy    • Full dentures    • Headache    • History of cervical dysplasia     s/p hsyterectomy   • HPV (human papilloma virus) infection     positive 18   • Insomnia    • Low back pain    • Neck pain    • Pneumonia    • PTSD (post-traumatic stress disorder)    • Restless leg syndrome    • Spinal headache    • Uses contact lenses         Past Surgical History:   Procedure Laterality Date   • ADENOIDECTOMY      1989   • ANTERIOR CERVICAL DISCECTOMY W/ FUSION N/A 1/14/2021    Procedure: CERVICAL DISCECTOMY ANTERIOR WITH FUSION C5-6;  Surgeon: Kalyan Vasquez MD;  Location:  [facility];  Service: Neurosurgery;  Laterality: N/A;   • COLONOSCOPY     • ENDOSCOPY     • TONSILLECTOMY AND ADENOIDECTOMY      1989   • TOTAL LAPAROSCOPIC HYSTERECTOMY N/A 1/9/2019    Procedure: TOTAL LAPAROSCOPIC HYSTERECTOMY, BILATERAL SALPINGECTOMY WITH DAVINCI ROBOT;  Surgeon: Markie Lewis MD;  Location:  [facility];  Service: DaVinci   • TUBAL ABDOMINAL LIGATION          Family History   Problem Relation Age of Onset   • Cervical cancer Mother 30   • Breast cancer Mother 30   • Lymphoma Mother    • Congenital heart disease Mother    • Ovarian cancer Mother         40s   • Cancer Father    • Breast cancer Sister         mastectomy -proph?   • Ovarian cancer Sister 30   • Cancer Sister    • No Known Problems Brother    • Dementia Maternal Grandmother    • Graves' disease Sister    • Thyroid disease Sister    • Breast cancer Maternal Aunt 35   • Thyroid disease Daughter    • Thyroid disease Sister    • No Known Problems Daughter         Social History     Socioeconomic History   • Marital status: Significant Other   Tobacco Use   • Smoking status: Current Every Day Smoker      "Packs/day: 0.50     Years: 24.00     Pack years: 12.00     Types: Cigarettes     Start date: 1998   • Smokeless tobacco: Never Used   • Tobacco comment: tried w preg   Vaping Use   • Vaping Use: Never used   Substance and Sexual Activity   • Alcohol use: Yes     Comment: rare   • Drug use: No   • Sexual activity: Yes     Partners: Male     Birth control/protection: Surgical        Current Outpatient Medications on File Prior to Visit   Medication Sig Dispense Refill   • albuterol sulfate  (90 Base) MCG/ACT inhaler Inhale 2 puffs Every 4 (Four) Hours As Needed for Wheezing. 18 g 3   • cyclobenzaprine (FLEXERIL) 5 MG tablet Take 1 tablet by mouth 3 (Three) Times a Day As Needed for Muscle Spasms. 90 tablet 3   • ibuprofen (ADVIL,MOTRIN) 200 MG tablet Take 600 mg by mouth As Needed for Mild Pain .     • [DISCONTINUED] metoprolol succinate XL (Toprol XL) 50 MG 24 hr tablet Take 1 tablet by mouth Daily. 90 tablet 0     No current facility-administered medications on file prior to visit.       Allergies   Allergen Reactions   • Buspar [Buspirone] Confusion        Visit Vitals  /64   Pulse 89   Ht 167.6 cm (66\")   Wt 103 kg (227 lb)   LMP 12/18/2018 Comment: last mammogram 2020   SpO2 98%   BMI 36.64 kg/m²        Physical Exam  Constitutional:       General: She is not in acute distress.     Appearance: She is well-developed. She is not diaphoretic.   HENT:      Head: Atraumatic.   Cardiovascular:      Rate and Rhythm: Normal rate and regular rhythm.      Heart sounds: Normal heart sounds. No murmur heard.    No friction rub. No gallop.   Pulmonary:      Effort: Pulmonary effort is normal. No respiratory distress.      Breath sounds: Normal breath sounds. No stridor. No wheezing, rhonchi or rales.   Abdominal:      General: Bowel sounds are normal. There is no distension.      Palpations: Abdomen is soft. There is no mass.      Tenderness: There is no abdominal tenderness. There is no guarding or rebound.      " Hernia: No hernia is present.   Musculoskeletal:      Cervical back: Normal range of motion and neck supple.   Skin:     General: Skin is warm and dry.      Findings: Lesion and rash present.          Neurological:      Mental Status: She is alert and oriented to person, place, and time.   Psychiatric:         Behavior: Behavior normal.               Immunization History   Administered Date(s) Administered   • Flucelvax Quad Vial =>4yrs 10/14/2019   • Fluzone Quad >6mos (Multi-dose) 10/14/2019   • Hepatitis A 10/26/2018   • Influenza, Unspecified 11/11/2020   • Tdap 10/05/2018, 05/20/2021       Problems Addressed this Visit        Cardiac and Vasculature    Hypertension    Relevant Medications    metoprolol succinate XL (Toprol XL) 50 MG 24 hr tablet    Other Relevant Orders    CBC & Differential    Comprehensive Metabolic Panel    Hemoglobin A1c    Lipid Panel    TSH Rfx On Abnormal To Free T4       Health Encounters    Well adult exam - Primary     The patient is here for health maintenance visit.  Currently, the patient consumes a unhealthy diet and has an inadequate exercise regimen.  Screening lab work is ordered.  Immunizations were reviewed today.  Advice and education was given regarding nutrition, aerobic exercise, routine dental evaluations, routine eye exams, reproductive health, cardiovascular risk reduction, sunscreen use, self skin examination (annual dermatology evaluations) and seatbelt use (general overall safety).  Further recommendations will be given if needed after lab evaluation.  Annual wellness evaluation is recommended.           Relevant Orders    CBC & Differential    Comprehensive Metabolic Panel    Hemoglobin A1c    Lipid Panel    TSH Rfx On Abnormal To Free T4       Mental Health    Anxiety     Chronic and worsening.  Patient will be restarted on Lexapro and will follow up in 8 weeks to reassess.         Relevant Medications    escitalopram (Lexapro) 20 MG tablet    Current moderate  episode of major depressive disorder (HCC)    Relevant Medications    QUEtiapine (SEROquel) 25 MG tablet    escitalopram (Lexapro) 20 MG tablet       Skin    Suppurative hidradenitis     Patient has lesion on the right buttocks which is firm mildly erythematous and tender to the touch.  Is not hot to the touch and no drainage could be expressed.  Lesion is consistent with suppurative hidradenitis.  Patient will continue supportive care at home keeping the area clean and dry.  She was given Hibiclens to wash with.  Patient has several small papular lesions around her bra line consistent with acne.  Patient will work to keep area clean and dry.  She was given referral to dermatology for skin cancer screening as well as further evaluation of suppurative hidradenitis.  RTC/ED precautions given.         Relevant Medications    chlorhexidine (HIBICLENS) 4 % external liquid    Other Relevant Orders    Ambulatory Referral to Dermatology       Sleep    Insomnia     Patient will restart Seroquel.  She will follow-up in 8 weeks to assess effectiveness of treatment.         Relevant Medications    QUEtiapine (SEROquel) 25 MG tablet      Other Visit Diagnoses     Skin cancer screening        Relevant Orders    Ambulatory Referral to Dermatology      Diagnoses       Codes Comments    Well adult exam    -  Primary ICD-10-CM: Z00.00  ICD-9-CM: V70.0     Other insomnia     ICD-10-CM: G47.09  ICD-9-CM: 780.52     Hypertension, unspecified type     ICD-10-CM: I10  ICD-9-CM: 401.9     Anxiety     ICD-10-CM: F41.9  ICD-9-CM: 300.00     Current moderate episode of major depressive disorder, unspecified whether recurrent (HCC)     ICD-10-CM: F32.1  ICD-9-CM: 296.22     Suppurative hidradenitis     ICD-10-CM: L73.2  ICD-9-CM: 705.83     Skin cancer screening     ICD-10-CM: Z12.83  ICD-9-CM: V76.43           Class 2 Severe Obesity (BMI >=35 and <=39.9). Obesity-related health conditions include the following: hypertension. Obesity is  worsening. BMI is is above average; BMI management plan is completed. We discussed low calorie, low carb based diet program, portion control and increasing exercise.      Counseled on health maintenance topics:      Return in about 8 weeks (around 10/21/2022) for Follow-up anxiety and sleep.    Nabil Green MD  8/26/2022

## 2022-08-26 NOTE — ASSESSMENT & PLAN NOTE
Chronic and worsening.  Patient will be restarted on Lexapro and will follow up in 8 weeks to reassess.

## 2022-09-20 ENCOUNTER — TELEPHONE (OUTPATIENT)
Dept: FAMILY MEDICINE CLINIC | Facility: CLINIC | Age: 42
End: 2022-09-20

## 2022-09-20 DIAGNOSIS — I10 HYPERTENSION, UNSPECIFIED TYPE: ICD-10-CM

## 2022-09-20 RX ORDER — METOPROLOL SUCCINATE 50 MG/1
50 TABLET, EXTENDED RELEASE ORAL DAILY
Qty: 90 TABLET | Refills: 3 | Status: SHIPPED | OUTPATIENT
Start: 2022-09-20 | End: 2022-10-21 | Stop reason: SDUPTHER

## 2022-09-20 NOTE — TELEPHONE ENCOUNTER
Spoke with patient.  She will take 50 mg of metoprolol tartrate tonight.  If she does not see improvement in her blood pressure her symptoms persist or worsen she will go to the ED.

## 2022-09-20 NOTE — TELEPHONE ENCOUNTER
Hub staff attempted to follow warm transfer process and was unsuccessful     Caller: Evelia Foster    Relationship to patient: Self    Best call back number: [phone]    Patient is needing: PATIENT STATES THAT SHE HER BLOOD PRESSURE /112. PATIENT STATES THAT SHE IS OUT OF HER MEDICATION METOPROLOL 50 MG  AND WILL NOT BE ABLE TO GET IT UNTIL TOMORROW. PATIENT WOULD LIKE TO KNOW IF SHE CAN TAKE 1 OF HER [employer]'S METOPROLOL 100 MG TABLETS OR WHAT SHE CAN DO TO HELP WITH HER BLOOD PRESSURE?   PATIENT STATES THAT SHE HAS BEEN SLEEPY AND HAS PAIN IN HER SHOULDERS AND DIDN'T KNOW IF IT COULD BE SIDE EFFECTS

## 2022-09-20 NOTE — TELEPHONE ENCOUNTER
Spoke with the pt and advised the pt that she may have to go to the ER in regards to her blood pressure being so high.     Pt states that she is currently out of her medication and will not be able to get it until tomorrow and she is not able to get a 90 day supply of the medication and the insurance only allows her to get the 90 day script nothing lower.     Pt would like to know what she should do as far as her medication and if she should go to the emergency room?

## 2022-10-21 ENCOUNTER — HOSPITAL ENCOUNTER (OUTPATIENT)
Dept: GENERAL RADIOLOGY | Facility: HOSPITAL | Age: 42
Discharge: HOME | End: 2022-10-21
Admitting: FAMILY MEDICINE

## 2022-10-21 ENCOUNTER — OFFICE VISIT (OUTPATIENT)
Dept: FAMILY MEDICINE CLINIC | Facility: CLINIC | Age: 42
End: 2022-10-21

## 2022-10-21 ENCOUNTER — TELEPHONE (OUTPATIENT)
Dept: FAMILY MEDICINE CLINIC | Facility: CLINIC | Age: 42
End: 2022-10-21

## 2022-10-21 VITALS
HEART RATE: 93 BPM | TEMPERATURE: 97 F | WEIGHT: 230 LBS | OXYGEN SATURATION: 98 % | SYSTOLIC BLOOD PRESSURE: 140 MMHG | DIASTOLIC BLOOD PRESSURE: 90 MMHG | BODY MASS INDEX: 36.96 KG/M2 | HEIGHT: 66 IN

## 2022-10-21 DIAGNOSIS — M54.41 CHRONIC BILATERAL LOW BACK PAIN WITH RIGHT-SIDED SCIATICA: ICD-10-CM

## 2022-10-21 DIAGNOSIS — F41.9 ANXIETY: ICD-10-CM

## 2022-10-21 DIAGNOSIS — E66.01 CLASS 2 SEVERE OBESITY DUE TO EXCESS CALORIES WITH SERIOUS COMORBIDITY AND BODY MASS INDEX (BMI) OF 37.0 TO 37.9 IN ADULT: ICD-10-CM

## 2022-10-21 DIAGNOSIS — G89.29 CHRONIC BILATERAL LOW BACK PAIN WITH RIGHT-SIDED SCIATICA: ICD-10-CM

## 2022-10-21 DIAGNOSIS — N39.3 STRESS INCONTINENCE IN FEMALE: ICD-10-CM

## 2022-10-21 DIAGNOSIS — F51.01 PRIMARY INSOMNIA: ICD-10-CM

## 2022-10-21 DIAGNOSIS — I10 PRIMARY HYPERTENSION: Primary | ICD-10-CM

## 2022-10-21 DIAGNOSIS — F32.1 CURRENT MODERATE EPISODE OF MAJOR DEPRESSIVE DISORDER, UNSPECIFIED WHETHER RECURRENT: ICD-10-CM

## 2022-10-21 PROBLEM — E66.812 CLASS 2 SEVERE OBESITY DUE TO EXCESS CALORIES WITH SERIOUS COMORBIDITY AND BODY MASS INDEX (BMI) OF 37.0 TO 37.9 IN ADULT: Status: ACTIVE | Noted: 2022-10-21

## 2022-10-21 PROBLEM — E78.2 MIXED HYPERLIPIDEMIA: Status: ACTIVE | Noted: 2022-10-21

## 2022-10-21 PROCEDURE — 72110 X-RAY EXAM L-2 SPINE 4/>VWS: CPT

## 2022-10-21 PROCEDURE — 99214 OFFICE O/P EST MOD 30 MIN: CPT | Performed by: FAMILY MEDICINE

## 2022-10-21 RX ORDER — ESCITALOPRAM OXALATE 20 MG/1
20 TABLET ORAL DAILY
Qty: 90 TABLET | Refills: 3 | Status: SHIPPED | OUTPATIENT
Start: 2022-10-21 | End: 2023-01-20

## 2022-10-21 RX ORDER — METOPROLOL SUCCINATE 100 MG/1
100 TABLET, EXTENDED RELEASE ORAL DAILY
Qty: 90 TABLET | Refills: 1 | Status: SHIPPED | OUTPATIENT
Start: 2022-10-21 | End: 2023-01-17

## 2022-10-21 RX ORDER — CHLORHEXIDINE GLUCONATE 4 %
LIQUID (ML) TOPICAL
COMMUNITY
Start: 2022-08-26 | End: 2023-01-17

## 2022-10-21 RX ORDER — CYCLOBENZAPRINE HCL 5 MG
5 TABLET ORAL 3 TIMES DAILY PRN
Qty: 90 TABLET | Refills: 3 | Status: SHIPPED | OUTPATIENT
Start: 2022-10-21

## 2022-10-21 NOTE — PROGRESS NOTES
Evelia Foster is a 42 y.o. female who presents today for Anxiety and Insomnia      Patient is here to follow-up on anxiety and insomnia. She was restarted on lexapro and seroquel at last visit. She has been sleeping much better with the Seroquel. She still wakes up aroun 1 or 3 in the morning but can usually get back to sleep. The lexapro has helped with anxiety but she does still have symptoms from time to time. She has used Hibiclens but still struggles with suppurative hidradenitis. She has appointment with derm next week for further evaluation. She hit her 4th and 5th toes on her right foot on the bed post about a month ago and had bruising and swelling. Bruising and swelling has resolved but they are still a little sore. She was limping and has started to have low back pain on Sunday and has pain radiating down her right leg to just above the knee. Pain is throbbing in nature. She has not found that laying down it is hard to find a comfortable position. She has not found anything that relieves pain. She has been taking ibuprofen for it with little relief in symptoms. She has done warm baths that help for a short time. She has used icy hot which did not help. She has had similar low back pain a couple of years ago when she had surgery on her C-spine. She has found that if she coughs or sneezes she has some leakage of urine but this started before the back pain began. she has She denies tingling and numbness, saddle anesthesia, and lower extremity weakness. She has been checking blood pressure off and on at home she sees numbers close to what we got today.        PHQ-2/PHQ-9 Depression Screening 10/21/2022   Retired PHQ-9 Total Score -   Retired Total Score -   Little Interest or Pleasure in Doing Things 0-->not at all   Feeling Down, Depressed or Hopeless 0-->not at all   Trouble Falling or Staying Asleep, or Sleeping Too Much -   Feeling Tired or Having Little Energy -   Poor Appetite or Overeating -    Feeling Bad about Yourself - or that You are a Failure or Have Let Yourself or Your Family Down -   Trouble Concentrating on Things, Such as Reading the Newspaper or Watching Television -   Moving or Speaking So Slowly that Other People Could Have Noticed? Or the Opposite - Being So Fidgety -   Thoughts that You Would be Better Off Dead or of Hurting Yourself in Some Way -   PHQ-9: Brief Depression Severity Measure Score 0   If You Checked Off Any Problems, How Difficult Have These Problems Made It For You to Do Your Work, Take Care of Things at Home, or Get Along with Other People? -     ASHLI-7  Over the last two weeks, how often have you been bothered by the following problems?  Feeling nervous, anxious or on edge: 0  Not being able to stop or control worryin  Worrying too much about different things: 0  Trouble Relaxin  Being so restless that it is hard to sit still: 0  Becoming easily annoyed or irritable: 0  Feeling afraid as if something awful might happen: 0  ASHLI 7 Total Score: 0  If you checked any problems, how difficult have these problems made it for you to do your work, take care of things at home, or get along with other people: Not difficult at all      Review of Systems   Constitutional: Negative for fever and unexpected weight loss.   HENT: Negative for congestion, ear pain and sore throat.    Eyes: Negative for visual disturbance.   Respiratory: Negative for cough, shortness of breath and wheezing.    Cardiovascular: Negative for chest pain and palpitations.   Gastrointestinal: Negative for abdominal pain, blood in stool, constipation, diarrhea, nausea, vomiting and GERD.   Endocrine: Negative for polydipsia and polyuria.   Genitourinary: Positive for urinary incontinence (stress incontinence ). Negative for difficulty urinating.   Musculoskeletal: Positive for back pain. Negative for joint swelling.   Skin: Negative for rash and skin lesions.   Allergic/Immunologic: Negative for  "environmental allergies.   Neurological: Negative for seizures and syncope.   Hematological: Does not bruise/bleed easily.   Psychiatric/Behavioral: Negative for suicidal ideas.        The following portions of the patient's history were reviewed and updated as appropriate: allergies, current medications, past family history, past medical history, past social history, past surgical history and problem list.    Current Outpatient Medications on File Prior to Visit   Medication Sig Dispense Refill   • albuterol sulfate  (90 Base) MCG/ACT inhaler Inhale 2 puffs Every 4 (Four) Hours As Needed for Wheezing. 18 g 3   • chlorhexidine (HIBICLENS) 4 % external liquid Apply  topically to the appropriate area as directed Daily As Needed for Wound Care. 236 mL 1   • [employer] Antiseptic Skin Cleanser 4 % solution APPLY TOPICALLY TO THE APPROPRIATE AREA AS DIRECTED DAILY AS NEEDED FOR WOUND CARE.     • ibuprofen (ADVIL,MOTRIN) 200 MG tablet Take 600 mg by mouth As Needed for Mild Pain .     • QUEtiapine (SEROquel) 25 MG tablet Take 1 tablet by mouth Every Night. 90 tablet 3   • [DISCONTINUED] cyclobenzaprine (FLEXERIL) 5 MG tablet Take 1 tablet by mouth 3 (Three) Times a Day As Needed for Muscle Spasms. 90 tablet 3   • [DISCONTINUED] escitalopram (Lexapro) 20 MG tablet Take 1 tablet by mouth Daily. 30 tablet 3   • [DISCONTINUED] metoprolol succinate XL (Toprol XL) 50 MG 24 hr tablet Take 1 tablet by mouth Daily. 90 tablet 3     No current facility-administered medications on file prior to visit.       Allergies   Allergen Reactions   • Buspar [Buspirone] Confusion        Visit Vitals  /90   Pulse 93   Temp 97 °F (36.1 °C)   Ht 167.6 cm (66\")   Wt 104 kg (230 lb)   LMP 12/18/2018 Comment: last mammogram 2020   SpO2 98%   BMI 37.12 kg/m²        Physical Exam  Constitutional:       General: She is not in acute distress.     Appearance: She is well-developed. She is not diaphoretic.   HENT:      Head: Atraumatic. "   Cardiovascular:      Rate and Rhythm: Normal rate and regular rhythm.      Heart sounds: Normal heart sounds. No murmur heard.    No friction rub. No gallop.   Pulmonary:      Effort: Pulmonary effort is normal. No respiratory distress.      Breath sounds: Normal breath sounds. No stridor. No wheezing, rhonchi or rales.   Musculoskeletal:      Cervical back: Normal range of motion and neck supple.      Thoracic back: Normal.      Lumbar back: Spasms and tenderness (bilateral paraspinal muscles) present. No bony tenderness.   Skin:     General: Skin is warm and dry.   Neurological:      Mental Status: She is alert and oriented to person, place, and time.   Psychiatric:         Behavior: Behavior normal.               Problems Addressed this Visit        Cardiac and Vasculature    Hypertension - Primary     Hypertension is worsening.  Dietary sodium restriction.  Weight loss.  Regular aerobic exercise.  Stop smoking.  Medication changes per orders.  Ambulatory blood pressure monitoring.  Blood pressure will be reassessed in 3 months.         Relevant Medications    metoprolol succinate XL (Toprol XL) 100 MG 24 hr tablet       Endocrine and Metabolic    Class 2 severe obesity due to excess calories with serious comorbidity and body mass index (BMI) of 37.0 to 37.9 in adult (HCC)     Patient's (Body mass index is 37.12 kg/m².) indicates that they are morbidly obese (BMI > 40 or > 35 with obesity - related health condition) with health conditions that include hypertension and dyslipidemias . Weight is worsening. BMI is is above average; BMI management plan is completed. We discussed low calorie, low carb based diet program, portion control, increasing exercise and consulting a Bariatric surgeon.          Relevant Orders    Ambulatory Referral to Bariatric Surgery       Genitourinary and Reproductive     Stress incontinence in female     Patient is been struggling on and off for this but is worsened over the last month.   Patient was given referral to urology for further evaluation and treatment.         Relevant Orders    Ambulatory Referral to Urology       Mental Health    Anxiety     Chronic and improving.  Patient will continue Lexapro 20 mg daily.         Relevant Medications    escitalopram (Lexapro) 20 MG tablet    Current moderate episode of major depressive disorder (HCC)     Patient's depression is recurrent and is moderate without psychosis. Their depression is currently in partial remission and the condition is improving with treatment. This will be reassessed in 3 months. F/U as described:patient will continue current medication therapy.         Relevant Medications    escitalopram (Lexapro) 20 MG tablet       Musculoskeletal and Injuries    Low back pain     Patient has chronic low back pain with recent flare of pain likely secondary to patient injuring her toes and having an abnormal gait.  Patient will continue ibuprofen and add Tylenol as needed.  She was also given prescription for Flexeril to help improve muscle spasms.  Order x-ray for further evaluation.  Patient was given referral to physical therapy for further evaluation and treatment.         Relevant Medications    cyclobenzaprine (FLEXERIL) 5 MG tablet    Other Relevant Orders    XR Spine Lumbar Complete 4+VW    Ambulatory Referral to Physical Therapy Evaluate and treat       Sleep    Insomnia     Chronic and improving.  Patient will continue to work on sleep hygiene and take Seroquel nightly.        Diagnoses       Codes Comments    Primary hypertension    -  Primary ICD-10-CM: I10  ICD-9-CM: 401.9     Anxiety     ICD-10-CM: F41.9  ICD-9-CM: 300.00     Chronic bilateral low back pain with right-sided sciatica     ICD-10-CM: M54.41, G89.29  ICD-9-CM: 724.2, 724.3, 338.29     Primary insomnia     ICD-10-CM: F51.01  ICD-9-CM: 307.42     Current moderate episode of major depressive disorder, unspecified whether recurrent (HCC)     ICD-10-CM: F32.1  ICD-9-CM:  296.22     Stress incontinence in female     ICD-10-CM: N39.3  ICD-9-CM: 625.6     Class 2 severe obesity due to excess calories with serious comorbidity and body mass index (BMI) of 37.0 to 37.9 in adult (HCC)     ICD-10-CM: E66.01, Z68.37  ICD-9-CM: 278.01, V85.37           Return in about 3 months (around 1/21/2023) for Follow-up HTN, HLD, back pain, anxiety.    Nabil Green MD   10/21/2022

## 2022-10-21 NOTE — PATIENT INSTRUCTIONS
"Hypertension, Adult  High blood pressure (hypertension) is when the force of blood pumping through the arteries is too strong. The arteries are the blood vessels that carry blood from the heart throughout the body. Hypertension forces the heart to work harder to pump blood and may cause arteries to become narrow or stiff. Untreated or uncontrolled hypertension can cause a heart attack, heart failure, a stroke, kidney disease, and other problems.  A blood pressure reading consists of a higher number over a lower number. Ideally, your blood pressure should be below 120/80. The first (\"top\") number is called the systolic pressure. It is a measure of the pressure in your arteries as your heart beats. The second (\"bottom\") number is called the diastolic pressure. It is a measure of the pressure in your arteries as the heart relaxes.  What are the causes?  The exact cause of this condition is not known. There are some conditions that result in or are related to high blood pressure.  What increases the risk?  Some risk factors for high blood pressure are under your control. The following factors may make you more likely to develop this condition:  Smoking.  Having type 2 diabetes mellitus, high cholesterol, or both.  Not getting enough exercise or physical activity.  Being overweight.  Having too much fat, sugar, calories, or salt (sodium) in your diet.  Drinking too much alcohol.  Some risk factors for high blood pressure may be difficult or impossible to change. Some of these factors include:  Having chronic kidney disease.  Having a family history of high blood pressure.  Age. Risk increases with age.  Race. You may be at higher risk if you are .  Gender. Men are at higher risk than women before age 45. After age 65, women are at higher risk than men.  Having obstructive sleep apnea.  Stress.  What are the signs or symptoms?  High blood pressure may not cause symptoms. Very high blood pressure " (hypertensive crisis) may cause:  Headache.  Anxiety.  Shortness of breath.  Nosebleed.  Nausea and vomiting.  Vision changes.  Severe chest pain.  Seizures.  How is this diagnosed?  This condition is diagnosed by measuring your blood pressure while you are seated, with your arm resting on a flat surface, your legs uncrossed, and your feet flat on the floor. The cuff of the blood pressure monitor will be placed directly against the skin of your upper arm at the level of your heart. It should be measured at least twice using the same arm. Certain conditions can cause a difference in blood pressure between your right and left arms.  Certain factors can cause blood pressure readings to be lower or higher than normal for a short period of time:  When your blood pressure is higher when you are in a health care provider's office than when you are at home, this is called white coat hypertension. Most people with this condition do not need medicines.  When your blood pressure is higher at home than when you are in a health care provider's office, this is called masked hypertension. Most people with this condition may need medicines to control blood pressure.  If you have a high blood pressure reading during one visit or you have normal blood pressure with other risk factors, you may be asked to:  Return on a different day to have your blood pressure checked again.  Monitor your blood pressure at home for 1 week or longer.  If you are diagnosed with hypertension, you may have other blood or imaging tests to help your health care provider understand your overall risk for other conditions.  How is this treated?  This condition is treated by making healthy lifestyle changes, such as eating healthy foods, exercising more, and reducing your alcohol intake. Your health care provider may prescribe medicine if lifestyle changes are not enough to get your blood pressure under control, and if:  Your systolic blood pressure is above  130.  Your diastolic blood pressure is above 80.  Your personal target blood pressure may vary depending on your medical conditions, your age, and other factors.  Follow these instructions at home:  Eating and drinking    Eat a diet that is high in fiber and potassium, and low in sodium, added sugar, and fat. An example eating plan is called the DASH (Dietary Approaches to Stop Hypertension) diet. To eat this way:  Eat plenty of fresh fruits and vegetables. Try to fill one half of your plate at each meal with fruits and vegetables.  Eat whole grains, such as whole-wheat pasta, brown rice, or whole-grain bread. Fill about one fourth of your plate with whole grains.  Eat or drink low-fat dairy products, such as skim milk or low-fat yogurt.  Avoid fatty cuts of meat, processed or cured meats, and poultry with skin. Fill about one fourth of your plate with lean proteins, such as fish, chicken without skin, beans, eggs, or tofu.  Avoid pre-made and processed foods. These tend to be higher in sodium, added sugar, and fat.  Reduce your daily sodium intake. Most people with hypertension should eat less than 1,500 mg of sodium a day.  Do not drink alcohol if:  Your health care provider tells you not to drink.  You are pregnant, may be pregnant, or are planning to become pregnant.  If you drink alcohol:  Limit how much you use to:  0-1 drink a day for women.  0-2 drinks a day for men.  Be aware of how much alcohol is in your drink. In the [place] one drink equals one 12 oz bottle of beer (355 mL), one 5 oz glass of wine (148 mL), or one 1½ oz glass of hard liquor (44 mL).  Lifestyle    Work with your health care provider to maintain a healthy body weight or to lose weight. Ask what an ideal weight is for you.  Get at least 30 minutes of exercise most days of the week. Activities may include walking, swimming, or biking.  Include exercise to strengthen your muscles (resistance exercise), such as Pilates or lifting weights, as  part of your weekly exercise routine. Try to do these types of exercises for 30 minutes at least 3 days a week.  Do not use any products that contain nicotine or tobacco, such as cigarettes, e-cigarettes, and chewing tobacco. If you need help quitting, ask your health care provider.  Monitor your blood pressure at home as told by your health care provider.  Keep all follow-up visits as told by your health care provider. This is important.  Medicines  Take over-the-counter and prescription medicines only as told by your health care provider. Follow directions carefully. Blood pressure medicines must be taken as prescribed.  Do not skip doses of blood pressure medicine. Doing this puts you at risk for problems and can make the medicine less effective.  Ask your health care provider about side effects or reactions to medicines that you should watch for.  Contact a health care provider if you:  Think you are having a reaction to a medicine you are taking.  Have headaches that keep coming back (recurring).  Feel dizzy.  Have swelling in your ankles.  Have trouble with your vision.  Get help right away if you:  Develop a severe headache or confusion.  Have unusual weakness or numbness.  Feel faint.  Have severe pain in your chest or abdomen.  Vomit repeatedly.  Have trouble breathing.  Summary  Hypertension is when the force of blood pumping through your arteries is too strong. If this condition is not controlled, it may put you at risk for serious complications.  Your personal target blood pressure may vary depending on your medical conditions, your age, and other factors. For most people, a normal blood pressure is less than 120/80.  Hypertension is treated with lifestyle changes, medicines, or a combination of both. Lifestyle changes include losing weight, eating a healthy, low-sodium diet, exercising more, and limiting alcohol.  This information is not intended to replace advice given to you by your health care  "provider. Make sure you discuss any questions you have with your health care provider.  Document Revised: 08/28/2019 Document Reviewed: 08/28/2019  [employer] Patient Education © 2022 [employer]  BMI for Adults  What is BMI?  Body mass index (BMI) is a number that is calculated from a person's weight and height. BMI can help estimate how much of a person's weight is composed of fat. BMI does not measure body fat directly. Rather, it is an alternative to procedures that directly measure body fat, which can be difficult and expensive.  BMI can help identify people who may be at higher risk for certain medical problems.  What are BMI measurements used for?  BMI is used as a screening tool to identify possible weight problems. It helps determine whether a person is obese, overweight, a healthy weight, or underweight.  BMI is useful for:  Identifying a weight problem that may be related to a medical condition or may increase the risk for medical problems.  Promoting changes, such as changes in diet and exercise, to help reach a healthy weight. BMI screening can be repeated to see if these changes are working.  How is BMI calculated?  BMI involves measuring your weight in relation to your height. Both height and weight are measured, and the BMI is calculated from those numbers. This can be done either in English ([place] or metric measurements. Note that charts and online BMI calculators are available to help you find your BMI quickly and easily without having to do these calculations yourself.  To calculate your BMI in English ([place] measurements:    Measure your weight in pounds (lb).  Multiply the number of pounds by 703.  For example, for a person who weighs 180 lb, multiply that number by 703, which equals 126,540.  Measure your height in inches. Then multiply that number by itself to get a measurement called \"inches squared.\"  For example, for a person who is 70 inches tall, the \"inches squared\" measurement is 70 " "inches x 70 inches, which equals 4,900 inches squared.  Divide the total from step 2 (number of lb x 703) by the total from step 3 (inches squared): 126,540 ÷ 4,900 = 25.8. This is your BMI.  To calculate your BMI in metric measurements:  Measure your weight in kilograms (kg).  Measure your height in meters (m). Then multiply that number by itself to get a measurement called \"meters squared.\"  For example, for a person who is 1.75 m tall, the \"meters squared\" measurement is 1.75 m x 1.75 m, which is equal to 3.1 meters squared.  Divide the number of kilograms (your weight) by the meters squared number. In this example: 70 ÷ 3.1 = 22.6. This is your BMI.  What do the results mean?  BMI charts are used to identify whether you are underweight, normal weight, overweight, or obese. The following guidelines will be used:  Underweight: BMI less than 18.5.  Normal weight: BMI between 18.5 and 24.9.  Overweight: BMI between 25 and 29.9.  Obese: BMI of 30 or above.  Keep these notes in mind:  Weight includes both fat and muscle, so someone with a muscular build, such as an athlete, may have a BMI that is higher than 24.9. In cases like these, BMI is not an accurate measure of body fat.  To determine if excess body fat is the cause of a BMI of 25 or higher, further assessments may need to be done by a health care provider.  BMI is usually interpreted in the same way for men and women.  Where to find more information  For more information about BMI, including tools to quickly calculate your BMI, go to these websites:  Centers for Disease Control and Prevention: www.cdc.gov  American Heart Association: www.heart.org  National Heart, Lung, and Blood [place]: www.nhlbi.nih.gov  Summary  Body mass index (BMI) is a number that is calculated from a person's weight and height.  BMI may help estimate how much of a person's weight is composed of fat. BMI can help identify those who may be at higher risk for certain medical " problems.  BMI can be measured using English measurements or metric measurements.  BMI charts are used to identify whether you are underweight, normal weight, overweight, or obese.  This information is not intended to replace advice given to you by your health care provider. Make sure you discuss any questions you have with your health care provider.  Document Revised: 09/09/2020 Document Reviewed: 07/17/2020  [employer] Patient Education © 2022 [employer]  Chronic Back Pain  When back pain lasts longer than 3 months, it is called chronic back pain. The cause of your back pain may not be known. Some common causes include:  Wear and tear (degenerative disease) of the bones, ligaments, or disks in your back.  Inflammation and stiffness in your back (arthritis).  People who have chronic back pain often go through certain periods in which the pain is more intense (flare-ups). Many people can learn to manage the pain with home care.  Follow these instructions at home:  Pay attention to any changes in your symptoms. Take these actions to help with your pain:  Managing pain and stiffness     If directed, apply ice to the painful area. Your health care provider may recommend applying ice during the first 24-48 hours after a flare-up begins. To do this:  Put ice in a plastic bag.  Place a towel between your skin and the bag.  Leave the ice on for 20 minutes, 2-3 times per day.  If directed, apply heat to the affected area as often as told by your health care provider. Use the heat source that your health care provider recommends, such as a moist heat pack or a heating pad.  Place a towel between your skin and the heat source.  Leave the heat on for 20-30 minutes.  Remove the heat if your skin turns bright red. This is especially important if you are unable to feel pain, heat, or cold. You may have a greater risk of getting burned.  Try soaking in a warm tub.  Activity    Avoid bending and other activities that make the  problem worse.  Maintain a proper position when standing or sitting:  When standing, keep your upper back and neck straight, with your shoulders pulled back. Avoid slouching.  When sitting, keep your back straight and relax your shoulders. Do not round your shoulders or pull them backward.  Do not sit or  one place for long periods of time.  Take brief periods of rest throughout the day. This will reduce your pain. Resting in a lying or standing position is usually better than sitting to rest.  When you are resting for longer periods, mix in some mild activity or stretching between periods of rest. This will help to prevent stiffness and pain.  Get regular exercise. Ask your health care provider what activities are safe for you.  Do not lift anything that is heavier than 10 lb (4.5 kg), or the limit that you are told, until your health care provider says that it is safe. Always use proper lifting technique, which includes:  Bending your knees.  Keeping the load close to your body.  Avoiding twisting.  Sleep on a firm mattress in a comfortable position. Try lying on your side with your knees slightly bent. If you lie on your back, put a pillow under your knees.  Medicines  Treatment may include medicines for pain and inflammation taken by mouth or applied to the skin, prescription pain medicine, or muscle relaxants. Take over-the-counter and prescription medicines only as told by your health care provider.  Ask your health care provider if the medicine prescribed to you:  Requires you to avoid driving or using machinery.  Can cause constipation. You may need to take these actions to prevent or treat constipation:  Drink enough fluid to keep your urine pale yellow.  Take over-the-counter or prescription medicines.  Eat foods that are high in fiber, such as beans, whole grains, and fresh fruits and vegetables.  Limit foods that are high in fat and processed sugars, such as fried or sweet foods.  General  instructions  Do not use any products that contain nicotine or tobacco, such as cigarettes, e-cigarettes, and chewing tobacco. If you need help quitting, ask your health care provider.  Keep all follow-up visits as told by your health care provider. This is important.  Contact a health care provider if:  You have pain that is not relieved with rest or medicine.  Your pain gets worse, or you have new pain.  You have a high fever.  You have rapid weight loss.  You have trouble doing your normal activities.  Get help right away if:  You have weakness or numbness in one or both of your legs or feet.  You have trouble controlling your bladder or your bowels.  You have severe back pain and have any of the following:  Nausea or vomiting.  Pain in your abdomen.  Shortness of breath or you faint.  Summary  Chronic back pain is back pain that lasts longer than 3 months.  When a flare-up begins, apply ice to the painful area for the first 24-48 hours.  Apply a moist heat pad or use a heating pad on the painful area as directed by your health care provider.  When you are resting for longer periods, mix in some mild activity or stretching between periods of rest. This will help to prevent stiffness and pain.  This information is not intended to replace advice given to you by your health care provider. Make sure you discuss any questions you have with your health care provider.  Document Revised: 01/27/2021 Document Reviewed: 01/27/2021  [employer] Patient Education © 2022 [employer]

## 2022-10-21 NOTE — ASSESSMENT & PLAN NOTE
Patient is been struggling on and off for this but is worsened over the last month.  Patient was given referral to urology for further evaluation and treatment.

## 2022-10-21 NOTE — ASSESSMENT & PLAN NOTE
Patient's (Body mass index is 37.12 kg/m².) indicates that they are morbidly obese (BMI > 40 or > 35 with obesity - related health condition) with health conditions that include hypertension and dyslipidemias . Weight is worsening. BMI is is above average; BMI management plan is completed. We discussed low calorie, low carb based diet program, portion control, increasing exercise and consulting a Bariatric surgeon.

## 2022-10-21 NOTE — ASSESSMENT & PLAN NOTE
Hypertension is worsening.  Dietary sodium restriction.  Weight loss.  Regular aerobic exercise.  Stop smoking.  Medication changes per orders.  Ambulatory blood pressure monitoring.  Blood pressure will be reassessed in 3 months.

## 2022-10-21 NOTE — TELEPHONE ENCOUNTER
Caller: Evelia Foster    Relationship: Self    Best call back number: [phone]    What test was performed: XRAY    When was the test performed: 10/21/22    Where was the test performed: [address]    PATIENT STATES SHE HAD AN XRAY DONE AND IT WAS LABELED STATE AND IS REQUESTING THE RESULTS.

## 2022-10-21 NOTE — ASSESSMENT & PLAN NOTE
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 Their depression is currently in partial remission and the condition is improving with treatment. This will be reassessed in 3 months. F/U as described:patient will continue current medication therapy.

## 2022-10-21 NOTE — ASSESSMENT & PLAN NOTE
Patient has chronic low back pain with recent flare of pain likely secondary to patient injuring her toes and having an abnormal gait.  Patient will continue ibuprofen and add Tylenol as needed.  She was also given prescription for Flexeril to help improve muscle spasms.  Order x-ray for further evaluation.  Patient was given referral to physical therapy for further evaluation and treatment.

## 2022-10-28 ENCOUNTER — TELEPHONE (OUTPATIENT)
Dept: FAMILY MEDICINE CLINIC | Facility: CLINIC | Age: 42
End: 2022-10-28

## 2022-10-28 NOTE — TELEPHONE ENCOUNTER
Caller: Evelia Fosterh    Relationship: Self    Best call back number: [phone]    What medications are you currently taking:   Current Outpatient Medications on File Prior to Visit   Medication Sig Dispense Refill   • albuterol sulfate  (90 Base) MCG/ACT inhaler Inhale 2 puffs Every 4 (Four) Hours As Needed for Wheezing. 18 g 3   • chlorhexidine (HIBICLENS) 4 % external liquid Apply  topically to the appropriate area as directed Daily As Needed for Wound Care. 236 mL 1   • [employer] Antiseptic Skin Cleanser 4 % solution APPLY TOPICALLY TO THE APPROPRIATE AREA AS DIRECTED DAILY AS NEEDED FOR WOUND CARE.     • cyclobenzaprine (FLEXERIL) 5 MG tablet Take 1 tablet by mouth 3 (Three) Times a Day As Needed for Muscle Spasms. 90 tablet 3   • escitalopram (Lexapro) 20 MG tablet Take 1 tablet by mouth Daily. 90 tablet 3   • ibuprofen (ADVIL,MOTRIN) 200 MG tablet Take 600 mg by mouth As Needed for Mild Pain .     • metoprolol succinate XL (Toprol XL) 100 MG 24 hr tablet Take 1 tablet by mouth Daily. 90 tablet 1   • QUEtiapine (SEROquel) 25 MG tablet Take 1 tablet by mouth Every Night. 90 tablet 3     No current facility-administered medications on file prior to visit.        What are your concerns: PT CALLED STATED THAT RX   cyclobenzaprine (FLEXERIL) 5 MG tablet      IS NOT HELPING WITH HER LOWER BACK PAIN, AND WILL LIKE TO  KNOW WHAT CAN BE DONE.

## 2022-11-29 ENCOUNTER — OFFICE VISIT (OUTPATIENT)
Dept: UROLOGY | Facility: CLINIC | Age: 42
End: 2022-11-29

## 2022-11-29 VITALS — HEIGHT: 66 IN | BODY MASS INDEX: 36.96 KG/M2 | WEIGHT: 230 LBS

## 2022-11-29 DIAGNOSIS — N39.3 STRESS INCONTINENCE IN FEMALE: ICD-10-CM

## 2022-11-29 DIAGNOSIS — D06.9 CARCINOMA IN SITU OF CERVIX, UNSPECIFIED LOCATION: Primary | ICD-10-CM

## 2022-11-29 LAB
BILIRUB BLD-MCNC: NEGATIVE MG/DL
CLARITY, POC: CLEAR
COLOR UR: YELLOW
EXPIRATION DATE: NORMAL
GLUCOSE UR STRIP-MCNC: NEGATIVE MG/DL
KETONES UR QL: NEGATIVE
LEUKOCYTE EST, POC: NEGATIVE
Lab: NORMAL
NITRITE UR-MCNC: NEGATIVE MG/ML
PH UR: 6 [PH] (ref 5–8)
PROT UR STRIP-MCNC: NEGATIVE MG/DL
RBC # UR STRIP: NEGATIVE /UL
SP GR UR: 1.02 (ref 1–1.03)
UROBILINOGEN UR QL: NORMAL

## 2022-11-29 PROCEDURE — 81003 URINALYSIS AUTO W/O SCOPE: CPT | Performed by: UROLOGY

## 2022-11-29 PROCEDURE — 99204 OFFICE O/P NEW MOD 45 MIN: CPT | Performed by: UROLOGY

## 2022-11-29 PROCEDURE — 51798 US URINE CAPACITY MEASURE: CPT | Performed by: UROLOGY

## 2022-11-29 RX ORDER — DOXYCYCLINE 50 MG/1
TABLET ORAL
COMMUNITY
Start: 2022-10-25 | End: 2023-01-20

## 2022-11-29 RX ORDER — CLINDAMYCIN PHOSPHATE 11.9 MG/ML
SOLUTION TOPICAL
Status: ON HOLD | COMMUNITY
Start: 2022-10-25 | End: 2023-03-01

## 2022-11-29 NOTE — PROGRESS NOTES
Office Visit New Urology      Patient Name: Evelia Foster  : 1980   MRN: [id number]     Chief Complaint:    Chief Complaint   Patient presents with   • Urinary Incontinence       Referring Provider: Nabil Green MD    History of Present Illness: Evelia Foster is a 42 y.o. female who presents to Urology today for stress urinary incontinence.  She reports she leaks when she coughs, sneezes, or squats.  No dysuria or gross hematuria.    Occasional pad usage.  If she wears them she wears 3 in a day.  She changes them prior to being soaked.  No leakage at night time.   She has had 3 children with vaginal deliveries without complication.  No urge incontinence.   She has had a partial hysterectomy for either cancerous or precancerous cells.  She has not been back for follow up since her surgery 2-3 years ago.    She drinks mostly water through the day.  She drinks a large diet Mt Dew through the day.      Subjective      Review of System:   Review of Systems   Constitutional: Negative for chills, fatigue, fever and unexpected weight change.   HENT: Negative for sore throat.    Eyes: Negative for visual disturbance.   Respiratory: Negative for cough, chest tightness and shortness of breath.    Cardiovascular: Negative for chest pain and leg swelling.   Gastrointestinal: Negative for blood in stool, constipation, diarrhea, nausea, rectal pain and vomiting.   Genitourinary: Positive for decreased urine volume, flank pain, frequency and urgency. Negative for difficulty urinating, dysuria, enuresis, genital sores and hematuria.   Musculoskeletal: Negative for back pain and joint swelling.   Skin: Negative for rash and wound.   Neurological: Negative for seizures, speech difficulty, weakness and headaches.   Psychiatric/Behavioral: Negative for confusion, sleep disturbance and suicidal ideas. The patient is not nervous/anxious.       I have reviewed the ROS documented by my clinical staff, I have  updated appropriately and I agree. Nubia Ross MD    Past Medical History:   Past Medical History:   Diagnosis Date   • Anemia    • Anxiety    • Asthma     mild, rescue inhaler prn    • Dizzy    • Full dentures    • Headache    • History of cervical dysplasia     s/p hsyterectomy   • HPV (human papilloma virus) infection     positive 18   • Insomnia    • Low back pain    • Neck pain    • Pneumonia    • PTSD (post-traumatic stress disorder)    • Restless leg syndrome    • Spinal headache    • Uses contact lenses        Past Surgical History:   Past Surgical History:   Procedure Laterality Date   • ADENOIDECTOMY      1989   • ANTERIOR CERVICAL DISCECTOMY W/ FUSION N/A 1/14/2021    Procedure: CERVICAL DISCECTOMY ANTERIOR WITH FUSION C5-6;  Surgeon: Kalyan Vasquez MD;  Location:  [facility];  Service: Neurosurgery;  Laterality: N/A;   • COLONOSCOPY     • ENDOSCOPY     • TONSILLECTOMY AND ADENOIDECTOMY      1989   • TOTAL LAPAROSCOPIC HYSTERECTOMY N/A 1/9/2019    Procedure: TOTAL LAPAROSCOPIC HYSTERECTOMY, BILATERAL SALPINGECTOMY WITH DAVINCI ROBOT;  Surgeon: Markie Lewis MD;  Location:  [facility];  Service: DaVinci   • TUBAL ABDOMINAL LIGATION         Family History:   Family History   Problem Relation Age of Onset   • Cervical cancer Mother 30   • Breast cancer Mother 30   • Lymphoma Mother    • Congenital heart disease Mother    • Ovarian cancer Mother         40s   • Cancer Father    • Breast cancer Sister         mastectomy -proph?   • Ovarian cancer Sister 30   • Cancer Sister    • No Known Problems Brother    • Dementia Maternal Grandmother    • Graves' disease Sister    • Thyroid disease Sister    • Breast cancer Maternal Aunt 35   • Thyroid disease Daughter    • Thyroid disease Sister    • No Known Problems Daughter        Social History:   Social History     Socioeconomic History   • Marital status: Significant Other   Tobacco Use   • Smoking status: Every Day     Packs/day: 0.50     Years: 24.00     Pack  "years: 12.00     Types: Cigarettes     Start date: 1998   • Smokeless tobacco: Never   • Tobacco comments:     tried w preg   Vaping Use   • Vaping Use: Never used   Substance and Sexual Activity   • Alcohol use: Yes     Comment: rare   • Drug use: No   • Sexual activity: Yes     Partners: Male     Birth control/protection: Surgical       Medications:     Current Outpatient Medications:   •  albuterol sulfate  (90 Base) MCG/ACT inhaler, Inhale 2 puffs Every 4 (Four) Hours As Needed for Wheezing., Disp: 18 g, Rfl: 3  •  clindamycin (CLEOCIN T) 1 % external solution, , Disp: , Rfl:   •  [employer] Antiseptic Skin Cleanser 4 % solution, APPLY TOPICALLY TO THE APPROPRIATE AREA AS DIRECTED DAILY AS NEEDED FOR WOUND CARE., Disp: , Rfl:   •  cyclobenzaprine (FLEXERIL) 5 MG tablet, Take 1 tablet by mouth 3 (Three) Times a Day As Needed for Muscle Spasms., Disp: 90 tablet, Rfl: 3  •  doxycycline (ADOXA) 50 MG tablet, , Disp: , Rfl:   •  escitalopram (Lexapro) 20 MG tablet, Take 1 tablet by mouth Daily., Disp: 90 tablet, Rfl: 3  •  ibuprofen (ADVIL,MOTRIN) 200 MG tablet, Take 600 mg by mouth As Needed for Mild Pain ., Disp: , Rfl:   •  metoprolol succinate XL (Toprol XL) 100 MG 24 hr tablet, Take 1 tablet by mouth Daily., Disp: 90 tablet, Rfl: 1  •  mupirocin (BACTROBAN) 2 % ointment, , Disp: , Rfl:   •  QUEtiapine (SEROquel) 25 MG tablet, Take 1 tablet by mouth Every Night., Disp: 90 tablet, Rfl: 3  •  chlorhexidine (HIBICLENS) 4 % external liquid, Apply  topically to the appropriate area as directed Daily As Needed for Wound Care., Disp: 236 mL, Rfl: 1    Allergies:   Allergies   Allergen Reactions   • Buspar [Buspirone] Confusion         Post void residual bladder scan:   040mL    Objective     Physical Exam:   Vital Signs:   Vitals:    11/29/22 1418   Weight: 104 kg (230 lb)   Height: 167.6 cm (65.98\")   PainSc:   6   PainLoc: Groin     Body mass index is 37.14 kg/m².     Physical Exam  Vitals and nursing note reviewed. " Exam conducted with a chaperone present.   Constitutional:       General: She is awake. She is not in acute distress.     Appearance: Normal appearance.   HENT:      Head: Normocephalic and atraumatic.      Right Ear: External ear normal.      Left Ear: External ear normal.      Nose: Nose normal.   Eyes:      Conjunctiva/sclera: Conjunctivae normal.   Pulmonary:      Effort: Pulmonary effort is normal. No respiratory distress.   Abdominal:      General: Abdomen is flat. There is no distension.      Palpations: Abdomen is soft. There is no mass.      Tenderness: There is no abdominal tenderness. There is no right CVA tenderness, left CVA tenderness, guarding or rebound.      Hernia: No hernia is present.   Genitourinary:     Exam position: Lithotomy position.      Comments: Mild bladder prolapse.   No demonstrable ELLEN today.  However, she had just voided.  Skin:     General: Skin is warm.   Neurological:      General: No focal deficit present.      Mental Status: She is alert and oriented to person, place, and time.      Gait: Gait normal.   Psychiatric:         Behavior: Behavior normal. Behavior is cooperative.         Thought Content: Thought content normal.         Judgment: Judgment normal.         Labs:   Brief Urine Lab Results  (Last result in the past 365 days)      Color   Clarity   Blood   Leuk Est   Nitrite   Protein   CREAT   Urine HCG        11/29/22 1433 Yellow   Clear   Negative   Negative   Negative   Negative                      Lab Results   Component Value Date    GLUCOSE 95 08/26/2022    CALCIUM 9.4 08/26/2022     08/26/2022    K 5.0 08/26/2022    CO2 24.2 08/26/2022     08/26/2022    BUN 6 08/26/2022    CREATININE 0.76 08/26/2022    EGFRIFNONA 89 04/20/2021    BCR 7.9 08/26/2022    ANIONGAP 8.8 08/26/2022       Lab Results   Component Value Date    WBC 10.96 (H) 08/26/2022    HGB 14.6 08/26/2022    HCT 42.6 08/26/2022    MCV 94.9 08/26/2022     08/26/2022       No results  found for: PSA     Images:   XR Spine Lumbar Complete 4+VW    Result Date: 10/21/2022  1. Negative for acute osseous abnormality. 2. Mild convex right lumbar dextrocurvature centered at L3 with advanced disc disease at L4-5 and L5-S1.  This report was finalized on 10/21/2022 10:32 AM by Silas Bryan MD.        Measures:   Tobacco:   Evelia Foster  reports that she has been smoking cigarettes. She started smoking about 24 years ago. She has a 12.00 pack-year smoking history. She has never used smokeless tobacco.. I have educated her on the risk of diseases from using tobacco products such as cancer, COPD and heart disease.     I advised her to quit    I spent 3  minutes counseling the patient.           Urine Incontinence: Patient reports that she is currently experiencing any symptoms of urinary incontinence.       Assessment / Plan      Assessment/Plan:   Evelia Foster is a 42 y.o. female who presented today for stress urinary incontinence.  We were unable to demonstrate stress urinary incontinence today, however, her bladder was not full at this time.  She has a history of cervical carcinoma in situ which she has been lost to follow-up.  I have placed a referral in for her today.  I discussed her options and she is interested in a possible bulking procedure.  I will have her follow-up for cystoscopy in 1 week and see if I can demonstrate stress urinary incontinence.    Diagnoses and all orders for this visit:    1. Carcinoma in situ of cervix, unspecified location (Primary)  -     Ambulatory Referral to Gynecology    2. Stress incontinence in female  -     POC Urinalysis Dipstick, Automated        Patient Education:        Follow Up:   Return in about 1 week (around 12/6/2022) for Recheck.      Nubia Ross MD  [facility]

## 2023-01-17 ENCOUNTER — LAB (OUTPATIENT)
Dept: LAB | Facility: HOSPITAL | Age: 43
End: 2023-01-17
Payer: COMMERCIAL

## 2023-01-17 ENCOUNTER — OFFICE VISIT (OUTPATIENT)
Dept: OBSTETRICS AND GYNECOLOGY | Facility: CLINIC | Age: 43
End: 2023-01-17
Payer: COMMERCIAL

## 2023-01-17 VITALS
DIASTOLIC BLOOD PRESSURE: 70 MMHG | WEIGHT: 220 LBS | RESPIRATION RATE: 16 BRPM | BODY MASS INDEX: 35.53 KG/M2 | SYSTOLIC BLOOD PRESSURE: 124 MMHG

## 2023-01-17 DIAGNOSIS — Z01.411 ENCOUNTER FOR GYNECOLOGICAL EXAMINATION WITH ABNORMAL FINDING: Primary | ICD-10-CM

## 2023-01-17 DIAGNOSIS — N95.1 MENOPAUSAL SYMPTOMS: ICD-10-CM

## 2023-01-17 DIAGNOSIS — Z01.411 ENCOUNTER FOR GYNECOLOGICAL EXAMINATION WITH ABNORMAL FINDING: ICD-10-CM

## 2023-01-17 DIAGNOSIS — Z91.89 AT HIGH RISK FOR BREAST CANCER: ICD-10-CM

## 2023-01-17 DIAGNOSIS — Z80.3 FAMILY HISTORY OF BREAST CANCER: ICD-10-CM

## 2023-01-17 DIAGNOSIS — R10.2 PELVIC PAIN: ICD-10-CM

## 2023-01-17 DIAGNOSIS — E55.9 VITAMIN D DEFICIENCY: ICD-10-CM

## 2023-01-17 DIAGNOSIS — D06.9 SEVERE DYSPLASIA OF CERVIX (CIN III): ICD-10-CM

## 2023-01-17 DIAGNOSIS — Z80.41 FAMILY HISTORY OF OVARIAN CANCER: ICD-10-CM

## 2023-01-17 DIAGNOSIS — N60.82 CYST OF SKIN OF BREAST, LEFT: ICD-10-CM

## 2023-01-17 PROCEDURE — 82306 VITAMIN D 25 HYDROXY: CPT

## 2023-01-17 PROCEDURE — 82607 VITAMIN B-12: CPT

## 2023-01-17 PROCEDURE — 83001 ASSAY OF GONADOTROPIN (FSH): CPT

## 2023-01-17 PROCEDURE — 82670 ASSAY OF TOTAL ESTRADIOL: CPT

## 2023-01-17 PROCEDURE — 36415 COLL VENOUS BLD VENIPUNCTURE: CPT

## 2023-01-17 PROCEDURE — 85027 COMPLETE CBC AUTOMATED: CPT

## 2023-01-17 PROCEDURE — 99386 PREV VISIT NEW AGE 40-64: CPT | Performed by: NURSE PRACTITIONER

## 2023-01-17 RX ORDER — METOPROLOL SUCCINATE 50 MG/1
50 TABLET, EXTENDED RELEASE ORAL DAILY
COMMUNITY
Start: 2022-12-16

## 2023-01-17 NOTE — PROGRESS NOTES
Annual Visit     Patient Name: Evelia Foster  : 1980   MRN: [id number]   Care Team: Patient Care Team:  Nabil Green MD as PCP - General (Family Medicine)  Mara Bernal APRN as Nurse Practitioner (Nurse Practitioner)    Chief Complaint:    Chief Complaint   Patient presents with   • Annual Exam       HPI: Evelia Foster is a 42 y.o. year old  presenting to be seen for her gynecologic exam - new pt.   Hx JULIAN 3   S/p total hysterectomy in 2018 - ovaries in situ   Vaginal pap smear 2019 WNL and HPV negative   Per Dr. Lewis will need follow up pap smears d/t hx     Reports menopausal sx - hot flashes are very bothersome   Labs done with PCP in August including TSH WNL   States she knows her mother and MGM went through menopause early but unsure of exact age   Hx vitamin b12 deficiency - states she has received injections in the past - no f/u labs done     Reports LLQ pain x 1 month   Constant dull ache that worsens intermittently   Ibuprofen relieves pain   Reports alternating constipation and diarrhea for the last few weeks   Also intermittent nausea   No vomiting or fever   No vaginal c/o     Hx mixed urinary incontinence   Saw urologist in Nov/Dec 2022 and was to return for cystoscopy but had to cancel her appt d/t Covid     Bilateral dx mammogram done d/t bilateral breast pain May 2022 birads 1 - return to routine screening in 1 yr per radiologist   Breast MRI done May 2022 d/t high risk birads 1 - recommend annual screening breast MRI   Mother with hx of breast and ovarian cancer   Sister with hx of breast and ovarian cancer   Maternal aunt hx of breast cancer   She has had negative genetic testing   Lifetime risk elevated at 20.1%     C/o bothersome cyst of skin of left breast   States it has been present intermittently x several months   States she used to have cysts on her thighs, but those have resolved     Has been a long time pt of Dr. Lewis       Subjective      I  have reviewed the patients family history, social history, past medical history, past surgical history and have updated it as appropriate.    /70   Resp 16   Wt 99.8 kg (220 lb)   LMP 12/18/2018 Comment: last mammogram 2020  BMI 35.53 kg/m²     BMI reviewed: Body mass index is 35.53 kg/m².      Objective     Physical Exam    Neuro: alert and oriented to person, place and time   General:  alert; cooperative; well developed; well nourished   Skin:  No suspicious lesions seen   Thyroid: normal to inspection and palpation   Lungs:  breathing is unlabored  clear to auscultation bilaterally   Heart:  regular rate and rhythm, S1, S2 normal, no murmur, click, rub or gallop  normal apical impulse   Breasts:  Examined in supine position  Symmetric without masses or skin dimpling  Nipples normal without inversion, lesions or discharge  There are no palpable axillary nodes  Fibrocystic changes are present both breasts without a discrete mass  Less than 1cm size cyst of skin of left breast - expressible, cx obtained   Scarring under left breast from previous cyst of skin    Abdomen: soft, non-tender; no masses  no umbilical or inguinal hernias are present  no hepato-splenomegaly   Pelvis: Clinical staff was present for exam  External genitalia:  normal appearance of the external genitalia including Bartholin's and Beckley's glands.  :  urethral meatus normal;  Vaginal:  normal pink mucosa without prolapse or lesions.  Cervix:  absent.  Uterus:  absent.  Adnexa:  non palpable bilaterally. tenderness of the left adnexa to  superficial and deep palpation  Rectal:  digital rectal exam not performed; anus visually normal appearing.         Assessment / Plan      Assessment  Problems Addressed This Visit    ICD-10-CM ICD-9-CM   1. Encounter for gynecological examination with abnormal finding  Z01.411 V72.31   2. Severe dysplasia of cervix (JULIAN III)  D06.9 233.1   3. Cyst of skin of breast, left  N60.82 610.8   4. Menopausal  symptoms  N95.1 627.2   5. Pelvic pain  R10.2 [id number]   6. Family history of breast cancer  Z80.3 V16.3   7. Family history of ovarian cancer  Z80.41 V16.41   8. At high risk for breast cancer  Z91.89 V49.89   9. Vitamin D deficiency  E55.9 268.9       Plan    Pap smear pending  Discussed need for continued pap smear screening vaginally d/t hx of JULIAN 3     Discussed monthly SBEs and importance of breast imaging   Discussed lifetime risk and recommendation for annual mammogram and breast MRI   Discussed fibrocystic breast changes   Recommend annual ovarian cancer screening - discussed program at [facility] pending for skin panel from cyst of skin of left breast   Will await results to initiate txment   Discussed Dial soap and warm compresses     Check labs for menopausal sx   FSH, Estradiol, and B12   Will check CBC considering nausea and bowel changes   Hx vitamin d deficiency - will f/u with rpt level     Pelvic u/s ordered   Discussed if WNL, likely GI in origin considering additional GI symptoms   Will call with results and to f/u     AV 1 yr   Will call with results and final POC         40 to 64: Counseling/Anticipatory Guidance Discussed: screenings and self-breast exam    Follow Up  Return in about 1 year (around 1/17/2024) for Annual physical.  Patient was given instructions and counseling regarding her condition or for health maintenance advice. Please see specific information pulled into the AVS if appropriate.     Mara Bernal, OG  January 17, 2023  16:18 EST

## 2023-01-18 DIAGNOSIS — E55.9 VITAMIN D DEFICIENCY: Primary | ICD-10-CM

## 2023-01-18 LAB
25(OH)D3 SERPL-MCNC: 13.1 NG/ML (ref 30–100)
DEPRECATED RDW RBC AUTO: 43.6 FL (ref 37–54)
ERYTHROCYTE [DISTWIDTH] IN BLOOD BY AUTOMATED COUNT: 13.1 % (ref 12.3–15.4)
ESTRADIOL SERPL HS-MCNC: 58.6 PG/ML
FSH SERPL-ACNC: 3.06 MIU/ML
HCT VFR BLD AUTO: 40.7 % (ref 34–46.6)
HGB BLD-MCNC: 14.4 G/DL (ref 12–15.9)
MCH RBC QN AUTO: 32.8 PG (ref 26.6–33)
MCHC RBC AUTO-ENTMCNC: 35.4 G/DL (ref 31.5–35.7)
MCV RBC AUTO: 92.7 FL (ref 79–97)
PLATELET # BLD AUTO: 349 10*3/MM3 (ref 140–450)
PMV BLD AUTO: 10.3 FL (ref 6–12)
RBC # BLD AUTO: 4.39 10*6/MM3 (ref 3.77–5.28)
VIT B12 BLD-MCNC: 296 PG/ML (ref 211–946)
WBC NRBC COR # BLD: 11.49 10*3/MM3 (ref 3.4–10.8)

## 2023-01-18 RX ORDER — ERGOCALCIFEROL 1.25 MG/1
50000 CAPSULE ORAL WEEKLY
Qty: 12 CAPSULE | Refills: 0 | Status: SHIPPED | OUTPATIENT
Start: 2023-01-18 | End: 2023-04-18

## 2023-01-19 ENCOUNTER — PATIENT ROUNDING (BHMG ONLY) (OUTPATIENT)
Dept: OBSTETRICS AND GYNECOLOGY | Facility: CLINIC | Age: 43
End: 2023-01-19
Payer: COMMERCIAL

## 2023-01-19 NOTE — PROGRESS NOTES
A [employer] message has been sent to the patient for PATIENT ROUNDING with [facility].

## 2023-01-20 ENCOUNTER — OFFICE VISIT (OUTPATIENT)
Dept: FAMILY MEDICINE CLINIC | Facility: CLINIC | Age: 43
End: 2023-01-20
Payer: COMMERCIAL

## 2023-01-20 VITALS
WEIGHT: 221 LBS | TEMPERATURE: 97 F | HEIGHT: 66 IN | SYSTOLIC BLOOD PRESSURE: 120 MMHG | DIASTOLIC BLOOD PRESSURE: 80 MMHG | BODY MASS INDEX: 35.52 KG/M2 | HEART RATE: 90 BPM | OXYGEN SATURATION: 98 %

## 2023-01-20 DIAGNOSIS — R29.818 SUSPECTED SLEEP APNEA: ICD-10-CM

## 2023-01-20 DIAGNOSIS — F41.9 ANXIETY: ICD-10-CM

## 2023-01-20 DIAGNOSIS — I10 PRIMARY HYPERTENSION: ICD-10-CM

## 2023-01-20 DIAGNOSIS — E78.2 MIXED HYPERLIPIDEMIA: Primary | ICD-10-CM

## 2023-01-20 PROCEDURE — 99214 OFFICE O/P EST MOD 30 MIN: CPT | Performed by: FAMILY MEDICINE

## 2023-01-20 RX ORDER — LANOLIN ALCOHOL/MO/W.PET/CERES
3000 CREAM (GRAM) TOPICAL DAILY
COMMUNITY

## 2023-01-20 NOTE — PROGRESS NOTES
Evelia Foster is a 42 y.o. female who presents today for Hypertension, Hyperlipidemia, Back Pain, and Anxiety      Patient has been checking blood pressure at home and getting numbers close to what we got here. She take metoprolol once daily and tolerates this well. She was recently started on vit d by TATY. She has stopped the lexapro and nightly seroquel. She has good days and bad does but not feel like she needs the medications at this time. She has had improvement in back pain. She has to lift heavy stuff work so she has good days and bad days. Patient has been told she needed a sleep study when she had neck surgery they told her she stopped breathing a lot. She wakes up gasping and choking. She also snores loudly and is fatigued through the day.        Review of Systems   Constitutional: Negative for fever and unexpected weight loss.   HENT: Negative for congestion, ear pain and sore throat.    Eyes: Negative for visual disturbance.   Respiratory: Negative for cough, shortness of breath and wheezing.    Cardiovascular: Negative for chest pain and palpitations.   Gastrointestinal: Negative for abdominal pain, blood in stool, constipation, diarrhea, nausea, vomiting and GERD.   Endocrine: Negative for polydipsia and polyuria.   Genitourinary: Negative for difficulty urinating.   Musculoskeletal: Positive for back pain (chronic and stable). Negative for joint swelling.   Skin: Negative for rash and skin lesions.   Allergic/Immunologic: Negative for environmental allergies.   Neurological: Negative for seizures and syncope.   Hematological: Does not bruise/bleed easily.   Psychiatric/Behavioral: Positive for sleep disturbance. Negative for suicidal ideas.        The following portions of the patient's history were reviewed and updated as appropriate: allergies, current medications, past family history, past medical history, past social history, past surgical history and problem list.    Current Outpatient  "Medications on File Prior to Visit   Medication Sig Dispense Refill   • albuterol sulfate  (90 Base) MCG/ACT inhaler Inhale 2 puffs Every 4 (Four) Hours As Needed for Wheezing. 18 g 3   • clindamycin (CLEOCIN T) 1 % external solution      • cyclobenzaprine (FLEXERIL) 5 MG tablet Take 1 tablet by mouth 3 (Three) Times a Day As Needed for Muscle Spasms. 90 tablet 3   • ergocalciferol (ERGOCALCIFEROL) 1.25 MG (78954 UT) capsule Take 1 capsule by mouth 1 (One) Time Per Week for 90 days. 12 capsule 0   • ibuprofen (ADVIL,MOTRIN) 200 MG tablet Take 600 mg by mouth As Needed for Mild Pain .     • metoprolol succinate XL (TOPROL-XL) 50 MG 24 hr tablet Take 50 mg by mouth Daily.     • vitamin B-12 (CYANOCOBALAMIN) 1000 MCG tablet Take 3,000 mcg by mouth Daily.     • [DISCONTINUED] doxycycline (ADOXA) 50 MG tablet      • [DISCONTINUED] escitalopram (Lexapro) 20 MG tablet Take 1 tablet by mouth Daily. 90 tablet 3   • [DISCONTINUED] QUEtiapine (SEROquel) 25 MG tablet Take 1 tablet by mouth Every Night. 90 tablet 3     No current facility-administered medications on file prior to visit.       Allergies   Allergen Reactions   • Buspar [Buspirone] Confusion        Visit Vitals  /80   Pulse 90   Temp 97 °F (36.1 °C)   Ht 167.6 cm (65.98\")   Wt 100 kg (221 lb)   LMP 12/18/2018 Comment: last mammogram 2020   SpO2 98%   BMI 35.69 kg/m²        Physical Exam  Constitutional:       General: She is not in acute distress.     Appearance: She is well-developed. She is not diaphoretic.   HENT:      Head: Atraumatic.   Cardiovascular:      Rate and Rhythm: Normal rate and regular rhythm.      Heart sounds: Normal heart sounds. No murmur heard.    No friction rub. No gallop.   Pulmonary:      Effort: Pulmonary effort is normal. No respiratory distress.      Breath sounds: Normal breath sounds. No stridor. No wheezing, rhonchi or rales.   Musculoskeletal:      Cervical back: Normal range of motion and neck supple.   Skin:     " General: Skin is warm and dry.   Neurological:      Mental Status: She is alert and oriented to person, place, and time.   Psychiatric:         Behavior: Behavior normal.          Results for orders placed or performed in visit on 01/17/23   CBC (No Diff)    Specimen: Blood   Result Value Ref Range    WBC 11.49 (H) 3.40 - 10.80 10*3/mm3    RBC 4.39 3.77 - 5.28 10*6/mm3    Hemoglobin 14.4 12.0 - 15.9 g/dL    Hematocrit 40.7 34.0 - 46.6 %    MCV 92.7 79.0 - 97.0 fL    MCH 32.8 26.6 - 33.0 pg    MCHC 35.4 31.5 - 35.7 g/dL    RDW 13.1 12.3 - 15.4 %    RDW-SD 43.6 37.0 - 54.0 fl    MPV 10.3 6.0 - 12.0 fL    Platelets 349 140 - 450 10*3/mm3   Follicle Stimulating Hormone    Specimen: Blood   Result Value Ref Range    FSH 3.06 mIU/mL   Estradiol    Specimen: Blood   Result Value Ref Range    Estradiol 58.6 pg/mL   Vitamin D,25-Hydroxy    Specimen: Blood   Result Value Ref Range    25 Hydroxy, Vitamin D 13.1 (L) 30.0 - 100.0 ng/ml   Vitamin B12    Specimen: Blood   Result Value Ref Range    Vitamin B-12 296 211 - 946 pg/mL        Problems Addressed this Visit        Cardiac and Vasculature    Hypertension     Hypertension is improving with treatment.  Continue current treatment regimen.  Blood pressure will be reassessed at the next regular appointment.         Mixed hyperlipidemia - Primary     Lipid abnormalities are newly identified.  Patient will continue to work on diet lifestyle modification.  She is not fasting on her previous lipid panel so we will obtain a fasting lipid panel today.  Pending result we may discuss starting a statin.  Lipids will be reassessed in 6 months.         Relevant Orders    Lipid Panel       Mental Health    Anxiety     Patient discontinued Lexapro and Seroquel.  She feels like her anxiety and depression symptoms are well enough controlled without medication at this time.  RTC/ED precautions given            Neuro    Suspected sleep apnea    Relevant Orders    Ambulatory Referral to Sleep  Medicine   Diagnoses       Codes Comments    Mixed hyperlipidemia    -  Primary ICD-10-CM: E78.2  ICD-9-CM: 272.2     Suspected sleep apnea     ICD-10-CM: R29.818  ICD-9-CM: 781.99     Primary hypertension     ICD-10-CM: I10  ICD-9-CM: 401.9     Anxiety     ICD-10-CM: F41.9  ICD-9-CM: 300.00           Return in about 7 months (around 8/28/2023) for Annual.    Nabil Green MD   1/20/2023

## 2023-01-20 NOTE — ASSESSMENT & PLAN NOTE
Lipid abnormalities are newly identified.  Patient will continue to work on diet lifestyle modification.  She is not fasting on her previous lipid panel so we will obtain a fasting lipid panel today.  Pending result we may discuss starting a statin.  Lipids will be reassessed in 6 months.

## 2023-01-20 NOTE — ASSESSMENT & PLAN NOTE
Patient discontinued Lexapro and Seroquel.  She feels like her anxiety and depression symptoms are well enough controlled without medication at this time.  RTC/ED precautions given

## 2023-01-20 NOTE — PATIENT INSTRUCTIONS

## 2023-01-23 ENCOUNTER — LAB (OUTPATIENT)
Dept: LAB | Facility: HOSPITAL | Age: 43
End: 2023-01-23
Payer: COMMERCIAL

## 2023-01-23 DIAGNOSIS — E55.9 VITAMIN D DEFICIENCY: Primary | ICD-10-CM

## 2023-01-23 DIAGNOSIS — E55.9 VITAMIN D DEFICIENCY: ICD-10-CM

## 2023-01-23 DIAGNOSIS — E78.2 MIXED HYPERLIPIDEMIA: ICD-10-CM

## 2023-01-23 LAB
25(OH)D3 SERPL-MCNC: 20 NG/ML (ref 30–100)
CHOLEST SERPL-MCNC: 261 MG/DL (ref 0–200)
HDLC SERPL-MCNC: 38 MG/DL (ref 40–60)
LDLC SERPL CALC-MCNC: 189 MG/DL (ref 0–100)
LDLC/HDLC SERPL: 4.93 {RATIO}
TRIGL SERPL-MCNC: 179 MG/DL (ref 0–150)
VLDLC SERPL-MCNC: 34 MG/DL (ref 5–40)

## 2023-01-23 PROCEDURE — 82306 VITAMIN D 25 HYDROXY: CPT

## 2023-01-23 PROCEDURE — 36415 COLL VENOUS BLD VENIPUNCTURE: CPT

## 2023-01-23 PROCEDURE — 80061 LIPID PANEL: CPT

## 2023-01-23 RX ORDER — CEPHALEXIN 500 MG/1
500 CAPSULE ORAL 2 TIMES DAILY
Qty: 14 CAPSULE | Refills: 0 | Status: SHIPPED | OUTPATIENT
Start: 2023-01-23 | End: 2023-01-30

## 2023-01-24 DIAGNOSIS — R10.32 LEFT LOWER QUADRANT PAIN: Primary | ICD-10-CM

## 2023-01-24 DIAGNOSIS — R11.0 NAUSEA WITHOUT VOMITING: ICD-10-CM

## 2023-01-24 DIAGNOSIS — R19.8 ALTERNATING CONSTIPATION AND DIARRHEA: ICD-10-CM

## 2023-01-25 LAB — REF LAB TEST METHOD: NORMAL

## 2023-01-25 RX ORDER — FLUCONAZOLE 150 MG/1
TABLET ORAL
Qty: 2 TABLET | Refills: 0 | Status: ON HOLD | OUTPATIENT
Start: 2023-01-25 | End: 2023-03-01

## 2023-01-31 DIAGNOSIS — E78.2 MIXED HYPERLIPIDEMIA: Primary | ICD-10-CM

## 2023-01-31 RX ORDER — ROSUVASTATIN CALCIUM 20 MG/1
20 TABLET, COATED ORAL NIGHTLY
Qty: 90 TABLET | Refills: 1 | Status: SHIPPED | OUTPATIENT
Start: 2023-01-31

## 2023-02-02 ENCOUNTER — PROCEDURE VISIT (OUTPATIENT)
Dept: UROLOGY | Facility: CLINIC | Age: 43
End: 2023-02-02
Payer: COMMERCIAL

## 2023-02-02 VITALS — OXYGEN SATURATION: 97 % | WEIGHT: 221 LBS | HEART RATE: 72 BPM | BODY MASS INDEX: 35.52 KG/M2 | HEIGHT: 66 IN

## 2023-02-02 DIAGNOSIS — N39.3 PRIMARY STRESS URINARY INCONTINENCE: ICD-10-CM

## 2023-02-02 DIAGNOSIS — D09.8: Primary | ICD-10-CM

## 2023-02-02 LAB
BILIRUB BLD-MCNC: NEGATIVE MG/DL
CLARITY, POC: CLEAR
COLOR UR: YELLOW
EXPIRATION DATE: ABNORMAL
GLUCOSE UR STRIP-MCNC: NEGATIVE MG/DL
KETONES UR QL: NEGATIVE
LEUKOCYTE EST, POC: NEGATIVE
Lab: ABNORMAL
NITRITE UR-MCNC: NEGATIVE MG/ML
PH UR: 6 [PH] (ref 5–8)
PROT UR STRIP-MCNC: ABNORMAL MG/DL
RBC # UR STRIP: NEGATIVE /UL
SP GR UR: 1.02 (ref 1–1.03)
UROBILINOGEN UR QL: NORMAL

## 2023-02-02 PROCEDURE — 87086 URINE CULTURE/COLONY COUNT: CPT | Performed by: UROLOGY

## 2023-02-02 PROCEDURE — 81003 URINALYSIS AUTO W/O SCOPE: CPT | Performed by: UROLOGY

## 2023-02-02 PROCEDURE — 52000 CYSTOURETHROSCOPY: CPT | Performed by: UROLOGY

## 2023-02-02 PROCEDURE — 99214 OFFICE O/P EST MOD 30 MIN: CPT | Performed by: UROLOGY

## 2023-02-02 NOTE — PROGRESS NOTES
Follow Up Office Visit      Patient Name: Evelia Foster  : 1980   MRN: [id number]     Chief Complaint:    Chief Complaint   Patient presents with   • Carcinoma in situ of cervix, unspecified location       Referring Provider: No ref. provider found    History of Present Illness: Evelia Foster is a 43 y.o. female who presents today for follow up of urinary incontinence.  She continues to report significant incontinence.  She goes to 3-4 pads per day.  She reports that she leaks when she coughs or sneezes.  She reports she has to cross her legs when she coughs.  She is here today for cystoscopy.  She works as a  and is not able to use the restroom conveniently.    Preprocedure diagnosis  ELLEN    Postprocedure diagnosis  Same    Procedure  Flexible Cystourethroscopy    Attending surgeon  Nubia Ross MD    Anesthesia  2% lidocaine jelly intraurethrally    Complications  None    Indications  43 y.o. female undergoing a flexible cystoscopy for the above mentioned indications.      Informed consent was obtained prior to the procedure start.       Findings  Cystoscopy revealed normal bladder mucosa with NO tumors, masses, stones.  Mild trabeculations.      Procedure  The patient was placed in supine position and prepped and draped in sterile fashion with lidocaine jelly instilled 5 minutes pre-procedure start.  A brief timeout including available nursing staff and awake patient was performed.  The 16 Fr digital flexible cystoscope was lubricated and gently placed into the urethral meatus. The proximal and distal portions of the urethra appeared well vascularized and normal in appearance. The bladder neck was visualized and appeared well vascularized without mucosal lesions or abnormal appearance. The bladder was then entered and  completely visualized including the trigone. There were bilateral orthotopic ureteral orifices which appeared patent and effluxed clear yellow urine. The  posterior wall, lateral walls, anterior wall, and dome were visualized. The cystoscope was then retroflexed and the bladder neck was further visualized and appeared normal.  The scope was gently withdrawn and the procedure terminated.  The patient tolerated the procedure well.       She did have demonstrable ELLEN when she stood and coughed.        Subjective      Review of System:   Review of Systems   Constitutional: Negative for chills, fatigue, fever and unexpected weight change.   HENT: Negative for congestion, rhinorrhea and sore throat.    Eyes: Negative for visual disturbance.   Respiratory: Negative for apnea, cough, chest tightness and shortness of breath.    Cardiovascular: Negative for chest pain.   Gastrointestinal: Negative for abdominal pain, constipation, diarrhea, nausea and vomiting.   Endocrine: Negative for polydipsia and polyuria.   Genitourinary: Negative for difficulty urinating, dysuria, enuresis, flank pain, frequency, genital sores, hematuria and urgency.   Musculoskeletal: Negative for gait problem.   Skin: Negative for rash and wound.   Allergic/Immunologic: Negative for immunocompromised state.   Neurological: Negative for dizziness, tremors, syncope, weakness and light-headedness.   Hematological: Does not bruise/bleed easily.      I have reviewed the ROS documented by my clinical staff, I have updated appropriately and I agree. Nubia Ross MD    I have reviewed and the following portions of the patient's history were updated as appropriate: past family history, past medical history, past social history, past surgical history and problem list.    Past Medical History:   Past Medical History:   Diagnosis Date   • Anemia    • Anxiety    • Asthma     mild, rescue inhaler prn    • Dizzy    • Full dentures    • Headache    • History of cervical dysplasia     s/p hsyterectomy   • HPV (human papilloma virus) infection     positive 18   • Insomnia    • Low back pain    • Neck pain    •  Pneumonia    • PTSD (post-traumatic stress disorder)    • Restless leg syndrome    • Spinal headache    • Uses contact lenses        Past Surgical History:  Past Surgical History:   Procedure Laterality Date   • ADENOIDECTOMY      1989   • ANTERIOR CERVICAL DISCECTOMY W/ FUSION N/A 1/14/2021    Procedure: CERVICAL DISCECTOMY ANTERIOR WITH FUSION C5-6;  Surgeon: Kalyan Vasquez MD;  Location:  [facility];  Service: Neurosurgery;  Laterality: N/A;   • COLONOSCOPY     • ENDOSCOPY     • TONSILLECTOMY AND ADENOIDECTOMY      1989   • TOTAL LAPAROSCOPIC HYSTERECTOMY N/A 1/9/2019    Procedure: TOTAL LAPAROSCOPIC HYSTERECTOMY, BILATERAL SALPINGECTOMY WITH DAVINCI ROBOT;  Surgeon: Markie Lewis MD;  Location:  [facility];  Service: [facility]   • TUBAL ABDOMINAL LIGATION         Family History:   family history includes Breast cancer in her sister; Breast cancer (age of onset: 30) in her mother; Breast cancer (age of onset: 35) in her maternal aunt; Cancer in her father and sister; Cervical cancer (age of onset: 30) in her mother; Congenital heart disease in her mother; Dementia in her maternal grandmother; Graves' disease in her sister; Lymphoma in her mother; No Known Problems in her brother and daughter; Ovarian cancer in her mother; Ovarian cancer (age of onset: 30) in her sister; Thyroid disease in her daughter, sister, and sister.   Otherwise pertinent FHx was reviewed and not pertinent to current issue.    Social History:    reports that she has been smoking cigarettes. She started smoking about 25 years ago. She has a 12.00 pack-year smoking history. She has been exposed to tobacco smoke. She has never used smokeless tobacco. She reports current alcohol use. She reports that she does not use drugs.    Medications:     Current Outpatient Medications:   •  albuterol sulfate  (90 Base) MCG/ACT inhaler, Inhale 2 puffs Every 4 (Four) Hours As Needed for Wheezing., Disp: 18 g, Rfl: 3  •  clindamycin (CLEOCIN T) 1 %  "external solution, , Disp: , Rfl:   •  cyclobenzaprine (FLEXERIL) 5 MG tablet, Take 1 tablet by mouth 3 (Three) Times a Day As Needed for Muscle Spasms., Disp: 90 tablet, Rfl: 3  •  ergocalciferol (ERGOCALCIFEROL) 1.25 MG (97085 UT) capsule, Take 1 capsule by mouth 1 (One) Time Per Week for 90 days., Disp: 12 capsule, Rfl: 0  •  fluconazole (Diflucan) 150 MG tablet, Take 1 tablet po now, repeat in 3 days., Disp: 2 tablet, Rfl: 0  •  ibuprofen (ADVIL,MOTRIN) 200 MG tablet, Take 600 mg by mouth As Needed for Mild Pain ., Disp: , Rfl:   •  metoprolol succinate XL (TOPROL-XL) 50 MG 24 hr tablet, Take 50 mg by mouth Daily., Disp: , Rfl:   •  rosuvastatin (CRESTOR) 20 MG tablet, Take 1 tablet by mouth Every Night., Disp: 90 tablet, Rfl: 1  •  vitamin B-12 (CYANOCOBALAMIN) 1000 MCG tablet, Take 3,000 mcg by mouth Daily., Disp: , Rfl:     Allergies:   Allergies   Allergen Reactions   • Buspar [Buspirone] Confusion         Post void residual bladder scan:   0 mL     Objective     Physical Exam:   Vital Signs:   Vitals:    02/02/23 0957   Pulse: 72   SpO2: 97%   Weight: 100 kg (221 lb)   Height: 167.6 cm (65.98\")   PainSc: 0-No pain     Body mass index is 35.69 kg/m².     Physical Exam  Vitals and nursing note reviewed. Exam conducted with a chaperone present.   Constitutional:       General: She is awake. She is not in acute distress.     Appearance: Normal appearance.   HENT:      Head: Normocephalic and atraumatic.      Right Ear: External ear normal.      Left Ear: External ear normal.      Nose: Nose normal.   Eyes:      Conjunctiva/sclera: Conjunctivae normal.   Pulmonary:      Effort: Pulmonary effort is normal. No respiratory distress.   Abdominal:      General: Abdomen is flat. There is no distension.      Palpations: Abdomen is soft. There is no mass.      Tenderness: There is no abdominal tenderness. There is no right CVA tenderness, left CVA tenderness, guarding or rebound.      Hernia: No hernia is present. "   Genitourinary:     General: Normal vulva.      Exam position: Lithotomy position.      Vagina: No vaginal discharge.   Skin:     General: Skin is warm.   Neurological:      General: No focal deficit present.      Mental Status: She is alert and oriented to person, place, and time.      Gait: Gait normal.   Psychiatric:         Behavior: Behavior normal. Behavior is cooperative.         Thought Content: Thought content normal.         Judgment: Judgment normal.         Labs:   Brief Urine Lab Results  (Last result in the past 365 days)      Color   Clarity   Blood   Leuk Est   Nitrite   Protein   CREAT   Urine HCG        02/02/23 1037 Yellow   Clear   Negative   Negative   Negative   Trace                      Lab Results   Component Value Date    GLUCOSE 95 08/26/2022    CALCIUM 9.4 08/26/2022     08/26/2022    K 5.0 08/26/2022    CO2 24.2 08/26/2022     08/26/2022    BUN 6 08/26/2022    CREATININE 0.76 08/26/2022    EGFRIFNONA 89 04/20/2021    BCR 7.9 08/26/2022    ANIONGAP 8.8 08/26/2022       Lab Results   Component Value Date    WBC 11.49 (H) 01/17/2023    HGB 14.4 01/17/2023    HCT 40.7 01/17/2023    MCV 92.7 01/17/2023     01/17/2023       No results found for: PSA    Images:   XR Spine Lumbar Complete 4+VW    Result Date: 10/21/2022  1. Negative for acute osseous abnormality. 2. Mild convex right lumbar dextrocurvature centered at L3 with advanced disc disease at L4-5 and L5-S1.  This report was finalized on 10/21/2022 10:32 AM by Silas Bryan MD.        Measures:   Tobacco:   Evelia Foster  reports that she has been smoking cigarettes. She started smoking about 25 years ago. She has a 12.00 pack-year smoking history. She has been exposed to tobacco smoke. She has never used smokeless tobacco.. I have educated her on the risk of diseases from using tobacco products such as cancer, COPD and heart disease.     I advised her to quit     I spent 3  minutes counseling the patient.            Urine Incontinence: Patient reports that she is currently experiencing any symptoms of urinary incontinence.         Assessment / Plan      Assessment/Plan:   43 y.o. female who presented today for follow up of ELLEN.  She does not have an urge component.  Her ELLEN is the most bothersome.  She underwent cystoscopy today and I was able to demonstrate stress urinary incontinence.  I discussed her options and she would like to pursue Bulkamid.  I discussed the option of timed voiding and try to keep her bladder is empty as possible which she reports she does this already.  She also reports that due to her job it is not feasible.  I discussed the risks and benefits of the procedure including urinary retention.  She is agreeable to proceed.    Diagnoses and all orders for this visit:    1. Carcinoma in situ of other site (Primary)  -     POC Urinalysis Dipstick, Automated    2. Primary stress urinary incontinence  -     External Facility Surgical/Procedural Request; Future         Follow Up:   Return for Follow up after surgery.    I spent approximately 30 minutes providing clinical care for this patient; including review of patient's chart and provider documentation, face to face time spent with patient in examination room (obtaining history, performing physical exam, discussing diagnosis and management options), placing orders, and completing patient documentation.     Nubia Ross MD  [facility]

## 2023-02-03 LAB — BACTERIA SPEC AEROBE CULT: NO GROWTH

## 2023-02-16 DIAGNOSIS — N39.3 PRIMARY STRESS URINARY INCONTINENCE: Primary | ICD-10-CM

## 2023-02-28 ENCOUNTER — ANESTHESIA EVENT (OUTPATIENT)
Dept: PERIOP | Facility: HOSPITAL | Age: 43
End: 2023-02-28
Payer: COMMERCIAL

## 2023-02-28 RX ORDER — FAMOTIDINE 10 MG/ML
20 INJECTION, SOLUTION INTRAVENOUS ONCE
Status: CANCELLED | OUTPATIENT
Start: 2023-02-28 | End: 2023-02-28

## 2023-03-01 ENCOUNTER — ANESTHESIA (OUTPATIENT)
Dept: PERIOP | Facility: HOSPITAL | Age: 43
End: 2023-03-01
Payer: COMMERCIAL

## 2023-03-01 ENCOUNTER — HOSPITAL ENCOUNTER (OUTPATIENT)
Facility: HOSPITAL | Age: 43
Setting detail: HOSPITAL OUTPATIENT SURGERY
Discharge: HOME | End: 2023-03-01
Attending: UROLOGY | Admitting: UROLOGY
Payer: COMMERCIAL

## 2023-03-01 VITALS
HEART RATE: 56 BPM | SYSTOLIC BLOOD PRESSURE: 96 MMHG | BODY MASS INDEX: 34.55 KG/M2 | TEMPERATURE: 98.2 F | RESPIRATION RATE: 16 BRPM | HEIGHT: 66 IN | OXYGEN SATURATION: 92 % | DIASTOLIC BLOOD PRESSURE: 64 MMHG | WEIGHT: 215 LBS

## 2023-03-01 DIAGNOSIS — N39.3 STRESS INCONTINENCE IN FEMALE: Primary | ICD-10-CM

## 2023-03-01 DIAGNOSIS — N39.3 PRIMARY STRESS URINARY INCONTINENCE: ICD-10-CM

## 2023-03-01 LAB
QT INTERVAL: 390 MS
QTC INTERVAL: 427 MS

## 2023-03-01 PROCEDURE — L8606 SYNTHETIC IMPLNT URINARY 1ML: HCPCS | Performed by: UROLOGY

## 2023-03-01 PROCEDURE — 25010000002 PROPOFOL 10 MG/ML EMULSION

## 2023-03-01 PROCEDURE — 93010 ELECTROCARDIOGRAM REPORT: CPT | Performed by: STUDENT IN AN ORGANIZED HEALTH CARE EDUCATION/TRAINING PROGRAM

## 2023-03-01 PROCEDURE — 25010000002 CEFAZOLIN IN DEXTROSE 2-4 GM/100ML-% SOLUTION: Performed by: UROLOGY

## 2023-03-01 PROCEDURE — 87086 URINE CULTURE/COLONY COUNT: CPT | Performed by: UROLOGY

## 2023-03-01 PROCEDURE — 51715 ENDOSCOPIC INJECTION/IMPLANT: CPT | Performed by: UROLOGY

## 2023-03-01 PROCEDURE — S0260 H&P FOR SURGERY: HCPCS | Performed by: UROLOGY

## 2023-03-01 PROCEDURE — 93005 ELECTROCARDIOGRAM TRACING: CPT | Performed by: ANESTHESIOLOGY

## 2023-03-01 PROCEDURE — 25010000002 FENTANYL CITRATE (PF) 100 MCG/2ML SOLUTION

## 2023-03-01 DEVICE — BULKAMID URETHRAL BULKING SYSTEM 2ML
Type: IMPLANTABLE DEVICE | Site: URETHRA | Status: FUNCTIONAL
Brand: BULKAMID

## 2023-03-01 RX ORDER — FAMOTIDINE 20 MG/1
20 TABLET, FILM COATED ORAL ONCE
Status: COMPLETED | OUTPATIENT
Start: 2023-03-01 | End: 2023-03-01

## 2023-03-01 RX ORDER — DROPERIDOL 2.5 MG/ML
0.62 INJECTION, SOLUTION INTRAMUSCULAR; INTRAVENOUS
Status: DISCONTINUED | OUTPATIENT
Start: 2023-03-01 | End: 2023-03-01 | Stop reason: HOSPADM

## 2023-03-01 RX ORDER — FENTANYL CITRATE 50 UG/ML
INJECTION, SOLUTION INTRAMUSCULAR; INTRAVENOUS AS NEEDED
Status: DISCONTINUED | OUTPATIENT
Start: 2023-03-01 | End: 2023-03-01 | Stop reason: SURG

## 2023-03-01 RX ORDER — PROMETHAZINE HYDROCHLORIDE 25 MG/1
25 SUPPOSITORY RECTAL ONCE AS NEEDED
Status: DISCONTINUED | OUTPATIENT
Start: 2023-03-01 | End: 2023-03-01 | Stop reason: HOSPADM

## 2023-03-01 RX ORDER — DEXMEDETOMIDINE HYDROCHLORIDE 100 UG/ML
INJECTION, SOLUTION INTRAVENOUS AS NEEDED
Status: DISCONTINUED | OUTPATIENT
Start: 2023-03-01 | End: 2023-03-01 | Stop reason: SURG

## 2023-03-01 RX ORDER — HYDROCODONE BITARTRATE AND ACETAMINOPHEN 5; 325 MG/1; MG/1
TABLET ORAL
Status: COMPLETED
Start: 2023-03-01 | End: 2023-03-01

## 2023-03-01 RX ORDER — MEPERIDINE HYDROCHLORIDE 25 MG/ML
12.5 INJECTION INTRAMUSCULAR; INTRAVENOUS; SUBCUTANEOUS
Status: DISCONTINUED | OUTPATIENT
Start: 2023-03-01 | End: 2023-03-01 | Stop reason: HOSPADM

## 2023-03-01 RX ORDER — SODIUM CHLORIDE 9 MG/ML
40 INJECTION, SOLUTION INTRAVENOUS AS NEEDED
Status: DISCONTINUED | OUTPATIENT
Start: 2023-03-01 | End: 2023-03-01 | Stop reason: HOSPADM

## 2023-03-01 RX ORDER — SODIUM CHLORIDE 0.9 % (FLUSH) 0.9 %
10 SYRINGE (ML) INJECTION EVERY 12 HOURS SCHEDULED
Status: DISCONTINUED | OUTPATIENT
Start: 2023-03-01 | End: 2023-03-01 | Stop reason: HOSPADM

## 2023-03-01 RX ORDER — MIDAZOLAM HYDROCHLORIDE 1 MG/ML
1 INJECTION INTRAMUSCULAR; INTRAVENOUS
Status: DISCONTINUED | OUTPATIENT
Start: 2023-03-01 | End: 2023-03-01 | Stop reason: HOSPADM

## 2023-03-01 RX ORDER — PROPOFOL 10 MG/ML
VIAL (ML) INTRAVENOUS CONTINUOUS PRN
Status: DISCONTINUED | OUTPATIENT
Start: 2023-03-01 | End: 2023-03-01 | Stop reason: SURG

## 2023-03-01 RX ORDER — PHENAZOPYRIDINE HYDROCHLORIDE 200 MG/1
200 TABLET, FILM COATED ORAL 3 TIMES DAILY PRN
Qty: 20 TABLET | Refills: 0 | Status: SHIPPED | OUTPATIENT
Start: 2023-03-01

## 2023-03-01 RX ORDER — NALOXONE HCL 0.4 MG/ML
0.4 VIAL (ML) INJECTION AS NEEDED
Status: DISCONTINUED | OUTPATIENT
Start: 2023-03-01 | End: 2023-03-01 | Stop reason: HOSPADM

## 2023-03-01 RX ORDER — FENTANYL CITRATE 50 UG/ML
50 INJECTION, SOLUTION INTRAMUSCULAR; INTRAVENOUS
Status: DISCONTINUED | OUTPATIENT
Start: 2023-03-01 | End: 2023-03-01 | Stop reason: HOSPADM

## 2023-03-01 RX ORDER — ONDANSETRON 2 MG/ML
4 INJECTION INTRAMUSCULAR; INTRAVENOUS ONCE AS NEEDED
Status: DISCONTINUED | OUTPATIENT
Start: 2023-03-01 | End: 2023-03-01 | Stop reason: HOSPADM

## 2023-03-01 RX ORDER — LABETALOL HYDROCHLORIDE 5 MG/ML
5 INJECTION, SOLUTION INTRAVENOUS
Status: DISCONTINUED | OUTPATIENT
Start: 2023-03-01 | End: 2023-03-01 | Stop reason: HOSPADM

## 2023-03-01 RX ORDER — CEFAZOLIN SODIUM 2 G/100ML
2 INJECTION, SOLUTION INTRAVENOUS ONCE
Status: COMPLETED | OUTPATIENT
Start: 2023-03-01 | End: 2023-03-01

## 2023-03-01 RX ORDER — SODIUM CHLORIDE, SODIUM LACTATE, POTASSIUM CHLORIDE, CALCIUM CHLORIDE 600; 310; 30; 20 MG/100ML; MG/100ML; MG/100ML; MG/100ML
9 INJECTION, SOLUTION INTRAVENOUS CONTINUOUS
Status: DISCONTINUED | OUTPATIENT
Start: 2023-03-01 | End: 2023-03-01 | Stop reason: HOSPADM

## 2023-03-01 RX ORDER — SODIUM CHLORIDE 0.9 % (FLUSH) 0.9 %
3 SYRINGE (ML) INJECTION EVERY 12 HOURS SCHEDULED
Status: DISCONTINUED | OUTPATIENT
Start: 2023-03-01 | End: 2023-03-01 | Stop reason: HOSPADM

## 2023-03-01 RX ORDER — IPRATROPIUM BROMIDE AND ALBUTEROL SULFATE 2.5; .5 MG/3ML; MG/3ML
3 SOLUTION RESPIRATORY (INHALATION) ONCE AS NEEDED
Status: DISCONTINUED | OUTPATIENT
Start: 2023-03-01 | End: 2023-03-01 | Stop reason: HOSPADM

## 2023-03-01 RX ORDER — MAGNESIUM HYDROXIDE 1200 MG/15ML
LIQUID ORAL AS NEEDED
Status: DISCONTINUED | OUTPATIENT
Start: 2023-03-01 | End: 2023-03-01 | Stop reason: HOSPADM

## 2023-03-01 RX ORDER — DROPERIDOL 2.5 MG/ML
0.62 INJECTION, SOLUTION INTRAMUSCULAR; INTRAVENOUS ONCE AS NEEDED
Status: DISCONTINUED | OUTPATIENT
Start: 2023-03-01 | End: 2023-03-01 | Stop reason: HOSPADM

## 2023-03-01 RX ORDER — LIDOCAINE HYDROCHLORIDE 10 MG/ML
INJECTION, SOLUTION EPIDURAL; INFILTRATION; INTRACAUDAL; PERINEURAL AS NEEDED
Status: DISCONTINUED | OUTPATIENT
Start: 2023-03-01 | End: 2023-03-01 | Stop reason: SURG

## 2023-03-01 RX ORDER — LIDOCAINE HYDROCHLORIDE 10 MG/ML
0.5 INJECTION, SOLUTION EPIDURAL; INFILTRATION; INTRACAUDAL; PERINEURAL ONCE AS NEEDED
Status: COMPLETED | OUTPATIENT
Start: 2023-03-01 | End: 2023-03-01

## 2023-03-01 RX ORDER — SODIUM CHLORIDE 0.9 % (FLUSH) 0.9 %
10 SYRINGE (ML) INJECTION AS NEEDED
Status: DISCONTINUED | OUTPATIENT
Start: 2023-03-01 | End: 2023-03-01 | Stop reason: HOSPADM

## 2023-03-01 RX ORDER — PROMETHAZINE HYDROCHLORIDE 25 MG/1
25 TABLET ORAL ONCE AS NEEDED
Status: DISCONTINUED | OUTPATIENT
Start: 2023-03-01 | End: 2023-03-01 | Stop reason: HOSPADM

## 2023-03-01 RX ORDER — HYDRALAZINE HYDROCHLORIDE 20 MG/ML
5 INJECTION INTRAMUSCULAR; INTRAVENOUS
Status: DISCONTINUED | OUTPATIENT
Start: 2023-03-01 | End: 2023-03-01 | Stop reason: HOSPADM

## 2023-03-01 RX ORDER — SODIUM CHLORIDE 0.9 % (FLUSH) 0.9 %
3-10 SYRINGE (ML) INJECTION AS NEEDED
Status: DISCONTINUED | OUTPATIENT
Start: 2023-03-01 | End: 2023-03-01 | Stop reason: HOSPADM

## 2023-03-01 RX ORDER — HYDROCODONE BITARTRATE AND ACETAMINOPHEN 5; 325 MG/1; MG/1
1 TABLET ORAL ONCE AS NEEDED
Status: DISCONTINUED | OUTPATIENT
Start: 2023-03-01 | End: 2023-03-01 | Stop reason: HOSPADM

## 2023-03-01 RX ADMIN — SODIUM CHLORIDE, POTASSIUM CHLORIDE, SODIUM LACTATE AND CALCIUM CHLORIDE 9 ML/HR: 600; 310; 30; 20 INJECTION, SOLUTION INTRAVENOUS at 10:33

## 2023-03-01 RX ADMIN — FENTANYL CITRATE 50 MCG: 50 INJECTION, SOLUTION INTRAMUSCULAR; INTRAVENOUS at 11:30

## 2023-03-01 RX ADMIN — FENTANYL CITRATE 50 MCG: 50 INJECTION, SOLUTION INTRAMUSCULAR; INTRAVENOUS at 11:56

## 2023-03-01 RX ADMIN — LIDOCAINE HYDROCHLORIDE 50 MG: 10 INJECTION, SOLUTION EPIDURAL; INFILTRATION; INTRACAUDAL; PERINEURAL at 11:30

## 2023-03-01 RX ADMIN — DEXMEDETOMIDINE HYDROCHLORIDE 4 MCG: 100 INJECTION, SOLUTION INTRAVENOUS at 11:33

## 2023-03-01 RX ADMIN — CEFAZOLIN SODIUM 2 G: 2 INJECTION, SOLUTION INTRAVENOUS at 11:29

## 2023-03-01 RX ADMIN — DEXMEDETOMIDINE HYDROCHLORIDE 4 MCG: 100 INJECTION, SOLUTION INTRAVENOUS at 11:46

## 2023-03-01 RX ADMIN — LIDOCAINE HYDROCHLORIDE 0.5 ML: 10 INJECTION, SOLUTION EPIDURAL; INFILTRATION; INTRACAUDAL; PERINEURAL at 10:33

## 2023-03-01 RX ADMIN — DEXMEDETOMIDINE HYDROCHLORIDE 4 MCG: 100 INJECTION, SOLUTION INTRAVENOUS at 11:40

## 2023-03-01 RX ADMIN — SODIUM CHLORIDE, POTASSIUM CHLORIDE, SODIUM LACTATE AND CALCIUM CHLORIDE: 600; 310; 30; 20 INJECTION, SOLUTION INTRAVENOUS at 11:24

## 2023-03-01 RX ADMIN — FAMOTIDINE 20 MG: 20 TABLET ORAL at 10:33

## 2023-03-01 RX ADMIN — HYDROCODONE BITARTRATE AND ACETAMINOPHEN 1 TABLET: 5; 325 TABLET ORAL at 12:19

## 2023-03-01 RX ADMIN — PROPOFOL 150 MCG/KG/MIN: 10 INJECTION, EMULSION INTRAVENOUS at 11:30

## 2023-03-01 NOTE — H&P
Pre-Op H&P  Evelia Foster  [id number]  1980      Chief complaint: Stress Incontinence      Subjective:  Patient is a 43 y.o.female presents for scheduled surgery by Dr. Ross. She anticipates a CYSTOSCOPY WITH BULKING AGENT INJECTION today.  The patient endorses having incontinence with frequency/urgency issues for about a year.  She notes that the incontinence is worse when she is lifting, coughing, vomiting or with stress.  She denies any other symptoms related to her incontinence.      Review of Systems:  Constitutional-- No fever, chills or sweats. No fatigue.  CV-- No chest pain, palpitation or syncope. +HTN, HLD  Resp-- No SOB, cough, hemoptysis  Skin--No rashes. Moreira on right buttox      Allergies:   Allergies   Allergen Reactions   • Buspar [Buspirone] Confusion         Home Meds:  Medications Prior to Admission   Medication Sig Dispense Refill Last Dose   • albuterol sulfate  (90 Base) MCG/ACT inhaler Inhale 2 puffs Every 4 (Four) Hours As Needed for Wheezing. 18 g 3    • clindamycin (CLEOCIN T) 1 % external solution       • cyclobenzaprine (FLEXERIL) 5 MG tablet Take 1 tablet by mouth 3 (Three) Times a Day As Needed for Muscle Spasms. 90 tablet 3 Unknown   • ergocalciferol (ERGOCALCIFEROL) 1.25 MG (09708 UT) capsule Take 1 capsule by mouth 1 (One) Time Per Week for 90 days. 12 capsule 0 2/26/2023   • fluconazole (Diflucan) 150 MG tablet Take 1 tablet po now, repeat in 3 days. 2 tablet 0    • ibuprofen (ADVIL,MOTRIN) 200 MG tablet Take 600 mg by mouth As Needed for Mild Pain .      • metoprolol succinate XL (TOPROL-XL) 50 MG 24 hr tablet Take 50 mg by mouth Daily.      • rosuvastatin (CRESTOR) 20 MG tablet Take 1 tablet by mouth Every Night. 90 tablet 1    • vitamin B-12 (CYANOCOBALAMIN) 1000 MCG tablet Take 3,000 mcg by mouth Daily.            PMH:   Past Medical History:   Diagnosis Date   • Anemia    • Anxiety    • Asthma     mild, rescue inhaler prn    • Dizzy    • Full dentures   "  • Headache    • History of cervical dysplasia     s/p hsyterectomy   • HPV (human papilloma virus) infection     positive 18   • Insomnia    • Low back pain    • Neck pain    • Pneumonia    • PTSD (post-traumatic stress disorder)    • Restless leg syndrome    • Spinal headache    • Uses contact lenses      PSH:    Past Surgical History:   Procedure Laterality Date   • ADENOIDECTOMY      1989   • ANTERIOR CERVICAL DISCECTOMY W/ FUSION N/A 1/14/2021    Procedure: CERVICAL DISCECTOMY ANTERIOR WITH FUSION C5-6;  Surgeon: Kalyan Vasquez MD;  [facility];  Service: Neurosurgery;  Laterality: N/A;   • COLONOSCOPY     • ENDOSCOPY     • TONSILLECTOMY AND ADENOIDECTOMY      1989   • TOTAL LAPAROSCOPIC HYSTERECTOMY N/A 1/9/2019    Procedure: TOTAL LAPAROSCOPIC HYSTERECTOMY, BILATERAL SALPINGECTOMY WITH DAVINCI ROBOT;  Surgeon: Markie Lewis MD;  [facility];  Service: [facility]   • TUBAL ABDOMINAL LIGATION         Immunization History:  Influenza: No  Pneumococcal: Yes  Tetanus: Yes  Covid : No    Social History:   Tobacco:   Social History     Tobacco Use   Smoking Status Every Day   • Packs/day: 0.50   • Years: 24.00   • Pack years: 12.00   • Types: Cigarettes   • Start date: 1998   • Passive exposure: Current   Smokeless Tobacco Never   Tobacco Comments    tried w preg      Alcohol:     Social History     Substance and Sexual Activity   Alcohol Use Yes    Comment: rare         Physical Exam:Ht 166.4 cm (65.5\")   Wt 97.5 kg (215 lb)   LMP 12/18/2018 Comment: last mammogram 2020  BMI 35.23 kg/m²     Vitals: BP: 122/72, HR: 82, RR: 15, SPO2: 93% on RA, T: 97    General Appearance:    Alert, cooperative, no distress, appears stated age   Head:    Normocephalic, without obvious abnormality, atraumatic   Lungs:     Clear to auscultation bilaterally, respirations unlabored    Heart:   Regular rate and rhythm, S1 and S2 normal    Abdomen:    Soft without tenderness   Extremities:   Extremities normal, " atraumatic, no cyanosis or edema   Skin:   Skin color, texture, turgor normal, no rashes or lesions   Neurologic:   Grossly intact     Results Review:     LABS:  Lab Results   Component Value Date    WBC 11.49 (H) 01/17/2023    HGB 14.4 01/17/2023    HCT 40.7 01/17/2023    MCV 92.7 01/17/2023     01/17/2023    NEUTROABS 7.54 (H) 08/26/2022    GLUCOSE 95 08/26/2022    BUN 6 08/26/2022    CREATININE 0.76 08/26/2022    EGFRIFNONA 89 04/20/2021     08/26/2022    K 5.0 08/26/2022     08/26/2022    CO2 24.2 08/26/2022    CALCIUM 9.4 08/26/2022    ALBUMIN 4.10 08/26/2022    AST 24 08/26/2022    ALT 24 08/26/2022    BILITOT 0.3 08/26/2022       RADIOLOGY:  Imaging Results (Last 72 Hours)     ** No results found for the last 72 hours. **          I reviewed the patient's new clinical results.      Impression:   Stress incontinence in female      Plan: CYSTOSCOPY WITH BULKING AGENT INJECTION      Enrique Echevarria, OG   3/1/2023   10:30 EST

## 2023-03-01 NOTE — ANESTHESIA POSTPROCEDURE EVALUATION
Patient: Evelia Foster    Procedure Summary     Date: 03/01/23 Room / [facility]  [facility]    Anesthesia Start: 1124 Anesthesia Stop: 1213    Procedure: CYSTOSCOPY WITH BULKING AGENT INJECTION Diagnosis:       Primary stress urinary incontinence      (Primary stress urinary incontinence [N39.3])    Surgeons: Nubia Ross MD Provider: Moe Ybarra MD    Anesthesia Type: MAC ASA Status: 2          Anesthesia Type: MAC    Vitals  Vitals Value Taken Time   BP 90/50 03/01/23 1212   Temp 97 °F (36.1 °C) 03/01/23 1212   Pulse 89 03/01/23 1212   Resp 14 03/01/23 1212   SpO2 95 % 03/01/23 1212           Post Anesthesia Care and Evaluation    Patient location during evaluation: PACU  Patient participation: complete - patient participated  Level of consciousness: awake and alert  Pain score: 0  Pain management: adequate    Airway patency: patent  Anesthetic complications: No anesthetic complications  PONV Status: none  Cardiovascular status: hemodynamically stable and acceptable  Respiratory status: nonlabored ventilation, acceptable and nasal cannula  Hydration status: acceptable

## 2023-03-01 NOTE — OP NOTE
OPERATIVE REPORT     Patient Name:  Evelia Foster  YOB: 1980    Patient MRN  [id number]    Date of Surgery:  3/1/2023     Indications:  42 yo F with bothersome and progressive stress urinary incontinence despite conservative measures presents for urethral bulking injections after discussion of risks, benefits, and alternatives.     Pre-op Diagnosis:   Intrinsic sphincter deficiency  Stress urinary incontinence    Post-op Diagnosis:   Intrinsic sphincter deficiency  Stress urinary incontinence    Procedure Performed: Cystoscopy, Urethral Bulking Injections (Bulkamid)     Procedure/CPT® Codes:  69673 - Bulking Injection   x 2 (1.2 mL, 2 vials Hydrogel used)     Staff:  Nubia Ross MD    Anesthesia: MAC    Estimated Blood Loss: Minimal    Implants:  Bulkamid Hydrogel     Specimen:  None    Drains: None      Findings: Uncomplicated urethral bulking, 1.2 mL hydrogel (2 vials used) with good urethral mucosa apposition/coaptation noted despite loss of hydrogel.  0.8 ml of hydrogel lost due to blow out of syringe.    Complications: None    Description of Procedure:   The patient was identified in the preoperative holding area where informed consent was reviewed and signed. The patient was transported the operating room per anesthesia and placed supine on the operating table. Monitored anesthesia care was performed and patient was adequately anesthetized. The patient was then placed in the dorsal lithotomy position where genitals were prepped and draped in the usual sterile fashion. A brief timeout was performed identifying the correct patient procedure and laterality. Perioperative antibiotics were administered. All pressure points were padded.      The Bulkamid Needle was then placed ¾ of the way into the needle channel of the rotatable sheath and the entire Bulkamid system was then advanced into the urethra until the bladder is visualised and inspected. The Bulkamid needle was then  advanced into the needle channel on the rotatable sheath until the tip of the sheath is adjacent to the bladder neck.      The sheath was then rotated to the 7 o’clock position. The needed was then extend into the bladder until the 2 cm lico on the needle is visible. The Bulkamid system was then retracted until the tip of the needle was resting on the bladder neck. The needle was then retracted into the sheath and approximately 1.5cm from the bladder neck the Bulkamid system was pressed parallel against the urethral wall and the needle is then advanced into the submucosal tissue ensuring that the bevel of the needle was facing towards the lumen.  On our initial injection there was a blow out of the syringe and approximately 0.8 mL of hydrogel was lost. The needle was re-advanced approximately 0.5 cm, and the Bulkamid hydrogel was then injected until the Bulkamid cushion was visible and reached the midline of the urethral lumen.     The needle was then retracted back into the rotatable sheath, and rotated to the next injection site. Subsequent injections were preformed at 5 o’clock, 2 o’clock and 10 o’clock all along the same plane as the original injection until all (4) cushions met at the midline of the urethral lumen. 1.2mls (2 vials) total of Bulkamid Hydrogel was used.     Approximately 300cc of fluid was left in the bladder and upon completion. The procedure was well tolerated and EBL was minimal.      The patient was awoken from general anesthesia and transported to the PACU in stable condition.       Disposition/Follow Up: 3-4 weeks for post op f/u in clinic    Nubia Ross MD     Date: 3/1/2023  Time: 12:01 EST

## 2023-03-01 NOTE — ANESTHESIA PREPROCEDURE EVALUATION
Anesthesia Evaluation     Patient summary reviewed and Nursing notes reviewed                Airway   Mallampati: II  TM distance: >3 FB  Neck ROM: full  No difficulty expected  Dental - normal exam   (+) upper dentures and lower dentures    Pulmonary - normal exam   (+) a smoker Current, asthma,  Cardiovascular - normal exam    (+) hypertension, hyperlipidemia,     ROS comment:   Negative stress test 7/22    Neuro/Psych- negative ROS  GI/Hepatic/Renal/Endo    (+) morbid obesity,      Musculoskeletal (-) negative ROS    Abdominal  - normal exam    Bowel sounds: normal.   Substance History - negative use     OB/GYN negative ob/gyn ROS         Other                        Anesthesia Plan    ASA 2     MAC     intravenous induction     Anesthetic plan, risks, benefits, and alternatives have been provided, discussed and informed consent has been obtained with: patient.    Plan discussed with CRNA.        CODE STATUS:

## 2023-03-02 LAB — BACTERIA SPEC AEROBE CULT: NO GROWTH

## 2023-03-07 ENCOUNTER — TELEPHONE (OUTPATIENT)
Dept: UROLOGY | Facility: CLINIC | Age: 43
End: 2023-03-07
Payer: COMMERCIAL

## 2023-03-07 ENCOUNTER — CLINICAL SUPPORT (OUTPATIENT)
Dept: UROLOGY | Facility: CLINIC | Age: 43
End: 2023-03-07
Payer: COMMERCIAL

## 2023-03-07 DIAGNOSIS — N39.3 PRIMARY STRESS URINARY INCONTINENCE: Primary | ICD-10-CM

## 2023-03-07 DIAGNOSIS — R39.9 UTI SYMPTOMS: ICD-10-CM

## 2023-03-07 LAB
BILIRUB BLD-MCNC: NEGATIVE MG/DL
CLARITY, POC: CLEAR
COLOR UR: YELLOW
EXPIRATION DATE: NORMAL
GLUCOSE UR STRIP-MCNC: NEGATIVE MG/DL
KETONES UR QL: NEGATIVE
LEUKOCYTE EST, POC: NEGATIVE
Lab: NORMAL
NITRITE UR-MCNC: NEGATIVE MG/ML
PH UR: 6.5 [PH] (ref 5–8)
PROT UR STRIP-MCNC: NEGATIVE MG/DL
RBC # UR STRIP: NEGATIVE /UL
SP GR UR: 1.02 (ref 1–1.03)
UROBILINOGEN UR QL: NORMAL

## 2023-03-07 PROCEDURE — 51798 US URINE CAPACITY MEASURE: CPT | Performed by: UROLOGY

## 2023-03-07 PROCEDURE — 81003 URINALYSIS AUTO W/O SCOPE: CPT | Performed by: UROLOGY

## 2023-03-07 PROCEDURE — 99211 OFF/OP EST MAY X REQ PHY/QHP: CPT | Performed by: UROLOGY

## 2023-03-07 PROCEDURE — 87086 URINE CULTURE/COLONY COUNT: CPT | Performed by: UROLOGY

## 2023-03-07 NOTE — TELEPHONE ENCOUNTER
Patient called and stated that she had the bulkamed procedure and cystoscopy done on the 3/1, stated that she had stopped bleeding the following day.  When she woke up this morning she had started bleeding again and it is dark pink.  She is having pain in her bladder area, states she has a constant burning when she urinates and it is a constant burn. She states that her bladder feels full but she doesn't feel that it is emptying.  Please advise.

## 2023-03-07 NOTE — PROGRESS NOTES
Patient called this morning stating that she had the bulimed procedure on 3/1/23 and did good following until yesterday.  Yesterday she stated that she began to bleed and have pain above her pubic area and was having a constant burning feeling.  I talked with Dr. Ross and he advised me to have her come in for a urine dipstick, culture, and pvr.  Her pvr was 13 mL, and urine dipstick came back normal but I will still send out the urine for culture.  I told the patient if she has any other symptoms come up or if she begins to feel worse to make sure to call the office and if she began to run a fever and start vomiting to come to the ER.

## 2023-03-09 LAB — BACTERIA SPEC AEROBE CULT: NO GROWTH

## 2023-03-10 ENCOUNTER — TELEPHONE (OUTPATIENT)
Dept: UROLOGY | Facility: CLINIC | Age: 43
End: 2023-03-10

## 2023-03-10 NOTE — TELEPHONE ENCOUNTER
Called patient to see if her pain had improved.  She reports she still has vague pain above the hairline when her bladder is full.  She reports it is a 5-7/10.   It appears it goes away with voiding.  Her hematuria has stopped.  Her culture has been negative.  Ibuprofen seems to help.  She reports it feels like a strong urge to void perhaps.  It is difficult for her to describe.   I believe she may have some OAB like symptoms.  I will have her follow up as scheduled.  If her symptoms have not improved we will try an anticholinergic to see if this helps.   
PROVIDER:[TOKEN:[[id number]]]

## 2023-03-15 ENCOUNTER — TELEPHONE (OUTPATIENT)
Dept: UROLOGY | Facility: CLINIC | Age: 43
End: 2023-03-15

## 2023-03-15 NOTE — TELEPHONE ENCOUNTER
Caller: Evelia Foster    Relationship to patient: SELF    Best call back number: [phone]    Patient is needing: PT UNABLE TO MAKE APPT TODAY, SHE IS WORKING AND CANNOT GET AWAY. PLEASE GIVE PT A CALL TO RESCHEDULE. SHE MENTIONED SOMETHING ABOUT DR CALVILLO GIVING HER SOME MEDICATION FOR OVERACTIVE BLADDER TOO.

## 2023-04-07 RX ORDER — ERGOCALCIFEROL 1.25 MG/1
CAPSULE ORAL
Qty: 12 CAPSULE | Refills: 0 | OUTPATIENT
Start: 2023-04-07

## 2023-05-03 ENCOUNTER — TELEPHONE (OUTPATIENT)
Dept: OBSTETRICS AND GYNECOLOGY | Facility: CLINIC | Age: 43
End: 2023-05-03
Payer: COMMERCIAL

## 2023-05-03 NOTE — TELEPHONE ENCOUNTER
She has boils on her thighs and now they are popping up up in thigh and rectum area.  Boil on breast is gone.  Made appt for tomorrow at 2:15 pm

## 2023-05-03 NOTE — TELEPHONE ENCOUNTER
Chrissy,  Please call her and ask what she is referring to specifically?  We last saw her in jan and treated a cyst of the skin of the breast - is that what she is still having problems with?      Thanks!

## 2023-05-03 NOTE — TELEPHONE ENCOUNTER
----- Message from Chrissy Gavin MA sent at 5/3/2023  7:40 AM EDT -----  Regarding: FW: Antibiotics   Contact: [phone]    ----- Message -----  From: Evelia Foster  Sent: 5/2/2023   5:03 PM EDT  To: [facility] Clinical [facility]  Subject: Antibiotics                                      I took the medication...my symptoms got worse after that ... tried to give it some time... worse than before.  HELP. I'm not sure what to do. Now I am getting bumps more than ever and I am limping from the latest one. The antibiotics didn't work and made symptoms worse.

## 2023-08-29 NOTE — PROGRESS NOTES
Follow Up Office Visit      Date: 2022   Patient Name: Evelia Foster  : 1980   MRN: [id number]     Chief Complaint:    Chief Complaint   Patient presents with   • Hypertension     High bp since friday       History of Present Illness: Evelia Foster is a 42 y.o. female who is here today to follow up with elevated blood pressure. On Friday she was laying on her cough and had a feeling of dread come over her, right arm was numb, she got up and had to swallow and take deep breaths to keep from passing out, had SOA and chest pressure.  She felt very poorly on Friday.  She still has occasional palpitations.  Her blood pressure has been staying high as well over the past several weeks.  She does have a strong family history of coronary artery disease, she is a smoker as well.  She recently had neck surgery for degeneration disc.      Subjective      Review of systems:  Review of Systems   Constitutional: Positive for fatigue. Negative for fever.   HENT: Negative for trouble swallowing.    Eyes: Negative for visual disturbance.   Respiratory: Positive for chest tightness and shortness of breath. Negative for wheezing.    Cardiovascular: Positive for chest pain and palpitations. Negative for leg swelling.   Gastrointestinal: Negative for abdominal pain.   Neurological: Negative for dizziness, weakness and light-headedness.        I have reviewed and the following portions of the patient's history were updated as appropriate: past family history, past medical history, past social history, past surgical history and problem list.    Medications:     Current Outpatient Medications:   •  albuterol sulfate  (90 Base) MCG/ACT inhaler, Inhale 2 puffs Every 4 (Four) Hours As Needed for Wheezing., Disp: , Rfl:   •  metoprolol succinate XL (Toprol XL) 50 MG 24 hr tablet, Take 1 tablet by mouth Daily., Disp: 30 tablet, Rfl: 5    Allergies:   Allergies   Allergen Reactions   • Buspar [Buspirone]  Vitamin D3 8000iu daily.  All at one time.    Adderall xl 20mg, take 2 tabs daily.    Your cholesterol results show that your HDL, healthy cholesterol, is low.  Daily exercise, weight loss and a diet high in omega-3 fatty acids (fish, nuts, olive oil and flax seeds or flax oil) would be helpful for this.    Exercise!  Daily if able.  At least try for 4-5 times a week.     "Confusion       Objective     Vital Signs:   Vitals:    06/06/22 1316   BP: (!) 148/104   Pulse: 100   Temp: 98.4 °F (36.9 °C)   SpO2: 94%   Weight: 100 kg (221 lb)   Height: 168.9 cm (66.5\")   PainSc: 0-No pain     Body mass index is 35.14 kg/m².   Class 2 Severe Obesity (BMI >=35 and <=39.9). Obesity-related health conditions include the following: hypertension. Obesity is unchanged. BMI is is above average; BMI management plan is completed. We discussed low calorie, low carb based diet program, portion control and increasing exercise.      Physical Exam:   Physical Exam  Vitals and nursing note reviewed.   Constitutional:       Appearance: Normal appearance.   HENT:      Head: Normocephalic and atraumatic.      Nose: No congestion or rhinorrhea.      Mouth/Throat:      Mouth: Mucous membranes are moist.      Pharynx: Oropharynx is clear. No posterior oropharyngeal erythema.   Cardiovascular:      Rate and Rhythm: Normal rate and regular rhythm.   Pulmonary:      Effort: Pulmonary effort is normal.      Breath sounds: Normal breath sounds. No decreased breath sounds, wheezing, rhonchi or rales.   Musculoskeletal:      Cervical back: Neck supple.      Right lower leg: No edema.      Left lower leg: No edema.   Lymphadenopathy:      Cervical: No cervical adenopathy.   Neurological:      Mental Status: She is alert.       ECG 12 Lead    Date/Time: 6/7/2022 9:00 AM  Performed by: Doreen Shukla PA  Authorized by: Doreen Shukla PA   Previous ECG: no previous ECG available  Rhythm: sinus rhythm  Rate: normal  Conduction: conduction normal  ST Segments: ST segments normal  T Waves: T waves normal    Clinical impression: normal ECG             Assessment / Plan      Assessment/Plan:   Diagnoses and all orders for this visit:    1. Hypertension, unspecified type (Primary)  -     metoprolol succinate XL (Toprol XL) 50 MG 24 hr tablet; Take 1 tablet by mouth Daily.  Dispense: 30 tablet; Refill: 5    2. Chest pain, " unspecified type  -     Stress test with myocardial perfusion; Future       Start metoprolol has directed to control blood pressure and palpitations.  She will monitor her blood pressure.  I have also discussed side effects of this medication.  With her family history as well as her smoking status and current symptoms we really need to do a stress test.  She does not feel she can walk very long on the treadmill due to recent surgery and current symptoms so we will do Jana scan.  We will await these results, if she experiences any further chest pain or shortness of breath symptoms worsen she is to proceed directly to ER or call 911.  Follow Up:   Return in about 4 weeks (around 7/4/2022) for Recheck.    Doreen Shukla PA-C   [facility]

## 2023-09-05 ENCOUNTER — OFFICE VISIT (OUTPATIENT)
Dept: FAMILY MEDICINE CLINIC | Facility: CLINIC | Age: 43
End: 2023-09-05
Payer: COMMERCIAL

## 2023-09-05 VITALS
TEMPERATURE: 98 F | HEIGHT: 66 IN | BODY MASS INDEX: 36.96 KG/M2 | HEART RATE: 82 BPM | SYSTOLIC BLOOD PRESSURE: 120 MMHG | WEIGHT: 230 LBS | DIASTOLIC BLOOD PRESSURE: 80 MMHG | OXYGEN SATURATION: 96 %

## 2023-09-05 DIAGNOSIS — Z00.00 WELL ADULT EXAM: Primary | ICD-10-CM

## 2023-09-05 DIAGNOSIS — F41.9 ANXIETY: ICD-10-CM

## 2023-09-05 DIAGNOSIS — F51.01 PRIMARY INSOMNIA: ICD-10-CM

## 2023-09-05 DIAGNOSIS — I10 PRIMARY HYPERTENSION: ICD-10-CM

## 2023-09-05 DIAGNOSIS — E53.8 B12 DEFICIENCY: ICD-10-CM

## 2023-09-05 DIAGNOSIS — J45.20 MILD INTERMITTENT ASTHMA WITHOUT COMPLICATION: ICD-10-CM

## 2023-09-05 DIAGNOSIS — L98.9 LESION OF THUMB: ICD-10-CM

## 2023-09-05 DIAGNOSIS — E55.9 VITAMIN D DEFICIENCY: ICD-10-CM

## 2023-09-05 DIAGNOSIS — E78.2 MIXED HYPERLIPIDEMIA: ICD-10-CM

## 2023-09-05 DIAGNOSIS — E66.01 CLASS 2 SEVERE OBESITY DUE TO EXCESS CALORIES WITH SERIOUS COMORBIDITY AND BODY MASS INDEX (BMI) OF 37.0 TO 37.9 IN ADULT: ICD-10-CM

## 2023-09-05 PROBLEM — C44.300 SKIN CANCER OF FACE: Status: RESOLVED | Noted: 2023-09-05 | Resolved: 2023-09-05

## 2023-09-05 PROBLEM — C44.300 SKIN CANCER OF FACE: Status: ACTIVE | Noted: 2023-09-05

## 2023-09-05 PROCEDURE — 99396 PREV VISIT EST AGE 40-64: CPT | Performed by: FAMILY MEDICINE

## 2023-09-05 RX ORDER — HYDROXYZINE HYDROCHLORIDE 25 MG/1
25 TABLET, FILM COATED ORAL EVERY 6 HOURS PRN
Qty: 100 TABLET | Refills: 3 | Status: SHIPPED | OUTPATIENT
Start: 2023-09-05

## 2023-09-05 RX ORDER — ALBUTEROL SULFATE 90 UG/1
2 AEROSOL, METERED RESPIRATORY (INHALATION) EVERY 4 HOURS PRN
Qty: 18 G | Refills: 3 | Status: SHIPPED | OUTPATIENT
Start: 2023-09-05

## 2023-09-05 RX ORDER — HYDROXYZINE HYDROCHLORIDE 25 MG/1
TABLET, FILM COATED ORAL
COMMUNITY
Start: 2023-07-20 | End: 2023-09-05 | Stop reason: SDUPTHER

## 2023-09-05 NOTE — ASSESSMENT & PLAN NOTE
Patient's (Body mass index is 37.69 kg/m².) indicates that they are morbidly/severely obese (BMI > 40 or > 35 with obesity - related health condition) with health conditions that include hypertension, dyslipidemias, and osteoarthritis . Weight is worsening. BMI  is above average; BMI management plan is completed. We discussed low calorie, low carb based diet program, portion control, increasing exercise, and an morales-based approach such as Conversocial Pal or Lose It.

## 2023-09-05 NOTE — PATIENT INSTRUCTIONS
"Hypertension, Adult  High blood pressure (hypertension) is when the force of blood pumping through the arteries is too strong. The arteries are the blood vessels that carry blood from the heart throughout the body. Hypertension forces the heart to work harder to pump blood and may cause arteries to become narrow or stiff. Untreated or uncontrolled hypertension can lead to a heart attack, heart failure, a stroke, kidney disease, and other problems.  A blood pressure reading consists of a higher number over a lower number. Ideally, your blood pressure should be below 120/80. The first (\"top\") number is called the systolic pressure. It is a measure of the pressure in your arteries as your heart beats. The second (\"bottom\") number is called the diastolic pressure. It is a measure of the pressure in your arteries as the heart relaxes.  What are the causes?  The exact cause of this condition is not known. There are some conditions that result in high blood pressure.  What increases the risk?  Certain factors may make you more likely to develop high blood pressure. Some of these risk factors are under your control, including:  Smoking.  Not getting enough exercise or physical activity.  Being overweight.  Having too much fat, sugar, calories, or salt (sodium) in your diet.  Drinking too much alcohol.  Other risk factors include:  Having a personal history of heart disease, diabetes, high cholesterol, or kidney disease.  Stress.  Having a family history of high blood pressure and high cholesterol.  Having obstructive sleep apnea.  Age. The risk increases with age.  What are the signs or symptoms?  High blood pressure may not cause symptoms. Very high blood pressure (hypertensive crisis) may cause:  Headache.  Fast or irregular heartbeats (palpitations).  Shortness of breath.  Nosebleed.  Nausea and vomiting.  Vision changes.  Severe chest pain, dizziness, and seizures.  How is this diagnosed?  This condition is diagnosed by " measuring your blood pressure while you are seated, with your arm resting on a flat surface, your legs uncrossed, and your feet flat on the floor. The cuff of the blood pressure monitor will be placed directly against the skin of your upper arm at the level of your heart. Blood pressure should be measured at least twice using the same arm. Certain conditions can cause a difference in blood pressure between your right and left arms.  If you have a high blood pressure reading during one visit or you have normal blood pressure with other risk factors, you may be asked to:  Return on a different day to have your blood pressure checked again.  Monitor your blood pressure at home for 1 week or longer.  If you are diagnosed with hypertension, you may have other blood or imaging tests to help your health care provider understand your overall risk for other conditions.  How is this treated?  This condition is treated by making healthy lifestyle changes, such as eating healthy foods, exercising more, and reducing your alcohol intake. You may be referred for counseling on a healthy diet and physical activity.  Your health care provider may prescribe medicine if lifestyle changes are not enough to get your blood pressure under control and if:  Your systolic blood pressure is above 130.  Your diastolic blood pressure is above 80.  Your personal target blood pressure may vary depending on your medical conditions, your age, and other factors.  Follow these instructions at home:  Eating and drinking    Eat a diet that is high in fiber and potassium, and low in sodium, added sugar, and fat. An example of this eating plan is called the DASH diet. DASH stands for Dietary Approaches to Stop Hypertension. To eat this way:  Eat plenty of fresh fruits and vegetables. Try to fill one half of your plate at each meal with fruits and vegetables.  Eat whole grains, such as whole-wheat pasta, brown rice, or whole-grain bread. Fill about one  fourth of your plate with whole grains.  Eat or drink low-fat dairy products, such as skim milk or low-fat yogurt.  Avoid fatty cuts of meat, processed or cured meats, and poultry with skin. Fill about one fourth of your plate with lean proteins, such as fish, chicken without skin, beans, eggs, or tofu.  Avoid pre-made and processed foods. These tend to be higher in sodium, added sugar, and fat.  Reduce your daily sodium intake. Many people with hypertension should eat less than 1,500 mg of sodium a day.  Do not drink alcohol if:  Your health care provider tells you not to drink.  You are pregnant, may be pregnant, or are planning to become pregnant.  If you drink alcohol:  Limit how much you have to:  0-1 drink a day for women.  0-2 drinks a day for men.  Know how much alcohol is in your drink. In the [place] one drink equals one 12 oz bottle of beer (355 mL), one 5 oz glass of wine (148 mL), or one 1½ oz glass of hard liquor (44 mL).  Lifestyle    Work with your health care provider to maintain a healthy body weight or to lose weight. Ask what an ideal weight is for you.  Get at least 30 minutes of exercise that causes your heart to beat faster (aerobic exercise) most days of the week. Activities may include walking, swimming, or biking.  Include exercise to strengthen your muscles (resistance exercise), such as Pilates or lifting weights, as part of your weekly exercise routine. Try to do these types of exercises for 30 minutes at least 3 days a week.  Do not use any products that contain nicotine or tobacco. These products include cigarettes, chewing tobacco, and vaping devices, such as e-cigarettes. If you need help quitting, ask your health care provider.  Monitor your blood pressure at home as told by your health care provider.  Keep all follow-up visits. This is important.  Medicines  Take over-the-counter and prescription medicines only as told by your health care provider. Follow directions carefully. Blood  pressure medicines must be taken as prescribed.  Do not skip doses of blood pressure medicine. Doing this puts you at risk for problems and can make the medicine less effective.  Ask your health care provider about side effects or reactions to medicines that you should watch for.  Contact a health care provider if you:  Think you are having a reaction to a medicine you are taking.  Have headaches that keep coming back (recurring).  Feel dizzy.  Have swelling in your ankles.  Have trouble with your vision.  Get help right away if you:  Develop a severe headache or confusion.  Have unusual weakness or numbness.  Feel faint.  Have severe pain in your chest or abdomen.  Vomit repeatedly.  Have trouble breathing.  These symptoms may be an emergency. Get help right away. Call 911.  Do not wait to see if the symptoms will go away.  Do not drive yourself to the hospital.  Summary  Hypertension is when the force of blood pumping through your arteries is too strong. If this condition is not controlled, it may put you at risk for serious complications.  Your personal target blood pressure may vary depending on your medical conditions, your age, and other factors. For most people, a normal blood pressure is less than 120/80.  Hypertension is treated with lifestyle changes, medicines, or a combination of both. Lifestyle changes include losing weight, eating a healthy, low-sodium diet, exercising more, and limiting alcohol.  This information is not intended to replace advice given to you by your health care provider. Make sure you discuss any questions you have with your health care provider.  Document Revised: 10/25/2022 Document Reviewed: 10/25/2022  [employer] Patient Education © 2023 [employer]  BMI for Adults  What is BMI?  Body mass index (BMI) is a number that is calculated from a person's weight and height. BMI can help estimate how much of a person's weight is composed of fat. BMI does not measure body fat directly.  "Rather, it is an alternative to procedures that directly measure body fat, which can be difficult and expensive.  BMI can help identify people who may be at higher risk for certain medical problems.  What are BMI measurements used for?  BMI is used as a screening tool to identify possible weight problems. It helps determine whether a person is obese, overweight, a healthy weight, or underweight.  BMI is useful for:  Identifying a weight problem that may be related to a medical condition or may increase the risk for medical problems.  Promoting changes, such as changes in diet and exercise, to help reach a healthy weight. BMI screening can be repeated to see if these changes are working.  How is BMI calculated?  BMI involves measuring your weight in relation to your height. Both height and weight are measured, and the BMI is calculated from those numbers. This can be done either in English ([place] or metric measurements. Note that charts and online BMI calculators are available to help you find your BMI quickly and easily without having to do these calculations yourself.  To calculate your BMI in English ([place] measurements:    Measure your weight in pounds (lb).  Multiply the number of pounds by 703.  For example, for a person who weighs 180 lb, multiply that number by 703, which equals 126,540.  Measure your height in inches. Then multiply that number by itself to get a measurement called \"inches squared.\"  For example, for a person who is 70 inches tall, the \"inches squared\" measurement is 70 inches x 70 inches, which equals 4,900 inches squared.  Divide the total from step 2 (number of lb x 703) by the total from step 3 (inches squared): 126,540 ÷ 4,900 = 25.8. This is your BMI.  To calculate your BMI in metric measurements:  Measure your weight in kilograms (kg).  Measure your height in meters (m). Then multiply that number by itself to get a measurement called \"meters squared.\"  For example, for a person who is " "1.75 m tall, the \"meters squared\" measurement is 1.75 m x 1.75 m, which is equal to 3.1 meters squared.  Divide the number of kilograms (your weight) by the meters squared number. In this example: 70 ÷ 3.1 = 22.6. This is your BMI.  What do the results mean?  BMI charts are used to identify whether you are underweight, normal weight, overweight, or obese. The following guidelines will be used:  Underweight: BMI less than 18.5.  Normal weight: BMI between 18.5 and 24.9.  Overweight: BMI between 25 and 29.9.  Obese: BMI of 30 or above.  Keep these notes in mind:  Weight includes both fat and muscle, so someone with a muscular build, such as an athlete, may have a BMI that is higher than 24.9. In cases like these, BMI is not an accurate measure of body fat.  To determine if excess body fat is the cause of a BMI of 25 or higher, further assessments may need to be done by a health care provider.  BMI is usually interpreted in the same way for men and women.  Where to find more information  For more information about BMI, including tools to quickly calculate your BMI, go to these websites:  Centers for Disease Control and Prevention: www.cdc.gov  American Heart Association: www.heart.org  National Heart, Lung, and Blood [place]: www.nhlbi.nih.gov  Summary  Body mass index (BMI) is a number that is calculated from a person's weight and height.  BMI may help estimate how much of a person's weight is composed of fat. BMI can help identify those who may be at higher risk for certain medical problems.  BMI can be measured using English measurements or metric measurements.  BMI charts are used to identify whether you are underweight, normal weight, overweight, or obese.  This information is not intended to replace advice given to you by your health care provider. Make sure you discuss any questions you have with your health care provider.  Document Revised: 09/09/2020 Document Reviewed: 07/17/2020  [employer] Patient Education " © 2023 [employer]  Insomnia  Insomnia is a sleep disorder that makes it difficult to fall asleep or stay asleep. Insomnia can cause fatigue, low energy, difficulty concentrating, mood swings, and poor performance at work or school.  There are three different ways to classify insomnia:  Difficulty falling asleep.  Difficulty staying asleep.  Waking up too early in the morning.  Any type of insomnia can be long-term (chronic) or short-term (acute). Both are common. Short-term insomnia usually lasts for 3 months or less. Chronic insomnia occurs at least three times a week for longer than 3 months.  What are the causes?  Insomnia may be caused by another condition, situation, or substance, such as:  Having certain mental health conditions, such as anxiety and depression.  Using caffeine, alcohol, tobacco, or drugs.  Having gastrointestinal conditions, such as gastroesophageal reflux disease (GERD).  Having certain medical conditions. These include:  Asthma.  Alzheimer's disease.  Stroke.  Chronic pain.  An overactive thyroid gland (hyperthyroidism).  Other sleep disorders, such as restless legs syndrome and sleep apnea.  Menopause.  Sometimes, the cause of insomnia may not be known.  What increases the risk?  Risk factors for insomnia include:  Gender. Females are affected more often than males.  Age. Insomnia is more common as people get older.  Stress and certain medical and mental health conditions.  Lack of exercise.  Having an irregular work schedule. This may include working night shifts and traveling between different time zones.  What are the signs or symptoms?  If you have insomnia, the main symptom is having trouble falling asleep or having trouble staying asleep. This may lead to other symptoms, such as:  Feeling tired or having low energy.  Feeling nervous about going to sleep.  Not feeling rested in the morning.  Having trouble concentrating.  Feeling irritable, anxious, or depressed.  How is this  diagnosed?  This condition may be diagnosed based on:  Your symptoms and medical history. Your health care provider may ask about:  Your sleep habits.  Any medical conditions you have.  Your mental health.  A physical exam.  How is this treated?  Treatment for insomnia depends on the cause. Treatment may focus on treating an underlying condition that is causing the insomnia. Treatment may also include:  Medicines to help you sleep.  Counseling or therapy.  Lifestyle adjustments to help you sleep better.  Follow these instructions at home:  Eating and drinking    Limit or avoid alcohol, caffeinated beverages, and products that contain nicotine and tobacco, especially close to bedtime. These can disrupt your sleep.  Do not eat a large meal or eat spicy foods right before bedtime. This can lead to digestive discomfort that can make it hard for you to sleep.  Sleep habits    Keep a sleep diary to help you and your health care provider figure out what could be causing your insomnia. Write down:  When you sleep.  When you wake up during the night.  How well you sleep and how rested you feel the next day.  Any side effects of medicines you are taking.  What you eat and drink.  Make your bedroom a dark, comfortable place where it is easy to fall asleep.  Put up shades or blackout curtains to block light from outside.  Use a white noise machine to block noise.  Keep the temperature cool.  Limit screen use before bedtime. This includes:  Not watching TV.  Not using your smartphone, tablet, or computer.  Stick to a routine that includes going to bed and waking up at the same times every day and night. This can help you fall asleep faster. Consider making a quiet activity, such as reading, part of your nighttime routine.  Try to avoid taking naps during the day so that you sleep better at night.  Get out of bed if you are still awake after 15 minutes of trying to sleep. Keep the lights down, but try reading or doing a quiet  activity. When you feel sleepy, go back to bed.  General instructions  Take over-the-counter and prescription medicines only as told by your health care provider.  Exercise regularly as told by your health care provider. However, avoid exercising in the hours right before bedtime.  Use relaxation techniques to manage stress. Ask your health care provider to suggest some techniques that may work well for you. These may include:  Breathing exercises.  Routines to release muscle tension.  Visualizing peaceful scenes.  Make sure that you drive carefully. Do not drive if you feel very sleepy.  Keep all follow-up visits. This is important.  Contact a health care provider if:  You are tired throughout the day.  You have trouble in your daily routine due to sleepiness.  You continue to have sleep problems, or your sleep problems get worse.  Get help right away if:  You have thoughts about hurting yourself or someone else.  Get help right away if you feel like you may hurt yourself or others, or have thoughts about taking your own life. Go to your nearest emergency room or:  Call 911.  Call the National Suicide Prevention Lifeline at 1-630.621.9150 or 453. This is open 24 hours a day.  Text the Crisis Text Line at 556538.  Summary  Insomnia is a sleep disorder that makes it difficult to fall asleep or stay asleep.  Insomnia can be long-term (chronic) or short-term (acute).  Treatment for insomnia depends on the cause. Treatment may focus on treating an underlying condition that is causing the insomnia.  Keep a sleep diary to help you and your health care provider figure out what could be causing your insomnia.  This information is not intended to replace advice given to you by your health care provider. Make sure you discuss any questions you have with your health care provider.  Document Revised: 11/28/2022 Document Reviewed: 11/28/2022  [employer] Patient Education © 2023 [employer]

## 2023-09-05 NOTE — PROGRESS NOTES
Evelia Foster is a 43 y.o. female who presents today for a well woman exam.    Chief Complaint   Patient presents with    Annual Exam        HPI     Last pap smear <1 year ago, results were normal. History of abnormal pap smears with partial hysterectomy. Family history of cervical and ovarian, but not uterine cancer.     Sexually active with one male partner. No vaginal discharge, itching, dysuria, or pelvic pain. Not interested in screening for STIs.     Last mammogram <1 year ago, results were normal. No history of abnormal mammogram. Family history of breast cancer.     Diet is regular but could be healthier. She eats once a day in the middle of the day and it is usually fast food.     Physical activity includes being active at work but she does not have an actual exercise regimen.     Sleep problems include getting to sleep and staying asleep chronic and unchanged. Average hours per night 4.     Patient has warty growth on her left thumb at the cuticle that has been present for 4-5 months and has been getting bigger. . It is tender. When she pumps it it bleed. It sometimes seeps out clear liquid. She has seen dermatologist who recommended she see a hand surgeon.     Chronic anxiety.       9/5/2023     3:24 PM   PHQ-2/PHQ-9 Depression Screening   Little Interest or Pleasure in Doing Things 0-->not at all   Feeling Down, Depressed or Hopeless 0-->not at all   PHQ-9: Brief Depression Severity Measure Score 0       Review of Systems   Constitutional:  Negative for fever and unexpected weight loss.   HENT:  Negative for congestion, ear pain and sore throat.    Eyes:  Negative for visual disturbance.   Respiratory:  Negative for cough, shortness of breath and wheezing.    Cardiovascular:  Negative for chest pain and palpitations.   Gastrointestinal:  Negative for abdominal pain, blood in stool, constipation, diarrhea, nausea, vomiting and GERD.   Endocrine: Negative for polydipsia and polyuria.   Genitourinary:   Negative for difficulty urinating.   Musculoskeletal:  Positive for back pain (chronic). Negative for joint swelling.   Skin:  Negative for rash and skin lesions.   Allergic/Immunologic: Negative for environmental allergies.   Neurological:  Negative for seizures and syncope.   Hematological:  Does not bruise/bleed easily.   Psychiatric/Behavioral:  Negative for suicidal ideas.       Health Maintenance   Topic Date Due    COVID-19 Vaccine (1) 2023 (Originally 1980)    Pneumococcal Vaccine 0-64 (1 - PCV) 2024 (Originally 1986)    INFLUENZA VACCINE  10/01/2023    LIPID PANEL  2024    ANNUAL PHYSICAL  2024    BMI FOLLOWUP  2024    PAP SMEAR  2026    TDAP/TD VACCINES (3 - Td or Tdap) 2031    HEPATITIS C SCREENING  Completed       Obstetric History:  OB History          3    Para   3    Term   3            AB        Living             SAB        IAB        Ectopic        Molar        Multiple        Live Births   3               Menstrual History:     Patient's last menstrual period was 2018.         Past Medical History:   Diagnosis Date    Anemia     Anxiety     Asthma     mild, rescue inhaler prn     Dizzy     Full dentures     Headache     History of cervical dysplasia     s/p hsyterectomy    HPV (human papilloma virus) infection     positive 18    Insomnia     Low back pain     Neck pain     Pneumonia     PTSD (post-traumatic stress disorder)     Restless leg syndrome     Spinal headache     Uses contact lenses         Past Surgical History:   Procedure Laterality Date    ADENOIDECTOMY          ANTERIOR CERVICAL DISCECTOMY W/ FUSION N/A 2021    Procedure: CERVICAL DISCECTOMY ANTERIOR WITH FUSION C5-6;  Surgeon: Kalyan Vasquez MD;  Location: [facility];  Service: Neurosurgery;  Laterality: N/A;    COLONOSCOPY      CYSTOSCOPY W/ BULKING AGENT INJECTION N/A 2023    Procedure: CYSTOSCOPY WITH BULKING AGENT INJECTION;  Surgeon:  Nubia Ross MD;  Location:  [facility];  Service: Urology;  Laterality: N/A;    ENDOSCOPY      SKIN CANCER EXCISION      TONSILLECTOMY AND ADENOIDECTOMY      1989    TOTAL LAPAROSCOPIC HYSTERECTOMY N/A 01/09/2019    Procedure: TOTAL LAPAROSCOPIC HYSTERECTOMY, BILATERAL SALPINGECTOMY WITH DAVINCI ROBOT;  Surgeon: Markie Lewis MD;  Location:  [facility];  Service: DaVinci    TUBAL ABDOMINAL LIGATION          Family History   Problem Relation Age of Onset    Cervical cancer Mother 30    Breast cancer Mother 30    Lymphoma Mother     Congenital heart disease Mother     Ovarian cancer Mother         40s    Cancer Father     Breast cancer Sister         mastectomy -proph?    Ovarian cancer Sister 30    Cancer Sister     No Known Problems Brother     Dementia Maternal Grandmother     Graves' disease Sister     Thyroid disease Sister     Breast cancer Maternal Aunt 35    Thyroid disease Daughter     Thyroid disease Sister     No Known Problems Daughter         Social History     Socioeconomic History    Marital status: Significant Other   Tobacco Use    Smoking status: Every Day     Packs/day: 0.50     Years: 24.00     Pack years: 12.00     Types: Cigarettes     Start date: 1998     Passive exposure: Current    Smokeless tobacco: Never    Tobacco comments:     tried w preg   Vaping Use    Vaping Use: Never used   Substance and Sexual Activity    Alcohol use: Yes     Comment: rare    Drug use: No    Sexual activity: Yes     Partners: Male     Birth control/protection: Surgical        Current Outpatient Medications on File Prior to Visit   Medication Sig Dispense Refill    ibuprofen (ADVIL,MOTRIN) 200 MG tablet Take 3 tablets by mouth As Needed for Mild Pain.      metoprolol succinate XL (TOPROL-XL) 50 MG 24 hr tablet Take 1 tablet by mouth Daily.      [DISCONTINUED] albuterol sulfate  (90 Base) MCG/ACT inhaler Inhale 2 puffs Every 4 (Four) Hours As Needed for Wheezing. 18 g 3    [DISCONTINUED] hydrOXYzine (ATARAX) 25  "MG tablet TAKE 1 TABLET BY MOUTH THREE TIMES A DAY AS NEEDED FOR ANXIETY FOR UP TO 10 DAYS      [DISCONTINUED] vitamin B-12 (CYANOCOBALAMIN) 1000 MCG tablet Take 3 tablets by mouth Daily.      [DISCONTINUED] cyclobenzaprine (FLEXERIL) 5 MG tablet Take 1 tablet by mouth 3 (Three) Times a Day As Needed for Muscle Spasms. 90 tablet 3    [DISCONTINUED] phenazopyridine (Pyridium) 200 MG tablet Take 1 tablet by mouth 3 (Three) Times a Day As Needed (Dysuria or burning). 20 tablet 0    [DISCONTINUED] rosuvastatin (CRESTOR) 20 MG tablet Take 1 tablet by mouth Every Night. 90 tablet 1     No current facility-administered medications on file prior to visit.       Allergies   Allergen Reactions    Buspar [Buspirone] Confusion        Visit Vitals  /80   Pulse 82   Temp 98 °F (36.7 °C)   Ht 166.4 cm (65.5\")   Wt 104 kg (230 lb)   LMP 12/18/2018 Comment: last mammogram 2020   SpO2 96%   BMI 37.69 kg/m²        Physical Exam  Constitutional:       General: She is not in acute distress.     Appearance: She is well-developed. She is not diaphoretic.   HENT:      Head: Atraumatic.   Cardiovascular:      Rate and Rhythm: Normal rate and regular rhythm.      Heart sounds: Normal heart sounds. No murmur heard.    No friction rub. No gallop.   Pulmonary:      Effort: Pulmonary effort is normal. No respiratory distress.      Breath sounds: Normal breath sounds. No stridor. No wheezing, rhonchi or rales.   Abdominal:      General: Bowel sounds are normal. There is no distension.      Palpations: Abdomen is soft. There is no mass.      Tenderness: There is no abdominal tenderness. There is no guarding or rebound.      Hernia: No hernia is present.   Musculoskeletal:      Cervical back: Normal range of motion and neck supple.   Skin:     General: Skin is warm and dry.      Findings: Lesion (left thumb at cuticle warty growth. Non tender today and no drainage could be expressed.) present.   Neurological:      Mental Status: She is alert " and oriented to person, place, and time.   Psychiatric:         Behavior: Behavior normal.        Results for orders placed or performed in visit on 03/07/23   Urine Culture - Urine, Urine, Clean Catch    Specimen: Urine, Clean Catch   Result Value Ref Range    Urine Culture No growth    POC Urinalysis Dipstick, Automated    Specimen: Urine   Result Value Ref Range    Color Yellow Yellow, Straw, Dark Yellow, Marichuy    Clarity, UA Clear Clear    Specific Gravity  1.020 1.005 - 1.030    pH, Urine 6.5 5.0 - 8.0    Leukocytes Negative Negative    Nitrite, UA Negative Negative    Protein, POC Negative Negative mg/dL    Glucose, UA Negative Negative mg/dL    Ketones, UA Negative Negative    Urobilinogen, UA Normal Normal, 0.2 E.U./dL    Bilirubin Negative Negative    Blood, UA Negative Negative    Lot Number [id number]     Expiration Date 07/13/2024         Immunization History   Administered Date(s) Administered    Flucelvax Quad Vial =>4yrs 10/14/2019    Fluzone Quad >6mos (Multi-dose) 10/14/2019    Hepatitis A 10/26/2018    Influenza, Unspecified 11/11/2020    Tdap 10/05/2018, 05/20/2021       Problems Addressed this Visit          Cardiac and Vasculature    Hypertension    Relevant Orders    Comprehensive Metabolic Panel    TSH Rfx On Abnormal To Free T4    CBC & Differential    Lipid Panel    Hemoglobin A1c    Mixed hyperlipidemia    Relevant Orders    Comprehensive Metabolic Panel    Lipid Panel       Endocrine and Metabolic    Class 2 severe obesity due to excess calories with serious comorbidity and body mass index (BMI) of 37.0 to 37.9 in adult     Patient's (Body mass index is 37.69 kg/m².) indicates that they are morbidly/severely obese (BMI > 40 or > 35 with obesity - related health condition) with health conditions that include hypertension, dyslipidemias, and osteoarthritis . Weight is worsening. BMI  is above average; BMI management plan is completed. We discussed low calorie, low carb based diet  program, portion control, increasing exercise, and an morales-based approach such as Shyp Pal or Lose It.          Relevant Orders    Comprehensive Metabolic Panel    TSH Rfx On Abnormal To Free T4    CBC & Differential    Lipid Panel    Hemoglobin A1c    Vitamin D deficiency    Relevant Orders    Vitamin D,25-Hydroxy    B12 deficiency    Relevant Orders    Vitamin B12       Health Encounters    Well adult exam - Primary     The patient is here for health maintenance visit.  Currently, the patient consumes a unhealthy diet and has an inadequate exercise regimen.  Screening lab work is ordered.  Immunizations were reviewed today.  Advice and education was given regarding nutrition, aerobic exercise, routine dental evaluations, routine eye exams, reproductive health, cardiovascular risk reduction, sunscreen use, self skin examination (annual dermatology evaluations) and seatbelt use (general overall safety).  Further recommendations will be given if needed after lab evaluation.  Annual wellness evaluation is recommended.           Relevant Orders    Comprehensive Metabolic Panel    TSH Rfx On Abnormal To Free T4    CBC & Differential    Lipid Panel    Hemoglobin A1c    Vitamin D,25-Hydroxy    Vitamin B12       Mental Health    Anxiety    Relevant Medications    hydrOXYzine (ATARAX) 25 MG tablet       Pulmonary and Pneumonias    Asthma    Relevant Medications    albuterol sulfate  (90 Base) MCG/ACT inhaler       Skin    Lesion of thumb    Relevant Orders    Ambulatory Referral to Hand Surgery       Sleep    Insomnia    Relevant Medications    hydrOXYzine (ATARAX) 25 MG tablet     Diagnoses         Codes Comments    Well adult exam    -  Primary ICD-10-CM: Z00.00  ICD-9-CM: V70.0     Primary hypertension     ICD-10-CM: I10  ICD-9-CM: 401.9     Mixed hyperlipidemia     ICD-10-CM: E78.2  ICD-9-CM: 272.2     Class 2 severe obesity due to excess calories with serious comorbidity and body mass index (BMI) of 37.0 to  37.9 in adult     ICD-10-CM: E66.01, Z68.37  ICD-9-CM: 278.01, V85.37     Vitamin D deficiency     ICD-10-CM: E55.9  ICD-9-CM: 268.9     B12 deficiency     ICD-10-CM: E53.8  ICD-9-CM: 266.2     Mild intermittent asthma without complication     ICD-10-CM: J45.20  ICD-9-CM: 493.90     Anxiety     ICD-10-CM: F41.9  ICD-9-CM: 300.00     Primary insomnia     ICD-10-CM: F51.01  ICD-9-CM: 307.42     Lesion of thumb     ICD-10-CM: L98.9  ICD-9-CM: 709.9              Return in about 6 months (around 3/5/2024) for Follow-up HTN, HLD, anxiety.    Nabil Green MD  9/5/2023

## 2023-09-13 ENCOUNTER — OFFICE VISIT (OUTPATIENT)
Dept: ORTHOPEDIC SURGERY | Facility: CLINIC | Age: 43
End: 2023-09-13
Payer: COMMERCIAL

## 2023-09-13 VITALS
SYSTOLIC BLOOD PRESSURE: 122 MMHG | BODY MASS INDEX: 36.85 KG/M2 | HEIGHT: 66 IN | DIASTOLIC BLOOD PRESSURE: 84 MMHG | WEIGHT: 229.28 LBS

## 2023-09-13 DIAGNOSIS — M79.645 THUMB PAIN, LEFT: Primary | ICD-10-CM

## 2023-09-13 RX ORDER — CEPHALEXIN 500 MG/1
500 CAPSULE ORAL EVERY 6 HOURS
Qty: 28 CAPSULE | Refills: 0 | Status: SHIPPED | OUTPATIENT
Start: 2023-09-13 | End: 2023-09-20

## 2023-09-13 NOTE — PROGRESS NOTES
"                                                               [facility]     Office Visit       Date: 09/13/2023   Patient Name: Evelia Foster  MRN: [id number]  YOB: 1980    Referring Physician: No ref. provider found     Chief Complaint:   Chief Complaint   Patient presents with    Left Hand - Pain       History of Present Illness:   Evelia Foster is a 43 y.o. female right-hand-dominant presents with left thumb pain and swelling of 1 month duration.  Patient reports that she initially noticed pain and swelling at the base of her left thumb nailbed approximately 1 month ago.  Reports that that the pain and swelling of both gotten worse and now she noticed occasional clear or bloody drainage from her nail fold.  Denies fevers or chills.  Denies history of trauma to the area.  She is a nail biter.  She has tried Neosporin ointment but not any oral antibiotics.  She has started noticed some nail changes over the last couple weeks.    Subjective   Review of Systems:   Review of Systems     Pertinent review of systems per HPI.     I reviewed the patient's chief complaint, history of present illness, review of systems, past medical history, surgical history, family history, social history, medications and allergy list.    Objective    Vital Signs:   Vitals:    09/13/23 1503   BP: 122/84   Weight: 104 kg (229 lb 4.5 oz)   Height: 166.4 cm (65.51\")     BMI: Class 2 Severe Obesity (BMI >=35 and <=39.9). Obesity-related health conditions include the following: none. Obesity is unchanged. BMI is is above average; no BMI management plan is appropriate.        General Appearance: No acute distress. Alert and oriented.     Ortho Exam:  Left upper extremity  Left thumb with erythema and swelling along the radial aspect of the eponychial fold.  There is mild blanching erythema but no purulent drainage.  There is also a small keratin this growth at the base of the eponychial fold.  There are " distal nail changes.     No proximal erythema.  Nontender on the volar aspect of the thumb.  All fingers are warm and well-perfused.    Sensation intact to light touch in the median radial and ulnar nerve distribution.    Full flexion and extension for all digits.        Imaging/Studies:   Imaging Results (Last 24 Hours)       Procedure Component Value Units Date/Time    XR Finger 2+ View Left [[id number]] Resulted: 09/13/23 1643     Updated: 09/13/23 1645    Narrative:      Left Thumb X-Ray    Indication: Pain    Views:  AP, Lateral,    Comparison: No prior comparison    Findings:  No fracture  No bony lesion  Soft tissue swelling over the dorsal aspect of the thumb DIP and nail   fold.  Normal joint spaces    Impression: Soft tissue swelling over the nail fold, otherwise normal   x-ray of the left thumb            Procedures:  Procedures    Quality Measures:   ACP:   ACP discussion was declined by the patient. Patient does not have an advance directive, declines further assistance.    Tobacco:   Evelia Foster  reports that she has been smoking cigarettes. She started smoking about 25 years ago. She has a 12.00 pack-year smoking history. She has been exposed to tobacco smoke. She has never used smokeless tobacco..      Assessment / Plan    Assessment/Plan:     Diagnoses and all orders for this visit:    Thumb pain, left  -     XR Finger 2+ View Left    Other orders  -     cephalexin (KEFLEX) 500 MG capsule; Take 1 capsule by mouth Every 6 (Six) Hours for 7 days.         Evelia Fosteris a 43 y.o. female presents with left thumb pain and swelling of 1 month duration.  Aspects of her history and physical are consistent with a paronychia however the duration, nail changes and keratinous growth are unusual for an acute paronychia.  Because of these unusual findings I like to perform biopsy of the lesion as well as likely incision and drainage of the area of fluctuance.  The patient has a significant needle  phobia and would like to have this done in the operating room.    Left thumb nailbed biopsy    The risks and benefits of the procedure were discussed with the patient and or appropriate guardian, which include but are not limited to the risk of bleeding, infection, neurovascular damage, post-operative stiffness, tendon and/or ligament retears, recurrent instability, continued pain, arthritic pain, need for further revision surgeries in the future, deep venous thrombosis, and general risks from anesthesia. We also discussed the post-operative rehabilitation, the need for physical therapy, and the overall expected outcomes from the procedure. We also discussed the possible use of biologics including allograft. We allowed proper time and answered the patient's questions regarding the procedure. The patient expressed understanding. Knowing what the risks are and what the conservative treatment is, the patient elected to forgo any further conservative treatment options and proceed with the surgical intervention.    Follow Up:   Return for Follow Up after Post Op.        Juma Davila MD  [facility] Hand and Upper Extremity Surgeon

## 2023-09-25 DIAGNOSIS — I10 ESSENTIAL (PRIMARY) HYPERTENSION: ICD-10-CM

## 2023-09-25 RX ORDER — METOPROLOL SUCCINATE 50 MG/1
TABLET, EXTENDED RELEASE ORAL
Qty: 90 TABLET | Refills: 0 | Status: SHIPPED | OUTPATIENT
Start: 2023-09-25

## 2023-09-26 ENCOUNTER — OUTSIDE FACILITY SERVICE (OUTPATIENT)
Age: 43
End: 2023-09-26
Payer: COMMERCIAL

## 2023-09-26 ENCOUNTER — LAB (OUTPATIENT)
Dept: LAB | Facility: HOSPITAL | Age: 43
End: 2023-09-26
Payer: COMMERCIAL

## 2023-09-26 ENCOUNTER — DOCUMENTATION (OUTPATIENT)
Dept: ORTHOPEDIC SURGERY | Facility: CLINIC | Age: 43
End: 2023-09-26
Payer: COMMERCIAL

## 2023-09-26 DIAGNOSIS — M79.645 THUMB PAIN, LEFT: Primary | ICD-10-CM

## 2023-09-26 DIAGNOSIS — E55.9 VITAMIN D DEFICIENCY: ICD-10-CM

## 2023-09-26 DIAGNOSIS — E78.2 MIXED HYPERLIPIDEMIA: ICD-10-CM

## 2023-09-26 DIAGNOSIS — Z00.00 WELL ADULT EXAM: ICD-10-CM

## 2023-09-26 DIAGNOSIS — E66.01 CLASS 2 SEVERE OBESITY DUE TO EXCESS CALORIES WITH SERIOUS COMORBIDITY AND BODY MASS INDEX (BMI) OF 37.0 TO 37.9 IN ADULT: ICD-10-CM

## 2023-09-26 DIAGNOSIS — I10 PRIMARY HYPERTENSION: ICD-10-CM

## 2023-09-26 DIAGNOSIS — E53.8 B12 DEFICIENCY: ICD-10-CM

## 2023-09-26 PROCEDURE — 83036 HEMOGLOBIN GLYCOSYLATED A1C: CPT

## 2023-09-26 PROCEDURE — 80053 COMPREHEN METABOLIC PANEL: CPT

## 2023-09-26 PROCEDURE — 85025 COMPLETE CBC W/AUTO DIFF WBC: CPT

## 2023-09-26 PROCEDURE — 84443 ASSAY THYROID STIM HORMONE: CPT

## 2023-09-26 PROCEDURE — 82607 VITAMIN B-12: CPT

## 2023-09-26 PROCEDURE — 82306 VITAMIN D 25 HYDROXY: CPT

## 2023-09-26 PROCEDURE — 80061 LIPID PANEL: CPT

## 2023-09-26 PROCEDURE — 88305 TISSUE EXAM BY PATHOLOGIST: CPT

## 2023-09-26 RX ORDER — OXYCODONE HYDROCHLORIDE 5 MG/1
5 TABLET ORAL EVERY 6 HOURS PRN
Qty: 12 TABLET | Refills: 0 | Status: SHIPPED | OUTPATIENT
Start: 2023-09-26

## 2023-09-26 NOTE — PROGRESS NOTES
DATE OF PROCEDURE: 09/26/2023     PROCEDURES PERFORMED:    1. Left thumb nail unit biopsy CPT 70771    SURGEON: Juma Davila MD      ASSISTANTS:    1. Rhea Whelan    * Surgery not found *      ANESTHESIA: Local    PREOPERATIVE DIAGNOSES:  1. Left thumb nail fold lesion     POSTOPERATIVE DIAGNOSES:    Same     ESTIMATED BLOOD LOSS: 1 mL mL.    SPECIMENS: Left thumb lesion    IMPLANTS: None    COMPLICATIONS: None     INDICATIONS:  Evelia Foster is a 43 y.o. female who initially presented with left thumb nail fold lesion w/ associated pain and swelling.  The risks, benefits, alternatives and potential complications of surgery were discussed with the patient including but not limited to bleeding scarring, infection, recurrence, stiffness, damage to surrounding structures or postoperative pain.  The patient understands the risks and agreed to proceed with surgery.  A surgical informed consent was signed prior to the procedure.     DESCRIPTION OF PROCEDURE:  The patient was greeted in the pre-operative holding area and the surgical site was marked and consent confirmed prior to bringing the patient to the operating room.  The patient was then taken to the operating room and a timeout was performed including the patient's name, procedure and antibiotic administration prior to the patient receiving local anesthesia.  The patient was positioned supine on the operative room table and digital block of the left thumb was performed using a combination of 1% lidocaine and 0.5% Marcaine and then the left upper extremity was then prepped and draped in the usual sterile fashion.  The patient received the appropriate antibiotics within 1 hour of skin incision.    A sterile turnicot was applied to the left thumb.  A backcut was made at the radial aspect of the left thumb nail fold to allow exposure of the underlying nail fold.  The thumbnail lesion was then excised sharply with minimal margins.  The nail fold was then  elevated using combination of sharp and blunt dissection and the underlying surface was rongeured and sent for pathology as well.  There was no associated purulence or purulent drainage from the region.  We felt that the entirety of the lesion was removed so there is no reason to remove the patient's nail.  The wound was then irrigated with copious amounts of normal saline solution.  Skin was closed with 5-0 nylon in horizontal mattress fashion.  The turnicot was removed and Xeroform gauze and an AlumaFoam splint were applied for dressing followed by loosely wrapped Coban.    At the end of the procedure all instrument counts were correct.  The patient was transferred to the PACU in stable condition.  I participated in all parts of the case.

## 2023-09-27 ENCOUNTER — LAB REQUISITION (OUTPATIENT)
Dept: LAB | Facility: HOSPITAL | Age: 43
End: 2023-09-27
Payer: COMMERCIAL

## 2023-09-27 DIAGNOSIS — R22.32 LOCALIZED SWELLING, MASS AND LUMP, LEFT UPPER LIMB: ICD-10-CM

## 2023-09-27 LAB
25(OH)D3 SERPL-MCNC: 26.6 NG/ML (ref 30–100)
ALBUMIN SERPL-MCNC: 4 G/DL (ref 3.5–5.2)
ALBUMIN/GLOB SERPL: 1.3 G/DL
ALP SERPL-CCNC: 99 U/L (ref 39–117)
ALT SERPL W P-5'-P-CCNC: 22 U/L (ref 1–33)
ANION GAP SERPL CALCULATED.3IONS-SCNC: 12.6 MMOL/L (ref 5–15)
AST SERPL-CCNC: 31 U/L (ref 1–32)
BASOPHILS # BLD AUTO: 0.05 10*3/MM3 (ref 0–0.2)
BASOPHILS NFR BLD AUTO: 0.5 % (ref 0–1.5)
BILIRUB SERPL-MCNC: 0.4 MG/DL (ref 0–1.2)
BUN SERPL-MCNC: 8 MG/DL (ref 6–20)
BUN/CREAT SERPL: 10.7 (ref 7–25)
CALCIUM SPEC-SCNC: 9.6 MG/DL (ref 8.6–10.5)
CHLORIDE SERPL-SCNC: 103 MMOL/L (ref 98–107)
CHOLEST SERPL-MCNC: 258 MG/DL (ref 0–200)
CO2 SERPL-SCNC: 21.4 MMOL/L (ref 22–29)
CREAT SERPL-MCNC: 0.75 MG/DL (ref 0.57–1)
DEPRECATED RDW RBC AUTO: 49 FL (ref 37–54)
EGFRCR SERPLBLD CKD-EPI 2021: 102.7 ML/MIN/1.73
EOSINOPHIL # BLD AUTO: 0.12 10*3/MM3 (ref 0–0.4)
EOSINOPHIL NFR BLD AUTO: 1.3 % (ref 0.3–6.2)
ERYTHROCYTE [DISTWIDTH] IN BLOOD BY AUTOMATED COUNT: 14.2 % (ref 12.3–15.4)
GLOBULIN UR ELPH-MCNC: 3 GM/DL
GLUCOSE SERPL-MCNC: 91 MG/DL (ref 65–99)
HBA1C MFR BLD: 5.8 % (ref 4.8–5.6)
HCT VFR BLD AUTO: 41.4 % (ref 34–46.6)
HDLC SERPL-MCNC: 44 MG/DL (ref 40–60)
HGB BLD-MCNC: 14.3 G/DL (ref 12–15.9)
IMM GRANULOCYTES # BLD AUTO: 0.03 10*3/MM3 (ref 0–0.05)
IMM GRANULOCYTES NFR BLD AUTO: 0.3 % (ref 0–0.5)
LDLC SERPL CALC-MCNC: 190 MG/DL (ref 0–100)
LDLC/HDLC SERPL: 4.28 {RATIO}
LYMPHOCYTES # BLD AUTO: 2.96 10*3/MM3 (ref 0.7–3.1)
LYMPHOCYTES NFR BLD AUTO: 31.3 % (ref 19.6–45.3)
MCH RBC QN AUTO: 33.1 PG (ref 26.6–33)
MCHC RBC AUTO-ENTMCNC: 34.5 G/DL (ref 31.5–35.7)
MCV RBC AUTO: 95.8 FL (ref 79–97)
MONOCYTES # BLD AUTO: 0.58 10*3/MM3 (ref 0.1–0.9)
MONOCYTES NFR BLD AUTO: 6.1 % (ref 5–12)
NEUTROPHILS NFR BLD AUTO: 5.73 10*3/MM3 (ref 1.7–7)
NEUTROPHILS NFR BLD AUTO: 60.5 % (ref 42.7–76)
NRBC BLD AUTO-RTO: 0 /100 WBC (ref 0–0.2)
PLATELET # BLD AUTO: 296 10*3/MM3 (ref 140–450)
PMV BLD AUTO: 9.9 FL (ref 6–12)
POTASSIUM SERPL-SCNC: 4.6 MMOL/L (ref 3.5–5.2)
PROT SERPL-MCNC: 7 G/DL (ref 6–8.5)
RBC # BLD AUTO: 4.32 10*6/MM3 (ref 3.77–5.28)
SODIUM SERPL-SCNC: 137 MMOL/L (ref 136–145)
TRIGL SERPL-MCNC: 129 MG/DL (ref 0–150)
TSH SERPL DL<=0.05 MIU/L-ACNC: 2.01 UIU/ML (ref 0.27–4.2)
VIT B12 BLD-MCNC: 213 PG/ML (ref 211–946)
VLDLC SERPL-MCNC: 24 MG/DL (ref 5–40)
WBC NRBC COR # BLD: 9.47 10*3/MM3 (ref 3.4–10.8)

## 2023-09-28 LAB — REF LAB TEST METHOD: NORMAL

## 2023-10-09 ENCOUNTER — TELEPHONE (OUTPATIENT)
Dept: ORTHOPEDIC SURGERY | Facility: CLINIC | Age: 43
End: 2023-10-09
Payer: COMMERCIAL

## 2023-10-09 NOTE — TELEPHONE ENCOUNTER
Caller: Evelia Foster    Relationship to patient: Self    Best call back number: [phone]    Patient is needing: PATIENT HAS WORK CONFLICT AND HAS TO RESCHEDULE FIRST POST OP THAT IS TODAY

## 2023-10-25 ENCOUNTER — OFFICE VISIT (OUTPATIENT)
Dept: ORTHOPEDIC SURGERY | Facility: CLINIC | Age: 43
End: 2023-10-25
Payer: COMMERCIAL

## 2023-10-25 VITALS — TEMPERATURE: 97.5 F

## 2023-10-25 DIAGNOSIS — M79.645 THUMB PAIN, LEFT: Primary | ICD-10-CM

## 2023-10-25 PROCEDURE — 99024 POSTOP FOLLOW-UP VISIT: CPT | Performed by: PLASTIC SURGERY

## 2023-10-25 NOTE — PROGRESS NOTES
[facility]     Follow-up Office Visit       Date: 10/25/2023   Patient Name: Evelia Foster  MRN: [id number]  YOB: 1980    Chief Complaint:   Chief Complaint   Patient presents with    Post-op     4 Week Status Post - Left thumb nail unit biopsy 9/26/23       History of Present Illness:   Evelia Foster is a 43 y.o. female presents for follow-up of the above procedure.  He has been well since surgery.  She reports that her sutures fell out on their own.  Her pain is improved from preop.  She has no complaints at this time.      Subjective   Review of Systems:   Review of Systems   Constitutional: Negative.  Negative for chills, fatigue and fever.   HENT: Negative.  Negative for congestion and dental problem.    Eyes: Negative.  Negative for blurred vision.   Respiratory: Negative.  Negative for shortness of breath.    Cardiovascular: Negative.  Negative for leg swelling.   Gastrointestinal: Negative.  Negative for abdominal pain.   Endocrine: Negative.  Negative for polyuria.   Genitourinary: Negative.  Negative for difficulty urinating.   Musculoskeletal:  Positive for arthralgias.   Skin: Negative.    Allergic/Immunologic: Negative.    Neurological: Negative.    Hematological: Negative.  Negative for adenopathy.   Psychiatric/Behavioral: Negative.  Negative for behavioral problems.         Pertinent review of systems per HPI    I reviewed the patient's chief complaint, history of present illness, review of systems, past medical history, surgical history, family history, social history, medications and allergy list in the EMR on 10/25/2023 and agree with the findings above.    Objective    Vital Signs:   Vitals:    10/25/23 1424   Temp: 97.5 °F (36.4 °C)   TempSrc: Temporal     BMI: Class 2 Severe Obesity (BMI >=35 and <=39.9). Obesity-related health conditions include the following: none. Obesity is unchanged.  BMI is is above average; no BMI management plan is appropriate. We discussed portion control and increasing exercise.       General Appearance: No acute distress. Alert and oriented.     Chest:  Non-labored breathing on room air      Left upper Extremity:  Left thumb incision well-healed.  No notable nail deformity.  No evidence of purulence or drainage.  Fingers warm and well-perfused distally  Sensation intact to light touch in the median, radian and ulnar nerve distributions    Imaging/Studies:   Imaging Results (Last 24 Hours)       ** No results found for the last 24 hours. **            No new imaging.    Procedures:  Procedures    Quality Measures:   ACP:   ACP discussion was declined by the patient. Patient does not have an advance directive, declines further assistance.    Tobacco:   Evelia Foster  reports that she has been smoking cigarettes. She started smoking about 25 years ago. She has a 12.00 pack-year smoking history. She has been exposed to tobacco smoke. She has never used smokeless tobacco..         Assessment / Plan    Assessment/Plan:      Diagnosis Plan   1. Thumb pain, left            Patient reports presents status post left thumb excision of nailbed lesion.  Pathology of the lesion significant for common wart.  Patient has no evidence of recurrence at this time.  Patient reports that her pain is improved compared to preoperatively.  We will see her back in 3 months for follow-up.    Follow Up:   Return in about 3 months (around 1/25/2024).        Juma Davila MD  [facility] Hand and Upper Extremity Surgeon

## 2023-12-05 ENCOUNTER — OFFICE VISIT (OUTPATIENT)
Dept: FAMILY MEDICINE CLINIC | Facility: CLINIC | Age: 43
End: 2023-12-05
Payer: COMMERCIAL

## 2023-12-05 VITALS
DIASTOLIC BLOOD PRESSURE: 82 MMHG | BODY MASS INDEX: 37.12 KG/M2 | WEIGHT: 231 LBS | OXYGEN SATURATION: 97 % | SYSTOLIC BLOOD PRESSURE: 140 MMHG | HEART RATE: 87 BPM | HEIGHT: 66 IN

## 2023-12-05 DIAGNOSIS — R07.9 CHEST PAIN, UNSPECIFIED TYPE: ICD-10-CM

## 2023-12-05 DIAGNOSIS — R42 DIZZINESS: ICD-10-CM

## 2023-12-05 DIAGNOSIS — I10 PRIMARY HYPERTENSION: ICD-10-CM

## 2023-12-05 DIAGNOSIS — R00.2 PALPITATIONS: Primary | ICD-10-CM

## 2023-12-05 PROCEDURE — 99214 OFFICE O/P EST MOD 30 MIN: CPT | Performed by: FAMILY MEDICINE

## 2023-12-05 NOTE — ASSESSMENT & PLAN NOTE
Patient been having episodes of dizziness along with palpitations.  She occasionally has chest pain which is more of a chronic issue and numbness other related to the palpitations and dizziness.  She has had cardiac workup in the ED previously which was unrevealing recently in July.  Patient was given referral to heart valve clinic for further evaluation and treatment.  RTC/ED precautions given.

## 2023-12-05 NOTE — ASSESSMENT & PLAN NOTE
Blood pressure was mildly elevated today but is usually well-controlled at home.  Patient will continue current medications for the time being.

## 2023-12-05 NOTE — PROGRESS NOTES
Evelia Foster is a 43 y.o. female who presents today for Hypertension      Patient is here for HTN follow-up she has been checking her blood pressure at home and it is usually in the normal range. She does periodically get a feeling of dizziness and have some tingling in her finger tips and when she checks blood pressure then she notices that is closer to what we got here today 140-150s/100s. She states the dizziness happens 1-3 times a week. The last time it occurred she was just at her desk working on paper work. She does tend to get a rapid heart rate and feeling of palpitations when this occurs. She has not passed out. She gets some chest tightness but no pain. She denies headaches but occasionally will having tinnitus. No vomiting but will occasionally have nausea. She has been having these symptoms for about a year or maybe a little more. It has not gotten more frequent or severe in that time frame. She has been seen in the ED but work-up was unrevealing. She has not noticed anything that makes it worse. Sometimes getting up and walking around helps. She states that when she coughs it will sometimes get rid of the episodes. She has tried taking hydroxyzine for the episodes and it just makes her go to sleep but she can not to if it helps.          Review of Systems   Constitutional:  Positive for diaphoresis. Negative for fatigue, fever and unexpected weight loss.   HENT:  Positive for tinnitus. Negative for congestion, ear pain and sore throat.    Eyes:  Negative for visual disturbance.   Respiratory:  Positive for chest tightness. Negative for cough, shortness of breath and wheezing.    Cardiovascular:  Positive for palpitations. Negative for chest pain.   Gastrointestinal:  Negative for abdominal pain, blood in stool, constipation, diarrhea, nausea, vomiting and GERD.   Endocrine: Negative for polydipsia and polyuria.   Genitourinary:  Negative for difficulty urinating.   Musculoskeletal:  Negative for  "joint swelling.   Skin:  Negative for rash and skin lesions.   Allergic/Immunologic: Negative for environmental allergies.   Neurological:  Positive for dizziness and light-headedness. Negative for seizures, syncope and headache.   Hematological:  Does not bruise/bleed easily.   Psychiatric/Behavioral:  Negative for suicidal ideas.         The following portions of the patient's history were reviewed and updated as appropriate: allergies, current medications, past family history, past medical history, past social history, past surgical history and problem list.    Current Outpatient Medications on File Prior to Visit   Medication Sig Dispense Refill    albuterol sulfate  (90 Base) MCG/ACT inhaler Inhale 2 puffs Every 4 (Four) Hours As Needed for Wheezing. 18 g 3    hydrOXYzine (ATARAX) 25 MG tablet Take 1 tablet by mouth Every 6 (Six) Hours As Needed for Anxiety. 100 tablet 3    ibuprofen (ADVIL,MOTRIN) 200 MG tablet Take 3 tablets by mouth As Needed for Mild Pain.      metoprolol succinate XL (TOPROL-XL) 50 MG 24 hr tablet TAKE 1 TABLET BY MOUTH EVERY DAY 90 tablet 0    [DISCONTINUED] oxyCODONE (ROXICODONE) 5 MG immediate release tablet Take 1 tablet by mouth Every 6 (Six) Hours As Needed for Severe Pain. (Patient not taking: Reported on 10/25/2023) 12 tablet 0     No current facility-administered medications on file prior to visit.       Allergies   Allergen Reactions    Buspar [Buspirone] Confusion        Visit Vitals  /82 (BP Location: Left arm, Patient Position: Sitting, Cuff Size: Adult)   Pulse 87   Ht 166.4 cm (65.5\")   Wt 105 kg (231 lb)   LMP 12/18/2018 Comment: last mammogram 2020   SpO2 97%   BMI 37.86 kg/m²        Physical Exam  Constitutional:       General: She is not in acute distress.     Appearance: She is well-developed. She is not diaphoretic.   HENT:      Head: Atraumatic.   Cardiovascular:      Rate and Rhythm: Normal rate and regular rhythm.      Heart sounds: Normal heart sounds. " No murmur heard.     No friction rub. No gallop.   Pulmonary:      Effort: Pulmonary effort is normal. No respiratory distress.      Breath sounds: Normal breath sounds. No stridor. No wheezing, rhonchi or rales.   Musculoskeletal:      Cervical back: Normal range of motion and neck supple.   Skin:     General: Skin is warm and dry.   Neurological:      Mental Status: She is alert and oriented to person, place, and time.   Psychiatric:         Behavior: Behavior normal.               Problems Addressed this Visit          Cardiac and Vasculature    Chest pain    Relevant Orders    Ambulatory Referral to [facility]    Hypertension     Blood pressure was mildly elevated today but is usually well-controlled at home.  Patient will continue current medications for the time being.         Relevant Orders    Ambulatory Referral to [facility]    Palpitations - Primary     Patient been having episodes of dizziness along with palpitations.  She occasionally has chest pain which is more of a chronic issue and numbness other related to the palpitations and dizziness.  She has had cardiac workup in the ED previously which was unrevealing recently in July.  Patient was given referral to heart valve clinic for further evaluation and treatment.  RTC/ED precautions given.         Relevant Orders    Ambulatory Referral to [facility]       Symptoms and Signs    Dizziness    Relevant Orders    Ambulatory Referral to [facility]     Diagnoses         Codes Comments    Palpitations    -  Primary ICD-10-CM: R00.2  ICD-9-CM: 785.1     Chest pain, unspecified type     ICD-10-CM: R07.9  ICD-9-CM: 786.50     Primary hypertension     ICD-10-CM: I10  ICD-9-CM: 401.9     Dizziness     ICD-10-CM: R42  ICD-9-CM: 780.4             Return in about 6 weeks (around 1/15/2024) for Follow-up HTN, anxiety.    30 minutes was spent on this  patient encounter which included history taking, physical exam, answering patient questions, counseling, discussing treatment plan, placing orders, and documentation.    Nabil Green MD   12/5/2023

## 2023-12-08 ENCOUNTER — HOSPITAL ENCOUNTER (OUTPATIENT)
Dept: CARDIOLOGY | Facility: HOSPITAL | Age: 43
Discharge: HOME | End: 2023-12-08
Payer: COMMERCIAL

## 2023-12-08 ENCOUNTER — OFFICE VISIT (OUTPATIENT)
Dept: CARDIOLOGY | Facility: HOSPITAL | Age: 43
End: 2023-12-08
Payer: COMMERCIAL

## 2023-12-08 ENCOUNTER — TELEPHONE (OUTPATIENT)
Dept: CARDIOLOGY | Facility: HOSPITAL | Age: 43
End: 2023-12-08
Payer: COMMERCIAL

## 2023-12-08 VITALS
WEIGHT: 235.38 LBS | HEART RATE: 78 BPM | TEMPERATURE: 97.5 F | BODY MASS INDEX: 37.83 KG/M2 | SYSTOLIC BLOOD PRESSURE: 146 MMHG | DIASTOLIC BLOOD PRESSURE: 72 MMHG | HEIGHT: 66 IN | RESPIRATION RATE: 18 BRPM | OXYGEN SATURATION: 96 %

## 2023-12-08 DIAGNOSIS — I10 PRIMARY HYPERTENSION: ICD-10-CM

## 2023-12-08 DIAGNOSIS — R42 DIZZINESS: ICD-10-CM

## 2023-12-08 DIAGNOSIS — R53.83 OTHER FATIGUE: ICD-10-CM

## 2023-12-08 DIAGNOSIS — R00.2 PALPITATIONS: ICD-10-CM

## 2023-12-08 DIAGNOSIS — G47.00 FREQUENT NOCTURNAL AWAKENING: ICD-10-CM

## 2023-12-08 DIAGNOSIS — R06.09 DOE (DYSPNEA ON EXERTION): Primary | ICD-10-CM

## 2023-12-08 PROCEDURE — 99213 OFFICE O/P EST LOW 20 MIN: CPT | Performed by: NURSE PRACTITIONER

## 2023-12-08 PROCEDURE — 93246 EXT ECG>7D<15D RECORDING: CPT

## 2023-12-08 NOTE — PROGRESS NOTES
[facility] Heart Monitor Documentation    Evelia Foster  1980  [id number]  12/08/23      [] ZIO XT Patch  Model [id number] Prescribed for N/A Days    Serial Number: (N + 9 Digits) N   Apply-By Date on Box:   USPS Tracking Number:   USPS Tracking        [] [employer] MINI PLUS Mobile Cardiac Telemetry  Model BGMINIPLUS Prescribed for N/A Days    Serial Number: (BGM + 7 Digits) BGM  Shipped-By Date on Box:   UPS Tracking Number: [id number]  UPS Tracking      [] [employer] MINI Holter Monitor  Model BGMINIEL Prescribed for 14 Days    Serial Number: (7 Digits) [id number]  Shipped-By Date on Box: 469310  UPS Tracking Number: [id number]  UPS Tracking        This monitor was applied to the patient's chest and checked for proper functioning.  Ms. Evelia Foster was instructed in the proper use of this monitor.  She was given the opportunity to ask questions and left the office with the device 's instruction manual.    Anne Guardado MA, 14:40 EST, 12/08/23                  [facility]

## 2023-12-11 NOTE — PROGRESS NOTES
"Chief Complaint  Chest Pain, Dizziness, Hypertension, Palpitations, and Establish Care    Subjective    History of Present Illness {CC  Problem List  Visit  Diagnosis   Encounters  Notes  Medications  Labs  Result Review Imaging  Media :23}       History of Present Illness   43 year old female presents to the office today for ongoing evaluation of of her palpitations, fatigue, dyspnea and dizziness.She reports intermittent feelings of dizziness and tingling in her fingertips when she checks her blood pressure.  Dizziness is occurring 1-3 times a week.  She also reports experiencing a rapid heart rate and feelings of palpitations.  She has not had a full syncopal spell she does report occasional chest tightness as well as dyspnea on exertion.  Objective     Vital Signs:   Vitals:    12/08/23 1349 12/08/23 1350 12/08/23 1352   BP: 164/81 156/78 146/72   BP Location: Right arm Left arm Left arm   Patient Position: Sitting Standing Sitting   Cuff Size: Adult Adult Adult   Pulse: 81 83 78   Resp:   18   Temp:   97.5 °F (36.4 °C)   TempSrc:   Temporal   SpO2: 95% 95% 96%   Weight:   107 kg (235 lb 6 oz)   Height:   166.4 cm (65.5\")     Body mass index is 38.57 kg/m².  Physical Exam  Vitals and nursing note reviewed.   Constitutional:       Appearance: Normal appearance.   HENT:      Head: Normocephalic.   Eyes:      Pupils: Pupils are equal, round, and reactive to light.   Cardiovascular:      Rate and Rhythm: Normal rate and regular rhythm.      Pulses: Normal pulses.      Heart sounds: Normal heart sounds. No murmur heard.  Pulmonary:      Effort: Pulmonary effort is normal.      Breath sounds: Normal breath sounds.   Abdominal:      General: Bowel sounds are normal.      Palpations: Abdomen is soft.   Musculoskeletal:         General: Normal range of motion.      Cervical back: Normal range of motion.      Right lower leg: No edema.      Left lower leg: No edema.   Skin:     General: Skin is warm and dry.     "  Capillary Refill: Capillary refill takes less than 2 seconds.   Neurological:      Mental Status: She is alert and oriented to person, place, and time.   Psychiatric:         Mood and Affect: Mood normal.         Thought Content: Thought content normal.              Result Review  Data Reviewed:{ Labs  Result Review  Imaging  Med Tab  Media :23}   Treadmill Stress Test (07/29/2022 08:05)   Comprehensive Metabolic Panel (09/26/2023 11:54)  TSH Rfx On Abnormal To Free T4 (09/26/2023 11:54)  CBC & Differential (09/26/2023 11:54)  Lipid Panel (09/26/2023 11:54)  Hemoglobin A1c (09/26/2023 11:54)  Vitamin D,25-Hydroxy (09/26/2023 11:54)  Vitamin B12 (09/26/2023 11:54)         Assessment and Plan {CC Problem List  Visit Diagnosis  ROS  Review (Popup)  Health Maintenance  Quality  BestPractice  Medications  SmartSets  SnapShot Encounters  Media :23}   1. GONZALEZ (dyspnea on exertion)    - Adult Transthoracic Echo Complete W/ Cont if Necessary Per Protocol; Future  - Ambulatory Referral to Sleep Medicine    2. Primary hypertension  Stable on toprol   - Adult Transthoracic Echo Complete W/ Cont if Necessary Per Protocol; Future    3. Palpitations    - Adult Transthoracic Echo Complete W/ Cont if Necessary Per Protocol; Future  - Holter Monitor - 72 Hour Up To 15 Days; Future    4. Other fatigue    - Adult Transthoracic Echo Complete W/ Cont if Necessary Per Protocol; Future  - Ambulatory Referral to Sleep Medicine    5. Dizziness    - Adult Transthoracic Echo Complete W/ Cont if Necessary Per Protocol; Future  - Holter Monitor - 72 Hour Up To 15 Days; Future    6. Frequent nocturnal awakening    - Ambulatory Referral to Sleep Medicine        Follow Up {Instructions Charge Capture  Follow-up Communications :23}   Return in about 4 weeks (around 1/5/2024) for Office visit, Monitor results, HTN.    Patient was given instructions and counseling regarding her condition or for health maintenance advice. Please see  specific information pulled into the AVS if appropriate.  Patient was instructed to call the [facility] with any questions, concerns, or worsening symptoms.

## 2023-12-12 ENCOUNTER — TELEPHONE (OUTPATIENT)
Dept: CARDIOLOGY | Facility: HOSPITAL | Age: 43
End: 2023-12-12
Payer: COMMERCIAL

## 2023-12-12 NOTE — TELEPHONE ENCOUNTER
----- Message from Anne Guardado MA sent at 12/11/2023  4:18 PM EST -----  I spoke to her and she said this is something she forgot to discuss with you and she said it happens quite often,  ----- Message -----  From: Sravanthi Birch MA  Sent: 12/8/2023   3:40 PM EST  To: Anne Guardado MA    She just called stating she had some edema when she was driving home from [place] and it took a day and a half to go down.

## 2023-12-21 DIAGNOSIS — I10 ESSENTIAL (PRIMARY) HYPERTENSION: ICD-10-CM

## 2023-12-21 RX ORDER — METOPROLOL SUCCINATE 50 MG/1
TABLET, EXTENDED RELEASE ORAL
Qty: 90 TABLET | Refills: 0 | Status: SHIPPED | OUTPATIENT
Start: 2023-12-21

## 2024-01-03 ENCOUNTER — HOSPITAL ENCOUNTER (OUTPATIENT)
Dept: CARDIOLOGY | Facility: HOSPITAL | Age: 44
Discharge: HOME | End: 2024-01-03
Admitting: NURSE PRACTITIONER
Payer: COMMERCIAL

## 2024-01-03 VITALS — BODY MASS INDEX: 39.3 KG/M2 | HEIGHT: 65 IN | WEIGHT: 235.89 LBS

## 2024-01-03 DIAGNOSIS — I10 PRIMARY HYPERTENSION: ICD-10-CM

## 2024-01-03 DIAGNOSIS — R42 DIZZINESS: ICD-10-CM

## 2024-01-03 DIAGNOSIS — R06.09 DOE (DYSPNEA ON EXERTION): ICD-10-CM

## 2024-01-03 DIAGNOSIS — R00.2 PALPITATIONS: ICD-10-CM

## 2024-01-03 DIAGNOSIS — R53.83 OTHER FATIGUE: ICD-10-CM

## 2024-01-03 LAB
ASCENDING AORTA: 2.9 CM
BH CV ECHO MEAS - AO MAX PG: 7.4 MMHG
BH CV ECHO MEAS - AO MEAN PG: 4 MMHG
BH CV ECHO MEAS - AO ROOT DIAM: 3.2 CM
BH CV ECHO MEAS - AO V2 MAX: 136 CM/SEC
BH CV ECHO MEAS - AO V2 VTI: 25.6 CM
BH CV ECHO MEAS - AVA(I,D): 2.9 CM2
BH CV ECHO MEAS - EDV(CUBED): 103.2 ML
BH CV ECHO MEAS - EDV(MOD-SP2): 46.9 ML
BH CV ECHO MEAS - EDV(MOD-SP4): 64.2 ML
BH CV ECHO MEAS - EF(MOD-BP): 67 %
BH CV ECHO MEAS - EF(MOD-SP2): 73.6 %
BH CV ECHO MEAS - EF(MOD-SP4): 64.5 %
BH CV ECHO MEAS - ESV(CUBED): 22.7 ML
BH CV ECHO MEAS - ESV(MOD-SP2): 12.4 ML
BH CV ECHO MEAS - ESV(MOD-SP4): 22.8 ML
BH CV ECHO MEAS - FS: 39.7 %
BH CV ECHO MEAS - IVS/LVPW: 1.1 CM
BH CV ECHO MEAS - IVSD: 0.9 CM
BH CV ECHO MEAS - LA DIMENSION: 3.5 CM
BH CV ECHO MEAS - LAT PEAK E' VEL: 12.1 CM/SEC
BH CV ECHO MEAS - LV MASS(C)D: 174.6 GRAMS
BH CV ECHO MEAS - LV MAX PG: 6.2 MMHG
BH CV ECHO MEAS - LV MEAN PG: 3 MMHG
BH CV ECHO MEAS - LV V1 MAX: 124 CM/SEC
BH CV ECHO MEAS - LV V1 VTI: 22.7 CM
BH CV ECHO MEAS - LVIDD: 4.7 CM
BH CV ECHO MEAS - LVIDS: 2.8 CM
BH CV ECHO MEAS - LVOT AREA: 3.3 CM2
BH CV ECHO MEAS - LVOT DIAM: 2.03 CM
BH CV ECHO MEAS - LVPWD: 1 CM
BH CV ECHO MEAS - MED PEAK E' VEL: 8.6 CM/SEC
BH CV ECHO MEAS - MV A MAX VEL: 82.3 CM/SEC
BH CV ECHO MEAS - MV DEC SLOPE: 422.2 CM/SEC2
BH CV ECHO MEAS - MV DEC TIME: 0.29 SEC
BH CV ECHO MEAS - MV E MAX VEL: 72.4 CM/SEC
BH CV ECHO MEAS - MV E/A: 0.88
BH CV ECHO MEAS - MV MAX PG: 3.5 MMHG
BH CV ECHO MEAS - MV MEAN PG: 1.66 MMHG
BH CV ECHO MEAS - MV P1/2T: 63.1 MSEC
BH CV ECHO MEAS - MV V2 VTI: 27.9 CM
BH CV ECHO MEAS - MVA(P1/2T): 3.5 CM2
BH CV ECHO MEAS - MVA(VTI): 2.6 CM2
BH CV ECHO MEAS - PA ACC TIME: 0.14 SEC
BH CV ECHO MEAS - SV(LVOT): 73.8 ML
BH CV ECHO MEAS - SV(MOD-SP2): 34.5 ML
BH CV ECHO MEAS - SV(MOD-SP4): 41.4 ML
BH CV ECHO MEAS - TAPSE (>1.6): 2.4 CM
BH CV ECHO MEASUREMENTS AVERAGE E/E' RATIO: 7
BH CV VAS BP RIGHT ARM: NORMAL MMHG
BH CV XLRA - RV BASE: 2.31 CM
BH CV XLRA - RV MID: 1.97 CM
BH CV XLRA - TDI S': 13.7 CM/SEC
IVRT: 95 MS
LEFT ATRIUM VOLUME INDEX: 10.8 ML/M2

## 2024-01-03 PROCEDURE — 93306 TTE W/DOPPLER COMPLETE: CPT

## 2024-01-04 NOTE — PROGRESS NOTES
Your echocardiogram is normal.  Your heart contracts normally.  There are no significant valvular abnormalities noted.    If you have any questions regarding this letter please let me know.    Sincerely yours,        Aditi FOLEY

## 2024-01-05 ENCOUNTER — OFFICE VISIT (OUTPATIENT)
Dept: CARDIOLOGY | Facility: HOSPITAL | Age: 44
End: 2024-01-05
Payer: COMMERCIAL

## 2024-01-05 VITALS
HEIGHT: 66 IN | TEMPERATURE: 97.4 F | OXYGEN SATURATION: 96 % | DIASTOLIC BLOOD PRESSURE: 76 MMHG | RESPIRATION RATE: 20 BRPM | HEART RATE: 82 BPM | SYSTOLIC BLOOD PRESSURE: 147 MMHG | BODY MASS INDEX: 37.51 KG/M2 | WEIGHT: 233.38 LBS

## 2024-01-05 DIAGNOSIS — I10 ESSENTIAL (PRIMARY) HYPERTENSION: ICD-10-CM

## 2024-01-05 DIAGNOSIS — R06.09 DOE (DYSPNEA ON EXERTION): Primary | ICD-10-CM

## 2024-01-05 DIAGNOSIS — R00.2 PALPITATIONS: ICD-10-CM

## 2024-01-05 DIAGNOSIS — R42 DIZZINESS: ICD-10-CM

## 2024-01-05 RX ORDER — METOPROLOL SUCCINATE 50 MG/1
50 TABLET, EXTENDED RELEASE ORAL 2 TIMES DAILY
Qty: 180 TABLET | Refills: 3 | Status: SHIPPED | OUTPATIENT
Start: 2024-01-05

## 2024-01-05 RX ORDER — HYDROCHLOROTHIAZIDE 25 MG/1
25 TABLET ORAL DAILY PRN
Qty: 30 TABLET | Refills: 2 | Status: SHIPPED | OUTPATIENT
Start: 2024-01-05 | End: 2024-01-11

## 2024-01-05 NOTE — PROGRESS NOTES
"Chief Complaint  Follow-up and Palpitations    Subjective    History of Present Illness {CC  Problem List  Visit  Diagnosis   Encounters  Notes  Medications  Labs  Result Review Imaging  Media :23}       History of Present Illness   43 year old female presents to the office today for ongoing evaluation of of her palpitations, fatigue, dyspnea and dizziness.She reports intermittent feelings of dizziness and tingling in her fingertips when she checks her blood pressure.  Dizziness is occurring 1-3 times a week.  She also reports experiencing a rapid heart rate and feelings of palpitations.  She has not had a full syncopal spell she does report occasional chest tightness as well as dyspnea on exertion.  Objective     Vital Signs:   Vitals:    01/05/24 1416   BP: 147/76   BP Location: Left arm   Patient Position: Sitting   Cuff Size: Adult   Pulse: 82   Resp: 20   Temp: 97.4 °F (36.3 °C)   TempSrc: Temporal   SpO2: 96%   Weight: 106 kg (233 lb 6 oz)   Height: 166.4 cm (65.5\")       Body mass index is 38.24 kg/m².  Physical Exam  Vitals and nursing note reviewed.   Constitutional:       Appearance: Normal appearance.   HENT:      Head: Normocephalic.   Eyes:      Pupils: Pupils are equal, round, and reactive to light.   Cardiovascular:      Rate and Rhythm: Normal rate and regular rhythm.      Pulses: Normal pulses.      Heart sounds: Normal heart sounds. No murmur heard.  Pulmonary:      Effort: Pulmonary effort is normal.      Breath sounds: Normal breath sounds.   Abdominal:      General: Bowel sounds are normal.      Palpations: Abdomen is soft.   Musculoskeletal:         General: Normal range of motion.      Cervical back: Normal range of motion.      Right lower leg: No edema.      Left lower leg: No edema.   Skin:     General: Skin is warm and dry.      Capillary Refill: Capillary refill takes less than 2 seconds.   Neurological:      Mental Status: She is alert and oriented to person, place, and time. "   Psychiatric:         Mood and Affect: Mood normal.         Thought Content: Thought content normal.            Result Review  Data Reviewed:{ Labs  Result Review  Imaging  Med Tab  Media :23}   Treadmill Stress Test (07/29/2022 08:05)   Comprehensive Metabolic Panel (09/26/2023 11:54)  TSH Rfx On Abnormal To Free T4 (09/26/2023 11:54)  CBC & Differential (09/26/2023 11:54)  Lipid Panel (09/26/2023 11:54)  Hemoglobin A1c (09/26/2023 11:54)  Vitamin D,25-Hydroxy (09/26/2023 11:54)  Vitamin B12 (09/26/2023 11:54)    Adult Transthoracic Echo Complete W/ Cont if Necessary Per Protocol (01/03/2024 15:51)   14-day Holter showed an average heart rate of 93 bpm, PAC/PVC burden less than 1% with 1 brief (9 beat) run of atrial tachycardia occurring during nocturnal hours     Assessment and Plan {CC Problem List  Visit Diagnosis  ROS  Review (Popup)  Health Maintenance  Quality  BestPractice  Medications  SmartSets  SnapShot Encounters  Media :23}   1. GONZALEZ (dyspnea on exertion)  Normal echo       2. Primary hypertension  Increase toprol to 50 mg bid       3. Palpitations  Intermittent periods of tachycardia   Increase toprol to 50 mg bid     4. Dizziness  Encouraged good hydration         Follow Up {Instructions Charge Capture  Follow-up Communications :23}   Return in about 3 weeks (around 1/26/2024) for Telemedicine visit tachycardia, htn .    Patient was given instructions and counseling regarding her condition or for health maintenance advice. Please see specific information pulled into the AVS if appropriate.  Patient was instructed to call the [facility] with any questions, concerns, or worsening symptoms.

## 2024-01-11 RX ORDER — HYDROCHLOROTHIAZIDE 25 MG/1
25 TABLET ORAL DAILY PRN
Qty: 90 TABLET | Refills: 1 | Status: SHIPPED | OUTPATIENT
Start: 2024-01-11

## 2024-01-11 NOTE — TELEPHONE ENCOUNTER
Last seen on 1/5/24 and will follow up on 1/25/24. Refill sent to [facility] [address]     Wendy Morton, DannielleD

## 2024-01-24 ENCOUNTER — LAB (OUTPATIENT)
Dept: LAB | Facility: HOSPITAL | Age: 44
End: 2024-01-24
Payer: COMMERCIAL

## 2024-01-24 ENCOUNTER — OFFICE VISIT (OUTPATIENT)
Dept: FAMILY MEDICINE CLINIC | Facility: CLINIC | Age: 44
End: 2024-01-24
Payer: COMMERCIAL

## 2024-01-24 VITALS
WEIGHT: 232 LBS | HEART RATE: 74 BPM | DIASTOLIC BLOOD PRESSURE: 78 MMHG | TEMPERATURE: 96.8 F | SYSTOLIC BLOOD PRESSURE: 120 MMHG | BODY MASS INDEX: 37.28 KG/M2 | HEIGHT: 66 IN

## 2024-01-24 DIAGNOSIS — E55.9 VITAMIN D DEFICIENCY: ICD-10-CM

## 2024-01-24 DIAGNOSIS — G89.29 CHRONIC RIGHT-SIDED LOW BACK PAIN WITHOUT SCIATICA: ICD-10-CM

## 2024-01-24 DIAGNOSIS — M54.50 CHRONIC RIGHT-SIDED LOW BACK PAIN WITHOUT SCIATICA: ICD-10-CM

## 2024-01-24 DIAGNOSIS — E78.2 MIXED HYPERLIPIDEMIA: ICD-10-CM

## 2024-01-24 DIAGNOSIS — I10 PRIMARY HYPERTENSION: Primary | ICD-10-CM

## 2024-01-24 DIAGNOSIS — I10 PRIMARY HYPERTENSION: ICD-10-CM

## 2024-01-24 DIAGNOSIS — R60.0 LOCALIZED EDEMA: ICD-10-CM

## 2024-01-24 PROCEDURE — 80053 COMPREHEN METABOLIC PANEL: CPT

## 2024-01-24 PROCEDURE — 80061 LIPID PANEL: CPT

## 2024-01-24 PROCEDURE — 99214 OFFICE O/P EST MOD 30 MIN: CPT | Performed by: FAMILY MEDICINE

## 2024-01-24 PROCEDURE — 82306 VITAMIN D 25 HYDROXY: CPT

## 2024-01-24 RX ORDER — ROSUVASTATIN CALCIUM 20 MG/1
20 TABLET, COATED ORAL DAILY
Qty: 90 TABLET | Refills: 1 | Status: SHIPPED | OUTPATIENT
Start: 2024-01-24

## 2024-01-24 NOTE — PROGRESS NOTES
Evelia Foster is a 44 y.o. female who presents today for Hyperlipidemia, Hypertension, and Vitamin D Deficiency      Patient saw cardiology and had echo and cardiac monitor. Echo was normal and cardiac monitor showed some short runs of tachycardia. She was started on HCTZ and increased metoprolol to 50mg BID. She has noticed less palpitations and has not had the chest tightness. Her blood pressure is well controlled today but she has not been checking at home. She has still noticed the edema in her feet and ankles that worsens at the end of the day and improves with elevation. She has not been wearing compression stockings and is on her feet at work a lot of the day. Patient has continued to have right sided low back pain that does not radiate. She has been taking ibuprofen, using heating pads, and episome salt baths which provide minimal relief. She has had the pain for 6 months. It waxes and wanes but does not completely resolve. She denies tingling, numbness, weakness, and incontinence. She had been taking 50,000 units of vitamin D weekly but ran out over the summer. She has not been exercising. She has been trying to eat a healthy diet but struggles with this.          Review of Systems   Constitutional:  Negative for fever and unexpected weight loss.   HENT:  Negative for congestion, ear pain and sore throat.    Eyes:  Negative for visual disturbance.   Respiratory:  Negative for cough, shortness of breath and wheezing.    Cardiovascular:  Positive for palpitations (improving) and leg swelling. Negative for chest pain.   Gastrointestinal:  Negative for abdominal pain, blood in stool, constipation, diarrhea, nausea, vomiting and GERD.   Endocrine: Negative for polydipsia and polyuria.   Genitourinary:  Negative for difficulty urinating.   Musculoskeletal:  Positive for back pain (chronic and stable but poorly controlled). Negative for joint swelling.   Skin:  Negative for rash and skin lesions.  "  Allergic/Immunologic: Negative for environmental allergies.   Neurological:  Negative for seizures and syncope.   Hematological:  Does not bruise/bleed easily.   Psychiatric/Behavioral:  Negative for suicidal ideas.         The following portions of the patient's history were reviewed and updated as appropriate: allergies, current medications, past family history, past medical history, past social history, past surgical history and problem list.    Current Outpatient Medications on File Prior to Visit   Medication Sig Dispense Refill    albuterol sulfate  (90 Base) MCG/ACT inhaler Inhale 2 puffs Every 4 (Four) Hours As Needed for Wheezing. 18 g 3    hydroCHLOROthiazide (HYDRODIURIL) 25 MG tablet TAKE 1 TABLET BY MOUTH DAILY AS NEEDED (EDEMA). 90 tablet 1    ibuprofen (ADVIL,MOTRIN) 200 MG tablet Take 3 tablets by mouth As Needed for Mild Pain.      metoprolol succinate XL (TOPROL-XL) 50 MG 24 hr tablet Take 1 tablet by mouth 2 (Two) Times a Day. 180 tablet 3     No current facility-administered medications on file prior to visit.       Allergies   Allergen Reactions    Buspar [Buspirone] Confusion        Visit Vitals  /78   Pulse 74   Temp 96.8 °F (36 °C) (Temporal)   Ht 166.4 cm (65.51\")   Wt 105 kg (232 lb)   LMP 12/18/2018 Comment: last mammogram 2020   BMI 38.01 kg/m²        Physical Exam  Constitutional:       General: She is not in acute distress.     Appearance: She is well-developed. She is not diaphoretic.   HENT:      Head: Atraumatic.   Cardiovascular:      Rate and Rhythm: Normal rate and regular rhythm.      Heart sounds: Normal heart sounds. No murmur heard.     No friction rub. No gallop.   Pulmonary:      Effort: Pulmonary effort is normal. No respiratory distress.      Breath sounds: Normal breath sounds. No stridor. No wheezing, rhonchi or rales.   Musculoskeletal:      Cervical back: Normal range of motion and neck supple.      Right lower leg: No edema.      Left lower leg: No " edema.   Skin:     General: Skin is warm and dry.   Neurological:      Mental Status: She is alert and oriented to person, place, and time.   Psychiatric:         Behavior: Behavior normal.               Problems Addressed this Visit          Cardiac and Vasculature    Hypertension - Primary     Hypertension is improving with treatment.  Continue current treatment regimen.  Blood pressure will be reassessed in 3 months.         Relevant Orders    Comprehensive Metabolic Panel    Lipid Panel    Mixed hyperlipidemia     Lipid abnormalities are  uncontrolled .  Nutritional counseling was provided. and Pharmacotherapy as ordered.  Lipids will be reassessed in 3 months.         Relevant Medications    rosuvastatin (Crestor) 20 MG tablet    Other Relevant Orders    Comprehensive Metabolic Panel    Lipid Panel       Endocrine and Metabolic    Vitamin D deficiency     Chronic with low vitamin D on last labs.  Will recheck vitamin D level today.  Patient has not been taking a vitamin D supplement since the summer.  Will restart vitamin D supplementation with 50,000 units weekly or possibly 3000 units daily depending on her current vitamin D level.         Relevant Orders    Vitamin D,25-Hydroxy       Musculoskeletal and Injuries    Low back pain     Chronic and poorly controlled.  Patient will continue ibuprofen as needed.  She was given referral to physical therapy for further evaluation and treatment.  She will take her MRI of her lumbar spine read to her neurosurgeons office that she has seen in the past.  If they need a new referral to see her for new MRI order she will contact me to place the orders.  RTC/ED precautions given.         Relevant Orders    Ambulatory Referral to Physical Therapy Evaluate and treat       Symptoms and Signs    Localized edema     No edema on exam today.  Patient was advised to ambulate as much as possible, wear compression stockings and keep her feet elevated when not ambulating.           Diagnoses         Codes Comments    Primary hypertension    -  Primary ICD-10-CM: I10  ICD-9-CM: 401.9     Mixed hyperlipidemia     ICD-10-CM: E78.2  ICD-9-CM: 272.2     Vitamin D deficiency     ICD-10-CM: E55.9  ICD-9-CM: 268.9     Chronic right-sided low back pain without sciatica     ICD-10-CM: M54.50, G89.29  ICD-9-CM: 724.2, 338.29     Localized edema     ICD-10-CM: R60.0  ICD-9-CM: 782.3             Return in about 3 months (around 4/24/2024) for Follow-up HTN, HLD, vitamin D.    Nabil Green MD   1/24/2024

## 2024-01-24 NOTE — ASSESSMENT & PLAN NOTE
Chronic and poorly controlled.  Patient will continue ibuprofen as needed.  She was given referral to physical therapy for further evaluation and treatment.  She will take her MRI of her lumbar spine read to her neurosurgeons office that she has seen in the past.  If they need a new referral to see her for new MRI order she will contact me to place the orders.  RTC/ED precautions given.

## 2024-01-24 NOTE — ASSESSMENT & PLAN NOTE
Chronic with low vitamin D on last labs.  Will recheck vitamin D level today.  Patient has not been taking a vitamin D supplement since the summer.  Will restart vitamin D supplementation with 50,000 units weekly or possibly 3000 units daily depending on her current vitamin D level.

## 2024-01-24 NOTE — ASSESSMENT & PLAN NOTE
No edema on exam today.  Patient was advised to ambulate as much as possible, wear compression stockings and keep her feet elevated when not ambulating.

## 2024-01-24 NOTE — ASSESSMENT & PLAN NOTE
Lipid abnormalities are  uncontrolled .  Nutritional counseling was provided. and Pharmacotherapy as ordered.  Lipids will be reassessed in 3 months.

## 2024-01-25 LAB
25(OH)D3 SERPL-MCNC: 17.5 NG/ML (ref 30–100)
ALBUMIN SERPL-MCNC: 4.3 G/DL (ref 3.5–5.2)
ALBUMIN/GLOB SERPL: 1.5 G/DL
ALP SERPL-CCNC: 93 U/L (ref 39–117)
ALT SERPL W P-5'-P-CCNC: 34 U/L (ref 1–33)
ANION GAP SERPL CALCULATED.3IONS-SCNC: 13 MMOL/L (ref 5–15)
AST SERPL-CCNC: 34 U/L (ref 1–32)
BILIRUB SERPL-MCNC: <0.2 MG/DL (ref 0–1.2)
BUN SERPL-MCNC: 8 MG/DL (ref 6–20)
BUN/CREAT SERPL: 9.3 (ref 7–25)
CALCIUM SPEC-SCNC: 9.7 MG/DL (ref 8.6–10.5)
CHLORIDE SERPL-SCNC: 103 MMOL/L (ref 98–107)
CHOLEST SERPL-MCNC: 272 MG/DL (ref 0–200)
CO2 SERPL-SCNC: 24 MMOL/L (ref 22–29)
CREAT SERPL-MCNC: 0.86 MG/DL (ref 0.57–1)
EGFRCR SERPLBLD CKD-EPI 2021: 86.6 ML/MIN/1.73
GLOBULIN UR ELPH-MCNC: 2.8 GM/DL
GLUCOSE SERPL-MCNC: 85 MG/DL (ref 65–99)
HDLC SERPL-MCNC: 46 MG/DL (ref 40–60)
LDLC SERPL CALC-MCNC: 182 MG/DL (ref 0–100)
LDLC/HDLC SERPL: 3.92 {RATIO}
POTASSIUM SERPL-SCNC: 4.4 MMOL/L (ref 3.5–5.2)
PROT SERPL-MCNC: 7.1 G/DL (ref 6–8.5)
SODIUM SERPL-SCNC: 140 MMOL/L (ref 136–145)
TRIGL SERPL-MCNC: 229 MG/DL (ref 0–150)
VLDLC SERPL-MCNC: 44 MG/DL (ref 5–40)

## 2024-01-30 DIAGNOSIS — G89.29 CHRONIC LEFT-SIDED LOW BACK PAIN WITHOUT SCIATICA: Primary | ICD-10-CM

## 2024-01-30 DIAGNOSIS — M54.50 CHRONIC LEFT-SIDED LOW BACK PAIN WITHOUT SCIATICA: Primary | ICD-10-CM

## 2024-02-06 DIAGNOSIS — E55.9 VITAMIN D DEFICIENCY: Primary | ICD-10-CM

## 2024-02-06 RX ORDER — ERGOCALCIFEROL 1.25 MG/1
50000 CAPSULE ORAL WEEKLY
Qty: 12 CAPSULE | Refills: 1 | Status: SHIPPED | OUTPATIENT
Start: 2024-02-06

## 2024-02-21 NOTE — PROGRESS NOTES
Chief Complaint  Fatigue, Frequent Awakenings, and Sleep Apnea    Subjective     History of Present Illness:  Evelia Foster is a 44 y.o. female with a history of hypertension, PTSD, restless leg syndrome, insomnia, asthma, anxiety, and anemia.  The patient is referred by OG Christianson with cardiology.  Patient has had difficulty with fatigue, and dizziness with rapid heart rate and palpitations.  Review of patient's self-reported questionnaire notes symptoms including snoring, witnessed apnea, daytime sleepiness and fatigue, frequent awakening, morning headaches, nonrestorative sleep, dry mouth, nasal allergies, grinding teeth, leg and body jerks, leg cramps, difficulty falling asleep, difficulty staying asleep, pain at night, sleep talking, nightmares, night sweats, memory loss, lack of concentration, decreased libido, abnormal heart rate, uncontrollable muscle weakness, and hallucinations when going to sleep.  The patient reports symptoms have been ongoing for 3-4 years.  Patient typically goes to bed at 8-9 PM waking at 330-4 AM on weekdays, and 10-11 PM waking to 5-6 AM on weekends.  She estimates an average of 4-5 hours of sleep per night and it can take minutes to an hour for her to get to sleep.  The patient smokes cigarettes, drinks alcohol 2-4 times per month, and denies use of illicit or recreational drugs.  She drinks 1 cup of regular coffee daily, and 3-4 regular sotero daily.  The patient's significant other reports witnessed episodes of apnea and pauses which occur multiple times per night.  Additionally, the patient is observed with heavy snoring which occurs continuously, nightly, and in all sleeping positions.  The patient has been observed kicking and jerking frequently and talking in her sleep.    Further details are as follows:    [place] Scale is (out of 24): Total score: 14     Estimated average amount of sleep per night: 4-5 hours  Number of times she wakes up at night: 3-4  times  Difficulty falling back asleep: yes  It usually takes minutes to an hour to go to sleep.  She feels sleepy upon waking up: yes  Rotating or night shift work: no    Drowsiness/Sleepiness:  She exhibits the following:  excessive daytime sleepiness  excessive daytime fatigue  falls asleep watching TV  Occasional naps    Snoring/Breathing:  She exhibits the following:  loud snoring, snores in all sleep positions, quits breathing at night, awakens with dry mouth, awakens gasping for breath, sore throat when waking up in the morning, and morning headaches    Head Injury:  She exhibits the following:  No    Reflux:  She describes the following:  Rarely wakes with reflux    Narcolepsy:  She exhibits the following:  feeling of paralysis while going to sleep or coming out of sleep    RLS/PLMs:  She describes the following:  moves or jerks during sleep  discomfort in legs with an urge to move them    Insomnia:  She describes the following:  problems initiating sleep at night  frequent awakenings    Parasomnia:  She exhibits the following:  sleep talks  frequent nightmares  excessive sweating while sleeping  grinds teeth    Weight:       02/26/24  1403   Weight: 105 kg (232 lb)      Weight change in the last year:  gain: 0 lbs    The patient's relevant past medical, surgical, family, and social history reviewed and updated in Epic as appropriate.    Review of Systems   Constitutional:  Positive for chills and fatigue.   HENT:  Positive for sneezing and tinnitus.    Eyes:  Positive for discharge and itching.   Respiratory:  Positive for choking and chest tightness.    Cardiovascular:  Positive for palpitations and leg swelling.   Gastrointestinal:  Positive for nausea.   Endocrine: Negative.    Genitourinary:  Positive for decreased libido and urgency.   Musculoskeletal:  Positive for back pain, gait problem and neck stiffness.   Skin: Negative.    Allergic/Immunologic: Positive for environmental allergies.    Neurological:  Positive for dizziness, weakness, light-headedness, numbness and headache.   Hematological: Negative.    Psychiatric/Behavioral:  Positive for agitation, decreased concentration and sleep disturbance. The patient is nervous/anxious.    All other systems reviewed and are negative.      PMH:    Past Medical History:   Diagnosis Date    Anemia     Anxiety     Asthma     mild, rescue inhaler prn     Dizzy     Full dentures     Headache     History of cervical dysplasia     s/p hsyterectomy    HPV (human papilloma virus) infection     positive 18    Insomnia     Low back pain     Neck pain     Pneumonia     PTSD (post-traumatic stress disorder)     Restless leg syndrome     Spinal headache     Uses contact lenses     Well adult exam 2022     Past Surgical History:   Procedure Laterality Date    ADENOIDECTOMY          ANTERIOR CERVICAL DISCECTOMY W/ FUSION N/A 2021    Procedure: CERVICAL DISCECTOMY ANTERIOR WITH FUSION C5-6;  Surgeon: Kalyan Vasquez MD;  Location:  [facility];  Service: Neurosurgery;  Laterality: N/A;    COLONOSCOPY      CYSTOSCOPY W/ BULKING AGENT INJECTION N/A 2023    Procedure: CYSTOSCOPY WITH BULKING AGENT INJECTION;  Surgeon: Nubia Ross MD;  Location:  [facility];  Service: Urology;  Laterality: N/A;    ENDOSCOPY      FINGER/THUMB LESION/CYST EXCISION Left 2023    Thumb    SKIN CANCER EXCISION      TONSILLECTOMY AND ADENOIDECTOMY          TOTAL LAPAROSCOPIC HYSTERECTOMY N/A 2019    Procedure: TOTAL LAPAROSCOPIC HYSTERECTOMY, BILATERAL SALPINGECTOMY WITH DAVINCI ROBOT;  Surgeon: Markie Lewis MD;  Location:  [facility];  Service: DaVinci    TUBAL ABDOMINAL LIGATION       OB History          3    Para   3    Term   3            AB        Living             SAB        IAB        Ectopic        Molar        Multiple        Live Births   3              Allergies   Allergen Reactions    Buspar [Buspirone] Confusion       MEDS:  Prior to  "Admission medications    Medication Sig Start Date End Date Taking? Authorizing Provider   albuterol sulfate  (90 Base) MCG/ACT inhaler Inhale 2 puffs Every 4 (Four) Hours As Needed for Wheezing. 9/5/23   Nabil Green MD   hydroCHLOROthiazide (HYDRODIURIL) 25 MG tablet TAKE 1 TABLET BY MOUTH DAILY AS NEEDED (EDEMA). 1/11/24   Aditi Mckinley APRN   ibuprofen (ADVIL,MOTRIN) 200 MG tablet Take 3 tablets by mouth As Needed for Mild Pain.    Provider, MD Marilyn   metoprolol succinate XL (TOPROL-XL) 50 MG 24 hr tablet Take 1 tablet by mouth 2 (Two) Times a Day. 1/5/24   Aditi Mckinley APRN   rosuvastatin (Crestor) 20 MG tablet Take 1 tablet by mouth Daily. 1/24/24   Nabil Green MD   vitamin D (ERGOCALCIFEROL) 1.25 MG (82544 UT) capsule capsule Take 1 capsule by mouth 1 (One) Time Per Week. 2/6/24   Nabil Green MD       FH:  Family History   Problem Relation Age of Onset    Cervical cancer Mother 30    Breast cancer Mother 30    Lymphoma Mother     Congenital heart disease Mother     Ovarian cancer Mother         40s    Cancer Father     Breast cancer Sister         mastectomy -proph?    Ovarian cancer Sister 30    Cancer Sister     No Known Problems Brother     Dementia Maternal Grandmother     Graves' disease Sister     Thyroid disease Sister     Breast cancer Maternal Aunt 35    Thyroid disease Daughter     Thyroid disease Sister     No Known Problems Daughter        Objective   Vital Signs:  /82 (BP Location: Right arm, Patient Position: Sitting, Cuff Size: Adult)   Pulse 89   Resp 16   Ht 166.4 cm (65.5\")   Wt 105 kg (232 lb)   SpO2 97%   BMI 38.02 kg/m²     Patient's (Body mass index is 38.02 kg/m².) indicates that they are obese (BMI >30) with health related conditions that include obstructive sleep apnea, hypertension, coronary heart disease, and diabetes mellitus . Weight is newly identified. BMI is is above average; BMI management plan is completed. We discussed " portion control and increasing exercise.            Physical Exam  Vitals reviewed.   Constitutional:       Appearance: Normal appearance.   HENT:      Head: Normocephalic and atraumatic.      Nose: Nose normal.      Mouth/Throat:      Mouth: Mucous membranes are moist.   Cardiovascular:      Rate and Rhythm: Normal rate and regular rhythm.      Heart sounds: No murmur heard.     No friction rub. No gallop.   Pulmonary:      Effort: Pulmonary effort is normal. No respiratory distress.      Breath sounds: Normal breath sounds. No wheezing or rhonchi.   Neurological:      Mental Status: She is alert and oriented to person, place, and time.   Psychiatric:         Behavior: Behavior normal.       Mallampati Score: III (soft and hard palate and base of uvula visible)    Result Review :              Assessment and Plan  Evelia Foster is a 44 y.o. female with a past medical history of hypertension, PTSD, restless leg syndrome, insomnia, asthma, anxiety, and anemia who presents for further evaluation of excessive daytime sleepiness and fatigue, nonrestorative sleep, and concerns for sleep disordered breathing and obstructive sleep apnea. The patient's symptoms, particularly snoring, apneas, are concerning for significant sleep disordered breathing and obstructive sleep apnea. We will obtain a home sleep test for further evaluation.  The patient will return for follow-up and recommendations after test.  I have discussed weight loss as it pertains to obstructive sleep apnea.    Diagnoses and all orders for this visit:    1. Snoring (Primary)  -     Home Sleep Study; Future    2. Suspected sleep apnea  -     Home Sleep Study; Future    3. Excessive daytime sleepiness  -     Home Sleep Study; Future    4. Class 2 severe obesity due to excess calories with serious comorbidity and body mass index (BMI) of 37.0 to 37.9 in adult                 I discussed the consequences of uncontrolled sleep apnea including hypertension,  heart disease, diabetes, stroke, and dementia. I further discussed sleep apnea therapeutic options including CPAP, Weight loss, Oral dental appliance, and surgery.         Follow Up  Return for Follow up after study.  Patient was given instructions and counseling regarding her condition or for health maintenance advice. Please see specific information pulled into the AVS if appropriate.     OG Redd, ACNP-BC  Pulmonology, Critical Care, and Sleep Medicine

## 2024-02-26 ENCOUNTER — OFFICE VISIT (OUTPATIENT)
Dept: SLEEP MEDICINE | Facility: HOSPITAL | Age: 44
End: 2024-02-26
Payer: COMMERCIAL

## 2024-02-26 VITALS
WEIGHT: 232 LBS | RESPIRATION RATE: 16 BRPM | BODY MASS INDEX: 37.28 KG/M2 | DIASTOLIC BLOOD PRESSURE: 82 MMHG | HEIGHT: 66 IN | OXYGEN SATURATION: 97 % | SYSTOLIC BLOOD PRESSURE: 141 MMHG | HEART RATE: 89 BPM

## 2024-02-26 DIAGNOSIS — R29.818 SUSPECTED SLEEP APNEA: ICD-10-CM

## 2024-02-26 DIAGNOSIS — R06.83 SNORING: Primary | ICD-10-CM

## 2024-02-26 DIAGNOSIS — G47.19 EXCESSIVE DAYTIME SLEEPINESS: ICD-10-CM

## 2024-02-26 DIAGNOSIS — E66.01 CLASS 2 SEVERE OBESITY DUE TO EXCESS CALORIES WITH SERIOUS COMORBIDITY AND BODY MASS INDEX (BMI) OF 37.0 TO 37.9 IN ADULT: ICD-10-CM

## 2024-02-26 PROCEDURE — 99213 OFFICE O/P EST LOW 20 MIN: CPT | Performed by: NURSE PRACTITIONER

## 2024-03-25 ENCOUNTER — HOSPITAL ENCOUNTER (OUTPATIENT)
Dept: SLEEP MEDICINE | Facility: HOSPITAL | Age: 44
End: 2024-03-25
Payer: COMMERCIAL

## 2024-03-25 VITALS — HEIGHT: 66 IN | WEIGHT: 232 LBS | BODY MASS INDEX: 37.28 KG/M2

## 2024-03-25 DIAGNOSIS — R06.83 SNORING: ICD-10-CM

## 2024-03-25 DIAGNOSIS — G47.19 EXCESSIVE DAYTIME SLEEPINESS: ICD-10-CM

## 2024-03-25 DIAGNOSIS — R29.818 SUSPECTED SLEEP APNEA: ICD-10-CM

## 2024-03-25 PROCEDURE — 95806 SLEEP STUDY UNATT&RESP EFFT: CPT

## 2024-03-27 ENCOUNTER — OFFICE VISIT (OUTPATIENT)
Dept: NEUROSURGERY | Facility: CLINIC | Age: 44
End: 2024-03-27
Payer: COMMERCIAL

## 2024-03-27 VITALS
SYSTOLIC BLOOD PRESSURE: 130 MMHG | BODY MASS INDEX: 36.35 KG/M2 | DIASTOLIC BLOOD PRESSURE: 88 MMHG | WEIGHT: 226.2 LBS | HEIGHT: 66 IN | OXYGEN SATURATION: 96 % | HEART RATE: 80 BPM

## 2024-03-27 DIAGNOSIS — M54.9 MECHANICAL BACK PAIN: ICD-10-CM

## 2024-03-27 DIAGNOSIS — M54.16 LUMBAR RADICULOPATHY: ICD-10-CM

## 2024-03-27 DIAGNOSIS — M47.12 CERVICAL SPONDYLOSIS WITH MYELOPATHY: Primary | ICD-10-CM

## 2024-03-27 PROCEDURE — 99214 OFFICE O/P EST MOD 30 MIN: CPT | Performed by: PHYSICIAN ASSISTANT

## 2024-03-27 RX ORDER — DICLOFENAC SODIUM 75 MG/1
75 TABLET, DELAYED RELEASE ORAL 2 TIMES DAILY
Qty: 60 TABLET | Refills: 1 | Status: SHIPPED | OUTPATIENT
Start: 2024-03-27

## 2024-03-27 NOTE — PROGRESS NOTES
Patient: Evelia Foster  : 1980  Chart #: [id number]    Date of Service: 2024    CHIEF COMPLAINT: Low back pain with gait instability     History of Present Illness  Ms Foster is a 44 year old [employer]  who is known to our clinic for undergoing ACDF C5-6 in  to address a cervical myelopathy. She has had ongoing numbness in the hands but otherwise did well. Today she complains of progressive low back pain with gait instability over the last 6 months. Her back bothers her mostly in the mornings and with repetitive bending or lifting. Sometimes pain or dysthesthesias extend into the right leg. She feels generalized weakness in the legs and a sense of no control. She feels weak ascending or descending stairs. The right arm has also felt weak. She has been dropping things. She feels some numbness down the right arm. She has had several falls lately. She has a history of some stress incontinence but that has improved.        Past Medical History:   Diagnosis Date    Anemia     Anxiety     Asthma     mild, rescue inhaler prn     Dizzy     Full dentures     Headache     History of cervical dysplasia     s/p hsyterectomy    HPV (human papilloma virus) infection     positive 18    Insomnia     Low back pain     Neck pain     Pneumonia     PTSD (post-traumatic stress disorder)     Restless leg syndrome     Spinal headache     Uses contact lenses     Well adult exam 2022         Current Outpatient Medications:     albuterol sulfate  (90 Base) MCG/ACT inhaler, Inhale 2 puffs Every 4 (Four) Hours As Needed for Wheezing., Disp: 18 g, Rfl: 3    hydroCHLOROthiazide (HYDRODIURIL) 25 MG tablet, TAKE 1 TABLET BY MOUTH DAILY AS NEEDED (EDEMA)., Disp: 90 tablet, Rfl: 1    ibuprofen (ADVIL,MOTRIN) 200 MG tablet, Take 3 tablets by mouth As Needed for Mild Pain., Disp: , Rfl:     metoprolol succinate XL (TOPROL-XL) 50 MG 24 hr tablet, Take 1 tablet by mouth 2 (Two) Times a Day., Disp: 180  tablet, Rfl: 3    vitamin D (ERGOCALCIFEROL) 1.25 MG (94644 UT) capsule capsule, Take 1 capsule by mouth 1 (One) Time Per Week., Disp: 12 capsule, Rfl: 1    diclofenac (VOLTAREN) 75 MG EC tablet, Take 1 tablet by mouth 2 (Two) Times a Day., Disp: 60 tablet, Rfl: 1    rosuvastatin (Crestor) 20 MG tablet, Take 1 tablet by mouth Daily. (Patient not taking: Reported on 2/26/2024), Disp: 90 tablet, Rfl: 1    Past Surgical History:   Procedure Laterality Date    ADENOIDECTOMY      1989    ANTERIOR CERVICAL DISCECTOMY W/ FUSION N/A 01/14/2021    Procedure: CERVICAL DISCECTOMY ANTERIOR WITH FUSION C5-6;  Surgeon: Kalyan Vasquez MD;  Location:  [facility];  Service: Neurosurgery;  Laterality: N/A;    COLONOSCOPY      CYSTOSCOPY W/ BULKING AGENT INJECTION N/A 03/01/2023    Procedure: CYSTOSCOPY WITH BULKING AGENT INJECTION;  Surgeon: Nubia Ross MD;  Location:  [facility];  Service: Urology;  Laterality: N/A;    ENDOSCOPY      FINGER/THUMB LESION/CYST EXCISION Left 09/26/2023    Thumb    SKIN CANCER EXCISION      TONSILLECTOMY AND ADENOIDECTOMY      1989    TOTAL LAPAROSCOPIC HYSTERECTOMY N/A 01/09/2019    Procedure: TOTAL LAPAROSCOPIC HYSTERECTOMY, BILATERAL SALPINGECTOMY WITH DAVINCI ROBOT;  Surgeon: Markie Lewis MD;  Location:  [facility];  Service: DaVinci    TUBAL ABDOMINAL LIGATION         Social History     Socioeconomic History    Marital status: Significant Other   Tobacco Use    Smoking status: Every Day     Current packs/day: 0.50     Average packs/day: 0.5 packs/day for 26.2 years (13.1 ttl pk-yrs)     Types: Cigarettes     Start date: 1998     Passive exposure: Current    Smokeless tobacco: Never    Tobacco comments:     tried w preg   Vaping Use    Vaping status: Never Used   Substance and Sexual Activity    Alcohol use: Yes     Comment: rare    Drug use: No    Sexual activity: Yes     Partners: Male     Birth control/protection: Surgical         Review of Systems    Objective   Vital Signs: Blood pressure  "130/88, pulse 80, height 166.4 cm (65.5\"), weight 103 kg (226 lb 3.2 oz), last menstrual period 12/18/2018, SpO2 96%, not currently breastfeeding.  Physical Exam  Vitals and nursing note reviewed.   Constitutional:       General: She is not in acute distress.     Appearance: She is well-developed.   HENT:      Head: Normocephalic and atraumatic.   Psychiatric:         Behavior: Behavior normal.         Thought Content: Thought content normal.     Musculoskeletal:     Strength is intact in upper and lower extremities to direct testing.     Gait is stiff.      Straight leg raising is negative.   Neurologic:     Muscle tone is normal throughout.     Coordination is intact.     Deep tendon reflexes: 3+ and symmetrical.     Sensation is intact to light touch throughout.     Patient is oriented to person, place, and time.     Myers sign negative.     Independent review of radiographic imaging: no recent imaging.     Assessment & Plan   Diagnosis:  Cervical myelopathy  Mechanical low back pain  Gait instability     Medical Decision Making: I am concerned that patient may harbor a new cervical myelopathy given her recent gait instability. She is frankly myelopathic on exam which could be residual from her previous injury but I can not determine that for sure without an MRI.  Additionally, she has low back pain with symptoms into the right leg that have progressed over the last 6 months despite home stretches and NSAIDS. My suspect is that this is mechanical but she could harbor a radiculopathy.  We will get an MRI of the neck and low back for further evaluation and treatment options. I also order cervical xray. Follow up with me to review and further recommendations will be made at that time.     Diagnoses and all orders for this visit:    1. Cervical spondylosis with myelopathy (Primary)  -     MRI Cervical Spine Without Contrast; Future  -     MRI Lumbar Spine Without Contrast; Future  -     XR spine cervical 2 vw; " Future    2. Mechanical back pain  -     MRI Cervical Spine Without Contrast; Future  -     MRI Lumbar Spine Without Contrast; Future  -     XR spine cervical 2 vw; Future    3. Lumbar radiculopathy  -     MRI Cervical Spine Without Contrast; Future  -     MRI Lumbar Spine Without Contrast; Future  -     XR spine cervical 2 vw; Future    Other orders  -     diclofenac (VOLTAREN) 75 MG EC tablet; Take 1 tablet by mouth 2 (Two) Times a Day.  Dispense: 60 tablet; Refill: 1                                  Lucy Donahue PA-C  Patient Care Team:  Nabil Green MD as PCP - General (Family Medicine)  Mara Bernal APRN as Nurse Practitioner (Nurse Practitioner)

## 2024-03-29 DIAGNOSIS — G47.33 OSA (OBSTRUCTIVE SLEEP APNEA): Primary | ICD-10-CM

## 2024-04-03 ENCOUNTER — TELEPHONE (OUTPATIENT)
Dept: SLEEP MEDICINE | Facility: CLINIC | Age: 44
End: 2024-04-03
Payer: COMMERCIAL

## 2024-04-08 ENCOUNTER — TELEPHONE (OUTPATIENT)
Dept: NEUROSURGERY | Facility: CLINIC | Age: 44
End: 2024-04-08
Payer: COMMERCIAL

## 2024-04-08 NOTE — TELEPHONE ENCOUNTER
[employer] insurance denied MRI Lumbar Spine due to patient needs 6 weeks of physical therapy in last 3 months.  The MRI Cervical Spine was approved.     Would you like to appeal the MRI Lumbar Spine? Or would you like to cancel the order? They did not offer a Peer to Peer.    Thanks,    Karen

## 2024-04-30 ENCOUNTER — HOSPITAL ENCOUNTER (OUTPATIENT)
Dept: MRI IMAGING | Facility: HOSPITAL | Age: 44
Discharge: HOME | End: 2024-04-30
Payer: COMMERCIAL

## 2024-04-30 ENCOUNTER — APPOINTMENT (OUTPATIENT)
Dept: MRI IMAGING | Facility: HOSPITAL | Age: 44
End: 2024-04-30
Payer: COMMERCIAL

## 2024-04-30 ENCOUNTER — HOSPITAL ENCOUNTER (OUTPATIENT)
Dept: GENERAL RADIOLOGY | Facility: HOSPITAL | Age: 44
Discharge: HOME | End: 2024-04-30
Payer: COMMERCIAL

## 2024-04-30 DIAGNOSIS — M47.12 CERVICAL SPONDYLOSIS WITH MYELOPATHY: ICD-10-CM

## 2024-04-30 DIAGNOSIS — M54.16 LUMBAR RADICULOPATHY: ICD-10-CM

## 2024-04-30 DIAGNOSIS — M54.9 MECHANICAL BACK PAIN: ICD-10-CM

## 2024-04-30 PROCEDURE — 72141 MRI NECK SPINE W/O DYE: CPT

## 2024-04-30 PROCEDURE — 72040 X-RAY EXAM NECK SPINE 2-3 VW: CPT

## 2024-05-01 ENCOUNTER — OFFICE VISIT (OUTPATIENT)
Dept: NEUROSURGERY | Facility: CLINIC | Age: 44
End: 2024-05-01
Payer: COMMERCIAL

## 2024-05-01 VITALS
DIASTOLIC BLOOD PRESSURE: 84 MMHG | TEMPERATURE: 97.8 F | BODY MASS INDEX: 36.8 KG/M2 | HEIGHT: 66 IN | SYSTOLIC BLOOD PRESSURE: 138 MMHG | WEIGHT: 229 LBS

## 2024-05-01 DIAGNOSIS — M54.9 MECHANICAL BACK PAIN: ICD-10-CM

## 2024-05-01 DIAGNOSIS — M47.12 CERVICAL SPONDYLOSIS WITH MYELOPATHY: Primary | ICD-10-CM

## 2024-05-01 DIAGNOSIS — M54.16 LUMBAR RADICULOPATHY: ICD-10-CM

## 2024-05-01 PROCEDURE — 99214 OFFICE O/P EST MOD 30 MIN: CPT | Performed by: PHYSICIAN ASSISTANT

## 2024-05-01 RX ORDER — CHLORHEXIDINE GLUCONATE 40 MG/ML
SOLUTION TOPICAL
Qty: 120 ML | Refills: 0 | Status: SHIPPED | OUTPATIENT
Start: 2024-05-01

## 2024-05-01 RX ORDER — FAMOTIDINE 10 MG
20 TABLET ORAL
OUTPATIENT
Start: 2024-05-01

## 2024-05-01 RX ORDER — CYCLOBENZAPRINE HCL 10 MG
10 TABLET ORAL 3 TIMES DAILY PRN
Qty: 45 TABLET | Refills: 0 | Status: SHIPPED | OUTPATIENT
Start: 2024-05-01

## 2024-05-01 RX ORDER — SODIUM CHLORIDE 9 MG/ML
100 INJECTION, SOLUTION INTRAVENOUS CONTINUOUS
OUTPATIENT
Start: 2024-05-01

## 2024-05-01 NOTE — PROGRESS NOTES
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 She has had ongoing numbness in the hands but otherwise did well. Today she complains of progressive low back pain with gait instability over the last 6 months. Her back bothers her mostly in the mornings and with repetitive bending or lifting. Sometimes pain or dysthesthesias extend into the right leg. She feels generalized weakness in the legs and a sense of no control. She feels weak ascending or descending stairs. The left arm has also felt weak. She has been dropping things. She feels some numbness down the right arm. She has had several falls lately. She has a history of some stress incontinence but that has improved.        Past Medical History:   Diagnosis Date    Anemia     Anxiety     Asthma     mild, rescue inhaler prn     Dizzy     Full dentures     Headache     History of cervical dysplasia     s/p hsyterectomy    HPV (human papilloma virus) infection     positive 18    Insomnia     Low back pain     Neck pain     Pneumonia     PTSD (post-traumatic stress disorder)     Restless leg syndrome     Spinal headache     Uses contact lenses     Well adult exam 2022         Current Outpatient Medications:     albuterol sulfate  (90 Base) MCG/ACT inhaler, Inhale 2 puffs Every 4 (Four) Hours As Needed for Wheezing., Disp: 18 g, Rfl: 3    diclofenac (VOLTAREN) 75 MG EC tablet, Take 1 tablet by mouth 2 (Two) Times a Day., Disp: 60 tablet, Rfl: 1    ibuprofen (ADVIL,MOTRIN) 200 MG tablet, Take 3 tablets by mouth As Needed for Mild Pain., Disp: , Rfl:     metoprolol succinate XL (TOPROL-XL) 50 MG 24 hr tablet, Take 1 tablet by mouth 2 (Two) Times a Day., Disp: 180 tablet, Rfl: 3     vitamin D (ERGOCALCIFEROL) 1.25 MG (58383 UT) capsule capsule, Take 1 capsule by mouth 1 (One) Time Per Week., Disp: 12 capsule, Rfl: 1    Chlorhexidine Gluconate 4 % solution, Shower each day with solution for 5 days beginning 5 days before surgery., Disp: 120 mL, Rfl: 0    cyclobenzaprine (FLEXERIL) 10 MG tablet, Take 1 tablet by mouth 3 (Three) Times a Day As Needed for Muscle Spasms., Disp: 45 tablet, Rfl: 0    hydroCHLOROthiazide (HYDRODIURIL) 25 MG tablet, TAKE 1 TABLET BY MOUTH DAILY AS NEEDED (EDEMA). (Patient not taking: Reported on 5/1/2024), Disp: 90 tablet, Rfl: 1    mupirocin (BACTROBAN) 2 % nasal ointment, Apply to the inside of each nostril with a cotton swab two times daily, morning and evening, for 5 days before surgery., Disp: 10 each, Rfl: 0    rosuvastatin (Crestor) 20 MG tablet, Take 1 tablet by mouth Daily. (Patient not taking: Reported on 5/1/2024), Disp: 90 tablet, Rfl: 1    Past Surgical History:   Procedure Laterality Date    ADENOIDECTOMY      1989    ANTERIOR CERVICAL DISCECTOMY W/ FUSION N/A 01/14/2021    Procedure: CERVICAL DISCECTOMY ANTERIOR WITH FUSION C5-6;  Surgeon: Kalyan Vasquez MD;  Location: [facility] OR;  Service: Neurosurgery;  Laterality: N/A;    COLONOSCOPY      CYSTOSCOPY W/ BULKING AGENT INJECTION N/A 03/01/2023    Procedure: CYSTOSCOPY WITH BULKING AGENT INJECTION;  Surgeon: Nubia Ross MD;  Location:  [facility];  Service: Urology;  Laterality: N/A;    ENDOSCOPY      FINGER/THUMB LESION/CYST EXCISION Left 09/26/2023    Thumb    SKIN CANCER EXCISION      TONSILLECTOMY AND ADENOIDECTOMY      1989    TOTAL LAPAROSCOPIC HYSTERECTOMY N/A 01/09/2019    Procedure: TOTAL LAPAROSCOPIC HYSTERECTOMY, BILATERAL SALPINGECTOMY WITH DAVINCI ROBOT;  Surgeon: Markie Lewis MD;  Location:  [facility];  Service: DaVinci    TUBAL ABDOMINAL LIGATION         Social History     Socioeconomic History    Marital status: Significant Other   Tobacco Use    Smoking status: Every Day     Current  "packs/day: 0.50     Average packs/day: 0.5 packs/day for 26.3 years (13.2 ttl pk-yrs)     Types: Cigarettes     Start date: 1998     Passive exposure: Current    Smokeless tobacco: Never    Tobacco comments:     tried w preg   Vaping Use    Vaping status: Never Used   Substance and Sexual Activity    Alcohol use: Yes     Comment: rare    Drug use: No    Sexual activity: Yes     Partners: Male     Birth control/protection: Surgical         Review of Systems    Objective   Vital Signs: Blood pressure 138/84, temperature 97.8 °F (36.6 °C), temperature source Infrared, height 166.4 cm (65.5\"), weight 104 kg (229 lb), last menstrual period 12/18/2018, not currently breastfeeding.  Physical Exam  Vitals and nursing note reviewed.   Constitutional:       General: She is not in acute distress.     Appearance: She is well-developed.   HENT:      Head: Normocephalic and atraumatic.   Psychiatric:         Behavior: Behavior normal.         Thought Content: Thought content normal.     Musculoskeletal:     Strength is intact in upper and lower extremities to direct testing.     Gait is stiff.      Straight leg raising is negative.   Neurologic:     Muscle tone is normal throughout.     Coordination is intact.     Deep tendon reflexes: 3+ and symmetrical.     Sensation is intact to light touch throughout.     Patient is oriented to person, place, and time.     Nicholas sign is strongly positive bilaterally    Independent review of radiographic imaging: MRI of the cervical spine demonstrates postoperative changes at C5-6.  Above that at C4-5 there is new disc bulge with significant canal stenosis.    Assessment & Plan   Diagnosis:  Cervical myelopathy  Mechanical low back pain  Gait instability     Medical Decision Making: I am concerned that patient may harbor a new cervical myelopathy given her recent gait instability and evidence of cord compromise on recent MRI. She is frankly myelopathic on exam which could be residual from " her previous injury.  Given her progressive gait difficulties, Dr. Vasquez recommends ACDF C4-5 to prevent progressive myelopathy.  I have reviewed the general nature of the procedure as well as risk which include but are not limited to ongoing pain, nerve damage, and death.  Patient is understanding and wishes to proceed.  We will address her lumbar issues at a later date.    Diagnoses and all orders for this visit:    1. Cervical spondylosis with myelopathy (Primary)    2. Mechanical back pain    3. Lumbar radiculopathy                              Lucy Donahue PA-C  Patient Care Team:  Nabil Green MD as PCP - General (Family Medicine)  Mara Bernal APRN as Nurse Practitioner (Nurse Practitioner)

## 2024-05-01 NOTE — H&P
Patient: Evelia Foster  : 1980  Chart #: [id number]    Date of Service: 2024    CHIEF COMPLAINT: Low back pain with gait instability     History of Present Illness  Ms Foster is a 44 year old [employer]  who is known to our clinic for undergoing ACDF C5-6 in  to address a cervical myelopathy. She has had ongoing numbness in the hands but otherwise did well. Today she complains of progressive low back pain with gait instability over the last 6 months. Her back bothers her mostly in the mornings and with repetitive bending or lifting. Sometimes pain or dysthesthesias extend into the right leg. She feels generalized weakness in the legs and a sense of no control. She feels weak ascending or descending stairs. The left arm has also felt weak. She has been dropping things. She feels some numbness down the right arm. She has had several falls lately. She has a history of some stress incontinence but that has improved.        Past Medical History:   Diagnosis Date    Anemia     Anxiety     Asthma     mild, rescue inhaler prn     Dizzy     Full dentures     Headache     History of cervical dysplasia     s/p hsyterectomy    HPV (human papilloma virus) infection     positive 18    Insomnia     Low back pain     Neck pain     Pneumonia     PTSD (post-traumatic stress disorder)     Restless leg syndrome     Spinal headache     Uses contact lenses     Well adult exam 2022         Current Outpatient Medications:     albuterol sulfate  (90 Base) MCG/ACT inhaler, Inhale 2 puffs Every 4 (Four) Hours As Needed for Wheezing., Disp: 18 g, Rfl: 3    diclofenac (VOLTAREN) 75 MG EC tablet, Take 1 tablet by mouth 2 (Two) Times a Day., Disp: 60 tablet, Rfl: 1    ibuprofen (ADVIL,MOTRIN) 200 MG tablet, Take 3 tablets by mouth As Needed for Mild Pain., Disp: , Rfl:     metoprolol succinate XL (TOPROL-XL) 50 MG 24 hr tablet, Take 1 tablet by mouth 2 (Two) Times a Day., Disp: 180 tablet, Rfl: 3     vitamin D (ERGOCALCIFEROL) 1.25 MG (40345 UT) capsule capsule, Take 1 capsule by mouth 1 (One) Time Per Week., Disp: 12 capsule, Rfl: 1    Chlorhexidine Gluconate 4 % solution, Shower each day with solution for 5 days beginning 5 days before surgery., Disp: 120 mL, Rfl: 0    cyclobenzaprine (FLEXERIL) 10 MG tablet, Take 1 tablet by mouth 3 (Three) Times a Day As Needed for Muscle Spasms., Disp: 45 tablet, Rfl: 0    hydroCHLOROthiazide (HYDRODIURIL) 25 MG tablet, TAKE 1 TABLET BY MOUTH DAILY AS NEEDED (EDEMA). (Patient not taking: Reported on 5/1/2024), Disp: 90 tablet, Rfl: 1    mupirocin (BACTROBAN) 2 % nasal ointment, Apply to the inside of each nostril with a cotton swab two times daily, morning and evening, for 5 days before surgery., Disp: 10 each, Rfl: 0    rosuvastatin (Crestor) 20 MG tablet, Take 1 tablet by mouth Daily. (Patient not taking: Reported on 5/1/2024), Disp: 90 tablet, Rfl: 1    Past Surgical History:   Procedure Laterality Date    ADENOIDECTOMY      1989    ANTERIOR CERVICAL DISCECTOMY W/ FUSION N/A 01/14/2021    Procedure: CERVICAL DISCECTOMY ANTERIOR WITH FUSION C5-6;  Surgeon: Kalyan Vasquez MD;  Location: [facility] OR;  Service: Neurosurgery;  Laterality: N/A;    COLONOSCOPY      CYSTOSCOPY W/ BULKING AGENT INJECTION N/A 03/01/2023    Procedure: CYSTOSCOPY WITH BULKING AGENT INJECTION;  Surgeon: Nubia Ross MD;  Location:  [facility];  Service: Urology;  Laterality: N/A;    ENDOSCOPY      FINGER/THUMB LESION/CYST EXCISION Left 09/26/2023    Thumb    SKIN CANCER EXCISION      TONSILLECTOMY AND ADENOIDECTOMY      1989    TOTAL LAPAROSCOPIC HYSTERECTOMY N/A 01/09/2019    Procedure: TOTAL LAPAROSCOPIC HYSTERECTOMY, BILATERAL SALPINGECTOMY WITH DAVINCI ROBOT;  Surgeon: Markie Lewis MD;  Location:  [facility];  Service: DaVinci    TUBAL ABDOMINAL LIGATION         Social History     Socioeconomic History    Marital status: Significant Other   Tobacco Use    Smoking status: Every Day     Current  "packs/day: 0.50     Average packs/day: 0.5 packs/day for 26.3 years (13.2 ttl pk-yrs)     Types: Cigarettes     Start date: 1998     Passive exposure: Current    Smokeless tobacco: Never    Tobacco comments:     tried w preg   Vaping Use    Vaping status: Never Used   Substance and Sexual Activity    Alcohol use: Yes     Comment: rare    Drug use: No    Sexual activity: Yes     Partners: Male     Birth control/protection: Surgical         Review of Systems    Objective   Vital Signs: Blood pressure 138/84, temperature 97.8 °F (36.6 °C), temperature source Infrared, height 166.4 cm (65.5\"), weight 104 kg (229 lb), last menstrual period 12/18/2018, not currently breastfeeding.  Physical Exam  Vitals and nursing note reviewed.   Constitutional:       General: She is not in acute distress.     Appearance: She is well-developed.   HENT:      Head: Normocephalic and atraumatic.   Psychiatric:         Behavior: Behavior normal.         Thought Content: Thought content normal.     Musculoskeletal:     Strength is intact in upper and lower extremities to direct testing.     Gait is stiff.      Straight leg raising is negative.   Neurologic:     Muscle tone is normal throughout.     Coordination is intact.     Deep tendon reflexes: 3+ and symmetrical.     Sensation is intact to light touch throughout.     Patient is oriented to person, place, and time.     Nicholas sign is strongly positive bilaterally    Independent review of radiographic imaging: MRI of the cervical spine demonstrates postoperative changes at C5-6.  Above that at C4-5 there is new disc bulge with significant canal stenosis.    Assessment & Plan   Diagnosis:  Cervical myelopathy  Mechanical low back pain  Gait instability     Medical Decision Making: I am concerned that patient may harbor a new cervical myelopathy given her recent gait instability and evidence of cord compromise on recent MRI. She is frankly myelopathic on exam which could be residual from " her previous injury.  Given her progressive gait difficulties, Dr. Vasquez recommends ACDF C4-5 to prevent progressive myelopathy.  I have reviewed the general nature of the procedure as well as risk which include but are not limited to ongoing pain, nerve damage, and death.  Patient is understanding and wishes to proceed.  We will address her lumbar issues at a later date.    Diagnoses and all orders for this visit:    1. Cervical spondylosis with myelopathy (Primary)    2. Mechanical back pain    3. Lumbar radiculopathy                              Lucy Donahue PA-C  Patient Care Team:  Nabil Green MD as PCP - General (Family Medicine)  Mara Bernal APRN as Nurse Practitioner (Nurse Practitioner)

## 2024-05-07 ENCOUNTER — PRE-ADMISSION TESTING (OUTPATIENT)
Dept: PREADMISSION TESTING | Facility: HOSPITAL | Age: 44
End: 2024-05-07
Payer: COMMERCIAL

## 2024-05-07 VITALS — HEIGHT: 65 IN | WEIGHT: 229.61 LBS | BODY MASS INDEX: 38.26 KG/M2

## 2024-05-07 DIAGNOSIS — M47.12 CERVICAL SPONDYLOSIS WITH MYELOPATHY: ICD-10-CM

## 2024-05-07 LAB
ANION GAP SERPL CALCULATED.3IONS-SCNC: 8 MMOL/L (ref 5–15)
BASOPHILS # BLD AUTO: 0.04 10*3/MM3 (ref 0–0.2)
BASOPHILS NFR BLD AUTO: 0.4 % (ref 0–1.5)
BUN SERPL-MCNC: 8 MG/DL (ref 6–20)
BUN/CREAT SERPL: 9.3 (ref 7–25)
CALCIUM SPEC-SCNC: 9.4 MG/DL (ref 8.6–10.5)
CHLORIDE SERPL-SCNC: 103 MMOL/L (ref 98–107)
CO2 SERPL-SCNC: 28 MMOL/L (ref 22–29)
CREAT SERPL-MCNC: 0.86 MG/DL (ref 0.57–1)
DEPRECATED RDW RBC AUTO: 50.5 FL (ref 37–54)
EGFRCR SERPLBLD CKD-EPI 2021: 86.6 ML/MIN/1.73
EOSINOPHIL # BLD AUTO: 0.18 10*3/MM3 (ref 0–0.4)
EOSINOPHIL NFR BLD AUTO: 1.7 % (ref 0.3–6.2)
ERYTHROCYTE [DISTWIDTH] IN BLOOD BY AUTOMATED COUNT: 14.1 % (ref 12.3–15.4)
GLUCOSE SERPL-MCNC: 148 MG/DL (ref 65–99)
HCT VFR BLD AUTO: 42.4 % (ref 34–46.6)
HGB BLD-MCNC: 13.9 G/DL (ref 12–15.9)
IMM GRANULOCYTES # BLD AUTO: 0.04 10*3/MM3 (ref 0–0.05)
IMM GRANULOCYTES NFR BLD AUTO: 0.4 % (ref 0–0.5)
LYMPHOCYTES # BLD AUTO: 2.82 10*3/MM3 (ref 0.7–3.1)
LYMPHOCYTES NFR BLD AUTO: 26.5 % (ref 19.6–45.3)
MCH RBC QN AUTO: 32.9 PG (ref 26.6–33)
MCHC RBC AUTO-ENTMCNC: 32.8 G/DL (ref 31.5–35.7)
MCV RBC AUTO: 100.2 FL (ref 79–97)
MONOCYTES # BLD AUTO: 0.52 10*3/MM3 (ref 0.1–0.9)
MONOCYTES NFR BLD AUTO: 4.9 % (ref 5–12)
NEUTROPHILS NFR BLD AUTO: 66.1 % (ref 42.7–76)
NEUTROPHILS NFR BLD AUTO: 7.04 10*3/MM3 (ref 1.7–7)
NRBC BLD AUTO-RTO: 0 /100 WBC (ref 0–0.2)
PLATELET # BLD AUTO: 234 10*3/MM3 (ref 140–450)
PMV BLD AUTO: 9.4 FL (ref 6–12)
POTASSIUM SERPL-SCNC: 4.5 MMOL/L (ref 3.5–5.2)
RBC # BLD AUTO: 4.23 10*6/MM3 (ref 3.77–5.28)
SODIUM SERPL-SCNC: 139 MMOL/L (ref 136–145)
WBC NRBC COR # BLD AUTO: 10.64 10*3/MM3 (ref 3.4–10.8)

## 2024-05-07 PROCEDURE — 87081 CULTURE SCREEN ONLY: CPT

## 2024-05-07 PROCEDURE — 93005 ELECTROCARDIOGRAM TRACING: CPT

## 2024-05-07 PROCEDURE — 80048 BASIC METABOLIC PNL TOTAL CA: CPT

## 2024-05-07 PROCEDURE — 85025 COMPLETE CBC W/AUTO DIFF WBC: CPT

## 2024-05-07 PROCEDURE — 36415 COLL VENOUS BLD VENIPUNCTURE: CPT

## 2024-05-08 LAB — MRSA SPEC QL CULT: NORMAL

## 2024-05-10 LAB
QT INTERVAL: 380 MS
QTC INTERVAL: 435 MS

## 2024-05-14 ENCOUNTER — ANESTHESIA EVENT (OUTPATIENT)
Dept: PERIOP | Facility: HOSPITAL | Age: 44
End: 2024-05-14
Payer: COMMERCIAL

## 2024-05-14 RX ORDER — FAMOTIDINE 10 MG/ML
20 INJECTION, SOLUTION INTRAVENOUS ONCE
Status: CANCELLED | OUTPATIENT
Start: 2024-05-14 | End: 2024-05-14

## 2024-05-14 RX ORDER — FAMOTIDINE 20 MG/1
20 TABLET, FILM COATED ORAL ONCE
Status: CANCELLED | OUTPATIENT
Start: 2024-05-14 | End: 2024-05-14

## 2024-05-15 ENCOUNTER — HOSPITAL ENCOUNTER (OUTPATIENT)
Facility: HOSPITAL | Age: 44
Discharge: HOME | End: 2024-05-15
Attending: NEUROLOGICAL SURGERY | Admitting: NEUROLOGICAL SURGERY
Payer: COMMERCIAL

## 2024-05-15 ENCOUNTER — ANESTHESIA (OUTPATIENT)
Dept: PERIOP | Facility: HOSPITAL | Age: 44
End: 2024-05-15
Payer: COMMERCIAL

## 2024-05-15 ENCOUNTER — APPOINTMENT (OUTPATIENT)
Dept: GENERAL RADIOLOGY | Facility: HOSPITAL | Age: 44
End: 2024-05-15
Payer: COMMERCIAL

## 2024-05-15 ENCOUNTER — TELEPHONE (OUTPATIENT)
Dept: NEUROSURGERY | Facility: CLINIC | Age: 44
End: 2024-05-15

## 2024-05-15 VITALS
DIASTOLIC BLOOD PRESSURE: 78 MMHG | OXYGEN SATURATION: 96 % | HEIGHT: 65 IN | RESPIRATION RATE: 16 BRPM | WEIGHT: 229 LBS | BODY MASS INDEX: 38.15 KG/M2 | TEMPERATURE: 98.1 F | HEART RATE: 71 BPM | SYSTOLIC BLOOD PRESSURE: 121 MMHG

## 2024-05-15 DIAGNOSIS — M47.12 CERVICAL SPONDYLOSIS WITH MYELOPATHY: Primary | ICD-10-CM

## 2024-05-15 DIAGNOSIS — M47.12 CERVICAL SPONDYLOSIS WITH MYELOPATHY: ICD-10-CM

## 2024-05-15 PROCEDURE — 20931 SP BONE ALGRFT STRUCT ADD-ON: CPT | Performed by: NEUROLOGICAL SURGERY

## 2024-05-15 PROCEDURE — C1713 ANCHOR/SCREW BN/BN,TIS/BN: HCPCS | Performed by: NEUROLOGICAL SURGERY

## 2024-05-15 PROCEDURE — 22845 INSERT SPINE FIXATION DEVICE: CPT

## 2024-05-15 PROCEDURE — 25010000002 ONDANSETRON PER 1 MG

## 2024-05-15 PROCEDURE — 25810000003 LACTATED RINGERS PER 1000 ML: Performed by: ANESTHESIOLOGY

## 2024-05-15 PROCEDURE — 25010000002 PROPOFOL 10 MG/ML EMULSION

## 2024-05-15 PROCEDURE — 25010000002 HYDROMORPHONE 1 MG/ML SOLUTION

## 2024-05-15 PROCEDURE — 22551 ARTHRD ANT NTRBDY CERVICAL: CPT

## 2024-05-15 PROCEDURE — 25010000002 FENTANYL CITRATE (PF) 50 MCG/ML SOLUTION

## 2024-05-15 PROCEDURE — 25010000002 SUGAMMADEX 200 MG/2ML SOLUTION

## 2024-05-15 PROCEDURE — 76000 FLUOROSCOPY <1 HR PHYS/QHP: CPT

## 2024-05-15 PROCEDURE — 25010000002 FENTANYL CITRATE (PF) 100 MCG/2ML SOLUTION

## 2024-05-15 PROCEDURE — 25810000003 SODIUM CHLORIDE PER 500 ML: Performed by: NEUROLOGICAL SURGERY

## 2024-05-15 PROCEDURE — 25010000002 DEXAMETHASONE SODIUM PHOSPHATE 100 MG/10ML SOLUTION: Performed by: PHYSICIAN ASSISTANT

## 2024-05-15 PROCEDURE — 22845 INSERT SPINE FIXATION DEVICE: CPT | Performed by: NEUROLOGICAL SURGERY

## 2024-05-15 PROCEDURE — 25010000002 CEFAZOLIN PER 500 MG: Performed by: PHYSICIAN ASSISTANT

## 2024-05-15 PROCEDURE — 22551 ARTHRD ANT NTRBDY CERVICAL: CPT | Performed by: NEUROLOGICAL SURGERY

## 2024-05-15 DEVICE — BONE LORDOTIC ASR 6X14X11 FZD: Type: IMPLANTABLE DEVICE | Site: SPINE CERVICAL | Status: FUNCTIONAL

## 2024-05-15 DEVICE — CLIP LIGAT VASC HORIZON TI MD BLU 6CT: Type: IMPLANTABLE DEVICE | Site: SPINE CERVICAL | Status: FUNCTIONAL

## 2024-05-15 DEVICE — CLIP LIGAT VASC HORIZON TI SM YEL 6CT: Type: IMPLANTABLE DEVICE | Site: SPINE CERVICAL | Status: FUNCTIONAL

## 2024-05-15 DEVICE — HEMOST ABS SURGIFOAM SZ100 8X12 10MM: Type: IMPLANTABLE DEVICE | Site: SPINE CERVICAL | Status: FUNCTIONAL

## 2024-05-15 DEVICE — SSC BONE WAX
Type: IMPLANTABLE DEVICE | Site: SPINE CERVICAL | Status: FUNCTIONAL
Brand: SSC BONE WAX

## 2024-05-15 RX ORDER — HYDROMORPHONE HYDROCHLORIDE 1 MG/ML
0.5 INJECTION, SOLUTION INTRAMUSCULAR; INTRAVENOUS; SUBCUTANEOUS
Status: DISCONTINUED | OUTPATIENT
Start: 2024-05-15 | End: 2024-05-15 | Stop reason: HOSPADM

## 2024-05-15 RX ORDER — NALOXONE HCL 0.4 MG/ML
0.4 VIAL (ML) INJECTION AS NEEDED
Status: DISCONTINUED | OUTPATIENT
Start: 2024-05-15 | End: 2024-05-15 | Stop reason: HOSPADM

## 2024-05-15 RX ORDER — DROPERIDOL 2.5 MG/ML
0.62 INJECTION, SOLUTION INTRAMUSCULAR; INTRAVENOUS
Status: DISCONTINUED | OUTPATIENT
Start: 2024-05-15 | End: 2024-05-15 | Stop reason: HOSPADM

## 2024-05-15 RX ORDER — SODIUM CHLORIDE 0.9 % (FLUSH) 0.9 %
3 SYRINGE (ML) INJECTION EVERY 12 HOURS SCHEDULED
Status: DISCONTINUED | OUTPATIENT
Start: 2024-05-15 | End: 2024-05-15 | Stop reason: HOSPADM

## 2024-05-15 RX ORDER — SODIUM CHLORIDE 0.9 % (FLUSH) 0.9 %
3-10 SYRINGE (ML) INJECTION AS NEEDED
Status: DISCONTINUED | OUTPATIENT
Start: 2024-05-15 | End: 2024-05-15 | Stop reason: HOSPADM

## 2024-05-15 RX ORDER — PROMETHAZINE HYDROCHLORIDE 25 MG/1
25 SUPPOSITORY RECTAL ONCE AS NEEDED
Status: DISCONTINUED | OUTPATIENT
Start: 2024-05-15 | End: 2024-05-15 | Stop reason: HOSPADM

## 2024-05-15 RX ORDER — FAMOTIDINE 20 MG/1
20 TABLET, FILM COATED ORAL
Status: COMPLETED | OUTPATIENT
Start: 2024-05-15 | End: 2024-05-15

## 2024-05-15 RX ORDER — SODIUM CHLORIDE 0.9 % (FLUSH) 0.9 %
10 SYRINGE (ML) INJECTION AS NEEDED
Status: DISCONTINUED | OUTPATIENT
Start: 2024-05-15 | End: 2024-05-15 | Stop reason: HOSPADM

## 2024-05-15 RX ORDER — HYDROCODONE BITARTRATE AND ACETAMINOPHEN 5; 325 MG/1; MG/1
1 TABLET ORAL ONCE AS NEEDED
Status: DISCONTINUED | OUTPATIENT
Start: 2024-05-15 | End: 2024-05-15 | Stop reason: HOSPADM

## 2024-05-15 RX ORDER — OXYCODONE HYDROCHLORIDE AND ACETAMINOPHEN 5; 325 MG/1; MG/1
1 TABLET ORAL 3 TIMES DAILY PRN
Qty: 15 TABLET | Refills: 0 | Status: SHIPPED | OUTPATIENT
Start: 2024-05-15

## 2024-05-15 RX ORDER — MAGNESIUM HYDROXIDE 1200 MG/15ML
LIQUID ORAL AS NEEDED
Status: DISCONTINUED | OUTPATIENT
Start: 2024-05-15 | End: 2024-05-15 | Stop reason: HOSPADM

## 2024-05-15 RX ORDER — PROMETHAZINE HYDROCHLORIDE 25 MG/1
25 TABLET ORAL ONCE AS NEEDED
Status: DISCONTINUED | OUTPATIENT
Start: 2024-05-15 | End: 2024-05-15 | Stop reason: HOSPADM

## 2024-05-15 RX ORDER — DEXMEDETOMIDINE HYDROCHLORIDE 4 UG/ML
INJECTION, SOLUTION INTRAVENOUS AS NEEDED
Status: DISCONTINUED | OUTPATIENT
Start: 2024-05-15 | End: 2024-05-15 | Stop reason: SURG

## 2024-05-15 RX ORDER — HYDROCODONE BITARTRATE AND ACETAMINOPHEN 5; 325 MG/1; MG/1
TABLET ORAL
Status: COMPLETED
Start: 2024-05-15 | End: 2024-05-15

## 2024-05-15 RX ORDER — ROCURONIUM BROMIDE 10 MG/ML
INJECTION, SOLUTION INTRAVENOUS AS NEEDED
Status: DISCONTINUED | OUTPATIENT
Start: 2024-05-15 | End: 2024-05-15 | Stop reason: SURG

## 2024-05-15 RX ORDER — PROPOFOL 10 MG/ML
VIAL (ML) INTRAVENOUS AS NEEDED
Status: DISCONTINUED | OUTPATIENT
Start: 2024-05-15 | End: 2024-05-15 | Stop reason: SURG

## 2024-05-15 RX ORDER — SODIUM CHLORIDE 9 MG/ML
40 INJECTION, SOLUTION INTRAVENOUS AS NEEDED
Status: DISCONTINUED | OUTPATIENT
Start: 2024-05-15 | End: 2024-05-15 | Stop reason: HOSPADM

## 2024-05-15 RX ORDER — DROPERIDOL 2.5 MG/ML
0.62 INJECTION, SOLUTION INTRAMUSCULAR; INTRAVENOUS ONCE AS NEEDED
Status: DISCONTINUED | OUTPATIENT
Start: 2024-05-15 | End: 2024-05-15 | Stop reason: HOSPADM

## 2024-05-15 RX ORDER — SODIUM CHLORIDE 9 MG/ML
INJECTION, SOLUTION INTRAVENOUS AS NEEDED
Status: DISCONTINUED | OUTPATIENT
Start: 2024-05-15 | End: 2024-05-15 | Stop reason: HOSPADM

## 2024-05-15 RX ORDER — HYDRALAZINE HYDROCHLORIDE 20 MG/ML
5 INJECTION INTRAMUSCULAR; INTRAVENOUS
Status: DISCONTINUED | OUTPATIENT
Start: 2024-05-15 | End: 2024-05-15 | Stop reason: HOSPADM

## 2024-05-15 RX ORDER — FENTANYL CITRATE 50 UG/ML
INJECTION, SOLUTION INTRAMUSCULAR; INTRAVENOUS AS NEEDED
Status: DISCONTINUED | OUTPATIENT
Start: 2024-05-15 | End: 2024-05-15 | Stop reason: SURG

## 2024-05-15 RX ORDER — SODIUM CHLORIDE 0.9 % (FLUSH) 0.9 %
10 SYRINGE (ML) INJECTION EVERY 12 HOURS SCHEDULED
Status: DISCONTINUED | OUTPATIENT
Start: 2024-05-15 | End: 2024-05-15 | Stop reason: HOSPADM

## 2024-05-15 RX ORDER — FENTANYL CITRATE 50 UG/ML
INJECTION, SOLUTION INTRAMUSCULAR; INTRAVENOUS
Status: COMPLETED
Start: 2024-05-15 | End: 2024-05-15

## 2024-05-15 RX ORDER — MEPERIDINE HYDROCHLORIDE 25 MG/ML
12.5 INJECTION INTRAMUSCULAR; INTRAVENOUS; SUBCUTANEOUS
Status: DISCONTINUED | OUTPATIENT
Start: 2024-05-15 | End: 2024-05-15 | Stop reason: HOSPADM

## 2024-05-15 RX ORDER — LIDOCAINE HYDROCHLORIDE 10 MG/ML
INJECTION, SOLUTION EPIDURAL; INFILTRATION; INTRACAUDAL; PERINEURAL AS NEEDED
Status: DISCONTINUED | OUTPATIENT
Start: 2024-05-15 | End: 2024-05-15 | Stop reason: SURG

## 2024-05-15 RX ORDER — IPRATROPIUM BROMIDE AND ALBUTEROL SULFATE 2.5; .5 MG/3ML; MG/3ML
3 SOLUTION RESPIRATORY (INHALATION) ONCE AS NEEDED
Status: DISCONTINUED | OUTPATIENT
Start: 2024-05-15 | End: 2024-05-15 | Stop reason: HOSPADM

## 2024-05-15 RX ORDER — SODIUM CHLORIDE, SODIUM LACTATE, POTASSIUM CHLORIDE, CALCIUM CHLORIDE 600; 310; 30; 20 MG/100ML; MG/100ML; MG/100ML; MG/100ML
9 INJECTION, SOLUTION INTRAVENOUS CONTINUOUS
Status: DISCONTINUED | OUTPATIENT
Start: 2024-05-15 | End: 2024-05-15 | Stop reason: HOSPADM

## 2024-05-15 RX ORDER — ONDANSETRON 2 MG/ML
4 INJECTION INTRAMUSCULAR; INTRAVENOUS ONCE AS NEEDED
Status: DISCONTINUED | OUTPATIENT
Start: 2024-05-15 | End: 2024-05-15 | Stop reason: HOSPADM

## 2024-05-15 RX ORDER — MIDAZOLAM HYDROCHLORIDE 1 MG/ML
1 INJECTION INTRAMUSCULAR; INTRAVENOUS
Status: DISCONTINUED | OUTPATIENT
Start: 2024-05-15 | End: 2024-05-15 | Stop reason: HOSPADM

## 2024-05-15 RX ORDER — LIDOCAINE HYDROCHLORIDE 10 MG/ML
0.5 INJECTION, SOLUTION EPIDURAL; INFILTRATION; INTRACAUDAL; PERINEURAL ONCE AS NEEDED
Status: COMPLETED | OUTPATIENT
Start: 2024-05-15 | End: 2024-05-15

## 2024-05-15 RX ORDER — FENTANYL CITRATE 50 UG/ML
50 INJECTION, SOLUTION INTRAMUSCULAR; INTRAVENOUS
Status: DISCONTINUED | OUTPATIENT
Start: 2024-05-15 | End: 2024-05-15 | Stop reason: HOSPADM

## 2024-05-15 RX ORDER — LABETALOL HYDROCHLORIDE 5 MG/ML
5 INJECTION, SOLUTION INTRAVENOUS
Status: DISCONTINUED | OUTPATIENT
Start: 2024-05-15 | End: 2024-05-15 | Stop reason: HOSPADM

## 2024-05-15 RX ORDER — ONDANSETRON 2 MG/ML
INJECTION INTRAMUSCULAR; INTRAVENOUS AS NEEDED
Status: DISCONTINUED | OUTPATIENT
Start: 2024-05-15 | End: 2024-05-15 | Stop reason: SURG

## 2024-05-15 RX ADMIN — HYDROMORPHONE HYDROCHLORIDE 0.5 MG: 1 INJECTION, SOLUTION INTRAMUSCULAR; INTRAVENOUS; SUBCUTANEOUS at 10:06

## 2024-05-15 RX ADMIN — PROPOFOL 250 MG: 10 INJECTION, EMULSION INTRAVENOUS at 06:57

## 2024-05-15 RX ADMIN — DEXMEDETOMIDINE HYDROCHLORIDE IN 0.9% SODIUM CHLORIDE 8 MCG: 4 INJECTION INTRAVENOUS at 08:36

## 2024-05-15 RX ADMIN — FAMOTIDINE 20 MG: 20 TABLET, FILM COATED ORAL at 06:33

## 2024-05-15 RX ADMIN — HYDROCODONE BITARTRATE AND ACETAMINOPHEN 1 TABLET: 5; 325 TABLET ORAL at 11:43

## 2024-05-15 RX ADMIN — SODIUM CHLORIDE 2 G: 900 INJECTION INTRAVENOUS at 07:05

## 2024-05-15 RX ADMIN — ONDANSETRON 4 MG: 2 INJECTION INTRAMUSCULAR; INTRAVENOUS at 08:35

## 2024-05-15 RX ADMIN — LIDOCAINE HYDROCHLORIDE 50 MG: 10 INJECTION, SOLUTION EPIDURAL; INFILTRATION; INTRACAUDAL; PERINEURAL at 06:57

## 2024-05-15 RX ADMIN — HYDROMORPHONE HYDROCHLORIDE 0.5 MG: 1 INJECTION, SOLUTION INTRAMUSCULAR; INTRAVENOUS; SUBCUTANEOUS at 09:27

## 2024-05-15 RX ADMIN — SUGAMMADEX 200 MG: 100 INJECTION, SOLUTION INTRAVENOUS at 08:47

## 2024-05-15 RX ADMIN — DEXMEDETOMIDINE HYDROCHLORIDE IN 0.9% SODIUM CHLORIDE 12 MCG: 4 INJECTION INTRAVENOUS at 08:44

## 2024-05-15 RX ADMIN — ROCURONIUM BROMIDE 50 MG: 10 INJECTION INTRAVENOUS at 06:57

## 2024-05-15 RX ADMIN — SODIUM CHLORIDE, POTASSIUM CHLORIDE, SODIUM LACTATE AND CALCIUM CHLORIDE: 600; 310; 30; 20 INJECTION, SOLUTION INTRAVENOUS at 08:37

## 2024-05-15 RX ADMIN — FENTANYL CITRATE 100 MCG: 50 INJECTION, SOLUTION INTRAMUSCULAR; INTRAVENOUS at 06:55

## 2024-05-15 RX ADMIN — FENTANYL CITRATE 50 MCG: 50 INJECTION, SOLUTION INTRAMUSCULAR; INTRAVENOUS at 09:18

## 2024-05-15 RX ADMIN — LIDOCAINE HYDROCHLORIDE 0.5 ML: 10 INJECTION, SOLUTION EPIDURAL; INFILTRATION; INTRACAUDAL; PERINEURAL at 06:28

## 2024-05-15 RX ADMIN — PROPOFOL 25 MCG/KG/MIN: 10 INJECTION, EMULSION INTRAVENOUS at 07:02

## 2024-05-15 RX ADMIN — ROCURONIUM BROMIDE 20 MG: 10 INJECTION INTRAVENOUS at 07:32

## 2024-05-15 RX ADMIN — SODIUM CHLORIDE, POTASSIUM CHLORIDE, SODIUM LACTATE AND CALCIUM CHLORIDE 9 ML/HR: 600; 310; 30; 20 INJECTION, SOLUTION INTRAVENOUS at 06:28

## 2024-05-15 NOTE — H&P
Pre-Op H&P  Evelia Foster  [id number]  1980      Chief complaint: Low back pain      Subjective:  Patient is a 44 y.o.female presents for scheduled surgery by Dr. Vasquez. She anticipates a CERVICAL DISCECTOMY ANTERIOR WITH FUSION C4-5 today.  The patient has had progressively worsening low back pain and gait instability over the past 6 months.  The symptoms are worse in the morning, when sitting and with repetitive lifting.  She also has left arm weakness and numbness and right arm numbness.  She endorses having recent falls.  She has been using a heating pad to help alleviate her pain.    Review of Systems:  Constitutional-- No fever, chills or sweats. No fatigue.  CV-- No chest pain, palpitation or syncope. +HTN, HLD.  Resp-- No SOB, cough, hemoptysis. +JENI w/o CPAP use.  Skin--No rashes or lesions      Allergies:   Allergies   Allergen Reactions    Benadryl [Diphenhydramine] Hives     Adult dosing may exacerbate symptoms  Tolerates children's benadryl dose      Buspar [Buspirone] Confusion         Home Meds:  Medications Prior to Admission   Medication Sig Dispense Refill Last Dose    Chlorhexidine Gluconate 4 % solution Shower each day with solution for 5 days beginning 5 days before surgery. 120 mL 0 5/14/2024    ibuprofen (ADVIL,MOTRIN) 200 MG tablet Take 3 tablets by mouth As Needed for Mild Pain.   Past Week    metoprolol succinate XL (TOPROL-XL) 50 MG 24 hr tablet Take 1 tablet by mouth 2 (Two) Times a Day. 180 tablet 3 5/15/2024    mupirocin (BACTROBAN) 2 % nasal ointment Apply to the inside of each nostril with a cotton swab two times daily, morning and evening, for 5 days before surgery. 10 each 0 5/14/2024    albuterol sulfate  (90 Base) MCG/ACT inhaler Inhale 2 puffs Every 4 (Four) Hours As Needed for Wheezing. 18 g 3 More than a month    cyclobenzaprine (FLEXERIL) 10 MG tablet Take 1 tablet by mouth 3 (Three) Times a Day As Needed for Muscle Spasms. 45 tablet 0     diclofenac  (VOLTAREN) 75 MG EC tablet Take 1 tablet by mouth 2 (Two) Times a Day. (Patient not taking: Reported on 5/7/2024) 60 tablet 1     hydroCHLOROthiazide (HYDRODIURIL) 25 MG tablet TAKE 1 TABLET BY MOUTH DAILY AS NEEDED (EDEMA). 90 tablet 1     rosuvastatin (Crestor) 20 MG tablet Take 1 tablet by mouth Daily. (Patient not taking: Reported on 5/1/2024) 90 tablet 1     vitamin D (ERGOCALCIFEROL) 1.25 MG (03052 UT) capsule capsule Take 1 capsule by mouth 1 (One) Time Per Week. 12 capsule 1          PMH:   Past Medical History:   Diagnosis Date    Anemia     Anxiety     Asthma     mild, rescue inhaler prn     Cancer     uterine cancer, skin cancer    Dizzy     Full dentures     Headache     History of cervical dysplasia     s/p hsyterectomy    HPV (human papilloma virus) infection     positive 18    Hyperlipidemia     Hypertension     Insomnia     Low back pain     Neck pain     Palpitations     Pneumonia     PTSD (post-traumatic stress disorder)     Restless leg syndrome     Sleep apnea     no cpap use    Spinal headache     SVT (supraventricular tachycardia)     Uses contact lenses     Well adult exam 08/26/2022     PSH:    Past Surgical History:   Procedure Laterality Date    ANTERIOR CERVICAL DISCECTOMY W/ FUSION N/A 01/14/2021    Procedure: CERVICAL DISCECTOMY ANTERIOR WITH FUSION C5-6;  Surgeon: Kalyan Vasquez MD;  Location: [facility] OR;  Service: Neurosurgery;  Laterality: N/A;    COLONOSCOPY      CYSTOSCOPY W/ BULKING AGENT INJECTION N/A 03/01/2023    Procedure: CYSTOSCOPY WITH BULKING AGENT INJECTION;  Surgeon: Nubia Ross MD;  Location:  [facility];  Service: Urology;  Laterality: N/A;    ENDOSCOPY      FINGER/THUMB LESION/CYST EXCISION Left 09/26/2023    Thumb    SKIN CANCER EXCISION      TONSILLECTOMY AND ADENOIDECTOMY      1989    TOTAL LAPAROSCOPIC HYSTERECTOMY N/A 01/09/2019    Procedure: TOTAL LAPAROSCOPIC HYSTERECTOMY, BILATERAL SALPINGECTOMY WITH DAVINCI ROBOT;  Surgeon: Markie Lewis MD;  Location:   "[facility];  Service: [facility]    TUBAL ABDOMINAL LIGATION         Immunization History:  Influenza: No  Pneumococcal: No  Tetanus: Yes  Covid : No    Social History:   Tobacco:   Social History     Tobacco Use   Smoking Status Every Day    Current packs/day: 0.50    Average packs/day: 0.5 packs/day for 26.4 years (13.2 ttl pk-yrs)    Types: Cigarettes    Start date: 1998    Passive exposure: Current   Smokeless Tobacco Never   Tobacco Comments    tried w preg      Alcohol:     Social History     Substance and Sexual Activity   Alcohol Use Yes    Alcohol/week: 14.0 standard drinks of alcohol    Types: 14 Shots of liquor per week    Comment: increased recently due to neck pain         Physical Exam:/98 (BP Location: Right arm, Patient Position: Lying)   Pulse 83   Temp 97.2 °F (36.2 °C) (Temporal)   Resp 18   Ht 165.1 cm (65\")   Wt 104 kg (229 lb)   LMP 12/18/2018 Comment: last mammogram 2020  SpO2 94%   BMI 38.11 kg/m²       General Appearance:    Alert, cooperative, no distress, appears stated age   Head:    Normocephalic, without obvious abnormality, atraumatic   Lungs:     Clear to auscultation bilaterally, respirations unlabored    Heart:   Regular rate and rhythm, S1 and S2 normal    Abdomen:    Soft without tenderness   Extremities:   Extremities normal, atraumatic, no cyanosis or edema   Skin:   Skin color, texture, turgor normal, no rashes or lesions   Neurologic:   Grossly intact     Results Review:     LABS:  Lab Results   Component Value Date    WBC 10.64 05/07/2024    HGB 13.9 05/07/2024    HCT 42.4 05/07/2024    .2 (H) 05/07/2024     05/07/2024    NEUTROABS 7.04 (H) 05/07/2024    GLUCOSE 148 (H) 05/07/2024    BUN 8 05/07/2024    CREATININE 0.86 05/07/2024    EGFRIFNONA 89 04/20/2021     05/07/2024    K 4.5 05/07/2024     05/07/2024    CO2 28.0 05/07/2024    MG 2.2 07/20/2023    CALCIUM 9.4 05/07/2024    ALBUMIN 4.3 01/24/2024    AST 34 (H) 01/24/2024    ALT 34 (H) " 01/24/2024    BILITOT <0.2 01/24/2024       RADIOLOGY:  Imaging Results (Last 72 Hours)       ** No results found for the last 72 hours. **            I reviewed the patient's new clinical results.    Cancer Staging (if applicable)  Cancer Patient: _x_ yes __no __unknown; If yes, clinical stage T:__ N:__M:__, stage group or __N/A      Impression: Cervical spondylosis with myelopathy      Plan: CERVICAL DISCECTOMY ANTERIOR WITH FUSION C4-5       Enrique Echevarria, APRN   5/15/2024   06:41 EDT

## 2024-05-15 NOTE — ANESTHESIA PROCEDURE NOTES
Airway  Urgency: elective    Date/Time: 5/15/2024 7:00 AM  Airway not difficult    General Information and Staff    Patient location during procedure: OR  CRNA/CAA: Jayde Euceda CRNA    Indications and Patient Condition  Indications for airway management: airway protection    Preoxygenated: yes  MILS maintained throughout  Mask difficulty assessment: 2 - vent by mask + OA or adjuvant +/- NMBA    Final Airway Details  Final airway type: endotracheal airway      Successful airway: ETT  Cuffed: yes   Successful intubation technique: direct laryngoscopy  Facilitating devices/methods: intubating stylet  Endotracheal tube insertion site: oral  Blade: Cline (X blade)  Blade size: 3  ETT size (mm): 7.0  Cormack-Lehane Classification: grade I - full view of glottis  Placement verified by: chest auscultation and capnometry   Cuff volume (mL): 7  Measured from: lips  ETT/EBT  to lips (cm): 21  Number of attempts at approach: 1  Assessment: lips, teeth, and gum same as pre-op and atraumatic intubation    Additional Comments  Negative epigastric sounds, Breath sound equal bilaterally with symmetric chest rise and fall

## 2024-05-15 NOTE — OP NOTE
NEUROSURGICAL OPERATIVE NOTE        PREOPERATIVE DIAGNOSIS:    Cervical spondylosis and disc herniation with myelopathy      POSTOPERATIVE DIAGNOSIS:  Same      PROCEDURE:  1.  Arthrodesis C4-5  2.  Anterior cervical discectomy with microdissection at C4-5  3.  Zevo anterior plating C4-5  4.  Composite allografting C4-5      SURGEON:  Kalyan Vasquez M.D.      ASSISTANT: Felicia Galindo PA-C    PAC assisted with:   Suctioning   Retraction   Tying   Suturing   Closing   Application of dressing   Skilled neurosurgery PA assistance was necessary to perform this procedure.        ANESTHESIA:  General      ESTIMATED BLOOD LOSS: Minimal      SPECIMEN: None      DRAINS: None      COMPLICATIONS:  None      Spinal Surgery Levels Completed:1 Level        CLINICAL NOTE:  The patient is a 44-year-old woman with a history of cervical myelopathy for which she underwent ACDF C5-6 in 2021.  She has ongoing gait instability and some numbness in her hands.  Studies demonstrate this protrusion with cord compression at C4-5 and as such presents at this time for ACDF at the C4-5 level to address a potential ongoing myelopathy.  The nature of the procedure as well as the potential risks, complications, limitations, and alternatives to the procedure were discussed at length with the patient and the patient has agreed to proceed with surgery.      TECHNICAL NOTE:  The patient was brought to the operating room and placed on the operating table in the supine position.  General endotracheal anesthesia was achieved.  Her neck was very mildly extended and head was maintained on a gel donut.  Her anterior neck was prepared and draped in the usual fashion.  Her previous incision within the skin crease at the level of the cricothyroid membrane was fashioned from the midline to the right.  [place] subcutaneous tissues were undermined.  The platysma was divided longitudinally.  A plane medial to the belly of the sternocleidomastoid muscle was  taken down to provide exposure to the anterior spine.  Previous plate was exposed, the level above was localized with another radiograph which confirmed the operative level.  Longus colli muscle was mobilized in the anterior spine with cautery.  Self-retaining retractor was utilized to provide side-to-side exposure.  Disk was incised and evacuated piecemeal with an array of pituitary rongeurs, curettes and punches.  Distraction screws were utilized. The operating microscope was brought into use.  Endplate osteophytes as well as numerous large disk fragments were evacuated.  The posterior longitudinal ligament was taken down with Yovana knife and Yovana curettes.  The neural foramina were widely decompressed.  Good decompression of the thecal sac was accomplished.  Endplates were decorticated.  A small ledge of bone was left posteriorly on each endplate.  A 6 mm lordotic composite allograft was impacted into place.  Modest anterior osteophytes were resected with the drill.  A 15 mm ZEVO plate was then affixed to the anterior spine at C4-5 using 13 mm variable angled screws bilaterally at each level and locking cam was engaged.  The wound was washed out with saline solution.  Modest bleeding points were controlled with bipolar cautery.  The platysma was reapproximated in interrupted fashion with 0 Vicryl suture.  The skin was closed in a running subcuticular fashion with 3-0 Vicryl suture.  A dermal sealant and sterile dressing were applied. She was subsequently extubated and taken to the recovery room in satisfactory condition.  There were no overt intraoperative complications.                Kalyan Vasquez M.D.

## 2024-05-15 NOTE — ANESTHESIA PREPROCEDURE EVALUATION
Anesthesia Evaluation     Patient summary reviewed and Nursing notes reviewed   history of anesthetic complications:  prolonged sedation  NPO Solid Status: > 8 hours  NPO Liquid Status: > 2 hours           Airway   Mallampati: II  TM distance: >3 FB  Neck ROM: full  No difficulty expected  Dental    (+) edentulous, upper dentures and lower dentures    Pulmonary    (+) asthma,sleep apnea  Cardiovascular     (+) hypertension, hyperlipidemia  (-) CHF      Neuro/Psych  (+) psychiatric history Anxiety  GI/Hepatic/Renal/Endo    (+) obesity    Musculoskeletal     (+) neck pain  Abdominal    Substance History      OB/GYN          Other   arthritis,                       Anesthesia Plan    ASA 3     general     intravenous induction     Anesthetic plan, risks, benefits, and alternatives have been provided, discussed and informed consent has been obtained with: patient.    Plan discussed with CRNA.        CODE STATUS:

## 2024-05-15 NOTE — TELEPHONE ENCOUNTER
Can someone please place in an order for AP and lateral cervical spine x-rays needed for 3 wks F/U w/ PA, S/P Cervical Fusion. Thank you

## 2024-05-15 NOTE — ANESTHESIA POSTPROCEDURE EVALUATION
Patient: Evelia Foster    Procedure Summary       Date: 05/15/24 [facility]    Anesthesia Start: 0652 Anesthesia Stop: 0904    Procedure: CERVICAL DISCECTOMY ANTERIOR WITH FUSION C4-5 (Spine Cervical) Diagnosis:       Cervical spondylosis with myelopathy      (Cervical spondylosis with myelopathy [M47.12])    Surgeons: Kalyan Vasquez MD Provider: Phu Goss Jr., MD    Anesthesia Type: general ASA Status: 3            Anesthesia Type: general    Vitals  Vitals Value Taken Time   /78 05/15/24 0904   Temp 97 °F (36.1 °C) 05/15/24 0904   Pulse 68 05/15/24 0904   Resp     SpO2 94 % 05/15/24 0904           Post Anesthesia Care and Evaluation    Patient location during evaluation: PACU  Patient participation: waiting for patient participation  Level of consciousness: sleepy but conscious  Pain management: adequate    Airway patency: patent  Anesthetic complications: No anesthetic complications  PONV Status: none  Cardiovascular status: hemodynamically stable and acceptable  Respiratory status: nonlabored ventilation, acceptable and nasal cannula  Hydration status: acceptable

## 2024-05-15 NOTE — TELEPHONE ENCOUNTER
Spoke w/ Pt and let her know of her POST Op appt for 6/5/24 @10am and she is aware to get Xrays prior. She appreciated the call.

## 2024-05-16 ENCOUNTER — PATIENT MESSAGE (OUTPATIENT)
Dept: NEUROSURGERY | Facility: CLINIC | Age: 44
End: 2024-05-16
Payer: COMMERCIAL

## 2024-06-04 ENCOUNTER — HOSPITAL ENCOUNTER (OUTPATIENT)
Dept: GENERAL RADIOLOGY | Facility: HOSPITAL | Age: 44
Discharge: HOME | End: 2024-06-04
Admitting: NEUROLOGICAL SURGERY
Payer: COMMERCIAL

## 2024-06-04 PROCEDURE — 72040 X-RAY EXAM NECK SPINE 2-3 VW: CPT

## 2024-06-05 ENCOUNTER — OFFICE VISIT (OUTPATIENT)
Dept: NEUROSURGERY | Facility: CLINIC | Age: 44
End: 2024-06-05
Payer: COMMERCIAL

## 2024-06-05 VITALS — HEIGHT: 66 IN | TEMPERATURE: 98.4 F | WEIGHT: 225 LBS | BODY MASS INDEX: 36.16 KG/M2

## 2024-06-05 DIAGNOSIS — M47.12 CERVICAL SPONDYLOSIS WITH MYELOPATHY: Primary | ICD-10-CM

## 2024-06-05 PROCEDURE — 99024 POSTOP FOLLOW-UP VISIT: CPT | Performed by: PHYSICIAN ASSISTANT

## 2024-06-05 NOTE — PROGRESS NOTES
Patient: Evelia Foster  : 1980  Chart #: [id number]    Date of Service: 2024    CHIEF COMPLAINT:     History of Present Illness       Past Medical History:   Diagnosis Date   • Anemia    • Anxiety    • Asthma     mild, rescue inhaler prn    • Cancer     uterine cancer, skin cancer   • Dizzy    • Full dentures    • Headache    • History of cervical dysplasia     s/p hsyterectomy   • HPV (human papilloma virus) infection     positive 18   • Hyperlipidemia    • Hypertension    • Insomnia    • Low back pain    • Neck pain    • Palpitations    • Pneumonia    • PTSD (post-traumatic stress disorder)    • Restless leg syndrome    • Sleep apnea     no cpap use   • Spinal headache    • SVT (supraventricular tachycardia)    • Uses contact lenses    • Well adult exam 2022         Current Outpatient Medications:   •  albuterol sulfate  (90 Base) MCG/ACT inhaler, Inhale 2 puffs Every 4 (Four) Hours As Needed for Wheezing., Disp: 18 g, Rfl: 3  •  hydroCHLOROthiazide (HYDRODIURIL) 25 MG tablet, TAKE 1 TABLET BY MOUTH DAILY AS NEEDED (EDEMA)., Disp: 90 tablet, Rfl: 1  •  ibuprofen (ADVIL,MOTRIN) 200 MG tablet, Take 3 tablets by mouth As Needed for Mild Pain., Disp: , Rfl:   •  metoprolol succinate XL (TOPROL-XL) 50 MG 24 hr tablet, Take 1 tablet by mouth 2 (Two) Times a Day., Disp: 180 tablet, Rfl: 3  •  oxyCODONE-acetaminophen (PERCOCET) 5-325 MG per tablet, Take 1 tablet by mouth 3 (Three) Times a Day As Needed (Pain)., Disp: 15 tablet, Rfl: 0  •  rosuvastatin (Crestor) 20 MG tablet, Take 1 tablet by mouth Daily., Disp: 90 tablet, Rfl: 1  •  vitamin D (ERGOCALCIFEROL) 1.25 MG (02710 UT) capsule capsule, Take 1 capsule by mouth 1 (One) Time Per Week., Disp: 12 capsule, Rfl: 1    Past Surgical History:   Procedure Laterality Date   • ANTERIOR CERVICAL DISCECTOMY W/ FUSION N/A 2021    Procedure: CERVICAL DISCECTOMY ANTERIOR WITH FUSION C5-6;  Surgeon: Kalyan Vasquez MD;  [facility];   Service: Neurosurgery;  Laterality: N/A;   • ANTERIOR CERVICAL DISCECTOMY W/ FUSION N/A 5/15/2024    Procedure: CERVICAL DISCECTOMY ANTERIOR WITH FUSION C4-5;  Surgeon: Kalyan Vasquez MD;  Location:  [facility];  Service: Neurosurgery;  Laterality: N/A;   • COLONOSCOPY     • CYSTOSCOPY W/ BULKING AGENT INJECTION N/A 03/01/2023    Procedure: CYSTOSCOPY WITH BULKING AGENT INJECTION;  Surgeon: Nubia Ross MD;  Location:  [facility] OR;  Service: Urology;  Laterality: N/A;   • ENDOSCOPY     • FINGER/THUMB LESION/CYST EXCISION Left 09/26/2023    Thumb   • SKIN CANCER EXCISION     • TONSILLECTOMY AND ADENOIDECTOMY      1989   • TOTAL LAPAROSCOPIC HYSTERECTOMY N/A 01/09/2019    Procedure: TOTAL LAPAROSCOPIC HYSTERECTOMY, BILATERAL SALPINGECTOMY WITH DAVINCI ROBOT;  Surgeon: Markie Lewis MD;  Location:  [facility] OR;  Service: DaVinci   • TUBAL ABDOMINAL LIGATION         Social History     Socioeconomic History   • Marital status: Significant Other   Tobacco Use   • Smoking status: Every Day     Current packs/day: 0.50     Average packs/day: 0.5 packs/day for 26.4 years (13.2 ttl pk-yrs)     Types: Cigarettes     Start date: 1998     Passive exposure: Current   • Smokeless tobacco: Never   • Tobacco comments:     tried w preg   Vaping Use   • Vaping status: Never Used   Substance and Sexual Activity   • Alcohol use: Yes     Alcohol/week: 14.0 standard drinks of alcohol     Types: 14 Shots of liquor per week     Comment: increased recently due to neck pain   • Drug use: No   • Sexual activity: Defer     Partners: Male     Birth control/protection: Surgical         Review of Systems   Constitutional:  Negative for activity change, appetite change, chills, diaphoresis, fatigue, fever and unexpected weight change.   HENT:  Negative for congestion, dental problem, drooling, ear discharge, ear pain, facial swelling, hearing loss, mouth sores, nosebleeds, postnasal drip, rhinorrhea, sinus pressure, sinus pain, sneezing, sore throat,  "tinnitus, trouble swallowing and voice change.    Eyes:  Negative for photophobia, pain, discharge, redness, itching and visual disturbance.   Respiratory:  Negative for apnea, cough, choking, chest tightness, shortness of breath, wheezing and stridor.    Cardiovascular:  Negative for chest pain, palpitations and leg swelling.   Gastrointestinal:  Negative for abdominal distention, abdominal pain, anal bleeding, blood in stool, constipation, diarrhea, nausea, rectal pain and vomiting.   Endocrine: Negative for cold intolerance, heat intolerance, polydipsia, polyphagia and polyuria.   Genitourinary:  Negative for decreased urine volume, difficulty urinating, dysuria, enuresis, flank pain, frequency, genital sores, hematuria and urgency.   Musculoskeletal:  Positive for back pain and neck pain. Negative for arthralgias, gait problem, joint swelling, myalgias and neck stiffness.   Skin:  Negative for color change, pallor, rash and wound.   Allergic/Immunologic: Negative for environmental allergies, food allergies and immunocompromised state.   Neurological:  Positive for numbness. Negative for dizziness, tremors, seizures, syncope, facial asymmetry, speech difficulty, weakness, light-headedness and headaches.   Hematological:  Negative for adenopathy. Does not bruise/bleed easily.   Psychiatric/Behavioral:  Negative for agitation, behavioral problems, confusion, decreased concentration, dysphoric mood, hallucinations, self-injury, sleep disturbance and suicidal ideas. The patient is not nervous/anxious and is not hyperactive.    All other systems reviewed and are negative.    Objective   Vital Signs: Temperature 98.4 °F (36.9 °C), temperature source Temporal, height 166.4 cm (65.5\"), weight 102 kg (225 lb), last menstrual period 12/18/2018, not currently breastfeeding.  Physical Exam  Musculoskeletal:     Strength is intact in upper and lower extremities to direct testing.     Station and gait are normal.     Straight " leg raising is negative.   Neurologic:     Muscle tone is normal throughout.     Coordination is intact.     Deep tendon reflexes: 2+ and symmetrical.     Sensation is intact to light touch throughout.     Patient is oriented to person, place, and time.         Independent review of radiographic imaging:     Assessment & Plan   Diagnosis:    Medical Decision Making:     There are no diagnoses linked to this encounter.                              Erika Serrato MA  Patient Care Team:  Nabil Green MD as PCP - General (Family Medicine)  Mara Bernal APRN as Nurse Practitioner (Nurse Practitioner)  Aditi Mckinley APRN as Nurse Practitioner (Cardiology)

## 2024-06-05 NOTE — PROGRESS NOTES
Patient: Evelia Foster  : 1980  Chart #: [id number]    Date of Service: 2024    CHIEF COMPLAINT: Cervical spondylosis and disc herniation with myelopathy    History of Present Illness Patient is a 44-year-old woman with a history of cervical myelopathy for which she underwent ACDF C5-6 in .  She has had ongoing gait instability and some numbness in her hands.  Symptoms progressed recently and she was found to have disc protrusion with cord compromise at C4-5.  As such on 5/15/2024 she underwent ACDF at this level.    Today patient is 3 weeks postop. She complains of neck stiffness. Modest improvement in hand numbness. About one week post-op she developed hives sporadically across her body.  She was prescribed a medrol dosepak without improvement. She is taking zyrtec and that seems to be helping. A similar things happened after her last neck surgery.       Past Medical History:   Diagnosis Date    Anemia     Anxiety     Asthma     mild, rescue inhaler prn     Cancer     uterine cancer, skin cancer    Dizzy     Full dentures     Headache     History of cervical dysplasia     s/p hsyterectomy    HPV (human papilloma virus) infection     positive 18    Hyperlipidemia     Hypertension     Insomnia     Low back pain     Neck pain     Palpitations     Pneumonia     PTSD (post-traumatic stress disorder)     Restless leg syndrome     Sleep apnea     no cpap use    Spinal headache     SVT (supraventricular tachycardia)     Uses contact lenses     Well adult exam 2022         Current Outpatient Medications:     albuterol sulfate  (90 Base) MCG/ACT inhaler, Inhale 2 puffs Every 4 (Four) Hours As Needed for Wheezing., Disp: 18 g, Rfl: 3    hydroCHLOROthiazide (HYDRODIURIL) 25 MG tablet, TAKE 1 TABLET BY MOUTH DAILY AS NEEDED (EDEMA)., Disp: 90 tablet, Rfl: 1    ibuprofen (ADVIL,MOTRIN) 200 MG tablet, Take 3 tablets by mouth As Needed for Mild Pain., Disp: , Rfl:     metoprolol succinate XL  (TOPROL-XL) 50 MG 24 hr tablet, Take 1 tablet by mouth 2 (Two) Times a Day., Disp: 180 tablet, Rfl: 3    oxyCODONE-acetaminophen (PERCOCET) 5-325 MG per tablet, Take 1 tablet by mouth 3 (Three) Times a Day As Needed (Pain)., Disp: 15 tablet, Rfl: 0    rosuvastatin (Crestor) 20 MG tablet, Take 1 tablet by mouth Daily., Disp: 90 tablet, Rfl: 1    vitamin D (ERGOCALCIFEROL) 1.25 MG (13821 UT) capsule capsule, Take 1 capsule by mouth 1 (One) Time Per Week., Disp: 12 capsule, Rfl: 1    Past Surgical History:   Procedure Laterality Date    ANTERIOR CERVICAL DISCECTOMY W/ FUSION N/A 01/14/2021    Procedure: CERVICAL DISCECTOMY ANTERIOR WITH FUSION C5-6;  Surgeon: Kalyan Vasquez MD;  Location:  [facility];  Service: Neurosurgery;  Laterality: N/A;    ANTERIOR CERVICAL DISCECTOMY W/ FUSION N/A 5/15/2024    Procedure: CERVICAL DISCECTOMY ANTERIOR WITH FUSION C4-5;  Surgeon: Kalyan Vasquez MD;  Location:  [facility];  Service: Neurosurgery;  Laterality: N/A;    COLONOSCOPY      CYSTOSCOPY W/ BULKING AGENT INJECTION N/A 03/01/2023    Procedure: CYSTOSCOPY WITH BULKING AGENT INJECTION;  Surgeon: Nubia Ross MD;  Location:  [facility];  Service: Urology;  Laterality: N/A;    ENDOSCOPY      FINGER/THUMB LESION/CYST EXCISION Left 09/26/2023    Thumb    SKIN CANCER EXCISION      TONSILLECTOMY AND ADENOIDECTOMY      1989    TOTAL LAPAROSCOPIC HYSTERECTOMY N/A 01/09/2019    Procedure: TOTAL LAPAROSCOPIC HYSTERECTOMY, BILATERAL SALPINGECTOMY WITH DAVINCI ROBOT;  Surgeon: Markie Lewis MD;  Location:  [facility];  Service: DaVinci    TUBAL ABDOMINAL LIGATION         Social History     Socioeconomic History    Marital status: Significant Other   Tobacco Use    Smoking status: Every Day     Current packs/day: 0.50     Average packs/day: 0.5 packs/day for 26.4 years (13.2 ttl pk-yrs)     Types: Cigarettes     Start date: 1998     Passive exposure: Current    Smokeless tobacco: Never    Tobacco comments:     tried w preg   Vaping Use     "Vaping status: Never Used   Substance and Sexual Activity    Alcohol use: Yes     Alcohol/week: 14.0 standard drinks of alcohol     Types: 14 Shots of liquor per week     Comment: increased recently due to neck pain    Drug use: No    Sexual activity: Defer     Partners: Male     Birth control/protection: Surgical         Review of Systems    Objective   Vital Signs: Temperature 98.4 °F (36.9 °C), temperature source Temporal, height 166.4 cm (65.5\"), weight 102 kg (225 lb), last menstrual period 12/18/2018, not currently breastfeeding.  Physical Exam  Vitals and nursing note reviewed.   Constitutional:       General: She is not in acute distress.     Appearance: She is well-developed.   HENT:      Head: Normocephalic and atraumatic.   Skin:     Comments: Neck incision has healed up nicely. No significant swelling    Psychiatric:         Behavior: Behavior normal.         Thought Content: Thought content normal.     Myers sign is positive bilaterally        Independent review of radiographic imaging: plain films demonstrate good positioning of the construct at C4-5 and C5-6    Assessment & Plan   Diagnosis: Cervical myelopathy status post ACDF C4-5 with previous ACDF C5-6    Medical Decision Making: Ms. Foster is doing well. She has noticed subtle improvements.  We can't pinpoint the cause of her hives but zyrtec is helping so I advised her to continue that.  She needs to return to work next week for financial reasons but can return with restrictions.  We will keep those restrictions in place until her follow up with Dr Vasquez in 6 weeks. She will call with any questions or concerns.        Diagnoses and all orders for this visit:    1. Cervical spondylosis with myelopathy (Primary)                                  Lucy Donahue PA-C  Patient Care Team:  Nabil Green MD as PCP - General (Family Medicine)  Mara Bernal APRN as Nurse Practitioner (Nurse Practitioner)  Aditi Mckinley APRN as " Nurse Practitioner (Cardiology)

## 2024-06-12 NOTE — TELEPHONE ENCOUNTER
From: Evelia Foster  To: Kalyan Vasquez  Sent: 5/16/2024 2:41 AM EDT  Subject: Swelling    I was told to ask about swelling if it looked like a lot to me. The darker one is late this evening. The lighter one is right after surgery. Does that look excessive or is this considered okay?

## 2024-07-16 ENCOUNTER — OFFICE VISIT (OUTPATIENT)
Dept: NEUROSURGERY | Facility: CLINIC | Age: 44
End: 2024-07-16
Payer: COMMERCIAL

## 2024-07-16 VITALS — HEIGHT: 66 IN | BODY MASS INDEX: 37.45 KG/M2 | TEMPERATURE: 98 F | WEIGHT: 233 LBS

## 2024-07-16 DIAGNOSIS — Z98.1 S/P CERVICAL SPINAL FUSION: ICD-10-CM

## 2024-07-16 DIAGNOSIS — M47.12 CERVICAL SPONDYLOSIS WITH MYELOPATHY: Primary | ICD-10-CM

## 2024-07-16 PROCEDURE — 99024 POSTOP FOLLOW-UP VISIT: CPT | Performed by: NEUROLOGICAL SURGERY

## 2024-07-16 RX ORDER — NEOMYCIN SULFATE, POLYMYXIN B SULFATE AND DEXAMETHASONE 3.5; 10000; 1 MG/ML; [USP'U]/ML; MG/ML
SUSPENSION/ DROPS OPHTHALMIC
COMMUNITY
Start: 2024-07-15

## 2024-07-16 RX ORDER — CETIRIZINE HYDROCHLORIDE 5 MG/1
5 TABLET ORAL DAILY
COMMUNITY

## 2024-07-16 NOTE — PROGRESS NOTES
Patient: Evelia Foster  : 1980    Primary Care Provider: Nabil Green MD    Requesting Provider: As above        History    Chief Complaint: Numbness in the hands with gait instability.    History of Present Illness: Ms. Foster is a 44-year-old woman with a history of cervical myelopathy for which she underwent ACDF C5-6 in .  She has had ongoing gait instability and some numbness in her hands.  Symptoms progressed recently and she was found to have disc protrusion with cord compromise at C4-5.  As such on 5/15/2024 she underwent ACDF at this level.  The numbness in her hands seems to be coming and going at this point.  She does not notice much change in her gait.  She is back to work.       Review of Systems   Constitutional:  Negative for activity change, appetite change, chills, diaphoresis, fatigue, fever and unexpected weight change.   HENT:  Negative for congestion, dental problem, drooling, ear discharge, ear pain, facial swelling, hearing loss, mouth sores, nosebleeds, postnasal drip, rhinorrhea, sinus pressure, sinus pain, sneezing, sore throat, tinnitus, trouble swallowing and voice change.    Eyes:  Negative for photophobia, pain, discharge, redness, itching and visual disturbance.   Respiratory:  Negative for apnea, cough, choking, chest tightness, shortness of breath, wheezing and stridor.    Cardiovascular:  Negative for chest pain, palpitations and leg swelling.   Gastrointestinal:  Negative for abdominal distention, abdominal pain, anal bleeding, blood in stool, constipation, diarrhea, nausea, rectal pain and vomiting.   Endocrine: Negative for cold intolerance, heat intolerance, polydipsia, polyphagia and polyuria.   Genitourinary:  Negative for decreased urine volume, difficulty urinating, dyspareunia, dysuria, enuresis, flank pain, frequency, genital sores, hematuria, menstrual problem, pelvic pain, urgency, vaginal bleeding, vaginal discharge and vaginal pain.  "  Musculoskeletal:  Positive for neck stiffness. Negative for arthralgias, back pain, gait problem, joint swelling, myalgias and neck pain.   Skin:  Negative for color change, pallor, rash and wound.   Allergic/Immunologic: Negative for environmental allergies, food allergies and immunocompromised state.   Neurological:  Negative for dizziness, tremors, seizures, syncope, facial asymmetry, speech difficulty, weakness, light-headedness, numbness and headaches.   Hematological:  Negative for adenopathy. Does not bruise/bleed easily.   Psychiatric/Behavioral:  Negative for agitation, behavioral problems, confusion, decreased concentration, dysphoric mood, hallucinations, self-injury, sleep disturbance and suicidal ideas. The patient is not nervous/anxious and is not hyperactive.      The patient's past medical history, past surgical history, family history, and social history have been reviewed at length in the electronic medical record.      Physical Exam:   Ht 167.6 cm (66\")   Wt 106 kg (233 lb)   LMP 12/18/2018 Comment: last mammogram 2020  BMI 37.61 kg/m²   Right anterior cervical incision looks good.    Medical Decision Making    Data Review:   (All imaging studies were personally reviewed unless stated otherwise)  Plain films of the cervical spine dated 6/4/2024 demonstrate good position of her new construct at C4-5 above the prior instrumented construct at C5-6.    Diagnosis:   Cervical spondylosis with myelopathy status post ACDF at C4-5.    Treatment Options:   Overall Ms. Foster is doing well.  She will steadily increase her activity at work.  She may lift up to 25 pounds.  She is not to perform sustained activity at shoulder level or above.  These restrictions should be in effect for the next 6 weeks.  Thereafter she may pursue her regular duties without limitation.  She will follow-up in our clinic in 6-7 months with new plain films of her cervical spine to assess her fusion.          IDr. Vasquez, " personally performed the services described in the documentation, as scribed in my presence, and it is both accurate and complete.

## 2024-08-20 NOTE — PROGRESS NOTES
Sleep Clinic Follow Up Note    Chief Complaint  Sleep Apnea, Follow-up, Snoring, and Daytime Sleepiness    Subjective     History of Present Illness (from previous encounter on 8/26/2024):  Evelia Foster is a 44 y.o. female with a history of hypertension, PTSD, restless leg syndrome, insomnia, asthma, anxiety, and anemia.  The patient is referred by OG Christianson with cardiology.  Patient has had difficulty with fatigue, and dizziness with rapid heart rate and palpitations.  Review of patient's self-reported questionnaire notes symptoms including snoring, witnessed apnea, daytime sleepiness and fatigue, frequent awakening, morning headaches, nonrestorative sleep, dry mouth, nasal allergies, grinding teeth, leg and body jerks, leg cramps, difficulty falling asleep, difficulty staying asleep, pain at night, sleep talking, nightmares, night sweats, memory loss, lack of concentration, decreased libido, abnormal heart rate, uncontrollable muscle weakness, and hallucinations when going to sleep.  The patient reports symptoms have been ongoing for 3-4 years.  Patient typically goes to bed at 8-9 PM waking at 330-4 AM on weekdays, and 10-11 PM waking to 5-6 AM on weekends.  She estimates an average of 4-5 hours of sleep per night and it can take minutes to an hour for her to get to sleep.  The patient smokes cigarettes, drinks alcohol 2-4 times per month, and denies use of illicit or recreational drugs.  She drinks 1 cup of regular coffee daily, and 3-4 regular sotero daily.  The patient's significant other reports witnessed episodes of apnea and pauses which occur multiple times per night.  Additionally, the patient is observed with heavy snoring which occurs continuously, nightly, and in all sleeping positions.  The patient has been observed kicking and jerking frequently and talking in her sleep.     Further details are as follows:     [place] Scale is (out of 24): Total score: 14  (End copied text).    -A home  sleep test was obtained on 3/26/2024 revealing moderate obstructive sleep apnea with an AHI of 17.3/H.    Interval History:  Evelia Foster is a 44 y.o. female returns for follow up and compliance of PAP therapy. The patient was last seen on 2/26/2024 by me. Overall the patient feels poor with regard to therapy. She is having difficulty with mask fit. She would like to try nasal pillows. She is not a mouth breather. The device appears to be working appropriately. On average the patient sleeps 7-8 hours per night. The patient wakes 4-5 times per night.     The patient reports the following changes to their medical and medication history since they were last seen:  None      Further details are as follows:      [place] Scale is (out of 24): Total score: 14     Weight:  Current Weight: 230 lb    Weight change in the last year:  Loss: 3 lb    The patient's relevant past medical, surgical, family, and social history reviewed and updated in Epic as appropriate.    PMH:    Past Medical History:   Diagnosis Date    Anemia     Anxiety     Asthma     mild, rescue inhaler prn     Cancer     uterine cancer, skin cancer    Dizzy     Full dentures     Headache     History of cervical dysplasia     s/p hsyterectomy    HPV (human papilloma virus) infection     positive 18    Hyperlipidemia     Hypertension     Insomnia     Low back pain     Neck pain     Palpitations     Pneumonia     PTSD (post-traumatic stress disorder)     Restless leg syndrome     Sleep apnea     no cpap use    Spinal headache     SVT (supraventricular tachycardia)     Uses contact lenses     Well adult exam 08/26/2022     Past Surgical History:   Procedure Laterality Date    ANTERIOR CERVICAL DISCECTOMY W/ FUSION N/A 01/14/2021    Procedure: CERVICAL DISCECTOMY ANTERIOR WITH FUSION C5-6;  Surgeon: Kalyan Vasquez MD;  Location: [facility];  Service: Neurosurgery;  Laterality: N/A;    ANTERIOR CERVICAL DISCECTOMY W/ FUSION N/A 5/15/2024    Procedure:  CERVICAL DISCECTOMY ANTERIOR WITH FUSION C4-5;  Surgeon: Kalyan Vasquez MD;  Location:  [facility];  Service: Neurosurgery;  Laterality: N/A;    COLONOSCOPY      CYSTOSCOPY W/ BULKING AGENT INJECTION N/A 2023    Procedure: CYSTOSCOPY WITH BULKING AGENT INJECTION;  Surgeon: Nubia Ross MD;  Location:  [facility];  Service: Urology;  Laterality: N/A;    ENDOSCOPY      FINGER/THUMB LESION/CYST EXCISION Left 2023    Thumb    SKIN CANCER EXCISION      TONSILLECTOMY AND ADENOIDECTOMY          TOTAL LAPAROSCOPIC HYSTERECTOMY N/A 2019    Procedure: TOTAL LAPAROSCOPIC HYSTERECTOMY, BILATERAL SALPINGECTOMY WITH DAVINCI ROBOT;  Surgeon: Markie Lewis MD;  Location:  [facility];  Service: DaVinci    TUBAL ABDOMINAL LIGATION       OB History          3    Para   3    Term   3            AB        Living             SAB        IAB        Ectopic        Molar        Multiple        Live Births   3              Allergies   Allergen Reactions    Benadryl [Diphenhydramine] Hives     Adult dosing may exacerbate symptoms  Tolerates children's benadryl dose      Buspar [Buspirone] Confusion       MEDS:  Prior to Admission medications    Medication Sig Start Date End Date Taking? Authorizing Provider   albuterol sulfate  (90 Base) MCG/ACT inhaler Inhale 2 puffs Every 4 (Four) Hours As Needed for Wheezing. 23   Nabil Green MD   cetirizine (zyrTEC) 5 MG tablet Take 1 tablet by mouth Daily.    Provider, MD Marilyn   hydroCHLOROthiazide (HYDRODIURIL) 25 MG tablet TAKE 1 TABLET BY MOUTH DAILY AS NEEDED (EDEMA). 24   Aditi Mckinley APRN   ibuprofen (ADVIL,MOTRIN) 200 MG tablet Take 3 tablets by mouth As Needed for Mild Pain.    Provider, MD Marilyn   metoprolol succinate XL (TOPROL-XL) 50 MG 24 hr tablet Take 1 tablet by mouth 2 (Two) Times a Day. 24   Aditi Mckinley APRN   neomycin-polymyxin-dexamethasone (MAXITROL) 3.5-32983-8.1 ophthalmic suspension  7/15/24    "Provider, MD Marilyn   oxyCODONE-acetaminophen (PERCOCET) 5-325 MG per tablet Take 1 tablet by mouth 3 (Three) Times a Day As Needed (Pain). 5/15/24   Kalyan Vasquez MD   rosuvastatin (Crestor) 20 MG tablet Take 1 tablet by mouth Daily. 1/24/24   Nabil Green MD   vitamin D (ERGOCALCIFEROL) 1.25 MG (98540 UT) capsule capsule Take 1 capsule by mouth 1 (One) Time Per Week. 2/6/24   Nabil Green MD         FH:  Family History   Problem Relation Age of Onset    Cervical cancer Mother 30    Breast cancer Mother 30    Lymphoma Mother     Congenital heart disease Mother     Ovarian cancer Mother         40s    Cancer Father     Breast cancer Sister         mastectomy -proph?    Ovarian cancer Sister 30    Cancer Sister     No Known Problems Brother     Dementia Maternal Grandmother     Graves' disease Sister     Thyroid disease Sister     Breast cancer Maternal Aunt 35    Thyroid disease Daughter     Thyroid disease Sister     No Known Problems Daughter        Objective   Vital Signs:  /70   Pulse 97   Temp 98.2 °F (36.8 °C) (Temporal)   Ht 167.6 cm (65.98\")   Wt 104 kg (230 lb)   SpO2 96%   BMI 37.14 kg/m²      Patient's (Body mass index is 37.14 kg/m².) indicates that they are obese (BMI >30) with health related conditions that include obstructive sleep apnea . Weight is unchanged. BMI is is above average; BMI management plan is completed. We discussed portion control and increasing exercise.           Physical Exam  Vitals reviewed.   Constitutional:       Appearance: Normal appearance.   HENT:      Head: Normocephalic and atraumatic.      Nose: Nose normal.      Mouth/Throat:      Mouth: Mucous membranes are moist.   Cardiovascular:      Rate and Rhythm: Normal rate and regular rhythm.      Heart sounds: No murmur heard.     No friction rub. No gallop.   Pulmonary:      Effort: Pulmonary effort is normal. No respiratory distress.      Breath sounds: Normal breath sounds. No wheezing or " rhonchi.   Neurological:      Mental Status: She is alert and oriented to person, place, and time.   Psychiatric:         Behavior: Behavior normal.             Result Review :           PAP Report:  AHI: 1.6/h  Days of Usage: 52/60 (87%)  Number of Days Greater than 4 hours: 37/60 (62%)  Average time (days used): 5 hours 16 minutes  95th Percentile Pressure: 15.7 cmH2O  95th percentile leaks: 7.9 L/min  Settings: Auto CPAP-4/20 cm H2O, EPR full-time, EPR level 1, response standard.       Assessment and Plan  Evelia Foster is a 44 y.o. female who returns for follow-up and compliance of PAP therapy.  The Pap report has been reviewed.  Overall usage is 87% with compliance at 62%.  Patient averages 5 hours and 16 minutes of therapy.  Sleep apnea is controlled with an AHI of 1.6/H.  She has a history of moderate obstructive sleep apnea with an initial AHI of 17.3/H.    I will refill the patient's supplies, and I have asked her to return for follow-up and recheck in approximately 4 months.  Patient is going to try nasal pillows to see if this is more helpful for her.    Diagnoses and all orders for this visit:    1. JENI (obstructive sleep apnea) (Primary)  -     PAP Therapy    2. Morbid (severe) obesity due to excess calories         The patient continues to use and benefit from PAP therapy.    1. The patient was counseled regarding multimodal approach with healthy nutrition, healthy sleep, regular physical activity, social activities, counseling, and medications. Encouraged to practice lateral sleep position. Avoid alcohol and sedatives close to bedtime.     2.  We will refill supplies x1 year.  Return to clinic 1 year or sooner if symptoms warrant. I have reviewed the results of my evaluation and impression and discussed my recommendations in detail with the patient.           Follow Up  Return in about 4 months (around 12/23/2024).  Patient was given instructions and counseling regarding her condition or for  health maintenance advice. Please see specific information pulled into the AVS if appropriate.       OG Redd, ACNP-BC  Pulmonology, Critical Care, and Sleep Medicine

## 2024-08-21 ENCOUNTER — OFFICE VISIT (OUTPATIENT)
Dept: FAMILY MEDICINE CLINIC | Facility: CLINIC | Age: 44
End: 2024-08-21
Payer: COMMERCIAL

## 2024-08-21 VITALS
HEART RATE: 90 BPM | SYSTOLIC BLOOD PRESSURE: 130 MMHG | OXYGEN SATURATION: 96 % | HEIGHT: 66 IN | DIASTOLIC BLOOD PRESSURE: 82 MMHG | BODY MASS INDEX: 37.09 KG/M2 | TEMPERATURE: 98.2 F | WEIGHT: 230.8 LBS

## 2024-08-21 DIAGNOSIS — E55.9 VITAMIN D DEFICIENCY: ICD-10-CM

## 2024-08-21 DIAGNOSIS — I10 PRIMARY HYPERTENSION: ICD-10-CM

## 2024-08-21 DIAGNOSIS — E27.8 ADRENAL NODULE: Primary | ICD-10-CM

## 2024-08-21 DIAGNOSIS — E78.2 MIXED HYPERLIPIDEMIA: ICD-10-CM

## 2024-08-21 PROBLEM — E27.9 ADRENAL NODULE: Status: ACTIVE | Noted: 2024-08-21

## 2024-08-21 PROBLEM — G47.33 OSA ON CPAP: Status: ACTIVE | Noted: 2024-08-21

## 2024-08-21 PROCEDURE — 99214 OFFICE O/P EST MOD 30 MIN: CPT | Performed by: FAMILY MEDICINE

## 2024-08-21 RX ORDER — ERGOCALCIFEROL 1.25 MG/1
50000 CAPSULE ORAL WEEKLY
Qty: 12 CAPSULE | Refills: 1 | Status: SHIPPED | OUTPATIENT
Start: 2024-08-21

## 2024-08-21 NOTE — PROGRESS NOTES
Evelia Foster is a 44 y.o. female who presents today for Back Pain and Leg Swelling      Patient was in the hospital for shortness or breath and chest pain. They did not find cause of symptoms but patient feels it was a panic attack. They found an adrenal nodule on the left side which was 16mm. On chart review she has had Chest CT in 2020 that showed similar nodule at 1cm in size at that time. Patient has been taking metoprolol and HCTZ but has not been taking blood pressure at home. She has tolerated these well and seen some improvement in edema in the lower extremities. She has not picked up the vitamin D to start taking.            Review of Systems   Constitutional:  Negative for fever and unexpected weight loss.   HENT:  Negative for congestion, ear pain and sore throat.    Eyes:  Negative for visual disturbance.   Respiratory:  Negative for cough, shortness of breath and wheezing.    Cardiovascular:  Positive for leg swelling. Negative for chest pain and palpitations.   Gastrointestinal:  Negative for abdominal pain, blood in stool, constipation, diarrhea, nausea, vomiting and GERD.   Endocrine: Negative for polydipsia and polyuria.   Genitourinary:  Negative for difficulty urinating.   Musculoskeletal:  Positive for back pain (chronic and stable but poorly controlled). Negative for joint swelling.   Skin:  Negative for rash and skin lesions.   Allergic/Immunologic: Negative for environmental allergies.   Neurological:  Negative for seizures and syncope.   Hematological:  Does not bruise/bleed easily.   Psychiatric/Behavioral:  Negative for suicidal ideas.         The following portions of the patient's history were reviewed and updated as appropriate: allergies, current medications, past family history, past medical history, past social history, past surgical history and problem list.    Current Outpatient Medications on File Prior to Visit   Medication Sig Dispense Refill    albuterol sulfate   "(90 Base) MCG/ACT inhaler Inhale 2 puffs Every 4 (Four) Hours As Needed for Wheezing. 18 g 3    cetirizine (zyrTEC) 5 MG tablet Take 1 tablet by mouth Daily.      hydroCHLOROthiazide (HYDRODIURIL) 25 MG tablet TAKE 1 TABLET BY MOUTH DAILY AS NEEDED (EDEMA). 90 tablet 1    ibuprofen (ADVIL,MOTRIN) 200 MG tablet Take 3 tablets by mouth As Needed for Mild Pain.      metoprolol succinate XL (TOPROL-XL) 50 MG 24 hr tablet Take 1 tablet by mouth 2 (Two) Times a Day. 180 tablet 3    neomycin-polymyxin-dexamethasone (MAXITROL) 3.5-17083-9.1 ophthalmic suspension       oxyCODONE-acetaminophen (PERCOCET) 5-325 MG per tablet Take 1 tablet by mouth 3 (Three) Times a Day As Needed (Pain). 15 tablet 0    rosuvastatin (Crestor) 20 MG tablet Take 1 tablet by mouth Daily. 90 tablet 1    [DISCONTINUED] vitamin D (ERGOCALCIFEROL) 1.25 MG (60043 UT) capsule capsule Take 1 capsule by mouth 1 (One) Time Per Week. 12 capsule 1     No current facility-administered medications on file prior to visit.       Allergies   Allergen Reactions    Benadryl [Diphenhydramine] Hives     Adult dosing may exacerbate symptoms  Tolerates children's benadryl dose      Buspar [Buspirone] Confusion        Visit Vitals  /82   Pulse 90   Temp 98.2 °F (36.8 °C) (Temporal)   Ht 167.6 cm (66\")   Wt 105 kg (230 lb 12.8 oz)   LMP 12/18/2018 Comment: last mammogram 2020   SpO2 96%   BMI 37.25 kg/m²        Physical Exam  Constitutional:       General: She is not in acute distress.     Appearance: She is well-developed. She is not diaphoretic.   HENT:      Head: Atraumatic.   Cardiovascular:      Rate and Rhythm: Normal rate and regular rhythm.      Heart sounds: Normal heart sounds. No murmur heard.     No friction rub. No gallop.   Pulmonary:      Effort: Pulmonary effort is normal. No respiratory distress.      Breath sounds: Normal breath sounds. No stridor. No wheezing, rhonchi or rales.   Musculoskeletal:      Cervical back: Normal range of motion and neck " supple.   Skin:     General: Skin is warm and dry.   Neurological:      Mental Status: She is alert and oriented to person, place, and time.   Psychiatric:         Behavior: Behavior normal.          Results for orders placed or performed in visit on 05/07/24   MRSA Screen Culture (Outpatient) - Swab, Nares    Specimen: Nares; Swab   Result Value Ref Range    MRSA Screen Cx       No Methicillin Resistant Staphylococcus aureus isolated   Basic Metabolic Panel    Specimen: Blood   Result Value Ref Range    Glucose 148 (H) 65 - 99 mg/dL    BUN 8 6 - 20 mg/dL    Creatinine 0.86 0.57 - 1.00 mg/dL    Sodium 139 136 - 145 mmol/L    Potassium 4.5 3.5 - 5.2 mmol/L    Chloride 103 98 - 107 mmol/L    CO2 28.0 22.0 - 29.0 mmol/L    Calcium 9.4 8.6 - 10.5 mg/dL    BUN/Creatinine Ratio 9.3 7.0 - 25.0    Anion Gap 8.0 5.0 - 15.0 mmol/L    eGFR 86.6 >60.0 mL/min/1.73   CBC Auto Differential    Specimen: Blood   Result Value Ref Range    WBC 10.64 3.40 - 10.80 10*3/mm3    RBC 4.23 3.77 - 5.28 10*6/mm3    Hemoglobin 13.9 12.0 - 15.9 g/dL    Hematocrit 42.4 34.0 - 46.6 %    .2 (H) 79.0 - 97.0 fL    MCH 32.9 26.6 - 33.0 pg    MCHC 32.8 31.5 - 35.7 g/dL    RDW 14.1 12.3 - 15.4 %    RDW-SD 50.5 37.0 - 54.0 fl    MPV 9.4 6.0 - 12.0 fL    Platelets 234 140 - 450 10*3/mm3    Neutrophil % 66.1 42.7 - 76.0 %    Lymphocyte % 26.5 19.6 - 45.3 %    Monocyte % 4.9 (L) 5.0 - 12.0 %    Eosinophil % 1.7 0.3 - 6.2 %    Basophil % 0.4 0.0 - 1.5 %    Immature Grans % 0.4 0.0 - 0.5 %    Neutrophils, Absolute 7.04 (H) 1.70 - 7.00 10*3/mm3    Lymphocytes, Absolute 2.82 0.70 - 3.10 10*3/mm3    Monocytes, Absolute 0.52 0.10 - 0.90 10*3/mm3    Eosinophils, Absolute 0.18 0.00 - 0.40 10*3/mm3    Basophils, Absolute 0.04 0.00 - 0.20 10*3/mm3    Immature Grans, Absolute 0.04 0.00 - 0.05 10*3/mm3    nRBC 0.0 0.0 - 0.2 /100 WBC   ECG 12 Lead   Result Value Ref Range    QT Interval 380 ms    QTC Interval 435 ms        Problems Addressed this Visit           Cardiac and Vasculature    Hypertension     Hypertension is stable and controlled  Continue current treatment regimen.  Blood pressure will be reassessed in 3 months.         Relevant Orders    Lipid Panel    CBC & Differential    Comprehensive Metabolic Panel    Mixed hyperlipidemia    Relevant Orders    Lipid Panel    Comprehensive Metabolic Panel       Endocrine and Metabolic    Vitamin D deficiency     Chronic low vitamin D.  Will recheck vitamin D level today.  Patient will likely need to start the 50,000 units of vitamin D weekly and then we will recheck and adjust supplementation at her follow-up visit.         Relevant Medications    vitamin D (ERGOCALCIFEROL) 1.25 MG (85710 UT) capsule capsule    Other Relevant Orders    Vitamin D,25-Hydroxy    Adrenal nodule - Primary     Incidental finding on CT scan of left-sided adrenal nodule thought to be an adenoma.  Will order labs for further evaluation as well as further imaging.          Relevant Orders    Aldosterone    Renin Direct Assay    DHEA-Sulfate    Cortisol - AM    CT abdomen w wo contrast     Diagnoses         Codes Comments    Adrenal nodule    -  Primary ICD-10-CM: E27.8  ICD-9-CM: 255.8     Vitamin D deficiency     ICD-10-CM: E55.9  ICD-9-CM: 268.9     Primary hypertension     ICD-10-CM: I10  ICD-9-CM: 401.9     Mixed hyperlipidemia     ICD-10-CM: E78.2  ICD-9-CM: 272.2             Return in about 3 months (around 11/21/2024) for Follow-up HTN, HLD, vitamin D.    39 minutes was spent on this patient encounter which included history taking, physical exam, answering patient questions, counseling, discussing treatment plan, placing orders, and documentation.    Nabil Green MD   8/21/2024

## 2024-08-21 NOTE — ASSESSMENT & PLAN NOTE
Incidental finding on CT scan of left-sided adrenal nodule thought to be an adenoma.  Will order labs for further evaluation as well as further imaging.

## 2024-08-21 NOTE — ASSESSMENT & PLAN NOTE
Chronic low vitamin D.  Will recheck vitamin D level today.  Patient will likely need to start the 50,000 units of vitamin D weekly and then we will recheck and adjust supplementation at her follow-up visit.

## 2024-08-23 ENCOUNTER — OFFICE VISIT (OUTPATIENT)
Dept: SLEEP MEDICINE | Age: 44
End: 2024-08-23
Payer: COMMERCIAL

## 2024-08-23 VITALS
DIASTOLIC BLOOD PRESSURE: 70 MMHG | TEMPERATURE: 98.2 F | BODY MASS INDEX: 36.96 KG/M2 | HEART RATE: 97 BPM | HEIGHT: 66 IN | WEIGHT: 230 LBS | OXYGEN SATURATION: 96 % | SYSTOLIC BLOOD PRESSURE: 140 MMHG

## 2024-08-23 DIAGNOSIS — G47.33 OSA (OBSTRUCTIVE SLEEP APNEA): Primary | ICD-10-CM

## 2024-08-23 DIAGNOSIS — E66.01 MORBID (SEVERE) OBESITY DUE TO EXCESS CALORIES: ICD-10-CM

## 2024-08-23 PROCEDURE — 99213 OFFICE O/P EST LOW 20 MIN: CPT | Performed by: NURSE PRACTITIONER

## 2024-08-31 ENCOUNTER — LAB (OUTPATIENT)
Dept: LAB | Facility: HOSPITAL | Age: 44
End: 2024-08-31
Payer: COMMERCIAL

## 2024-08-31 DIAGNOSIS — E55.9 VITAMIN D DEFICIENCY: ICD-10-CM

## 2024-08-31 DIAGNOSIS — E78.2 MIXED HYPERLIPIDEMIA: ICD-10-CM

## 2024-08-31 DIAGNOSIS — I10 PRIMARY HYPERTENSION: ICD-10-CM

## 2024-08-31 DIAGNOSIS — E27.8 ADRENAL NODULE: ICD-10-CM

## 2024-08-31 LAB
25(OH)D3 SERPL-MCNC: 28.2 NG/ML (ref 30–100)
ALBUMIN SERPL-MCNC: 4.2 G/DL (ref 3.5–5.2)
ALBUMIN/GLOB SERPL: 1.4 G/DL
ALP SERPL-CCNC: 130 U/L (ref 39–117)
ALT SERPL W P-5'-P-CCNC: 44 U/L (ref 1–33)
ANION GAP SERPL CALCULATED.3IONS-SCNC: 11.8 MMOL/L (ref 5–15)
AST SERPL-CCNC: 43 U/L (ref 1–32)
BASOPHILS # BLD AUTO: 0.04 10*3/MM3 (ref 0–0.2)
BASOPHILS NFR BLD AUTO: 0.4 % (ref 0–1.5)
BILIRUB SERPL-MCNC: 0.3 MG/DL (ref 0–1.2)
BUN SERPL-MCNC: 7 MG/DL (ref 6–20)
BUN/CREAT SERPL: 9.1 (ref 7–25)
CALCIUM SPEC-SCNC: 9.5 MG/DL (ref 8.6–10.5)
CHLORIDE SERPL-SCNC: 101 MMOL/L (ref 98–107)
CHOLEST SERPL-MCNC: 278 MG/DL (ref 0–200)
CO2 SERPL-SCNC: 26.2 MMOL/L (ref 22–29)
CORTIS AM PEAK SERPL-MCNC: 18.37 MCG/DL
CREAT SERPL-MCNC: 0.77 MG/DL (ref 0.57–1)
DEPRECATED RDW RBC AUTO: 48.9 FL (ref 37–54)
EGFRCR SERPLBLD CKD-EPI 2021: 98.9 ML/MIN/1.73
EOSINOPHIL # BLD AUTO: 0.15 10*3/MM3 (ref 0–0.4)
EOSINOPHIL NFR BLD AUTO: 1.5 % (ref 0.3–6.2)
ERYTHROCYTE [DISTWIDTH] IN BLOOD BY AUTOMATED COUNT: 13.9 % (ref 12.3–15.4)
GLOBULIN UR ELPH-MCNC: 3 GM/DL
GLUCOSE SERPL-MCNC: 107 MG/DL (ref 65–99)
HCT VFR BLD AUTO: 45.7 % (ref 34–46.6)
HDLC SERPL-MCNC: 39 MG/DL (ref 40–60)
HGB BLD-MCNC: 15.9 G/DL (ref 12–15.9)
IMM GRANULOCYTES # BLD AUTO: 0.03 10*3/MM3 (ref 0–0.05)
IMM GRANULOCYTES NFR BLD AUTO: 0.3 % (ref 0–0.5)
LDLC SERPL CALC-MCNC: 199 MG/DL (ref 0–100)
LDLC/HDLC SERPL: 5.06 {RATIO}
LYMPHOCYTES # BLD AUTO: 2.99 10*3/MM3 (ref 0.7–3.1)
LYMPHOCYTES NFR BLD AUTO: 30.1 % (ref 19.6–45.3)
MCH RBC QN AUTO: 33.5 PG (ref 26.6–33)
MCHC RBC AUTO-ENTMCNC: 34.8 G/DL (ref 31.5–35.7)
MCV RBC AUTO: 96.4 FL (ref 79–97)
MONOCYTES # BLD AUTO: 0.51 10*3/MM3 (ref 0.1–0.9)
MONOCYTES NFR BLD AUTO: 5.1 % (ref 5–12)
NEUTROPHILS NFR BLD AUTO: 6.22 10*3/MM3 (ref 1.7–7)
NEUTROPHILS NFR BLD AUTO: 62.6 % (ref 42.7–76)
NRBC BLD AUTO-RTO: 0 /100 WBC (ref 0–0.2)
PLATELET # BLD AUTO: 293 10*3/MM3 (ref 140–450)
PMV BLD AUTO: 9.8 FL (ref 6–12)
POTASSIUM SERPL-SCNC: 4.1 MMOL/L (ref 3.5–5.2)
PROT SERPL-MCNC: 7.2 G/DL (ref 6–8.5)
RBC # BLD AUTO: 4.74 10*6/MM3 (ref 3.77–5.28)
SODIUM SERPL-SCNC: 139 MMOL/L (ref 136–145)
TRIGL SERPL-MCNC: 208 MG/DL (ref 0–150)
VLDLC SERPL-MCNC: 40 MG/DL (ref 5–40)
WBC NRBC COR # BLD AUTO: 9.94 10*3/MM3 (ref 3.4–10.8)

## 2024-08-31 PROCEDURE — 82306 VITAMIN D 25 HYDROXY: CPT

## 2024-08-31 PROCEDURE — 80053 COMPREHEN METABOLIC PANEL: CPT

## 2024-08-31 PROCEDURE — 82088 ASSAY OF ALDOSTERONE: CPT

## 2024-08-31 PROCEDURE — 80061 LIPID PANEL: CPT

## 2024-08-31 PROCEDURE — 84244 ASSAY OF RENIN: CPT

## 2024-08-31 PROCEDURE — 36415 COLL VENOUS BLD VENIPUNCTURE: CPT

## 2024-08-31 PROCEDURE — 82533 TOTAL CORTISOL: CPT

## 2024-08-31 PROCEDURE — 82627 DEHYDROEPIANDROSTERONE: CPT

## 2024-08-31 PROCEDURE — 85025 COMPLETE CBC W/AUTO DIFF WBC: CPT

## 2024-09-01 LAB — DHEA-S SERPL-MCNC: 522 UG/DL (ref 57.3–279.2)

## 2024-09-04 ENCOUNTER — TELEPHONE (OUTPATIENT)
Dept: FAMILY MEDICINE CLINIC | Facility: CLINIC | Age: 44
End: 2024-09-04
Payer: COMMERCIAL

## 2024-09-04 LAB — ALDOST SERPL-MCNC: 32.3 NG/DL (ref 0–30)

## 2024-09-04 NOTE — TELEPHONE ENCOUNTER
Chrissy with financial assistance calling, insurance denied CT abdomen with and without contrast. She said insurance will cover CPT code 01184, ct of abdomen without contrast. She is requesting order be changed. [place] advise.

## 2024-09-05 NOTE — TELEPHONE ENCOUNTER
Chrissy is rescheduling CT scan. Provider will need to do peer to peer request. Please call [phone]  CASE# [id number]    Denial letter in media tab

## 2024-09-06 ENCOUNTER — TELEPHONE (OUTPATIENT)
Dept: FAMILY MEDICINE CLINIC | Facility: CLINIC | Age: 44
End: 2024-09-06
Payer: COMMERCIAL

## 2024-09-06 DIAGNOSIS — E26.9 HIGH ALDOSTERONE: ICD-10-CM

## 2024-09-06 DIAGNOSIS — R79.89 ELEVATED DHEA: ICD-10-CM

## 2024-09-06 DIAGNOSIS — E27.8 ADRENAL NODULE: Primary | ICD-10-CM

## 2024-09-06 NOTE — TELEPHONE ENCOUNTER
----- Message from Nabil Green sent at 9/6/2024  3:32 PM EDT -----  Endocrinology referral has been placed.  ----- Message -----  From: Johanna Lynch MA  Sent: 9/6/2024   3:27 PM EDT  To: Nabil Green MD    SPOKE WITH PT, INFORMED OF NOTE, VERBALIZED UNDERSTANDING.  WANTS REFERRAL TO ENDOCRINOLOGY.  ----- Message -----  From: Nabil Green MD  Sent: 9/6/2024   2:54 PM EDT  To: [facility]    Please the patient know that her aldosterone level was elevated her DHEA sulfate level was elevated, but her cortisol level was in the normal range.  We are still waiting for the renin assay to return.  Due to these abnormalities I would recommend the patient see endocrinology for further evaluation.  The patient is agreeable this please let me know and I will place the referral.  Patient's vitamin D level remains low but is near normal now.  I would recommend she continue her current dose of vitamin D.  LDL cholesterol level has worsened further and is now 199.  I would recommend patient begin taking the statin medication that was prescribed to her.  She should also work on diet and exercise.  Liver function numbers remain elevated and have only increased slightly from previous labs.  Will continue to monitor this going forward.  Liver function numbers would likely improve with weight loss.  Advised patient avoid Tylenol and alcohol use.  If patient has any questions please have her contact me.

## 2024-09-09 NOTE — TELEPHONE ENCOUNTER
Patient notified of providers message. She is agreeable to endocrinology referral that has already been placed.

## 2024-09-10 ENCOUNTER — TELEPHONE (OUTPATIENT)
Dept: FAMILY MEDICINE CLINIC | Facility: CLINIC | Age: 44
End: 2024-09-10
Payer: COMMERCIAL

## 2024-09-10 NOTE — TELEPHONE ENCOUNTER
Explained to patient insurance will only approve without contrast and provider  needs with contrast.

## 2024-09-10 NOTE — TELEPHONE ENCOUNTER
Caller: Evelia Foster    Relationship: Self    Best call back number: [phone]     What is the medical concern/diagnosis: CT SCAN    What specialty or service is being requested: CT SCAN ABDOMEN    What is the provider, practice or medical service name: AS DECIDED BY PROVIDER    Any additional details: INSURANCE DENIED THIS AS PREVIOUSLY IT WAS WRITTEN AS WITH OR WITHOUT CONTRAST. INSURANCE CONTACTED PATIENT AND SHE STATED THE LETTER SEEMED TO INDICATE THAT WE HAD ORDERED TWO SEPARATE, ONE WITH AND ONE WITHOUT, INSTEAD OF ONE WITH AN OPTION FOR CONTRAST. PLEASE RESEND AND SPECIFY EITHER WITH OR WITHOUT CONTRAST TO SEE IF INSURANCE WILL COVER.

## 2024-09-11 LAB — RENIN PLAS-CCNC: 6.74 NG/ML/HR (ref 0.17–5.38)

## 2024-10-02 DIAGNOSIS — L29.9 ITCHING: ICD-10-CM

## 2024-10-02 DIAGNOSIS — J30.89 ENVIRONMENTAL AND SEASONAL ALLERGIES: Primary | ICD-10-CM

## 2024-10-02 RX ORDER — CETIRIZINE HYDROCHLORIDE 10 MG/1
10 TABLET ORAL DAILY
Qty: 90 TABLET | Refills: 3 | Status: SHIPPED | OUTPATIENT
Start: 2024-10-02

## 2024-10-03 ENCOUNTER — TELEPHONE (OUTPATIENT)
Dept: FAMILY MEDICINE CLINIC | Facility: CLINIC | Age: 44
End: 2024-10-03

## 2024-10-03 NOTE — TELEPHONE ENCOUNTER
Provider: EUGENE SOUTH    Caller: ZACHARY PATIÑO      Phone Number: [phone]     Reason for Call: PATIENT RECEIVED A [employer] MESSAGE REGARD WHY HER CT SCAN WAS NOT SCHEDULED. PATIENT INSURANCE IS NOT GOING TO COVER IT.

## 2024-10-28 ENCOUNTER — TELEPHONE (OUTPATIENT)
Dept: FAMILY MEDICINE CLINIC | Facility: CLINIC | Age: 44
End: 2024-10-28
Payer: COMMERCIAL

## 2024-10-28 NOTE — TELEPHONE ENCOUNTER
Caller: Evelia Foster    Relationship: Self    Best call back number: [phone]     What medication are you requesting: ANY MEDICATION FOR COVID-19, AND MEDICATION FOR COUGH AND NAUSEA    What are your current symptoms: FEVER, COUGH, LEFT EARACHE, NAUSEA    How long have you been experiencing symptoms: 10.27.24    Have you had these symptoms before:    [x] Yes  [] No    Have you been treated for these symptoms before:   [x] Yes  [] No    If a prescription is needed, what is your preferred pharmacy and phone number: [id number] - [place], [place] - [address]  - [phone]  - [phone] FX     Additional notes: PATIENT TOOK AN AT HOME COVID-19 TEST, AND IT WAS POSITIVE. PATIENT WOULD LIKE TO KNOW IF THERE IS ANY MEDICATION THAT COULD BE CALLED IN TO EASE SYMPTOMS AND SHORTEN THE DURATION.

## 2024-11-07 ENCOUNTER — TELEPHONE (OUTPATIENT)
Dept: FAMILY MEDICINE CLINIC | Facility: CLINIC | Age: 44
End: 2024-11-07

## 2024-11-07 ENCOUNTER — READMISSION MANAGEMENT (OUTPATIENT)
Dept: CALL CENTER | Facility: HOSPITAL | Age: 44
End: 2024-11-07
Payer: COMMERCIAL

## 2024-11-07 NOTE — TELEPHONE ENCOUNTER
Caller: Evelia Foster    Relationship to patient: Self    Best call back number:   Telephone Information:   Mobile [phone]         New or established patient?  [] New  [x] Established    Date of discharge: 11/06/24    Facility discharged from: [facility]    Diagnosis/Symptoms: SEPSIS-KIDNEY ISSUES AND BLADDER INFECTION    Length of stay (If applicable): 11/04/24-11/06/24    Specialty Only: Did you see a [facility] provider?    [] Yes  [x] No  If so, who?     Additional Details:

## 2024-11-07 NOTE — OUTREACH NOTE
Prep Survey      Flowsheet Row Responses   [facility] facility patient discharged from? Non-[facility]   Is LACE score < 7 ? Non- Discharge   Eligibility [facility] [place]   Date of Admission 11/04/24   Date of Discharge 11/06/24   Discharge diagnosis SEPSIS-KIDNEY ISSUES AND BLADDER INFECTION   Does the patient have one of the following disease processes/diagnoses(primary or secondary)? Sepsis   Prep survey completed? Yes            Mara CONNORS - Registered Nurse

## 2024-11-08 ENCOUNTER — TRANSITIONAL CARE MANAGEMENT TELEPHONE ENCOUNTER (OUTPATIENT)
Dept: CALL CENTER | Facility: HOSPITAL | Age: 44
End: 2024-11-08
Payer: COMMERCIAL

## 2024-11-08 NOTE — OUTREACH NOTE
Call Center TCM Note      Flowsheet Row Responses   [facility] ([facility] patient discharged from? Non-  [[facility]]   Does the patient have one of the following disease processes/diagnoses(primary or secondary)? Sepsis   TCM attempt successful? Yes   Call start time 1000   Call end time 1002   Discharge diagnosis SEPSIS-KIDNEY ISSUES AND BLADDER INFECTION   Does the patient have all medications ordered at discharge? Yes   Is the patient taking all medications as directed (includes completed medication regime)? Yes   Comments Hospital follow up with PCP Dr. Green for 11/12   Does the patient have an appointment with their PCP within 7-14 days of discharge? Yes   Has home health visited the patient within 72 hours of discharge? N/A   Psychosocial issues? No   What is the patient's perception of their health status since discharge? Improving   Is the patient/caregiver able to teach back signs and symptoms related to disease process for when to call PCP? Yes   Is the patient/caregiver able to teach back signs and symptoms related to disease process for when to call 911? Yes   Is the patient/caregiver able to teach back the hierarchy of who to call/visit for symptoms/problems? PCP, Specialist, Home health nurse, Urgent Care, ED, 911 Yes   TCM call completed? Yes   Wrap up additional comments Doing well, denies any questions or concerns, confirmed hospital follow up appt with PCP Dr. Green for 11/12.   Call end time 1002   Would this patient benefit from a Referral to Amb Social Work? No   Is the patient interested in additional calls from an ambulatory ? No            Carol Quick RN    11/8/2024, 10:02 EST

## 2024-11-12 ENCOUNTER — OFFICE VISIT (OUTPATIENT)
Dept: FAMILY MEDICINE CLINIC | Facility: CLINIC | Age: 44
End: 2024-11-12
Payer: COMMERCIAL

## 2024-11-12 VITALS
WEIGHT: 223.4 LBS | HEIGHT: 66 IN | BODY MASS INDEX: 35.9 KG/M2 | HEART RATE: 92 BPM | SYSTOLIC BLOOD PRESSURE: 134 MMHG | DIASTOLIC BLOOD PRESSURE: 92 MMHG | OXYGEN SATURATION: 96 % | TEMPERATURE: 98.2 F

## 2024-11-12 DIAGNOSIS — E53.8 B12 DEFICIENCY: ICD-10-CM

## 2024-11-12 DIAGNOSIS — F51.01 PRIMARY INSOMNIA: ICD-10-CM

## 2024-11-12 DIAGNOSIS — A41.9 SEPSIS WITHOUT ACUTE ORGAN DYSFUNCTION, DUE TO UNSPECIFIED ORGANISM: Chronic | ICD-10-CM

## 2024-11-12 DIAGNOSIS — Q44.1 GALLBLADDER ANOMALY: Primary | ICD-10-CM

## 2024-11-12 DIAGNOSIS — N30.90 CYSTITIS: ICD-10-CM

## 2024-11-12 PROCEDURE — 99495 TRANSJ CARE MGMT MOD F2F 14D: CPT | Performed by: FAMILY MEDICINE

## 2024-11-12 RX ORDER — QUETIAPINE FUMARATE 25 MG/1
25 TABLET, FILM COATED ORAL NIGHTLY
Qty: 30 TABLET | Refills: 3 | Status: SHIPPED | OUTPATIENT
Start: 2024-11-12

## 2024-11-12 RX ORDER — SACCHAROMYCES BOULARDII 250 MG
250 CAPSULE ORAL
COMMUNITY
Start: 2024-11-07 | End: 2024-11-12

## 2024-11-12 RX ORDER — CEFDINIR 300 MG/1
300 CAPSULE ORAL
COMMUNITY
Start: 2024-11-07 | End: 2024-11-12

## 2024-11-12 RX ORDER — LIDOCAINE 50 MG/G
1 PATCH TOPICAL
COMMUNITY
Start: 2024-11-06 | End: 2024-11-12

## 2024-11-12 NOTE — ASSESSMENT & PLAN NOTE
Patient had low normal vitamin B12 level the last time it was checked.  Her MCV has been elevated over the upper end of normal.  Patient will begin taking 1000 mcg of vitamin B12 daily and we will recheck her B12 levels at follow-up visit.

## 2024-11-12 NOTE — PROGRESS NOTES
Transitional Care Follow Up Visit  Subjective     Evelia Foster is a 44 y.o. female who presents for a transitional care management visit.    Within 48 business hours after discharge our office contacted her via telephone to coordinate her care and needs.      I reviewed and discussed the details of that call along with the discharge summary, hospital problems, inpatient lab results, inpatient diagnostic studies, and consultation reports with Evelia.     Current outpatient and discharge medications have been reconciled for the patient.  Reviewed by: Nabil Green MD          11/7/2024    11:16 AM   Date of TCM Phone Call   [facility]   Date of Admission 11/4/2024   Date of Discharge 11/6/2024     Risk for Readmission (LACE) No data recorded    History of Present Illness  She has finished cefdinir yesterday. She is planning on finishing probiotic. She she denies urinary tract symptoms. She continues to have intermittent RUQ pain. Intermittent nausea, vomiting, constipation, and diarrhea which have been chronic and not worsening. She has not noticed a correlation with food but has been trying to eat better since discharge from the hospital. MRI showed collapsed gallbladder. She has had chills and fever on and off since discharge but the last time was several days ago before she completed the antibiotic.     Patient has been using CPAP and has been complaint with this but still has trouble falling asleep and staying asleep. She has tried melatonin, vistaril, trazodone, and Seroquel. She felt the Seroquel was working but lost its effectiveness. She did not take higher than a 25 mg dose.        Course During Hospital Stay: Patient admitted to [facility] for UTI sepsis.  Patient was admitted on 11/4/24 and discharged on 11/6/2024.  Patient presented to the ED with dysuria and difficulty voiding since November 1.  She reports feeling nauseous for 1 week with 1 episode of emesis.  She  is also having abdominal pain.  Patient recently tested positive for COVID-19 on 10/20/2024.  She had been feeling poor since that time.  She reports chills but did not take her temperature.  In the ER she was tachycardic, white blood cell count of 12.9, lactic acid 1.5, UA with 1+ blood, 1+ leuk esterase, white blood cells too many to count blood, 1+ bacteria, CT abdomen pelvis with contrast showed moderate to severe cystitis.  No acute pyelonephritis.  Hepatic steatosis.  Indeterminate left hemiliver 2.2 cm hypodense lesion.  Subcentimeter bilateral simple renal cyst.  Indeterminate 1.6 cm left adrenal nodule probable adenoma.  Patient was given fluid bolus, IV Rocephin and Tylenol.  She was admitted for sepsis secondary to UTI.  Patient was continued on ceftriaxone sepsis resolved.  Urine culture grew E. coli sensitive to ceftriaxone and on discharge was continued on cefdinir with probiotic for 5 additional days.  MRI abdominal liver mass protocol was completed in the hospital showed a 22 mm abnormality in the medial segment of the left hepatic lobe consistent with an area of focal fatty infiltration.  No mass was identified.  Left adrenal nodule consistent with adenoma and she has follow-up appointment with endocrinology in December to evaluate adrenal nodule further.  Home medications were continued unchanged.     The following portions of the patient's history were reviewed and updated as appropriate: allergies, current medications, past family history, past medical history, past social history, past surgical history, and problem list.    Review of Systems   Constitutional:  Positive for chills (resolved), fatigue and fever (resolved). Negative for activity change, appetite change and unexpected weight change.   HENT:  Negative for congestion, ear pain, rhinorrhea, sinus pressure, sore throat and trouble swallowing.    Eyes:  Negative for visual disturbance.   Respiratory:  Negative for cough, chest tightness,  "shortness of breath and wheezing.    Cardiovascular:  Negative for chest pain and palpitations.   Gastrointestinal:  Positive for abdominal pain (RUQ pain), constipation (intermittent), diarrhea (intermittent), nausea (intermittent) and vomiting (intermittent). Negative for abdominal distention, anal bleeding, blood in stool and rectal pain.   Endocrine: Negative for cold intolerance and heat intolerance.   Genitourinary:  Negative for difficulty urinating, dyspareunia, dysuria, flank pain, frequency, genital sores, hematuria, menstrual problem, vaginal bleeding, vaginal discharge and vaginal pain.   Musculoskeletal:  Positive for back pain (chronic and unchanged). Negative for joint swelling.   Skin:  Negative for rash and wound.   Allergic/Immunologic: Negative for environmental allergies.   Neurological:  Negative for dizziness, seizures, syncope and headaches.   Hematological:  Does not bruise/bleed easily.   Psychiatric/Behavioral:  Negative for confusion, dysphoric mood and suicidal ideas. The patient is not nervous/anxious.        Objective   /92 (BP Location: Right arm, Patient Position: Sitting, Cuff Size: Adult)   Pulse 92   Temp 98.2 °F (36.8 °C) (Infrared)   Ht 167.6 cm (66\")   Wt 101 kg (223 lb 6.4 oz)   SpO2 96%   BMI 36.06 kg/m²     Physical Exam  Constitutional:       General: She is not in acute distress.     Appearance: She is well-developed. She is not diaphoretic.   HENT:      Head: Atraumatic.   Cardiovascular:      Rate and Rhythm: Normal rate and regular rhythm.      Heart sounds: Normal heart sounds. No murmur heard.     No friction rub. No gallop.   Pulmonary:      Effort: Pulmonary effort is normal. No respiratory distress.      Breath sounds: Normal breath sounds. No stridor. No wheezing, rhonchi or rales.   Abdominal:      General: Bowel sounds are normal. There is no distension.      Palpations: Abdomen is soft. There is no mass.      Tenderness: There is abdominal tenderness " (mild RUQ). There is no right CVA tenderness, left CVA tenderness, guarding or rebound.      Hernia: No hernia is present.   Musculoskeletal:      Cervical back: Normal range of motion and neck supple.   Skin:     General: Skin is warm and dry.   Neurological:      Mental Status: She is alert and oriented to person, place, and time.   Psychiatric:         Behavior: Behavior normal.         Assessment & Plan   Problems Addressed this Visit          Endocrine and Metabolic    B12 deficiency     Patient had low normal vitamin B12 level the last time it was checked.  Her MCV has been elevated over the upper end of normal.  Patient will begin taking 1000 mcg of vitamin B12 daily and we will recheck her B12 levels at follow-up visit.            Gastrointestinal Abdominal     Gallbladder anomaly - Primary     Patient has been having intermittent right upper quadrant pain which has been a chronic issue.  When she was in the hospital for UTI sepsis MRI revealed collapsed gallbladder.  Patient was given referral to general surgery for further evaluation of gallbladder dysfunction and possible removal.         Relevant Orders    Ambulatory Referral to General Surgery       Genitourinary and Reproductive     RESOLVED: Cystitis     Patient is no longer having UTI symptoms.  Her urination has returned to normal after completing antibiotics.  RTC/ED precautions given.            Infectious Diseases    RESOLVED: Sepsis (Chronic)       Sleep    Insomnia     Chronic and poorly controlled.  Patient has been using CPAP as directed but still has difficulty sleeping.  Patient had good result with Seroquel in the past but never increased the dose and found that it lost effectiveness.  Will restart Seroquel at 25 mg nightly.  Will adjust as needed at follow-up visit.  RTC/ED precautions given.         Relevant Medications    QUEtiapine (SEROquel) 25 MG tablet     Diagnoses         Codes Comments    Gallbladder anomaly    -  Primary  ICD-10-CM: Q44.1  ICD-9-CM: 751.60     B12 deficiency     ICD-10-CM: E53.8  ICD-9-CM: 266.2     Cystitis     ICD-10-CM: N30.90  ICD-9-CM: 595.9     Primary insomnia     ICD-10-CM: F51.01  ICD-9-CM: 307.42     Sepsis without acute organ dysfunction, due to unspecified organism     ICD-10-CM: A41.9  ICD-9-CM: 038.9, 995.91

## 2024-11-12 NOTE — ASSESSMENT & PLAN NOTE
Patient has been having intermittent right upper quadrant pain which has been a chronic issue.  When she was in the hospital for UTI sepsis MRI revealed collapsed gallbladder.  Patient was given referral to general surgery for further evaluation of gallbladder dysfunction and possible removal.

## 2024-11-12 NOTE — ASSESSMENT & PLAN NOTE
Chronic and poorly controlled.  Patient has been using CPAP as directed but still has difficulty sleeping.  Patient had good result with Seroquel in the past but never increased the dose and found that it lost effectiveness.  Will restart Seroquel at 25 mg nightly.  Will adjust as needed at follow-up visit.  RTC/ED precautions given.

## 2024-11-12 NOTE — PATIENT INSTRUCTIONS
Low-FODMAP Eating Plan    FODMAP stands for fermentable oligosaccharides, disaccharides, monosaccharides, and polyols. These are sugars that are hard for some people to digest. A low-FODMAP eating plan may help some people who have irritable bowel syndrome (IBS) and certain other bowel (intestinal) diseases to manage their symptoms.  This meal plan can be complicated to follow. Work with a diet and nutrition specialist (dietitian) to make a low-FODMAP eating plan that is right for you. A dietitian can help make sure that you get enough nutrition from this diet.  What are tips for following this plan?  Reading food labels  Check labels for hidden FODMAPs such as:  High-fructose syrup.  Honey.  Agave.  Natural fruit flavors.  Onion or garlic powder.  Choose low-FODMAP foods that contain 3-4 grams of fiber per serving.  Check food labels for serving sizes. Eat only one serving at a time to make sure FODMAP levels stay low.  Shopping  Shop with a list of foods that are recommended on this diet and make a meal plan.  Meal planning  Follow a low-FODMAP eating plan for up to 6 weeks, or as told by your health care provider or dietitian.  To follow the eating plan:  Eliminate high-FODMAP foods from your diet completely. Choose only low-FODMAP foods to eat. You will do this for 2-6 weeks.  Gradually reintroduce high-FODMAP foods into your diet one at a time. Most people should wait a few days before introducing the next new high-FODMAP food into their meal plan. Your dietitian can recommend how quickly you may reintroduce foods.  Keep a daily record of what and how much you eat and drink. Make note of any symptoms that you have after eating.  Review your daily record with a dietitian regularly to identify which foods you can eat and which foods you should avoid.  General tips  Drink enough fluid each day to keep your urine pale yellow.  Avoid processed foods. These often have added sugar and may be high in FODMAPs.  Avoid  "most dairy products, whole grains, and sweeteners.  Work with a dietitian to make sure you get enough fiber in your diet.  Avoid high FODMAP foods at meals to manage symptoms.  Recommended foods  Fruits  Bananas, oranges, tangerines, zhang, limes, blueberries, raspberries, strawberries, grapes, cantaloupe, honeydew melon, kiwi, papaya, passion fruit, and pineapple. Limited amounts of dried cranberries, banana chips, and shredded coconut.  Vegetables  Eggplant, zucchini, cucumber, peppers, green beans, bean sprouts, lettuce, arugula, kale, Swiss chard, spinach, shanae greens, bok jorge, summer squash, potato, and tomato. Limited amounts of corn, carrot, and sweet potato. Green parts of scallions.  Grains  Gluten-free grains, such as rice, oats, buckwheat, quinoa, corn, polenta, and millet. Gluten-free pasta, bread, or cereal. Rice noodles. Corn tortillas.  Meats and other proteins  Unseasoned beef, pork, poultry, or fish. Eggs. Kaye. Tofu (firm) and tempeh. Limited amounts of nuts and seeds, such as almonds, walnuts, brazil nuts, pecans, peanuts, nut butters, pumpkin seeds, clint seeds, and sunflower seeds.  Dairy  Lactose-free milk, yogurt, and kefir. Lactose-free cottage cheese and ice cream. Non-dairy milks, such as almond, coconut, hemp, and rice milk. Non-dairy yogurt. Limited amounts of goat cheese, brie, mozzarella, parmesan, swiss, and other hard cheeses.  Fats and oils  Butter-free spreads. Vegetable oils, such as olive, canola, and sunflower oil.  Seasoning and other foods  Artificial sweeteners with names that do not end in \"ol,\" such as aspartame, saccharine, and stevia. Maple syrup, white table sugar, raw sugar, brown sugar, and molasses. Mayonnaise, soy sauce, and tamari. Fresh basil, coriander, parsley, rosemary, and thyme.  Beverages  Water and mineral water. Sugar-sweetened soft drinks. Small amounts of orange juice or cranberry juice. Black and green tea. Most dry caesar. Coffee.  The items listed " above may not be a complete list of foods and beverages you can eat. Contact a dietitian for more information.  Foods to avoid  Fruits  Fresh, dried, and juiced forms of apple, pear, watermelon, peach, plum, cherries, apricots, blackberries, boysenberries, figs, nectarines, and fred. Avocado.  Vegetables  Chicory root, artichoke, asparagus, cabbage, snow peas, [place] sprouts, broccoli, sugar snap peas, mushrooms, celery, and cauliflower. Onions, garlic, leeks, and the white part of scallions.  Grains  Wheat, including kamut, durum, and semolina. Barley and bulgur. Couscous. Wheat-based cereals. Wheat noodles, bread, crackers, and pastries.  Meats and other proteins  Fried or fatty meat. Sausage. Cashews and pistachios. Soybeans, baked beans, black beans, chickpeas, kidney beans, rd beans, navy beans, lentils, black-eyed peas, and split peas.  Dairy  Milk, yogurt, ice cream, and soft cheese. Cream and sour cream. Milk-based sauces. Custard. Buttermilk. Soy milk.  Seasoning and other foods  Any sugar-free gum or candy. Foods that contain artificial sweeteners such as sorbitol, mannitol, isomalt, or xylitol. Foods that contain honey, high-fructose corn syrup, or agave. Bouillon, vegetable stock, beef stock, and chicken stock. Garlic and onion powder. Condiments made with onion, such as hummus, chutney, pickles, relish, salad dressing, and salsa. Tomato paste.  Beverages  Chicory-based drinks. Coffee substitutes. Chamomile tea. Fennel tea. Sweet or fortified caesar such as port or nitesh. Diet soft drinks made with isomalt, mannitol, maltitol, sorbitol, or xylitol. Apple, pear, and fred juice. Juices with high-fructose corn syrup.  The items listed above may not be a complete list of foods and beverages you should avoid. Contact a dietitian for more information.  Summary  FODMAP stands for fermentable oligosaccharides, disaccharides, monosaccharides, and polyols. These are sugars that are hard for some people to  digest.  A low-FODMAP eating plan is a short-term diet that helps to ease symptoms of certain bowel diseases.  The eating plan usually lasts up to 6 weeks. After that, high-FODMAP foods are reintroduced gradually and one at a time. This can help you find out which foods may be causing symptoms.  A low-FODMAP eating plan can be complicated. It is best to work with a dietitian who has experience with this type of plan.  This information is not intended to replace advice given to you by your health care provider. Make sure you discuss any questions you have with your health care provider.  Document Revised: 05/06/2021 Document Reviewed: 05/06/2021  [employer] Patient Education © 2024 [employer]  Insomnia  Insomnia is a sleep disorder that makes it difficult to fall asleep or stay asleep. Insomnia can cause fatigue, low energy, difficulty concentrating, mood swings, and poor performance at work or school.  There are three different ways to classify insomnia:  Difficulty falling asleep.  Difficulty staying asleep.  Waking up too early in the morning.  Any type of insomnia can be long-term (chronic) or short-term (acute). Both are common. Short-term insomnia usually lasts for 3 months or less. Chronic insomnia occurs at least three times a week for longer than 3 months.  What are the causes?  Insomnia may be caused by another condition, situation, or substance, such as:  Having certain mental health conditions, such as anxiety and depression.  Using caffeine, alcohol, tobacco, or drugs.  Having gastrointestinal conditions, such as gastroesophageal reflux disease (GERD).  Having certain medical conditions. These include:  Asthma.  Alzheimer's disease.  Stroke.  Chronic pain.  An overactive thyroid gland (hyperthyroidism).  Other sleep disorders, such as restless legs syndrome and sleep apnea.  Menopause.  Sometimes, the cause of insomnia may not be known.  What increases the risk?  Risk factors for insomnia  include:  Gender. Females are affected more often than males.  Age. Insomnia is more common as people get older.  Stress and certain medical and mental health conditions.  Lack of exercise.  Having an irregular work schedule. This may include working night shifts and traveling between different time zones.  What are the signs or symptoms?  If you have insomnia, the main symptom is having trouble falling asleep or having trouble staying asleep. This may lead to other symptoms, such as:  Feeling tired or having low energy.  Feeling nervous about going to sleep.  Not feeling rested in the morning.  Having trouble concentrating.  Feeling irritable, anxious, or depressed.  How is this diagnosed?  This condition may be diagnosed based on:  Your symptoms and medical history. Your health care provider may ask about:  Your sleep habits.  Any medical conditions you have.  Your mental health.  A physical exam.  How is this treated?  Treatment for insomnia depends on the cause. Treatment may focus on treating an underlying condition that is causing the insomnia. Treatment may also include:  Medicines to help you sleep.  Counseling or therapy.  Lifestyle adjustments to help you sleep better.  Follow these instructions at home:  Eating and drinking    Limit or avoid alcohol, caffeinated beverages, and products that contain nicotine and tobacco, especially close to bedtime. These can disrupt your sleep.  Do not eat a large meal or eat spicy foods right before bedtime. This can lead to digestive discomfort that can make it hard for you to sleep.  Sleep habits    Keep a sleep diary to help you and your health care provider figure out what could be causing your insomnia. Write down:  When you sleep.  When you wake up during the night.  How well you sleep and how rested you feel the next day.  Any side effects of medicines you are taking.  What you eat and drink.  Make your bedroom a dark, comfortable place where it is easy to fall  asleep.  Put up shades or blackout curtains to block light from outside.  Use a white noise machine to block noise.  Keep the temperature cool.  Limit screen use before bedtime. This includes:  Not watching TV.  Not using your smartphone, tablet, or computer.  Stick to a routine that includes going to bed and waking up at the same times every day and night. This can help you fall asleep faster. Consider making a quiet activity, such as reading, part of your nighttime routine.  Try to avoid taking naps during the day so that you sleep better at night.  Get out of bed if you are still awake after 15 minutes of trying to sleep. Keep the lights down, but try reading or doing a quiet activity. When you feel sleepy, go back to bed.  General instructions  Take over-the-counter and prescription medicines only as told by your health care provider.  Exercise regularly as told by your health care provider. However, avoid exercising in the hours right before bedtime.  Use relaxation techniques to manage stress. Ask your health care provider to suggest some techniques that may work well for you. These may include:  Breathing exercises.  Routines to release muscle tension.  Visualizing peaceful scenes.  Make sure that you drive carefully. Do not drive if you feel very sleepy.  Keep all follow-up visits. This is important.  Contact a health care provider if:  You are tired throughout the day.  You have trouble in your daily routine due to sleepiness.  You continue to have sleep problems, or your sleep problems get worse.  Get help right away if:  You have thoughts about hurting yourself or someone else.  Get help right away if you feel like you may hurt yourself or others, or have thoughts about taking your own life. Go to your nearest emergency room or:  Call 911.  Call the National Suicide Prevention Lifeline at 1-451.431.9053 or 245. This is open 24 hours a day.  Text the Crisis Text Line at 272026.  Summary  Insomnia is a  sleep disorder that makes it difficult to fall asleep or stay asleep.  Insomnia can be long-term (chronic) or short-term (acute).  Treatment for insomnia depends on the cause. Treatment may focus on treating an underlying condition that is causing the insomnia.  Keep a sleep diary to help you and your health care provider figure out what could be causing your insomnia.  This information is not intended to replace advice given to you by your health care provider. Make sure you discuss any questions you have with your health care provider.  Document Revised: 11/28/2022 Document Reviewed: 11/28/2022  [employer]vier Patient Education © 2024 [employer]

## 2024-11-12 NOTE — ASSESSMENT & PLAN NOTE
Patient is no longer having UTI symptoms.  Her urination has returned to normal after completing antibiotics.  RTC/ED precautions given.

## 2024-11-22 ENCOUNTER — TELEPHONE (OUTPATIENT)
Dept: FAMILY MEDICINE CLINIC | Facility: CLINIC | Age: 44
End: 2024-11-22

## 2024-11-22 NOTE — TELEPHONE ENCOUNTER
Caller: Evelia Foster    Relationship: Self    Best call back number:      [phone] (Mobile)     What is the medical concern/diagnosis:     GALL BLADDER    What specialty or service is being requested:     GASTROENTEROLOGY    PATIENT STATED FOLLOWING HOSPITAL FOLLOW UP WITH DR SOUTH, A REFERRAL FOR GENERAL SURGERY WAS TO BE COMPLETED    PATIENT ALSO STATED SHE HAS NOT HEARD ANYTHING YET REGARDING THIS REFERRAL AND REQUESTED A CALL BACK WITH ANY UPDATES

## 2024-11-27 DIAGNOSIS — F51.01 PRIMARY INSOMNIA: ICD-10-CM

## 2024-11-27 RX ORDER — QUETIAPINE FUMARATE 25 MG/1
25 TABLET, FILM COATED ORAL NIGHTLY
Qty: 30 TABLET | Refills: 3 | Status: CANCELLED | OUTPATIENT
Start: 2024-11-27

## 2024-12-09 ENCOUNTER — OFFICE VISIT (OUTPATIENT)
Age: 44
End: 2024-12-09
Payer: COMMERCIAL

## 2024-12-09 VITALS
OXYGEN SATURATION: 98 % | BODY MASS INDEX: 36.32 KG/M2 | HEIGHT: 66 IN | WEIGHT: 226 LBS | HEART RATE: 96 BPM | DIASTOLIC BLOOD PRESSURE: 88 MMHG | SYSTOLIC BLOOD PRESSURE: 136 MMHG

## 2024-12-09 DIAGNOSIS — E27.9 ADRENAL NODULE: Primary | ICD-10-CM

## 2024-12-09 PROCEDURE — 84403 ASSAY OF TOTAL TESTOSTERONE: CPT | Performed by: INTERNAL MEDICINE

## 2024-12-09 PROCEDURE — 36415 COLL VENOUS BLD VENIPUNCTURE: CPT | Performed by: INTERNAL MEDICINE

## 2024-12-09 PROCEDURE — 82627 DEHYDROEPIANDROSTERONE: CPT | Performed by: INTERNAL MEDICINE

## 2024-12-09 PROCEDURE — 99204 OFFICE O/P NEW MOD 45 MIN: CPT | Performed by: INTERNAL MEDICINE

## 2024-12-09 PROCEDURE — 82384 ASSAY THREE CATECHOLAMINES: CPT | Performed by: INTERNAL MEDICINE

## 2024-12-09 PROCEDURE — 84402 ASSAY OF FREE TESTOSTERONE: CPT | Performed by: INTERNAL MEDICINE

## 2024-12-09 NOTE — ASSESSMENT & PLAN NOTE
She has an adrenal incidentaloma that has grown slightly from 1 to 1.6 cm since 2020  She has hirsutism and a high dhea-s level  -----------  We evaluate these in terms of structure and function  Structurally this appears to be benign  and relatively stable. She will need a repeat image in a year.   SHE WILL NEED REPEAT IMAGING OF HER LIVER IN 6 MONTHS IN MY OPINION TO RECHECK THE 2.2 CM HEPATIC LESION  --------------  Function: this is not an aldosteronoma. If this were making aldosterone, the renin would be low, not high. She has high renin htn which can be seen with volume depletion or with renal artery stenosis so if there is trouble controlling her bp, that ought to be checked. We need to rule out pheo and we can start with checking plasma catecholes.  We rule out cushings with 24 hour urine cortisol  The high dhea-s could be an issue. Dhea-s is made in the ovaries and the adrenals and sometimes it is difficult to figure out the source when a level is high. She has hx of ovarian cysts but has not actually been told she had pcos .  If a repeat level is high, I will have to figure out the next  step . I might consider checking after trying to suppress with dex

## 2024-12-09 NOTE — PROGRESS NOTES
"     Office Note      Date: 2024  Patient Name: Evelia Foster  MRN: [id number]  : 1980    Chief Complaint   Patient presents with    Adrenal Problem       History of Present Illness:   Evelia Foster is a 44 y.o. female who presents for Adrenal Problem  .   Patient is seen for a new patient evaluation    While she was first made aware of a 1.6 cm left adrenal nodule after a recent image, in retrospect, it was mentioned as being there (1.0 cm ) on a CT in .  Her labs showed normal cortisol, high dheas, high sanju and high renin  She has hirsutism and struggles with her weight   Subjective          Review of Systems:   Review of Systems   Constitutional:  Positive for appetite change, chills, diaphoresis and fatigue.   HENT:  Positive for ear discharge.    Eyes:  Positive for discharge.   Respiratory:  Positive for cough, shortness of breath and wheezing.    Cardiovascular:  Positive for chest pain and leg swelling.   Gastrointestinal:  Positive for abdominal pain and diarrhea.   Endocrine: Positive for polydipsia.   Musculoskeletal:  Positive for back pain and neck stiffness.   Neurological:  Positive for dizziness, weakness and numbness.   Psychiatric/Behavioral:  Positive for agitation and sleep disturbance. The patient is nervous/anxious.        The following portions of the patient's history were reviewed and updated as appropriate: allergies, current medications, past family history, past medical history, past social history, past surgical history, and problem list.    Objective     Visit Vitals  /88 (BP Location: Left arm, Patient Position: Sitting, Cuff Size: Adult)   Pulse 96   Ht 167.6 cm (66\")   Wt 103 kg (226 lb)   LMP 2018 Comment: last mammogram    SpO2 98%   BMI 36.48 kg/m²           Physical Exam:  Physical Exam  Vitals reviewed.   Constitutional:       Appearance: Normal appearance.   HENT:      Head: Normocephalic and atraumatic.   Eyes:      Extraocular " Movements: Extraocular movements intact.   Neck:      Comments: No goiter  Cardiovascular:      Rate and Rhythm: Normal rate.   Pulmonary:      Effort: Pulmonary effort is normal.   Lymphadenopathy:      Cervical: No cervical adenopathy.   Neurological:      Mental Status: She is alert.   Psychiatric:         Mood and Affect: Mood normal.         Thought Content: Thought content normal.         Judgment: Judgment normal.         Assessment / Plan      Assessment & Plan:  Problem List Items Addressed This Visit       Adrenal nodule - Primary    Overview      Left. Was noted to be 1 cm on imaging in 2020/ 1.6 cm on imaging in 2024 with structure consistent with adenoma   Has high dhea-s   High sanju but also high renin   Normal serum cortisol         Current Assessment & Plan     She has an adrenal incidentaloma that has grown slightly from 1 to 1.6 cm since 2020  She has hirsutism and a high dhea-s level  -----------  We evaluate these in terms of structure and function  Structurally this appears to be benign  and relatively stable. She will need a repeat image in a year.   SHE WILL NEED REPEAT IMAGING OF HER LIVER IN 6 MONTHS IN MY OPINION TO RECHECK THE 2.2 CM HEPATIC LESION  --------------  Function: this is not an aldosteronoma. If this were making aldosterone, the renin would be low, not high. She has high renin htn which can be seen with volume depletion or with renal artery stenosis so if there is trouble controlling her bp, that ought to be checked. We need to rule out pheo and we can start with checking plasma catecholes.  We rule out cushings with 24 hour urine cortisol  The high dhea-s could be an issue. Dhea-s is made in the ovaries and the adrenals and sometimes it is difficult to figure out the source when a level is high. She has hx of ovarian cysts but has not actually been told she had pcos .  If a repeat level is high, I will have to figure out the next  step . I might consider checking after trying  to suppress with dex          Relevant Orders    Catecholamines, Fractionated, Plasma    Cortisol, Urine, Free 24Hr - Urine, Clean Catch    Testosterone, Free, Total    DHEA-Sulfate        Electronically signed by  : Yusuf Pearson MD   12/09/2024

## 2024-12-10 ENCOUNTER — PATIENT ROUNDING (BHMG ONLY) (OUTPATIENT)
Dept: ENDOCRINOLOGY | Facility: CLINIC | Age: 44
End: 2024-12-10
Payer: COMMERCIAL

## 2024-12-10 NOTE — PROGRESS NOTES
A [employer] message has been sent to the patient for patient rounding with [facility]

## 2024-12-11 LAB — DHEA-S SERPL-MCNC: 424 UG/DL (ref 57.3–279.2)

## 2024-12-13 LAB
DOPAMINE SERPL-MCNC: 33 PG/ML (ref 0–48)
EPINEPH PLAS-MCNC: 27 PG/ML (ref 0–62)
NOREPINEPH PLAS-MCNC: 449 PG/ML (ref 0–874)
TESTOST FREE SERPL-MCNC: 2.2 PG/ML (ref 0–4.2)
TESTOST SERPL-MCNC: 34 NG/DL (ref 4–50)

## 2024-12-15 ENCOUNTER — LAB (OUTPATIENT)
Dept: LAB | Facility: HOSPITAL | Age: 44
End: 2024-12-15
Payer: COMMERCIAL

## 2024-12-15 DIAGNOSIS — E27.9 ADRENAL NODULE: ICD-10-CM

## 2024-12-15 PROCEDURE — 81050 URINALYSIS VOLUME MEASURE: CPT

## 2024-12-15 PROCEDURE — 82530 CORTISOL FREE: CPT

## 2024-12-19 ENCOUNTER — TRANSCRIBE ORDERS (OUTPATIENT)
Dept: LAB | Facility: HOSPITAL | Age: 44
End: 2024-12-19
Payer: COMMERCIAL

## 2024-12-19 ENCOUNTER — LAB (OUTPATIENT)
Dept: LAB | Facility: HOSPITAL | Age: 44
End: 2024-12-19
Payer: COMMERCIAL

## 2024-12-19 DIAGNOSIS — R10.11 ABDOMINAL PAIN, RIGHT UPPER QUADRANT: ICD-10-CM

## 2024-12-19 DIAGNOSIS — R10.11 ABDOMINAL PAIN, RIGHT UPPER QUADRANT: Primary | ICD-10-CM

## 2024-12-19 LAB
ALBUMIN SERPL-MCNC: 4.1 G/DL (ref 3.5–5.2)
ALBUMIN/GLOB SERPL: 1.5 G/DL
ALP SERPL-CCNC: 117 U/L (ref 39–117)
ALT SERPL W P-5'-P-CCNC: 47 U/L (ref 1–33)
ANION GAP SERPL CALCULATED.3IONS-SCNC: 10.5 MMOL/L (ref 5–15)
AST SERPL-CCNC: 52 U/L (ref 1–32)
BASOPHILS # BLD AUTO: 0.04 10*3/MM3 (ref 0–0.2)
BASOPHILS NFR BLD AUTO: 0.4 % (ref 0–1.5)
BILIRUB SERPL-MCNC: 0.4 MG/DL (ref 0–1.2)
BUN SERPL-MCNC: 7 MG/DL (ref 6–20)
BUN/CREAT SERPL: 8.4 (ref 7–25)
CALCIUM SPEC-SCNC: 9.1 MG/DL (ref 8.6–10.5)
CHLORIDE SERPL-SCNC: 103 MMOL/L (ref 98–107)
CO2 SERPL-SCNC: 23.5 MMOL/L (ref 22–29)
CORTIS F 24H UR-MCNC: 11 UG/L
CORTIS F 24H UR-MRATE: 34 UG/24 HR (ref 6–42)
CREAT SERPL-MCNC: 0.83 MG/DL (ref 0.57–1)
DEPRECATED RDW RBC AUTO: 47.6 FL (ref 37–54)
EGFRCR SERPLBLD CKD-EPI 2021: 90.4 ML/MIN/1.73
EOSINOPHIL # BLD AUTO: 0.14 10*3/MM3 (ref 0–0.4)
EOSINOPHIL NFR BLD AUTO: 1.4 % (ref 0.3–6.2)
ERYTHROCYTE [DISTWIDTH] IN BLOOD BY AUTOMATED COUNT: 13.7 % (ref 12.3–15.4)
GGT SERPL-CCNC: 57 U/L (ref 5–36)
GLOBULIN UR ELPH-MCNC: 2.8 GM/DL
GLUCOSE SERPL-MCNC: 87 MG/DL (ref 65–99)
HCT VFR BLD AUTO: 42.2 % (ref 34–46.6)
HGB BLD-MCNC: 15 G/DL (ref 12–15.9)
IMM GRANULOCYTES # BLD AUTO: 0.03 10*3/MM3 (ref 0–0.05)
IMM GRANULOCYTES NFR BLD AUTO: 0.3 % (ref 0–0.5)
INR PPP: 0.95 (ref 0.89–1.12)
LYMPHOCYTES # BLD AUTO: 3.22 10*3/MM3 (ref 0.7–3.1)
LYMPHOCYTES NFR BLD AUTO: 31.4 % (ref 19.6–45.3)
MCH RBC QN AUTO: 34.2 PG (ref 26.6–33)
MCHC RBC AUTO-ENTMCNC: 35.5 G/DL (ref 31.5–35.7)
MCV RBC AUTO: 96.3 FL (ref 79–97)
MONOCYTES # BLD AUTO: 0.52 10*3/MM3 (ref 0.1–0.9)
MONOCYTES NFR BLD AUTO: 5.1 % (ref 5–12)
NEUTROPHILS NFR BLD AUTO: 6.31 10*3/MM3 (ref 1.7–7)
NEUTROPHILS NFR BLD AUTO: 61.4 % (ref 42.7–76)
NRBC BLD AUTO-RTO: 0 /100 WBC (ref 0–0.2)
PLATELET # BLD AUTO: 321 10*3/MM3 (ref 140–450)
PMV BLD AUTO: 10 FL (ref 6–12)
POTASSIUM SERPL-SCNC: 4.5 MMOL/L (ref 3.5–5.2)
PROT SERPL-MCNC: 6.9 G/DL (ref 6–8.5)
PROTHROMBIN TIME: 12.8 SECONDS (ref 12.2–14.5)
RBC # BLD AUTO: 4.38 10*6/MM3 (ref 3.77–5.28)
SODIUM SERPL-SCNC: 137 MMOL/L (ref 136–145)
WBC NRBC COR # BLD AUTO: 10.26 10*3/MM3 (ref 3.4–10.8)

## 2024-12-19 PROCEDURE — 36415 COLL VENOUS BLD VENIPUNCTURE: CPT

## 2024-12-19 PROCEDURE — 85025 COMPLETE CBC W/AUTO DIFF WBC: CPT

## 2024-12-19 PROCEDURE — 85610 PROTHROMBIN TIME: CPT

## 2024-12-19 PROCEDURE — 80053 COMPREHEN METABOLIC PANEL: CPT

## 2024-12-19 PROCEDURE — 82977 ASSAY OF GGT: CPT

## 2024-12-20 ENCOUNTER — TRANSCRIBE ORDERS (OUTPATIENT)
Dept: ADMINISTRATIVE | Facility: HOSPITAL | Age: 44
End: 2024-12-20
Payer: COMMERCIAL

## 2024-12-20 DIAGNOSIS — E27.9 ADRENAL NODULE: Primary | ICD-10-CM

## 2024-12-20 DIAGNOSIS — R10.11 ABDOMINAL PAIN, RIGHT UPPER QUADRANT: Primary | ICD-10-CM

## 2024-12-20 RX ORDER — DEXAMETHASONE 1 MG
1 TABLET ORAL ONCE
Qty: 1 TABLET | Refills: 0 | Status: SHIPPED | OUTPATIENT
Start: 2024-12-20 | End: 2024-12-20

## 2024-12-22 ENCOUNTER — LAB (OUTPATIENT)
Dept: LAB | Facility: HOSPITAL | Age: 44
End: 2024-12-22
Payer: COMMERCIAL

## 2024-12-22 DIAGNOSIS — E27.9 ADRENAL NODULE: ICD-10-CM

## 2024-12-22 PROCEDURE — 82627 DEHYDROEPIANDROSTERONE: CPT

## 2024-12-23 LAB — DHEA-S SERPL-MCNC: 305 UG/DL (ref 57.3–279.2)

## 2024-12-26 ENCOUNTER — HOSPITAL ENCOUNTER (OUTPATIENT)
Dept: ULTRASOUND IMAGING | Facility: HOSPITAL | Age: 44
Discharge: HOME | End: 2024-12-26
Admitting: SURGERY
Payer: COMMERCIAL

## 2024-12-26 DIAGNOSIS — R10.11 ABDOMINAL PAIN, RIGHT UPPER QUADRANT: ICD-10-CM

## 2024-12-26 PROCEDURE — 76705 ECHO EXAM OF ABDOMEN: CPT

## 2024-12-30 DIAGNOSIS — E27.9 ADRENAL NODULE: Primary | ICD-10-CM

## 2025-01-08 ENCOUNTER — TRANSCRIBE ORDERS (OUTPATIENT)
Dept: ADMINISTRATIVE | Facility: HOSPITAL | Age: 45
End: 2025-01-08
Payer: COMMERCIAL

## 2025-01-08 DIAGNOSIS — R10.11 ABDOMINAL PAIN, RIGHT UPPER QUADRANT: Primary | ICD-10-CM

## 2025-01-13 DIAGNOSIS — I10 ESSENTIAL (PRIMARY) HYPERTENSION: ICD-10-CM

## 2025-01-13 RX ORDER — METOPROLOL SUCCINATE 50 MG/1
50 TABLET, EXTENDED RELEASE ORAL 2 TIMES DAILY
Qty: 180 TABLET | Refills: 3 | Status: SHIPPED | OUTPATIENT
Start: 2025-01-13

## 2025-01-13 NOTE — TELEPHONE ENCOUNTER
Caller: Cristian Evelia Mccormack    Relationship: Self    Best call back number:[phone]     Requested Prescriptions:   Requested Prescriptions     Pending Prescriptions Disp Refills    metoprolol succinate XL (TOPROL-XL) 50 MG 24 hr tablet 180 tablet 3     Sig: Take 1 tablet by mouth 2 (Two) Times a Day.        Pharmacy where request should be sent: [id number] - [place], [place] - [address]  - [phone]  - [phone] FX     Last office visit with prescribing clinician: 11/12/2024   Last telemedicine visit with prescribing clinician: Visit date not found   Next office visit with prescribing clinician: Visit date not found     Additional details provided by patient: PATIENT IS ALSO REQUESTING HYDROCHLOROTHIAZIDE 25 MG .    PATIENT IS COMPLETELY OUT OF MEDICATION     Does the patient have less than a 3 day supply:  [x] Yes  [] No    Would you like a call back once the refill request has been completed: [x] Yes [] No    If the office needs to give you a call back, can they leave a voicemail: [] Yes [] No    Concha Rios Rep   01/13/25 13:33 EST

## 2025-01-14 ENCOUNTER — HOSPITAL ENCOUNTER (OUTPATIENT)
Dept: NUCLEAR MEDICINE | Facility: HOSPITAL | Age: 45
Discharge: HOME | End: 2025-01-14
Payer: COMMERCIAL

## 2025-01-14 VITALS — BODY MASS INDEX: 37.12 KG/M2 | WEIGHT: 230 LBS

## 2025-01-14 DIAGNOSIS — R10.11 ABDOMINAL PAIN, RIGHT UPPER QUADRANT: ICD-10-CM

## 2025-01-14 PROCEDURE — 34310000005 TECHNETIUM TC 99M MEBROFENIN KIT: Performed by: SURGERY

## 2025-01-14 PROCEDURE — 78227 HEPATOBIL SYST IMAGE W/DRUG: CPT

## 2025-01-14 PROCEDURE — 25010000002 SINCALIDE PER 5 MCG: Performed by: SURGERY

## 2025-01-14 PROCEDURE — A9537 TC99M MEBROFENIN: HCPCS | Performed by: SURGERY

## 2025-01-14 RX ORDER — KIT FOR THE PREPARATION OF TECHNETIUM TC 99M MEBROFENIN 45 MG/10ML
1 INJECTION, POWDER, LYOPHILIZED, FOR SOLUTION INTRAVENOUS
Status: COMPLETED | OUTPATIENT
Start: 2025-01-14 | End: 2025-01-14

## 2025-01-14 RX ORDER — SINCALIDE 5 UG/5ML
0.02 INJECTION, POWDER, LYOPHILIZED, FOR SOLUTION INTRAVENOUS ONCE
Status: COMPLETED | OUTPATIENT
Start: 2025-01-14 | End: 2025-01-14

## 2025-01-14 RX ADMIN — SINCALIDE 2.1 MCG: 5 INJECTION, POWDER, LYOPHILIZED, FOR SOLUTION INTRAVENOUS at 09:48

## 2025-01-14 RX ADMIN — MEBROFENIN 1 DOSE: 45 INJECTION, POWDER, LYOPHILIZED, FOR SOLUTION INTRAVENOUS at 08:31

## 2025-01-31 ENCOUNTER — OFFICE VISIT (OUTPATIENT)
Dept: NEUROSURGERY | Facility: CLINIC | Age: 45
End: 2025-01-31
Payer: COMMERCIAL

## 2025-01-31 VITALS
SYSTOLIC BLOOD PRESSURE: 142 MMHG | HEIGHT: 66 IN | WEIGHT: 233.8 LBS | BODY MASS INDEX: 37.57 KG/M2 | TEMPERATURE: 98 F | DIASTOLIC BLOOD PRESSURE: 88 MMHG

## 2025-01-31 DIAGNOSIS — M47.12 CERVICAL SPONDYLOSIS WITH MYELOPATHY: Primary | ICD-10-CM

## 2025-01-31 DIAGNOSIS — M54.50 LOW BACK PAIN, UNSPECIFIED BACK PAIN LATERALITY, UNSPECIFIED CHRONICITY, UNSPECIFIED WHETHER SCIATICA PRESENT: ICD-10-CM

## 2025-01-31 DIAGNOSIS — Z98.1 S/P CERVICAL SPINAL FUSION: ICD-10-CM

## 2025-01-31 DIAGNOSIS — M54.9 MECHANICAL BACK PAIN: ICD-10-CM

## 2025-01-31 DIAGNOSIS — M54.16 LUMBAR RADICULOPATHY: ICD-10-CM

## 2025-01-31 PROCEDURE — 99214 OFFICE O/P EST MOD 30 MIN: CPT | Performed by: PHYSICIAN ASSISTANT

## 2025-01-31 RX ORDER — ONDANSETRON 4 MG/1
TABLET, FILM COATED ORAL
COMMUNITY
Start: 2025-01-19

## 2025-01-31 NOTE — PROGRESS NOTES
Patient: Evelia Foster  : 1980  Chart #: [id number]    Date of Service: 2024    CHIEF COMPLAINT: Cervical spondylosis and disc herniation with myelopathy    History of Present Illness Patient is a 45-year-old woman seen in follow-up.  She has a history of cervical myelopathy for which she underwent ACDF C5-6 in .  She has had ongoing gait instability and some numbness in her hands.  Symptoms progressed recently and she was found to have disc protrusion with cord compromise at C4-5.  As such on 5/15/2024 she underwent ACDF at this level.  Postoperatively she has done excellent.  She has bit of neck stiffness but otherwise no complaints.  Her main concern today is ongoing low back pain that radiates down both lower extremities.  She was reportedly hospitalized a few weeks ago due to back pain.  She was found to have urosepsis.  She has report from a CT scan that is concerning for discitis.      Past Medical History:   Diagnosis Date    Anemia     Anxiety     Asthma     mild, rescue inhaler prn     Bladder infection     Cancer     uterine cancer, skin cancer    Dizzy     Full dentures     Headache     History of cervical dysplasia     s/p hsyterectomy    HPV (human papilloma virus) infection     positive 18    Hyperlipidemia     Hypertension     Insomnia     Low back pain     Neck pain     Palpitations     Pneumonia     PTSD (post-traumatic stress disorder)     Restless leg syndrome     Sepsis     Sleep apnea     no cpap use    Spinal headache     SVT (supraventricular tachycardia)     Uses contact lenses     Well adult exam 2022         Current Outpatient Medications:     albuterol sulfate  (90 Base) MCG/ACT inhaler, Inhale 2 puffs Every 4 (Four) Hours As Needed for Wheezing., Disp: 18 g, Rfl: 3    cetirizine (zyrTEC) 10 MG tablet, Take 1 tablet by mouth Daily., Disp: 90 tablet, Rfl: 3    metoprolol succinate XL (TOPROL-XL) 50 MG 24 hr tablet, Take 1 tablet by mouth 2 (Two) Times a  Day., Disp: 180 tablet, Rfl: 3    ondansetron (ZOFRAN) 4 MG tablet, , Disp: , Rfl:     Past Surgical History:   Procedure Laterality Date    ANTERIOR CERVICAL DISCECTOMY W/ FUSION N/A 01/14/2021    Procedure: CERVICAL DISCECTOMY ANTERIOR WITH FUSION C5-6;  Surgeon: Kalyan Vasquez MD;  Location:  [facility];  Service: Neurosurgery;  Laterality: N/A;    ANTERIOR CERVICAL DISCECTOMY W/ FUSION N/A 5/15/2024    Procedure: CERVICAL DISCECTOMY ANTERIOR WITH FUSION C4-5;  Surgeon: Kalyan Vasquez MD;  Location:  [facility];  Service: Neurosurgery;  Laterality: N/A;    COLONOSCOPY      CYSTOSCOPY W/ BULKING AGENT INJECTION N/A 03/01/2023    Procedure: CYSTOSCOPY WITH BULKING AGENT INJECTION;  Surgeon: Nubia Ross MD;  Location:  [facility];  Service: Urology;  Laterality: N/A;    ENDOSCOPY      FINGER/THUMB LESION/CYST EXCISION Left 09/26/2023    Thumb    SKIN CANCER EXCISION      TONSILLECTOMY AND ADENOIDECTOMY      1989    TOTAL LAPAROSCOPIC HYSTERECTOMY N/A 01/09/2019    Procedure: TOTAL LAPAROSCOPIC HYSTERECTOMY, BILATERAL SALPINGECTOMY WITH DAVINCI ROBOT;  Surgeon: Markie Lewis MD;  Location:  [facility];  Service: DaVinci    TUBAL ABDOMINAL LIGATION         Social History     Socioeconomic History    Marital status: Significant Other   Tobacco Use    Smoking status: Every Day     Current packs/day: 0.50     Average packs/day: 0.5 packs/day for 27.1 years (13.5 ttl pk-yrs)     Types: Cigarettes     Start date: 1998     Passive exposure: Current    Smokeless tobacco: Never    Tobacco comments:     tried w preg   Vaping Use    Vaping status: Never Used   Substance and Sexual Activity    Alcohol use: Not Currently     Alcohol/week: 14.0 standard drinks of alcohol     Types: 14 Shots of liquor per week     Comment: havent drank since 04/03/2024    Drug use: No    Sexual activity: Defer     Partners: Male     Birth control/protection: Surgical         Review of Systems    Objective   Vital Signs: Blood pressure 142/88,  "temperature 98 °F (36.7 °C), temperature source Infrared, height 167.6 cm (66\"), weight 106 kg (233 lb 12.8 oz), last menstrual period 12/18/2018, not currently breastfeeding.  Physical Exam  Vitals and nursing note reviewed.   Constitutional:       General: She is not in acute distress.     Appearance: She is well-developed.   HENT:      Head: Normocephalic and atraumatic.   Psychiatric:         Behavior: Behavior normal.         Thought Content: Thought content normal.     Myers sign is positive bilaterally        Independent review of radiographic imaging: CT of the abdomen and pelvis dated 1/18/2025 reviewed severe degenerative discitis at L4-5 and moderate degenerative discitis at L5-S1    Assessment & Plan   Diagnosis:   Cervical myelopathy status post ACDF C4-5 with previous ACDF C5-6  Low back pain    Medical Decision Making: Ms Foster is doing well in terms of her neck. She has ongoing low back and leg symptoms which warrants further evaluation with an MRI of the lumbar spine without gadolinium. She has not participated in formal physical therapy due to it aggravating her pain. She will follow up with me to review and further recommendations will be made.         Diagnoses and all orders for this visit:    1. Cervical spondylosis with myelopathy (Primary)  -     XR Spine Cervical 2 View; Future    2. Low back pain, unspecified back pain laterality, unspecified chronicity, unspecified whether sciatica present  -     MRI Lumbar Spine Without Contrast; Future    3. Lumbar radiculopathy    4. Mechanical back pain    5. S/P cervical spinal fusion                                  Lucy Donahue PA-C  Patient Care Team:  Nabil Green MD as PCP - General (Family Medicine)  Mara Bernal APRN as Nurse Practitioner (Nurse Practitioner)  Aditi Mckinley APRN as Nurse Practitioner (Cardiology)  Kalyan Vasquez MD as Consulting Physician (Neurosurgery)  Nabil Green MD as Referring " Physician (Family Medicine)  Yusuf Pearson MD as Consulting Physician (Endocrinology)

## 2025-02-14 ENCOUNTER — HOSPITAL ENCOUNTER (OUTPATIENT)
Dept: GENERAL RADIOLOGY | Facility: HOSPITAL | Age: 45
Discharge: HOME | End: 2025-02-14
Payer: COMMERCIAL

## 2025-02-14 ENCOUNTER — HOSPITAL ENCOUNTER (OUTPATIENT)
Dept: MRI IMAGING | Facility: HOSPITAL | Age: 45
Discharge: HOME | End: 2025-02-14
Payer: COMMERCIAL

## 2025-02-14 DIAGNOSIS — M47.12 CERVICAL SPONDYLOSIS WITH MYELOPATHY: ICD-10-CM

## 2025-02-14 DIAGNOSIS — M54.50 LOW BACK PAIN, UNSPECIFIED BACK PAIN LATERALITY, UNSPECIFIED CHRONICITY, UNSPECIFIED WHETHER SCIATICA PRESENT: ICD-10-CM

## 2025-02-14 PROCEDURE — 72040 X-RAY EXAM NECK SPINE 2-3 VW: CPT

## 2025-02-14 PROCEDURE — 72148 MRI LUMBAR SPINE W/O DYE: CPT

## 2025-03-03 ENCOUNTER — PATIENT MESSAGE (OUTPATIENT)
Dept: NEUROSURGERY | Facility: CLINIC | Age: 45
End: 2025-03-03
Payer: COMMERCIAL

## 2025-03-05 NOTE — TELEPHONE ENCOUNTER
I have reviewed her imaging. Nothing worrisome going on. She does have a couple degenerative discs that are likely source for her back pain as well as nerve root compromise that could be causing her leg pain. Typically first line treatment for this is physical therapy. She was not particularly interested in that at the visit. We could try gabapentin or lyrica to treat the pain prior to her follow up with me.

## 2025-03-06 ENCOUNTER — PRE-ADMISSION TESTING (OUTPATIENT)
Dept: PREADMISSION TESTING | Facility: HOSPITAL | Age: 45
End: 2025-03-06
Payer: COMMERCIAL

## 2025-03-06 VITALS — WEIGHT: 232.59 LBS | HEIGHT: 65 IN | BODY MASS INDEX: 38.75 KG/M2

## 2025-03-06 DIAGNOSIS — M54.50 CHRONIC LEFT-SIDED LOW BACK PAIN WITHOUT SCIATICA: Primary | ICD-10-CM

## 2025-03-06 DIAGNOSIS — G89.29 CHRONIC LEFT-SIDED LOW BACK PAIN WITHOUT SCIATICA: Primary | ICD-10-CM

## 2025-03-06 LAB
ALBUMIN SERPL-MCNC: 4.4 G/DL (ref 3.5–5.2)
ALBUMIN/GLOB SERPL: 1.6 G/DL
ALP SERPL-CCNC: 120 U/L (ref 39–117)
ALT SERPL W P-5'-P-CCNC: 41 U/L (ref 1–33)
ANION GAP SERPL CALCULATED.3IONS-SCNC: 14 MMOL/L (ref 5–15)
AST SERPL-CCNC: 32 U/L (ref 1–32)
BACTERIA UR QL AUTO: ABNORMAL /HPF
BILIRUB SERPL-MCNC: 0.3 MG/DL (ref 0–1.2)
BILIRUB UR QL STRIP: NEGATIVE
BUN SERPL-MCNC: 7 MG/DL (ref 6–20)
BUN/CREAT SERPL: 10 (ref 7–25)
CALCIUM SPEC-SCNC: 10.2 MG/DL (ref 8.6–10.5)
CHLORIDE SERPL-SCNC: 101 MMOL/L (ref 98–107)
CLARITY UR: CLEAR
CO2 SERPL-SCNC: 24 MMOL/L (ref 22–29)
COLOR UR: YELLOW
CREAT SERPL-MCNC: 0.7 MG/DL (ref 0.57–1)
DEPRECATED RDW RBC AUTO: 48 FL (ref 37–54)
EGFRCR SERPLBLD CKD-EPI 2021: 110.2 ML/MIN/1.73
ERYTHROCYTE [DISTWIDTH] IN BLOOD BY AUTOMATED COUNT: 13.3 % (ref 12.3–15.4)
GLOBULIN UR ELPH-MCNC: 2.7 GM/DL
GLUCOSE SERPL-MCNC: 93 MG/DL (ref 65–99)
GLUCOSE UR STRIP-MCNC: NEGATIVE MG/DL
HBA1C MFR BLD: 5.8 % (ref 4.8–5.6)
HCT VFR BLD AUTO: 44.8 % (ref 34–46.6)
HGB BLD-MCNC: 15.1 G/DL (ref 12–15.9)
HGB UR QL STRIP.AUTO: NEGATIVE
HYALINE CASTS UR QL AUTO: ABNORMAL /LPF
KETONES UR QL STRIP: NEGATIVE
LEUKOCYTE ESTERASE UR QL STRIP.AUTO: ABNORMAL
MCH RBC QN AUTO: 32.8 PG (ref 26.6–33)
MCHC RBC AUTO-ENTMCNC: 33.7 G/DL (ref 31.5–35.7)
MCV RBC AUTO: 97.2 FL (ref 79–97)
NITRITE UR QL STRIP: NEGATIVE
PH UR STRIP.AUTO: 7 [PH] (ref 5–8)
PLATELET # BLD AUTO: 320 10*3/MM3 (ref 140–450)
PMV BLD AUTO: 9.2 FL (ref 6–12)
POTASSIUM SERPL-SCNC: 4.5 MMOL/L (ref 3.5–5.2)
PROT SERPL-MCNC: 7.1 G/DL (ref 6–8.5)
PROT UR QL STRIP: NEGATIVE
RBC # BLD AUTO: 4.61 10*6/MM3 (ref 3.77–5.28)
RBC # UR STRIP: ABNORMAL /HPF
REF LAB TEST METHOD: ABNORMAL
SODIUM SERPL-SCNC: 139 MMOL/L (ref 136–145)
SP GR UR STRIP: 1.01 (ref 1–1.03)
SQUAMOUS #/AREA URNS HPF: ABNORMAL /HPF
UROBILINOGEN UR QL STRIP: ABNORMAL
WBC # UR STRIP: ABNORMAL /HPF
WBC NRBC COR # BLD AUTO: 10.31 10*3/MM3 (ref 3.4–10.8)

## 2025-03-06 PROCEDURE — 85027 COMPLETE CBC AUTOMATED: CPT

## 2025-03-06 PROCEDURE — 36415 COLL VENOUS BLD VENIPUNCTURE: CPT

## 2025-03-06 PROCEDURE — 81001 URINALYSIS AUTO W/SCOPE: CPT

## 2025-03-06 PROCEDURE — 93005 ELECTROCARDIOGRAM TRACING: CPT

## 2025-03-06 PROCEDURE — 83036 HEMOGLOBIN GLYCOSYLATED A1C: CPT

## 2025-03-06 PROCEDURE — 80053 COMPREHEN METABOLIC PANEL: CPT

## 2025-03-06 PROCEDURE — 87086 URINE CULTURE/COLONY COUNT: CPT

## 2025-03-06 RX ORDER — GABAPENTIN 300 MG/1
300 CAPSULE ORAL 3 TIMES DAILY
Qty: 60 CAPSULE | Refills: 0 | Status: SHIPPED | OUTPATIENT
Start: 2025-03-06

## 2025-03-06 RX ORDER — IBUPROFEN 400 MG/1
400 TABLET, FILM COATED ORAL EVERY 6 HOURS PRN
COMMUNITY

## 2025-03-06 NOTE — PAT
An arrival time for procedure was not provided during PAT visit. If patient had any questions or concerns about their arrival time, they were instructed to contact their surgeon/physician.  Additionally, if the patient referred to an arrival time that was acquired from their my chart account, patient was encouraged to verify that time with their surgeon/physician. Arrival times are NOT provided in Pre Admission Testing Department.    Patient denies any current skin issues.     Patient to apply Chlorhexadine wipes  to surgical area (as instructed) the night before procedure and the AM of procedure. Wipes provided.    Patient viewed general PAT education video as instructed in their preoperative information received from their surgeon.  Patient stated the general PAT education video was viewed in its entirety and survey completed.  Copies of PAT general education handouts (Incentive Spirometry, Meds to Beds Program, Patient Belongings, Pre-op skin preparation instructions, Blood Glucose testing, Visitor policy, Surgery FAQ, Code H) distributed to patient if not printed. Education related to the PAT pass and skin preparation for surgery (if applicable) completed in PAT as a reinforcement to PAT education video. Patient instructed to return PAT pass provided today as well as completed skin preparation sheet (if applicable) on the day of procedure.     Additionally if patient had not viewed video yet but intended to view it at home or in our waiting area, then referred them to the handout with QR code/link provided during PAT visit.  Encouraged patient/family to read PAT general education handouts thoroughly and notify PAT staff with any questions or concerns. Patient verbalized understanding of all information and priority content.    UA sent per protocol, pt c/o UTI symptoms.

## 2025-03-07 ENCOUNTER — OFFICE VISIT (OUTPATIENT)
Dept: FAMILY MEDICINE CLINIC | Facility: CLINIC | Age: 45
End: 2025-03-07
Payer: COMMERCIAL

## 2025-03-07 VITALS
HEART RATE: 95 BPM | BODY MASS INDEX: 38.42 KG/M2 | OXYGEN SATURATION: 97 % | DIASTOLIC BLOOD PRESSURE: 92 MMHG | RESPIRATION RATE: 24 BRPM | SYSTOLIC BLOOD PRESSURE: 136 MMHG | TEMPERATURE: 98.2 F | WEIGHT: 230.6 LBS | HEIGHT: 65 IN

## 2025-03-07 DIAGNOSIS — R35.0 URINARY FREQUENCY: Primary | ICD-10-CM

## 2025-03-07 DIAGNOSIS — R31.29 MICROSCOPIC HEMATURIA: ICD-10-CM

## 2025-03-07 DIAGNOSIS — R33.9 URINARY RETENTION: ICD-10-CM

## 2025-03-07 LAB
BACTERIA SPEC AEROBE CULT: NORMAL
BILIRUB BLD-MCNC: NEGATIVE MG/DL
CLARITY, POC: ABNORMAL
COLOR UR: ABNORMAL
EXPIRATION DATE: ABNORMAL
GLUCOSE UR STRIP-MCNC: NEGATIVE MG/DL
KETONES UR QL: NEGATIVE
LEUKOCYTE EST, POC: ABNORMAL
Lab: ABNORMAL
NITRITE UR-MCNC: NEGATIVE MG/ML
PH UR: 6 [PH] (ref 5–8)
PROT UR STRIP-MCNC: ABNORMAL MG/DL
QT INTERVAL: 396 MS
QTC INTERVAL: 421 MS
RBC # UR STRIP: ABNORMAL /UL
SP GR UR: 1.03 (ref 1–1.03)
UROBILINOGEN UR QL: ABNORMAL

## 2025-03-07 PROCEDURE — 87086 URINE CULTURE/COLONY COUNT: CPT | Performed by: STUDENT IN AN ORGANIZED HEALTH CARE EDUCATION/TRAINING PROGRAM

## 2025-03-07 RX ORDER — CEFDINIR 300 MG/1
300 CAPSULE ORAL 2 TIMES DAILY
Qty: 14 CAPSULE | Refills: 0 | Status: SHIPPED | OUTPATIENT
Start: 2025-03-07

## 2025-03-07 RX ORDER — KETOROLAC TROMETHAMINE 10 MG/1
10 TABLET, FILM COATED ORAL ONCE
Status: DISCONTINUED | OUTPATIENT
Start: 2025-03-07 | End: 2025-03-07

## 2025-03-07 RX ORDER — KETOROLAC TROMETHAMINE 30 MG/ML
30 INJECTION, SOLUTION INTRAMUSCULAR; INTRAVENOUS EVERY 6 HOURS PRN
Status: DISCONTINUED | OUTPATIENT
Start: 2025-03-07 | End: 2025-03-12 | Stop reason: HOSPADM

## 2025-03-07 RX ORDER — PHENAZOPYRIDINE HYDROCHLORIDE 100 MG/1
100 TABLET, FILM COATED ORAL 3 TIMES DAILY PRN
Qty: 15 TABLET | Refills: 0 | Status: SHIPPED | OUTPATIENT
Start: 2025-03-07

## 2025-03-07 RX ADMIN — KETOROLAC TROMETHAMINE 30 MG: 30 INJECTION, SOLUTION INTRAMUSCULAR; INTRAVENOUS at 14:20

## 2025-03-07 NOTE — PROGRESS NOTES
"Chief Complaint  Urinary Frequency (Frequency and pain, had pink tinge to toilet paper when wiping today)    Urinary Frequency   Associated symptoms include frequency.         The patient has been having pain in the urethra . She says she has back pain always so she is unsure if back pain is new worse. No vaginal discharge. This burns all the time not just with urination. She has had chills but hasn't taken temp . She hasn't taken ibu or tylenol . She has been drinking cranberry juice and water. She has not been able to complete a full urinary void.     The following portions of the patient's history were reviewed and updated as appropriate: allergies, current medications, past family history, past medical history, past social history, past surgical history, and problem list.    OBJECTIVE:  /92 (BP Location: Left arm, Patient Position: Sitting, Cuff Size: Adult)   Pulse 95   Temp 98.2 °F (36.8 °C) (Temporal)   Resp 24   Ht 165.1 cm (65\")   Wt 105 kg (230 lb 9.6 oz)   SpO2 97%   BMI 38.37 kg/m²       Physical Exam  Constitutional:       General: She is not in acute distress.     Appearance: Normal appearance.   HENT:      Head: Normocephalic and atraumatic.   Eyes:      Extraocular Movements: Extraocular movements intact.   Cardiovascular:      Rate and Rhythm: Normal rate and regular rhythm.      Heart sounds: No murmur heard.  Pulmonary:      Effort: Pulmonary effort is normal. No respiratory distress.      Breath sounds: Normal breath sounds. No stridor. No wheezing, rhonchi or rales.   Abdominal:      General: Bowel sounds are normal.      Palpations: Abdomen is soft.      Tenderness: There is abdominal tenderness (she has mild tenderness in the lower pelvis no guarding or rebound).   Skin:     Findings: No rash.   Neurological:      General: No focal deficit present.      Mental Status: She is alert.   Psychiatric:         Mood and Affect: Mood normal.                  Assessment and Plan "   Diagnoses and all orders for this visit:    1. Urinary frequency (Primary)  -     POCT urinalysis dipstick, automated  -     Urine Culture - Urine, Urine, Clean Catch; Future  -     ketorolac (TORADOL) tablet 10 mg    2. Urinary retention  -     Urine Culture - Urine, Urine, Clean Catch; Future    3. Microscopic hematuria  -     CT Abdomen Pelvis Stone Protocol; Future    Other orders  -     cefdinir (OMNICEF) 300 MG capsule; Take 1 capsule by mouth 2 (Two) Times a Day.  Dispense: 14 capsule; Refill: 0  -     phenazopyridine (Pyridium) 100 MG tablet; Take 1 tablet by mouth 3 (Three) Times a Day As Needed for Bladder Spasms.  Dispense: 15 tablet; Refill: 0      I told the patient the safest option for her is to go to the ER given her lack of urination and severe pain in the bladder. She declines AMA so we are proceeding with outpatient treatment.   I gave the patient strict er precautions for any feelings that she is worsening, fever, worsening pain, inability to make urine by the am. She was able to make urine today.   I am concerned for stone , order ct a/p as stat.   Give toradol for pain. She will not take any other nsaid today.   She will continue to hydrate.   She wants to avoid pelvic exam today.   She is agreeable to following up with us in the morning as she is high risk for deterioration.     Return in about 1 day (around 3/8/2025) for uti.       Kat Heller D.O.  [facility]

## 2025-03-09 LAB — BACTERIA SPEC AEROBE CULT: NO GROWTH

## 2025-03-13 ENCOUNTER — ANESTHESIA (OUTPATIENT)
Dept: PERIOP | Facility: HOSPITAL | Age: 45
End: 2025-03-13
Payer: COMMERCIAL

## 2025-03-13 ENCOUNTER — ANESTHESIA EVENT CONVERTED (OUTPATIENT)
Dept: ANESTHESIOLOGY | Facility: HOSPITAL | Age: 45
DRG: 614 | End: 2025-03-13
Payer: COMMERCIAL

## 2025-03-13 ENCOUNTER — ANESTHESIA EVENT (OUTPATIENT)
Dept: PERIOP | Facility: HOSPITAL | Age: 45
End: 2025-03-13
Payer: COMMERCIAL

## 2025-03-13 ENCOUNTER — HOSPITAL ENCOUNTER (INPATIENT)
Facility: HOSPITAL | Age: 45
LOS: 1 days | Discharge: HOME | DRG: 614 | End: 2025-03-14
Attending: SURGERY | Admitting: SURGERY
Payer: COMMERCIAL

## 2025-03-13 DIAGNOSIS — E27.8 LEFT ADRENAL MASS: Primary | ICD-10-CM

## 2025-03-13 DIAGNOSIS — K80.20 CHOLELITHIASIS: ICD-10-CM

## 2025-03-13 DIAGNOSIS — K76.0 FATTY LIVER: ICD-10-CM

## 2025-03-13 DIAGNOSIS — E27.9 ADRENAL NODULE: ICD-10-CM

## 2025-03-13 PROCEDURE — 0FT44ZZ RESECTION OF GALLBLADDER, PERCUTANEOUS ENDOSCOPIC APPROACH: ICD-10-PCS | Performed by: SURGERY

## 2025-03-13 PROCEDURE — 88304 TISSUE EXAM BY PATHOLOGIST: CPT | Performed by: SURGERY

## 2025-03-13 PROCEDURE — 25010000002 LIDOCAINE PF 1% 1 % SOLUTION: Performed by: NURSE ANESTHETIST, CERTIFIED REGISTERED

## 2025-03-13 PROCEDURE — 25010000002 FENTANYL CITRATE (PF) 50 MCG/ML SOLUTION

## 2025-03-13 PROCEDURE — 25010000002 FENTANYL CITRATE (PF) 100 MCG/2ML SOLUTION: Performed by: NURSE ANESTHETIST, CERTIFIED REGISTERED

## 2025-03-13 PROCEDURE — 25010000002 SUGAMMADEX 200 MG/2ML SOLUTION: Performed by: NURSE ANESTHETIST, CERTIFIED REGISTERED

## 2025-03-13 PROCEDURE — 25010000002 HYDROMORPHONE 1 MG/ML SOLUTION

## 2025-03-13 PROCEDURE — 25010000002 PROPOFOL 10 MG/ML EMULSION: Performed by: NURSE ANESTHETIST, CERTIFIED REGISTERED

## 2025-03-13 PROCEDURE — 25810000003 LACTATED RINGERS PER 1000 ML: Performed by: ANESTHESIOLOGY

## 2025-03-13 PROCEDURE — 25010000002 CEFAZOLIN PER 500 MG: Performed by: SURGERY

## 2025-03-13 PROCEDURE — 25010000002 BUPIVACAINE (PF) 0.25 % SOLUTION: Performed by: ANESTHESIOLOGY

## 2025-03-13 PROCEDURE — 0FB04ZX EXCISION OF LIVER, PERCUTANEOUS ENDOSCOPIC APPROACH, DIAGNOSTIC: ICD-10-PCS | Performed by: SURGERY

## 2025-03-13 PROCEDURE — 88307 TISSUE EXAM BY PATHOLOGIST: CPT | Performed by: SURGERY

## 2025-03-13 PROCEDURE — 25810000003 SODIUM CHLORIDE PER 500 ML: Performed by: SURGERY

## 2025-03-13 PROCEDURE — 88313 SPECIAL STAINS GROUP 2: CPT | Performed by: SURGERY

## 2025-03-13 PROCEDURE — 25010000002 DEXAMETHASONE SODIUM PHOSPHATE 10 MG/ML SOLUTION: Performed by: NURSE ANESTHETIST, CERTIFIED REGISTERED

## 2025-03-13 PROCEDURE — 25010000002 HYDROMORPHONE 1 MG/ML SOLUTION: Performed by: SURGERY

## 2025-03-13 PROCEDURE — 25010000002 LIDOCAINE PF 1% 1 % SOLUTION: Performed by: ANESTHESIOLOGY

## 2025-03-13 PROCEDURE — 8E0W4CZ ROBOTIC ASSISTED PROCEDURE OF TRUNK REGION, PERCUTANEOUS ENDOSCOPIC APPROACH: ICD-10-PCS | Performed by: SURGERY

## 2025-03-13 PROCEDURE — 0GT20ZZ RESECTION OF LEFT ADRENAL GLAND, OPEN APPROACH: ICD-10-PCS | Performed by: SURGERY

## 2025-03-13 PROCEDURE — 25010000002 ONDANSETRON PER 1 MG: Performed by: NURSE ANESTHETIST, CERTIFIED REGISTERED

## 2025-03-13 DEVICE — LIGACLIP 10-M/L, 10MM ENDOSCOPIC ROTATING MULTIPLE CLIP APPLIERS
Type: IMPLANTABLE DEVICE | Site: ABDOMEN | Status: FUNCTIONAL
Brand: LIGACLIP

## 2025-03-13 RX ORDER — ACETAMINOPHEN 325 MG/1
650 TABLET ORAL EVERY 4 HOURS PRN
Status: DISCONTINUED | OUTPATIENT
Start: 2025-03-13 | End: 2025-03-14 | Stop reason: HOSPADM

## 2025-03-13 RX ORDER — FAMOTIDINE 10 MG/ML
20 INJECTION, SOLUTION INTRAVENOUS ONCE
Status: CANCELLED | OUTPATIENT
Start: 2025-03-13 | End: 2025-03-13

## 2025-03-13 RX ORDER — SODIUM CHLORIDE 0.9 % (FLUSH) 0.9 %
10 SYRINGE (ML) INJECTION AS NEEDED
Status: CANCELLED | OUTPATIENT
Start: 2025-03-13

## 2025-03-13 RX ORDER — SODIUM CHLORIDE 9 MG/ML
INJECTION, SOLUTION INTRAVENOUS AS NEEDED
Status: DISCONTINUED | OUTPATIENT
Start: 2025-03-13 | End: 2025-03-13 | Stop reason: HOSPADM

## 2025-03-13 RX ORDER — LIDOCAINE HYDROCHLORIDE 10 MG/ML
0.5 INJECTION, SOLUTION EPIDURAL; INFILTRATION; INTRACAUDAL; PERINEURAL ONCE AS NEEDED
Status: COMPLETED | OUTPATIENT
Start: 2025-03-13 | End: 2025-03-13

## 2025-03-13 RX ORDER — ROCURONIUM BROMIDE 10 MG/ML
INJECTION, SOLUTION INTRAVENOUS AS NEEDED
Status: DISCONTINUED | OUTPATIENT
Start: 2025-03-13 | End: 2025-03-13 | Stop reason: SURG

## 2025-03-13 RX ORDER — ONDANSETRON 2 MG/ML
4 INJECTION INTRAMUSCULAR; INTRAVENOUS EVERY 6 HOURS PRN
Status: DISCONTINUED | OUTPATIENT
Start: 2025-03-13 | End: 2025-03-14 | Stop reason: HOSPADM

## 2025-03-13 RX ORDER — SODIUM CHLORIDE, SODIUM LACTATE, POTASSIUM CHLORIDE, CALCIUM CHLORIDE 600; 310; 30; 20 MG/100ML; MG/100ML; MG/100ML; MG/100ML
9 INJECTION, SOLUTION INTRAVENOUS CONTINUOUS
Status: DISCONTINUED | OUTPATIENT
Start: 2025-03-14 | End: 2025-03-13

## 2025-03-13 RX ORDER — LIDOCAINE HYDROCHLORIDE 10 MG/ML
INJECTION, SOLUTION EPIDURAL; INFILTRATION; INTRACAUDAL; PERINEURAL AS NEEDED
Status: DISCONTINUED | OUTPATIENT
Start: 2025-03-13 | End: 2025-03-13 | Stop reason: SURG

## 2025-03-13 RX ORDER — FAMOTIDINE 20 MG/1
20 TABLET, FILM COATED ORAL ONCE
Status: COMPLETED | OUTPATIENT
Start: 2025-03-13 | End: 2025-03-13

## 2025-03-13 RX ORDER — PROPOFOL 10 MG/ML
VIAL (ML) INTRAVENOUS AS NEEDED
Status: DISCONTINUED | OUTPATIENT
Start: 2025-03-13 | End: 2025-03-13 | Stop reason: SURG

## 2025-03-13 RX ORDER — ONDANSETRON 2 MG/ML
4 INJECTION INTRAMUSCULAR; INTRAVENOUS ONCE AS NEEDED
Status: DISCONTINUED | OUTPATIENT
Start: 2025-03-13 | End: 2025-03-13 | Stop reason: HOSPADM

## 2025-03-13 RX ORDER — FENTANYL CITRATE 50 UG/ML
INJECTION, SOLUTION INTRAMUSCULAR; INTRAVENOUS
Status: COMPLETED
Start: 2025-03-13 | End: 2025-03-13

## 2025-03-13 RX ORDER — BUPIVACAINE HYDROCHLORIDE 2.5 MG/ML
INJECTION, SOLUTION EPIDURAL; INFILTRATION; INTRACAUDAL
Status: COMPLETED | OUTPATIENT
Start: 2025-03-13 | End: 2025-03-13

## 2025-03-13 RX ORDER — ONDANSETRON 2 MG/ML
INJECTION INTRAMUSCULAR; INTRAVENOUS AS NEEDED
Status: DISCONTINUED | OUTPATIENT
Start: 2025-03-13 | End: 2025-03-13 | Stop reason: SURG

## 2025-03-13 RX ORDER — DEXAMETHASONE SODIUM PHOSPHATE 10 MG/ML
INJECTION, SOLUTION INTRAMUSCULAR; INTRAVENOUS
Status: COMPLETED | OUTPATIENT
Start: 2025-03-13 | End: 2025-03-13

## 2025-03-13 RX ORDER — OXYCODONE AND ACETAMINOPHEN 7.5; 325 MG/1; MG/1
1 TABLET ORAL EVERY 4 HOURS PRN
Status: DISCONTINUED | OUTPATIENT
Start: 2025-03-13 | End: 2025-03-13 | Stop reason: HOSPADM

## 2025-03-13 RX ORDER — HYDROCODONE BITARTRATE AND ACETAMINOPHEN 5; 325 MG/1; MG/1
1 TABLET ORAL ONCE AS NEEDED
Status: DISCONTINUED | OUTPATIENT
Start: 2025-03-13 | End: 2025-03-13 | Stop reason: HOSPADM

## 2025-03-13 RX ORDER — MIDAZOLAM HYDROCHLORIDE 1 MG/ML
1 INJECTION, SOLUTION INTRAMUSCULAR; INTRAVENOUS
Status: DISCONTINUED | OUTPATIENT
Start: 2025-03-13 | End: 2025-03-13 | Stop reason: HOSPADM

## 2025-03-13 RX ORDER — SODIUM CHLORIDE 0.9 % (FLUSH) 0.9 %
10 SYRINGE (ML) INJECTION EVERY 12 HOURS SCHEDULED
Status: DISCONTINUED | OUTPATIENT
Start: 2025-03-13 | End: 2025-03-13 | Stop reason: HOSPADM

## 2025-03-13 RX ORDER — SODIUM CHLORIDE, SODIUM LACTATE, POTASSIUM CHLORIDE, CALCIUM CHLORIDE 600; 310; 30; 20 MG/100ML; MG/100ML; MG/100ML; MG/100ML
INJECTION, SOLUTION INTRAVENOUS CONTINUOUS PRN
Status: DISCONTINUED | OUTPATIENT
Start: 2025-03-13 | End: 2025-03-13

## 2025-03-13 RX ORDER — HYDRALAZINE HYDROCHLORIDE 20 MG/ML
5 INJECTION INTRAMUSCULAR; INTRAVENOUS
Status: DISCONTINUED | OUTPATIENT
Start: 2025-03-13 | End: 2025-03-13 | Stop reason: HOSPADM

## 2025-03-13 RX ORDER — LABETALOL HYDROCHLORIDE 5 MG/ML
5 INJECTION, SOLUTION INTRAVENOUS
Status: DISCONTINUED | OUTPATIENT
Start: 2025-03-13 | End: 2025-03-13 | Stop reason: HOSPADM

## 2025-03-13 RX ORDER — NALOXONE HCL 0.4 MG/ML
0.4 VIAL (ML) INJECTION AS NEEDED
Status: DISCONTINUED | OUTPATIENT
Start: 2025-03-13 | End: 2025-03-13 | Stop reason: HOSPADM

## 2025-03-13 RX ORDER — NALOXONE HCL 0.4 MG/ML
0.1 VIAL (ML) INJECTION
Status: DISCONTINUED | OUTPATIENT
Start: 2025-03-13 | End: 2025-03-14 | Stop reason: HOSPADM

## 2025-03-13 RX ORDER — ALBUTEROL SULFATE 90 UG/1
2 INHALANT RESPIRATORY (INHALATION) EVERY 4 HOURS PRN
Status: DISCONTINUED | OUTPATIENT
Start: 2025-03-13 | End: 2025-03-14 | Stop reason: HOSPADM

## 2025-03-13 RX ORDER — ONDANSETRON 4 MG/1
4 TABLET, ORALLY DISINTEGRATING ORAL EVERY 6 HOURS PRN
Status: DISCONTINUED | OUTPATIENT
Start: 2025-03-13 | End: 2025-03-14 | Stop reason: HOSPADM

## 2025-03-13 RX ORDER — IPRATROPIUM BROMIDE AND ALBUTEROL SULFATE 2.5; .5 MG/3ML; MG/3ML
3 SOLUTION RESPIRATORY (INHALATION) ONCE AS NEEDED
Status: DISCONTINUED | OUTPATIENT
Start: 2025-03-13 | End: 2025-03-13 | Stop reason: HOSPADM

## 2025-03-13 RX ORDER — PROMETHAZINE HYDROCHLORIDE 12.5 MG/1
12.5 TABLET ORAL EVERY 6 HOURS PRN
Status: DISCONTINUED | OUTPATIENT
Start: 2025-03-13 | End: 2025-03-14 | Stop reason: HOSPADM

## 2025-03-13 RX ORDER — FENTANYL CITRATE 50 UG/ML
INJECTION, SOLUTION INTRAMUSCULAR; INTRAVENOUS AS NEEDED
Status: DISCONTINUED | OUTPATIENT
Start: 2025-03-13 | End: 2025-03-13 | Stop reason: SURG

## 2025-03-13 RX ORDER — METOPROLOL SUCCINATE 50 MG/1
50 TABLET, EXTENDED RELEASE ORAL DAILY
Status: DISCONTINUED | OUTPATIENT
Start: 2025-03-13 | End: 2025-03-14 | Stop reason: HOSPADM

## 2025-03-13 RX ORDER — FENTANYL CITRATE 50 UG/ML
50 INJECTION, SOLUTION INTRAMUSCULAR; INTRAVENOUS
Status: DISCONTINUED | OUTPATIENT
Start: 2025-03-13 | End: 2025-03-13 | Stop reason: HOSPADM

## 2025-03-13 RX ORDER — DEXAMETHASONE SODIUM PHOSPHATE 10 MG/ML
INJECTION, SOLUTION INTRAMUSCULAR; INTRAVENOUS AS NEEDED
Status: DISCONTINUED | OUTPATIENT
Start: 2025-03-13 | End: 2025-03-13 | Stop reason: SURG

## 2025-03-13 RX ORDER — HYDROMORPHONE HYDROCHLORIDE 1 MG/ML
0.5 INJECTION, SOLUTION INTRAMUSCULAR; INTRAVENOUS; SUBCUTANEOUS
Status: DISCONTINUED | OUTPATIENT
Start: 2025-03-13 | End: 2025-03-13 | Stop reason: HOSPADM

## 2025-03-13 RX ORDER — SODIUM CHLORIDE 0.9 % (FLUSH) 0.9 %
3 SYRINGE (ML) INJECTION EVERY 12 HOURS SCHEDULED
Status: DISCONTINUED | OUTPATIENT
Start: 2025-03-13 | End: 2025-03-13 | Stop reason: HOSPADM

## 2025-03-13 RX ORDER — SODIUM CHLORIDE 9 MG/ML
9 INJECTION, SOLUTION INTRAVENOUS AS NEEDED
Status: DISCONTINUED | OUTPATIENT
Start: 2025-03-13 | End: 2025-03-13 | Stop reason: HOSPADM

## 2025-03-13 RX ORDER — PROMETHAZINE HYDROCHLORIDE 25 MG/1
25 TABLET ORAL ONCE AS NEEDED
Status: DISCONTINUED | OUTPATIENT
Start: 2025-03-13 | End: 2025-03-13 | Stop reason: HOSPADM

## 2025-03-13 RX ORDER — SODIUM CHLORIDE 0.9 % (FLUSH) 0.9 %
3-10 SYRINGE (ML) INJECTION AS NEEDED
Status: DISCONTINUED | OUTPATIENT
Start: 2025-03-13 | End: 2025-03-13 | Stop reason: HOSPADM

## 2025-03-13 RX ORDER — EPHEDRINE SULFATE 50 MG/ML
INJECTION INTRAVENOUS AS NEEDED
Status: DISCONTINUED | OUTPATIENT
Start: 2025-03-13 | End: 2025-03-13 | Stop reason: SURG

## 2025-03-13 RX ORDER — DOCUSATE SODIUM 100 MG/1
100 CAPSULE, LIQUID FILLED ORAL 2 TIMES DAILY PRN
Status: DISCONTINUED | OUTPATIENT
Start: 2025-03-13 | End: 2025-03-14 | Stop reason: HOSPADM

## 2025-03-13 RX ORDER — PHENAZOPYRIDINE HYDROCHLORIDE 100 MG/1
100 TABLET, FILM COATED ORAL 3 TIMES DAILY PRN
Status: DISCONTINUED | OUTPATIENT
Start: 2025-03-13 | End: 2025-03-14 | Stop reason: HOSPADM

## 2025-03-13 RX ORDER — SODIUM CHLORIDE, SODIUM LACTATE, POTASSIUM CHLORIDE, CALCIUM CHLORIDE 600; 310; 30; 20 MG/100ML; MG/100ML; MG/100ML; MG/100ML
9 INJECTION, SOLUTION INTRAVENOUS CONTINUOUS
Status: ACTIVE | OUTPATIENT
Start: 2025-03-13 | End: 2025-03-14

## 2025-03-13 RX ORDER — PROMETHAZINE HYDROCHLORIDE 25 MG/1
25 SUPPOSITORY RECTAL ONCE AS NEEDED
Status: DISCONTINUED | OUTPATIENT
Start: 2025-03-13 | End: 2025-03-13 | Stop reason: HOSPADM

## 2025-03-13 RX ORDER — DEXMEDETOMIDINE HYDROCHLORIDE 4 UG/ML
INJECTION, SOLUTION INTRAVENOUS AS NEEDED
Status: DISCONTINUED | OUTPATIENT
Start: 2025-03-13 | End: 2025-03-13 | Stop reason: SURG

## 2025-03-13 RX ORDER — METOCLOPRAMIDE HYDROCHLORIDE 5 MG/ML
10 INJECTION INTRAMUSCULAR; INTRAVENOUS EVERY 6 HOURS PRN
Status: DISCONTINUED | OUTPATIENT
Start: 2025-03-13 | End: 2025-03-14 | Stop reason: HOSPADM

## 2025-03-13 RX ORDER — FAMOTIDINE 20 MG/1
20 TABLET, FILM COATED ORAL 2 TIMES DAILY
Status: DISCONTINUED | OUTPATIENT
Start: 2025-03-13 | End: 2025-03-14 | Stop reason: HOSPADM

## 2025-03-13 RX ORDER — OXYCODONE AND ACETAMINOPHEN 5; 325 MG/1; MG/1
1 TABLET ORAL EVERY 4 HOURS PRN
Refills: 0 | Status: DISCONTINUED | OUTPATIENT
Start: 2025-03-13 | End: 2025-03-14

## 2025-03-13 RX ADMIN — DEXMEDETOMIDINE HYDROCHLORIDE IN 0.9% SODIUM CHLORIDE 8 MCG: 4 INJECTION INTRAVENOUS at 12:31

## 2025-03-13 RX ADMIN — ROCURONIUM BROMIDE 50 MG: 10 INJECTION INTRAVENOUS at 11:39

## 2025-03-13 RX ADMIN — SODIUM CHLORIDE, POTASSIUM CHLORIDE, SODIUM LACTATE AND CALCIUM CHLORIDE 9 ML/HR: 600; 310; 30; 20 INJECTION, SOLUTION INTRAVENOUS at 10:09

## 2025-03-13 RX ADMIN — FENTANYL CITRATE 50 MCG: 50 INJECTION, SOLUTION INTRAMUSCULAR; INTRAVENOUS at 15:34

## 2025-03-13 RX ADMIN — PROPOFOL INJECTABLE EMULSION 50 MG: 10 INJECTION, EMULSION INTRAVENOUS at 11:39

## 2025-03-13 RX ADMIN — FENTANYL CITRATE 50 MCG: 50 INJECTION, SOLUTION INTRAMUSCULAR; INTRAVENOUS at 14:47

## 2025-03-13 RX ADMIN — SODIUM CHLORIDE 2000 MG: 900 INJECTION INTRAVENOUS at 11:42

## 2025-03-13 RX ADMIN — HYDROMORPHONE HYDROCHLORIDE 0.5 MG: 1 INJECTION, SOLUTION INTRAMUSCULAR; INTRAVENOUS; SUBCUTANEOUS at 15:18

## 2025-03-13 RX ADMIN — HYDROMORPHONE HYDROCHLORIDE 0.5 MG: 1 INJECTION, SOLUTION INTRAMUSCULAR; INTRAVENOUS; SUBCUTANEOUS at 16:24

## 2025-03-13 RX ADMIN — ROCURONIUM BROMIDE 30 MG: 10 INJECTION INTRAVENOUS at 13:00

## 2025-03-13 RX ADMIN — LIDOCAINE HYDROCHLORIDE 0.5 ML: 10 INJECTION, SOLUTION EPIDURAL; INFILTRATION; INTRACAUDAL; PERINEURAL at 10:09

## 2025-03-13 RX ADMIN — SODIUM CHLORIDE, POTASSIUM CHLORIDE, SODIUM LACTATE AND CALCIUM CHLORIDE: 600; 310; 30; 20 INJECTION, SOLUTION INTRAVENOUS at 13:03

## 2025-03-13 RX ADMIN — DEXAMETHASONE SODIUM PHOSPHATE 6 MG: 10 INJECTION, SOLUTION INTRAMUSCULAR; INTRAVENOUS at 11:42

## 2025-03-13 RX ADMIN — HYDROMORPHONE HYDROCHLORIDE 0.5 MG: 1 INJECTION, SOLUTION INTRAMUSCULAR; INTRAVENOUS; SUBCUTANEOUS at 18:43

## 2025-03-13 RX ADMIN — SUGAMMADEX 200 MG: 100 INJECTION, SOLUTION INTRAVENOUS at 13:45

## 2025-03-13 RX ADMIN — METOPROLOL SUCCINATE 50 MG: 50 TABLET, EXTENDED RELEASE ORAL at 18:43

## 2025-03-13 RX ADMIN — LIDOCAINE HYDROCHLORIDE 50 MG: 10 INJECTION, SOLUTION EPIDURAL; INFILTRATION; INTRACAUDAL; PERINEURAL at 11:38

## 2025-03-13 RX ADMIN — FENTANYL CITRATE 50 MCG: 50 INJECTION, SOLUTION INTRAMUSCULAR; INTRAVENOUS at 14:15

## 2025-03-13 RX ADMIN — HYDROMORPHONE HYDROCHLORIDE 0.5 MG: 1 INJECTION, SOLUTION INTRAMUSCULAR; INTRAVENOUS; SUBCUTANEOUS at 14:30

## 2025-03-13 RX ADMIN — FAMOTIDINE 20 MG: 20 TABLET, FILM COATED ORAL at 20:50

## 2025-03-13 RX ADMIN — ONDANSETRON 4 MG: 2 INJECTION INTRAMUSCULAR; INTRAVENOUS at 13:45

## 2025-03-13 RX ADMIN — FENTANYL CITRATE 100 MCG: 50 INJECTION, SOLUTION INTRAMUSCULAR; INTRAVENOUS at 11:38

## 2025-03-13 RX ADMIN — MUPIROCIN 1 APPLICATION: 20 OINTMENT TOPICAL at 10:08

## 2025-03-13 RX ADMIN — FAMOTIDINE 20 MG: 20 TABLET, FILM COATED ORAL at 10:08

## 2025-03-13 RX ADMIN — ROCURONIUM BROMIDE 20 MG: 10 INJECTION INTRAVENOUS at 12:25

## 2025-03-13 RX ADMIN — BUPIVACAINE HYDROCHLORIDE 60 ML: 2.5 INJECTION, SOLUTION EPIDURAL; INFILTRATION; INTRACAUDAL; PERINEURAL at 11:44

## 2025-03-13 RX ADMIN — HYDROMORPHONE HYDROCHLORIDE 0.5 MG: 1 INJECTION, SOLUTION INTRAMUSCULAR; INTRAVENOUS; SUBCUTANEOUS at 22:26

## 2025-03-13 RX ADMIN — OXYCODONE HYDROCHLORIDE AND ACETAMINOPHEN 1 TABLET: 5; 325 TABLET ORAL at 20:50

## 2025-03-13 RX ADMIN — PROPOFOL INJECTABLE EMULSION 50 MG: 10 INJECTION, EMULSION INTRAVENOUS at 11:40

## 2025-03-13 RX ADMIN — DEXAMETHASONE SODIUM PHOSPHATE 4 MG: 10 INJECTION, SOLUTION INTRAMUSCULAR; INTRAVENOUS at 11:40

## 2025-03-13 RX ADMIN — PROPOFOL INJECTABLE EMULSION 200 MG: 10 INJECTION, EMULSION INTRAVENOUS at 11:38

## 2025-03-13 RX ADMIN — EPHEDRINE SULFATE 10 MG: 50 INJECTION INTRAVENOUS at 12:52

## 2025-03-13 NOTE — OP NOTE
General Surgery Operative Note    Evelia Foster  [id number]  1980    Date of Surgery:  3/13/2025 13:58 EDT    Pre-op Diagnosis: Chronic cholecystitis, biliary dyskinesia             Hepatosteatosis    Post-op Diagnosis: Chronic cholecystitis, biliary dyskinesia               Hepatosteatosis    Procedure: Laparoscopic cholecystectomy           Liver biopsy    Surgeon: Tae Brady MD    Co-surgeon: Dr. Watkins - left adrenal nodule removal (dictated by him)    Anesthesia: General    Fluids: Total 1500 mL of crystalloid    Estimated Blood Loss: Less than 25 mL    Urine Voided: Not recorded     Specimens: Gallbladder            Segment 5 liver biopsy                Complications: None apparent    History: 45-year-old lady presented to office with right upper quadrant abdominal pain.  She had a HIDA scan demonstrating a decreased ejection fraction.  She presents today for cholecystectomy and liver biopsy.      I rehashed the risks and benefits of cholecystectomy and liver biopsy, including but not limited to: bleeding, infection, injury to adjacent viscera (small bowel, large bowel, duodenum, common bile duct, hepatic artery, the liver etc), no change in preoperative symptomology, biloma/bile leak, retained stones, need for ERCP, an open operation in general, chronic pain, and medical issues from a cardiopulmonary venous thrombosis standpoint.    Procedure:      After informed consent, the patient was taken to the operating room.  Dr. Watkins completed his portion of the procedure.  The patient was placed in the supine position on the operating table.  Perioperative antibiotics were given at the beginning of the procedure.  A timeout was performed.     The operation began with a supra umbilical Ruma trocar cutdown.  The skin was anesthetized and incised, the peritoneum entered sharply under direct visualization, bilateral 0 Vicryl fascial sutures were placed, and the blunt-tipped Ruma trocar was  placed intra-abdominally.  The abdomen was insufflated and the patient was placed in the head up position and rolled slightly onto the left hand side.  I then placed a subxiphoid port and two 5 mm trocars in the right upper quadrant under direct visualization.     The right and left lobes appeared grossly fatty infiltrated.  The fundus of the gallbladder was grasped and retracted cephalad up over the liver edge.  The gallbladder appeared chronically scarred, mildly so.  The neck of the gallbladder was then retracted inferior laterally.  The peritoneal attachments to the cystic duct and cystic artery were stripped down and a meticulous dissection demonstrated the critical view.  I then placed 3 clips proximally and one distally on the cystic duct, 2 clips proximally on the cystic artery and one distally. The laparoscopic scissors were then used to transect these structures.  There was a small posterior penetrating branch of the cystic artery that was doubly clipped and divided.  The gallbladder was then removed the gallbladder from the gallbladder bed.  I placed the gallbladder into an Endo Catch bag and withdrew this from the abdomen.  I reinspected my clips, they were without bile leak or bleeding and the gallbladder fossa was similar.  Along the border of segment 5 the laparoscopic scissors were used to take a liver biopsy.  This was passed off for permanent pathology.  The biopsy bed was fulgurated with the electrocautery Bovie.  Meticulous hemostasis was obtained.  I inspected my umbilical port site and this was without obvious complication.  All trocars were removed under direct visualization without bleeding.  The periumbilical fascial defect was closed with 0 Vicryl.  All skin incisions were closed with 4-0 Monocryl, Mastisol, Steri-Strips, Telfa, and Tegaderms.       The lap and needle count was reported as correct at the end of the procedure ×2. The patient was then transferred to the recovery room in  stable condition.     Tae Brady MD     Date: 3/13/2025  Time: 13:58 EDT

## 2025-03-13 NOTE — ANESTHESIA PROCEDURE NOTES
Airway  Reason: elective    Date/Time: 3/13/2025 11:40 AM  Airway not difficult    General Information and Staff    Patient location during procedure: OR  CRNA/CAA: Hayde Malin CRNA    Indications and Patient Condition  Indications for airway management: airway protection    Preoxygenated: yes  MILS not maintained throughout    Mask difficulty assessment: 2 - vent by mask + OA or adjuvant +/- NMBA    Final Airway Details    Final airway type: endotracheal airway      Successful airway: ETT  Cuffed: yes   Successful intubation technique: direct laryngoscopy  Adjuncts used in placement: intubating stylet  Endotracheal tube insertion site: oral  Blade: Meng  Blade size: 3  ETT size (mm): 7.0  Cormack-Lehane Classification: grade I - full view of glottis  Placement verified by: chest auscultation and capnometry   Measured from: lips  ETT/EBT  to lips (cm): 21  Number of attempts at approach: 1  Assessment: lips, teeth, and gum same as pre-op and atraumatic intubation    Additional Comments  Negative epigastric sounds, Breath sound equal bilaterally with symmetric chest rise and fall

## 2025-03-13 NOTE — BRIEF OP NOTE
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    Procedure(s):      Procedure(s):  ROBOTIC LEFT ADRENALECTOMY                Surgeon(s):  Adrián Watkins MD Baehler, Richard David, MD Baehler, Richard David, MD Shirley, Lawrence A, MD    Anesthesia: General with Block    Staff:   Circulator: Vanessa Khan RN; Areli Persaud RN; Clive Castillo RN  Scrub Person: Saige Hernandez; Hayley Downey  Nursing Assistant: Alice Garg PCT; Stephanie Castillo PCT; Stephanie Heller PCT  Assistant: Avtar Oakes PA  Assistant: Avtar Oakes PA    Estimated Blood Loss: minimal for my portion    Urine Voided: * No values recorded between 3/13/2025 11:32 AM and 3/13/2025 12:37 PM *    Specimens:                Specimens       ID Source Type Tests Collected By Collected At Frozen?    A Gland, Adrenal Left Tissue TISSUE PATHOLOGY EXAM   Adrián Watkins MD 3/13/25 1224 No    Description: Left adrenal nodule    This specimen was not marked as sent.              Drains: * No LDAs found *    Findings: Nodule at superior pole of left adrenal gland removed completely grossly      Complications: None    Assistant: Avtar Oakes PA  was responsible for performing the following activities: Retraction, Suturing, Closing, Placing Dressing, and Held/Positioned Camera and their skilled assistance was necessary for the success of this case.    Adrián Watkins MD     Date: 3/13/2025  Time: 12:38 EDT

## 2025-03-13 NOTE — ANESTHESIA POSTPROCEDURE EVALUATION
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          Post Anesthesia Care and Evaluation    Patient location during evaluation: PACU  Patient participation: complete - patient participated  Level of consciousness: awake  Pain score: 5  Pain management: adequate    Airway patency: patent  Anesthetic complications: No anesthetic complications  PONV Status: none  Cardiovascular status: hemodynamically stable  Respiratory status: acceptable, nasal cannula and spontaneous ventilation  Hydration status: acceptable    Comments: BP: 117/77  RR; 15  SPO2: 93%  Hr; 78  Temp;97.3  No anesthesia care post op

## 2025-03-13 NOTE — OP NOTE
General Surgery Operative Note    ADRENALECTOMY LAPAROSCOPIC WITH DAVINCI ROBOT  Procedure Report    Patient Name:  Evelia Foster  YOB: 1980  [id number]     Date of Surgery:  3/13/2025       Pre-op Diagnosis: Left adrenal nodule    Post-op Diagnosis: Left adrenal nodule    Procedure(s):  ROBOTIC LEFT ADRENALECTOMY    Surgeon: Adrián Watkins MD    Assistant: Avtar Oakes PA     Anesthesia: General with Block         Estimated Blood Loss: minimal for my portion    Complications: None     Implants:    Nothing was implanted during the procedure    Specimen:          Specimens       ID Source Type Tests Collected By Collected At Frozen?    A Gland, Adrenal Left Tissue TISSUE PATHOLOGY EXAM   Adrián Watkins MD 3/13/25 1224 No    Description: Left adrenal nodule    This specimen was not marked as sent.                Findings: Nodule at superior pole of left adrenal gland removed completely grossly    Indications: The patient is a 45-year-old female who was noted to have a left adrenal nodule which had increased in size on subsequent CT scans.  We discussed the risks, benefits, and alternatives to robotic left adrenalectomy and the patient was agreeable.  Informed consent was obtained.    Description of Procedure:     After obtaining informed consent, the patient was taken to the operating room and placed in supine position. After appropriate DVT and antibiotic prophylaxis, general anesthesia was induced.  A Membreno catheter was placed under sterile conditions.  Tap blocks were performed by anesthesia.  The patient was then placed in the right lateral decubitus position with appropriate padding in place including an axillary roll.  The abdomen was prepped and draped in standard sterile fashion.    An 8 mm skin incision was made at May's point in the left upper quadrant.  A 5 mm laparoscopic port was placed into the peritoneal cavity using the Optiview technique.  The peritoneal  cavity was then insufflated with carbon oxide.  A laparoscope was placed through the port and the underlying viscera was inspected with no evidence of injury.  An 8 mm robotic port was then placed lateral to the initial port under direct vision and an additional 8 mm robotic port was placed medial to the initial port under direct vision.  A 12 mm laparoscopic port was placed medial and inferior to the initial port.  The initial port was then upsized to an 8 mm robotic port.  The robot was then docked at the patient's side.    The robotic camera was then placed in the middle port.  The robotic hook cautery and robotic fenestrated grasper were placed in the medial and lateral ports respectively, each placed under direct vision.  The splenic flexure of the colon was then mobilized from surrounding attachments using hook cautery.  The lateral attachments of the spleen were then dissected using hook cautery to allow for medial rotation of the spleen.  The adrenal gland was noted deep to this.  The peritoneum was then incised over the adrenal gland.  The adrenal nodule was noted to be at the superior pole within attachment to the normal adrenal gland.  The soft tissue attachments around the nodule were taken down using hook cautery.  The attachments to the adrenal gland were taken down using the vessel sealer.  The specimen was placed in an Endo Catch bag for later retrieval.  The area of dissection was was inspected and was found to be hemostatic.  The robotic instruments were removed and the robot was undocked from the patient's side.  The specimen was then removed from the 12 mm port site and sent to pathology as a permanent specimen.  The 12 mm port was then removed and the fascial defect was reapproximated using an 0 Vicryl stitch and the Erlin-Richelle device.  The remaining ports were then removed under direct laparoscopic vision. The skin was reapproximated all sites using subcuticular 4-0 Monocryl.  Skin glue  was placed over each site.    All sponge and needle counts were correct times two at the completion of my portion of the procedure.  Please see Dr. Brady's operative note for details on the remainder of the procedure.          Assistant: Avtar Oakes PA  was responsible for performing the following activities: Retraction, Suturing, Closing, Placing Dressing, and Held/Positioned Camera and their skilled assistance was necessary for the success of this case.    Adrián Watkins MD     Date: 3/13/2025  Time: 12:40 EDT

## 2025-03-13 NOTE — H&P
Pre-Op H&P  Evelia Foster  [id number]  1980      Chief complaint: left adrenal nodule       Subjective:  Patient is a 45 y.o.female presents for scheduled surgery by Dr. Watkins and Dr. Brady. She anticipates a ROBOTIC LEFT ADRENALECTOMY; LAPAROSCOPIC CHOLECYSTECTOMY WITH LIVER BIOPSY POSSIBLE OPEN  today.  He had an incidental finding of left adrenal nodule on imaging in .  Repeat imaging in  showed it had increased in size.  She occasionally has right sided abdominal pain.  She also has intermittent epigastric pain and nausea over the last 6 months.  The symptoms are exacerbated by fatty foods and large meals.  HIDA scan showed low ejection fraction.      Review of Systems:  Constitutional-- No fever, chills or sweats. No fatigue.  CV-- No chest pain, palpitation or syncope. +HTN, HLD  Resp-- No SOB, cough, hemoptysis. +JENI on cpap  Skin--No rashes or lesions      Allergies:   Allergies   Allergen Reactions    Benadryl [Diphenhydramine] Hives     Adult dosing may exacerbate symptoms  Tolerates children's benadryl dose      Buspar [Buspirone] Other (See Comments)     Brain zaps         Home Meds:  Facility-Administered Medications Prior to Admission   Medication Dose Route Frequency Provider Last Rate Last Admin    [] ketorolac (TORADOL) injection 30 mg  30 mg Intramuscular Q6H PRN Kat Heller DO   30 mg at 25 1420     Medications Prior to Admission   Medication Sig Dispense Refill Last Dose/Taking    albuterol sulfate  (90 Base) MCG/ACT inhaler Inhale 2 puffs Every 4 (Four) Hours As Needed for Wheezing. 18 g 3     cefdinir (OMNICEF) 300 MG capsule Take 1 capsule by mouth 2 (Two) Times a Day. 14 capsule 0     cetirizine (zyrTEC) 10 MG tablet Take 1 tablet by mouth Daily. (Patient taking differently: Take 1 tablet by mouth Daily As Needed for Allergies.) 90 tablet 3     gabapentin (NEURONTIN) 300 MG capsule Take 1 capsule by mouth 3 (Three) Times a Day. Take one capsule  at bedtime for 3 days. If not overly sedating, take one at bedtime and one mid-day for 3 days. If this is well tolerated, take three times daily 60 capsule 0     ibuprofen (ADVIL,MOTRIN) 400 MG tablet Take 1 tablet by mouth Every 6 (Six) Hours As Needed for Mild Pain.       metoprolol succinate XL (TOPROL-XL) 50 MG 24 hr tablet Take 1 tablet by mouth 2 (Two) Times a Day. (Patient taking differently: Take 1 tablet by mouth Daily.) 180 tablet 3     ondansetron (ZOFRAN) 4 MG tablet        phenazopyridine (Pyridium) 100 MG tablet Take 1 tablet by mouth 3 (Three) Times a Day As Needed for Bladder Spasms. 15 tablet 0          PMH:   Past Medical History:   Diagnosis Date    Adrenal mass     LEFT    Anemia     Anxiety     Asthma     mild, rescue inhaler prn     Bladder infection     Cancer     uterine cancer, skin cancer    Degenerative disc disease, lumbar     Dizzy     Full dentures     Headache     History of cervical dysplasia     s/p hsyterectomy    HPV (human papilloma virus) infection     positive 18    Hyperlipidemia     Hypertension     Insomnia     Low back pain     Neck pain     Palpitations     Pneumonia     PTSD (post-traumatic stress disorder)     Restless leg syndrome     Sepsis     Sleep apnea     CPAP nightly    Spinal headache     SVT (supraventricular tachycardia)     Uses contact lenses     Well adult exam 08/26/2022     PSH:    Past Surgical History:   Procedure Laterality Date    ANTERIOR CERVICAL DISCECTOMY W/ FUSION N/A 01/14/2021    Procedure: CERVICAL DISCECTOMY ANTERIOR WITH FUSION C5-6;  Surgeon: Kalyan Vasquez MD;  Location:  [facility] OR;  Service: Neurosurgery;  Laterality: N/A;    ANTERIOR CERVICAL DISCECTOMY W/ FUSION N/A 5/15/2024    Procedure: CERVICAL DISCECTOMY ANTERIOR WITH FUSION C4-5;  Surgeon: Kalyan Vasquez MD;  Location:  [facility] OR;  Service: Neurosurgery;  Laterality: N/A;    COLONOSCOPY      CYSTOSCOPY W/ BULKING AGENT INJECTION N/A 03/01/2023    Procedure: CYSTOSCOPY WITH  BULKING AGENT INJECTION;  Surgeon: Nubia Ross MD;  Location: [facility] OR;  Service: Urology;  Laterality: N/A;    ENDOSCOPY      FINGER/THUMB LESION/CYST EXCISION Left 09/26/2023    Thumb    SKIN CANCER EXCISION      TONSILLECTOMY AND ADENOIDECTOMY      1989    TOTAL LAPAROSCOPIC HYSTERECTOMY N/A 01/09/2019    Procedure: TOTAL LAPAROSCOPIC HYSTERECTOMY, BILATERAL SALPINGECTOMY WITH DAVINCI ROBOT;  Surgeon: Markie Lewis MD;  Location:  [facility] OR;  Service: DaVinci    TUBAL ABDOMINAL LIGATION         Social History:   Tobacco:   Social History     Tobacco Use   Smoking Status Every Day    Current packs/day: 0.50    Average packs/day: 0.5 packs/day for 27.2 years (13.6 ttl pk-yrs)    Types: Cigarettes    Start date: 1998    Passive exposure: Current   Smokeless Tobacco Never   Tobacco Comments    tried w preg      Alcohol:     Social History     Substance and Sexual Activity   Alcohol Use Not Currently    Comment: occasional         Physical Exam: VS: /83  HR 84  RR 16  T 97.8  sat 96%RA      General Appearance:    Alert, cooperative, no distress, appears stated age   Head:    Normocephalic, without obvious abnormality, atraumatic   Lungs:     Clear to auscultation bilaterally, respirations unlabored    Heart:   Regular rate and rhythm, S1 and S2 normal    Abdomen:    Soft without tenderness   Extremities:   Extremities normal, atraumatic, no cyanosis or edema   Skin:   Skin color, texture, turgor normal, no rashes or lesions   Neurologic:   Grossly intact     Results Review:     LABS:  Lab Results   Component Value Date    WBC 10.31 03/06/2025    HGB 15.1 03/06/2025    HCT 44.8 03/06/2025    MCV 97.2 (H) 03/06/2025     03/06/2025    NEUTROABS 6.31 12/19/2024    GLUCOSE 93 03/06/2025    BUN 7 03/06/2025    CREATININE 0.70 03/06/2025    EGFRIFNONA 89 04/20/2021     03/06/2025    K 4.5 03/06/2025     03/06/2025    CO2 24.0 03/06/2025    MG 2.0 06/05/2024    CALCIUM 10.2 03/06/2025    ALBUMIN 4.4  03/06/2025    AST 32 03/06/2025    ALT 41 (H) 03/06/2025    BILITOT 0.3 03/06/2025       RADIOLOGY:  Study Result    Narrative & Impression   DATE OF EXAM: 1/14/2025 8:39 AM EST     PROCEDURE: NM HIDA SCAN WITH PHARMACOLOGICAL INTERVENTION     INDICATIONS: R10.11     COMPARISON: Ultrasound 12/26/2024     TECHNIQUE: The patient received 7.60 mCi of technetium 99m Choletec intravenously and images were obtained of the abdomen in the anterior projection through 60 minutes. Patient was administered 2.1 mcg of Kinevac to assess gallbladder emptying. Counts   were obtained over the gallbladder to calculate the ejection fraction.     FINDINGS:  Normal uptake is noted within hepatocytes. There is clearance of the blood pool by 5 minutes. Uptake within the gallbladder, common duct and small bowel noted prior to administration of CCK. There appears to be reflux into the stomach. Gallbladder   ejection fraction is calculated at 34 minutes. This measures 24% and plateaus. Patient reports nausea and abdominal pain with injection of CCK.     IMPRESSION:     1. There is reflux of uptake into the stomach. Biliary scan is otherwise unremarkable in appearance.  2. Gallbladder ejection fraction is 25% which is abnormal, low. This can be seen with chronic inflammation and biliary dyskinesia         I reviewed the patient's new clinical results.    Cancer Staging (if applicable)  Cancer Patient: __ yes __no __unknown; If yes, clinical stage T:__ N:__M:__, stage group or __N/A      Impression: Adrenal nodule       Plan: ROBOTIC LEFT ADRENALECTOMY; LAPAROSCOPIC CHOLECYSTECTOMY WITH LIVER BIOPSY POSSIBLE OPEN       OG [facility]   3/13/2025   09:47 EDT    Chart reviewed.  Prior abdominal MR, CT imaging, ultrasound, and HIDA scan.  Preoperative blood work reviewed.  Adrenalectomy, Dr. Watkins.  Laparoscopic cholecystectomy with possible liver biopsy by me.

## 2025-03-13 NOTE — PAYOR COMM NOTE
"Sheyla Lama RN  [facility]  Utilization Management  P:[phone]  F:[phone]    Ref # [id number]     Zachary Patiño (45 y.o. Female)       Date of Birth   1980    Social Security Number       Address   [address] [id number]    Home Phone   [phone]    MRN   [id number]       [facility]th[facility]   [facility]    Marital Status   Significant Other                            Admission Date   3/13/2025    Admission Type   Elective    Admitting Provider   Adrián Watkins MD    Attending Provider   Adrián Watkins MD    Department, Room/Bed   [facility] OR, [facility] OR/MAIN OR       Discharge Date       Discharge Disposition       Discharge Destination                                 Attending Provider: Adrián Watkins MD    Allergies: Benadryl [Diphenhydramine], Buspar [Buspirone]    Isolation: None   Infection: None   Code Status: CPR    Ht: 165.1 cm (65\")   Wt: 105 kg (230 lb 9.6 oz)    Admission Cmt: None   Principal Problem: Left adrenal mass [E27.8]                   Active Insurance as of 3/13/2025       Primary Coverage       Payor Plan Insurance Group Employer/Plan Group    [employer]ERCIAL AETNA [id number]       Payor Plan Address Payor Plan Phone Number Payor Plan Fax Number Effective Dates    [address] [phone]  1/1/2022 - None Entered    [facility] [id number]         Subscriber Name Subscriber Birth Date Member ID       ZACHARY PATIÑO 1980 [id number]                     Emergency Contacts        (Rel.) Home Phone Work Phone Mobile Phone    Satya Ervin (Significant Other) [phone] -- [phone]                 History & Physical        Tae Brady MD at 03/13/25 0939            Pre-Op H&P  Zachary Patiño  [id number]  1980      Chief complaint: left adrenal nodule       Subjective:  Patient is a 45 y.o.female presents for scheduled surgery by Dr. Watkins and Dr. Brady. " She anticipates a ROBOTIC LEFT ADRENALECTOMY; LAPAROSCOPIC CHOLECYSTECTOMY WITH LIVER BIOPSY POSSIBLE OPEN  today.  He had an incidental finding of left adrenal nodule on imaging in .  Repeat imaging in  showed it had increased in size.  She occasionally has right sided abdominal pain.  She also has intermittent epigastric pain and nausea over the last 6 months.  The symptoms are exacerbated by fatty foods and large meals.  HIDA scan showed low ejection fraction.      Review of Systems:  Constitutional-- No fever, chills or sweats. No fatigue.  CV-- No chest pain, palpitation or syncope. +HTN, HLD  Resp-- No SOB, cough, hemoptysis. +JENI on cpap  Skin--No rashes or lesions      Allergies:   Allergies   Allergen Reactions    Benadryl [Diphenhydramine] Hives     Adult dosing may exacerbate symptoms  Tolerates children's benadryl dose      Buspar [Buspirone] Other (See Comments)     Brain zaps         Home Meds:  Facility-Administered Medications Prior to Admission   Medication Dose Route Frequency Provider Last Rate Last Admin    [] ketorolac (TORADOL) injection 30 mg  30 mg Intramuscular Q6H PRN Kat Heller, DO   30 mg at 25 1420     Medications Prior to Admission   Medication Sig Dispense Refill Last Dose/Taking    albuterol sulfate  (90 Base) MCG/ACT inhaler Inhale 2 puffs Every 4 (Four) Hours As Needed for Wheezing. 18 g 3     cefdinir (OMNICEF) 300 MG capsule Take 1 capsule by mouth 2 (Two) Times a Day. 14 capsule 0     cetirizine (zyrTEC) 10 MG tablet Take 1 tablet by mouth Daily. (Patient taking differently: Take 1 tablet by mouth Daily As Needed for Allergies.) 90 tablet 3     gabapentin (NEURONTIN) 300 MG capsule Take 1 capsule by mouth 3 (Three) Times a Day. Take one capsule at bedtime for 3 days. If not overly sedating, take one at bedtime and one mid-day for 3 days. If this is well tolerated, take three times daily 60 capsule 0     ibuprofen (ADVIL,MOTRIN) 400 MG tablet Take 1  tablet by mouth Every 6 (Six) Hours As Needed for Mild Pain.       metoprolol succinate XL (TOPROL-XL) 50 MG 24 hr tablet Take 1 tablet by mouth 2 (Two) Times a Day. (Patient taking differently: Take 1 tablet by mouth Daily.) 180 tablet 3     ondansetron (ZOFRAN) 4 MG tablet        phenazopyridine (Pyridium) 100 MG tablet Take 1 tablet by mouth 3 (Three) Times a Day As Needed for Bladder Spasms. 15 tablet 0          PMH:   Past Medical History:   Diagnosis Date    Adrenal mass     LEFT    Anemia     Anxiety     Asthma     mild, rescue inhaler prn     Bladder infection     Cancer     uterine cancer, skin cancer    Degenerative disc disease, lumbar     Dizzy     Full dentures     Headache     History of cervical dysplasia     s/p hsyterectomy    HPV (human papilloma virus) infection     positive 18    Hyperlipidemia     Hypertension     Insomnia     Low back pain     Neck pain     Palpitations     Pneumonia     PTSD (post-traumatic stress disorder)     Restless leg syndrome     Sepsis     Sleep apnea     CPAP nightly    Spinal headache     SVT (supraventricular tachycardia)     Uses contact lenses     Well adult exam 08/26/2022     PSH:    Past Surgical History:   Procedure Laterality Date    ANTERIOR CERVICAL DISCECTOMY W/ FUSION N/A 01/14/2021    Procedure: CERVICAL DISCECTOMY ANTERIOR WITH FUSION C5-6;  Surgeon: Kalyan Vasquez MD;  Location:  [facility] OR;  Service: Neurosurgery;  Laterality: N/A;    ANTERIOR CERVICAL DISCECTOMY W/ FUSION N/A 5/15/2024    Procedure: CERVICAL DISCECTOMY ANTERIOR WITH FUSION C4-5;  Surgeon: Kalyan Vasquez MD;  Location:  [facility] OR;  Service: Neurosurgery;  Laterality: N/A;    COLONOSCOPY      CYSTOSCOPY W/ BULKING AGENT INJECTION N/A 03/01/2023    Procedure: CYSTOSCOPY WITH BULKING AGENT INJECTION;  Surgeon: Nubia Ross MD;  Location:  [facility] OR;  Service: Urology;  Laterality: N/A;    ENDOSCOPY      FINGER/THUMB LESION/CYST EXCISION Left 09/26/2023    Thumb    SKIN CANCER  EXCISION      TONSILLECTOMY AND ADENOIDECTOMY      1989    TOTAL LAPAROSCOPIC HYSTERECTOMY N/A 01/09/2019    Procedure: TOTAL LAPAROSCOPIC HYSTERECTOMY, BILATERAL SALPINGECTOMY WITH DAVINCI ROBOT;  Surgeon: Markie Lewis MD;  Location: [facility];  Service: DaVinci    TUBAL ABDOMINAL LIGATION         Social History:   Tobacco:   Social History     Tobacco Use   Smoking Status Every Day    Current packs/day: 0.50    Average packs/day: 0.5 packs/day for 27.2 years (13.6 ttl pk-yrs)    Types: Cigarettes    Start date: 1998    Passive exposure: Current   Smokeless Tobacco Never   Tobacco Comments    tried w preg      Alcohol:     Social History     Substance and Sexual Activity   Alcohol Use Not Currently    Comment: occasional         Physical Exam: VS: /83  HR 84  RR 16  T 97.8  sat 96%RA      General Appearance:    Alert, cooperative, no distress, appears stated age   Head:    Normocephalic, without obvious abnormality, atraumatic   Lungs:     Clear to auscultation bilaterally, respirations unlabored    Heart:   Regular rate and rhythm, S1 and S2 normal    Abdomen:    Soft without tenderness   Extremities:   Extremities normal, atraumatic, no cyanosis or edema   Skin:   Skin color, texture, turgor normal, no rashes or lesions   Neurologic:   Grossly intact     Results Review:     LABS:  Lab Results   Component Value Date    WBC 10.31 03/06/2025    HGB 15.1 03/06/2025    HCT 44.8 03/06/2025    MCV 97.2 (H) 03/06/2025     03/06/2025    NEUTROABS 6.31 12/19/2024    GLUCOSE 93 03/06/2025    BUN 7 03/06/2025    CREATININE 0.70 03/06/2025    EGFRIFNONA 89 04/20/2021     03/06/2025    K 4.5 03/06/2025     03/06/2025    CO2 24.0 03/06/2025    MG 2.0 06/05/2024    CALCIUM 10.2 03/06/2025    ALBUMIN 4.4 03/06/2025    AST 32 03/06/2025    ALT 41 (H) 03/06/2025    BILITOT 0.3 03/06/2025       RADIOLOGY:  Study Result    Narrative & Impression   DATE OF EXAM: 1/14/2025 8:39 AM EST     PROCEDURE: RAVINDRA TABARES  SCAN WITH PHARMACOLOGICAL INTERVENTION     INDICATIONS: R10.11     COMPARISON: Ultrasound 12/26/2024     TECHNIQUE: The patient received 7.60 mCi of technetium 99m Choletec intravenously and images were obtained of the abdomen in the anterior projection through 60 minutes. Patient was administered 2.1 mcg of Kinevac to assess gallbladder emptying. Counts   were obtained over the gallbladder to calculate the ejection fraction.     FINDINGS:  Normal uptake is noted within hepatocytes. There is clearance of the blood pool by 5 minutes. Uptake within the gallbladder, common duct and small bowel noted prior to administration of CCK. There appears to be reflux into the stomach. Gallbladder   ejection fraction is calculated at 34 minutes. This measures 24% and plateaus. Patient reports nausea and abdominal pain with injection of CCK.     IMPRESSION:     1. There is reflux of uptake into the stomach. Biliary scan is otherwise unremarkable in appearance.  2. Gallbladder ejection fraction is 25% which is abnormal, low. This can be seen with chronic inflammation and biliary dyskinesia         I reviewed the patient's new clinical results.    Cancer Staging (if applicable)  Cancer Patient: __ yes __no __unknown; If yes, clinical stage T:__ N:__M:__, stage group or __N/A      Impression: Adrenal nodule       Plan: ROBOTIC LEFT ADRENALECTOMY; LAPAROSCOPIC CHOLECYSTECTOMY WITH LIVER BIOPSY POSSIBLE OPEN       Anne OG Montez   3/13/2025   09:47 EDT    Chart reviewed.  Prior abdominal MR, CT imaging, ultrasound, and HIDA scan.  Preoperative blood work reviewed.  Adrenalectomy, Dr. Watkins.  Laparoscopic cholecystectomy with possible liver biopsy by me.    Electronically signed by aTe Brady MD at 03/13/25 1200       Current Facility-Administered Medications   Medication Dose Route Frequency Provider Last Rate Last Admin    acetaminophen (TYLENOL) tablet 650 mg  650 mg Oral Q4H PRN Adrián Watkins MD         Or    acetaminophen (TYLENOL) suppository 650 mg  650 mg Rectal Q4H PRN Adrián Watkins MD        albuterol sulfate HFA (PROVENTIL HFA;VENTOLIN HFA;PROAIR HFA) inhaler 2 puff  2 puff Inhalation Q4H PRN Adrián Watkins MD        docusate sodium (COLACE) capsule 100 mg  100 mg Oral BID PRN Adrián Watkins MD        famotidine (PEPCID) tablet 20 mg  20 mg Oral BID Adrián Watkins MD        fentaNYL citrate (PF) (SUBLIMAZE) injection 50 mcg  50 mcg Intravenous Q5 Min PRN Hayde Malin CRNA   50 mcg at 03/13/25 1534    hydrALAZINE (APRESOLINE) injection 5 mg  5 mg Intravenous Q10 Min PRN Hayde Malin CRNA        HYDROcodone-acetaminophen (NORCO) 5-325 MG per tablet 1 tablet  1 tablet Oral Once PRN Hayde Malin CRNA        HYDROmorphone (DILAUDID) injection 0.5 mg  0.5 mg Intravenous Q10 Min PRN Hayde Malin CRNA   0.5 mg at 03/13/25 1518    HYDROmorphone (DILAUDID) injection 0.5 mg  0.5 mg Intravenous Q2H PRN Adrián Watkins MD        And    naloxone (NARCAN) injection 0.1 mg  0.1 mg Intravenous Q5 Min PRN Adrián Watkins MD        ipratropium-albuterol (DUO-NEB) nebulizer solution 3 mL  3 mL Nebulization Once PRN Hayde Malin CRNA        labetalol (NORMODYNE,TRANDATE) injection 5 mg  5 mg Intravenous Q5 Min PRN Hayde Malin CRNA        lactated ringers bolus 250 mL  250 mL Intravenous Once PRN Hayde Malin CRNA        [START ON 3/14/2025] lactated ringers infusion  9 mL/hr Intravenous Continuous Tristian Sorenson MD 9 mL/hr at 03/13/25 1132 New Bag at 03/13/25 1303    lactated ringers infusion  9 mL/hr Intravenous Continuous Hayde Malin CRNA        metoclopramide (REGLAN) injection 10 mg  10 mg Intravenous Q6H PRN Adrián Watkins MD        [START ON 3/14/2025] metoprolol succinate XL (TOPROL-XL) 24 hr tablet 50 mg  50 mg Oral Daily Adrián Watkins MD        naloxone  (NARCAN) injection 0.4 mg  0.4 mg Intravenous PRN Hayde Malin CRNA        ondansetron (ZOFRAN) injection 4 mg  4 mg Intravenous Once PRN Hayde Malin CRNA        ondansetron ODT (ZOFRAN-ODT) disintegrating tablet 4 mg  4 mg Oral Q6H PRN Adrián Watkins MD        Or    ondansetron (ZOFRAN) injection 4 mg  4 mg Intravenous Q6H PRN Adrián Watkins MD        oxyCODONE-acetaminophen (PERCOCET) 5-325 MG per tablet 1 tablet  1 tablet Oral Q4H PRN Adrián Watkins MD        oxyCODONE-acetaminophen (PERCOCET) 7.5-325 MG per tablet 1 tablet  1 tablet Oral Q4H PRN Hayde Malin CRNA        phenazopyridine (PYRIDIUM) tablet 100 mg  100 mg Oral TID PRN Adrián Watkins MD        promethazine (PHENERGAN) suppository 25 mg  25 mg Rectal Once PRN Hayde Malin CRNA        Or    promethazine (PHENERGAN) tablet 25 mg  25 mg Oral Once PRN Hyade Malin CRNA        promethazine (PHENERGAN) tablet 12.5 mg  12.5 mg Oral Q6H PRN Adrián Watkins MD        sodium chloride 0.9 % flush 3 mL  3 mL Intravenous Q12H Hayde Malin CRNA        sodium chloride 0.9 % flush 3-10 mL  3-10 mL Intravenous PRN Hayde Malin CRNA        sodium chloride 0.9 % infusion 9 mL  9 mL Intravenous PRN Hayde Malin CRNA                Operative/Procedure Notes (all)        Adrián Watkins MD at 03/13/25 1155  Version 1 of 1         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    Procedure(s):      Procedure(s):  ROBOTIC LEFT ADRENALECTOMY                Surgeon(s):  Adrián Watkins MD Baehler, Richard David, MD Baehler, Richard David, MD Shirley, Lawrence A, MD    Anesthesia: General with Block    Staff:    Circulator: Vanessa Khan RN; Areli Persaud RN; Clive Castillo RN  Scrub Person: Saige Hernandez; Hayley Downey  Nursing Assistant: Alice Garg PCT; Stephanie Castillo PCT; Stephanie Heller PCT  Assistant: Avtar Oakes PA  Assistant: Avtar Oakes PA    Estimated Blood Loss: minimal for my portion    Urine Voided: * No values recorded between 3/13/2025 11:32 AM and 3/13/2025 12:37 PM *    Specimens:                Specimens       ID Source Type Tests Collected By Collected At Frozen?    A Gland, Adrenal Left Tissue TISSUE PATHOLOGY EXAM   Adrián Watkins MD 3/13/25 1224 No    Description: Left adrenal nodule    This specimen was not marked as sent.              Drains: * No LDAs found *    Findings: Nodule at superior pole of left adrenal gland removed completely grossly      Complications: None    Assistant: Avtar Oakes PA  was responsible for performing the following activities: Retraction, Suturing, Closing, Placing Dressing, and Held/Positioned Camera and their skilled assistance was necessary for the success of this case.    Adrián Watkins MD     Date: 3/13/2025  Time: 12:38 EDT          Electronically signed by Adrián Watkins MD at 03/13/25 1240       Adrián Watkins MD at 03/13/25 1155  Version 1 of 1         General Surgery Operative Note    ADRENALECTOMY LAPAROSCOPIC WITH DAVINCI ROBOT  Procedure Report    Patient Name:  Evelia Foster  YOB: 1980  [id number]     Date of Surgery:  3/13/2025       Pre-op Diagnosis: Left adrenal nodule    Post-op Diagnosis: Left adrenal nodule    Procedure(s):  ROBOTIC LEFT ADRENALECTOMY    Surgeon: Adrián Watkins MD    Assistant: Avtar Oakes PA     Anesthesia: General with Block         Estimated Blood Loss: minimal for my portion    Complications: None     Implants:    Nothing was implanted during the procedure    Specimen:          Specimens       ID Source Type Tests Collected By  Collected At Frozen?    A Gland, Adrenal Left Tissue TISSUE PATHOLOGY EXAM   Adrián Watkins MD 3/13/25 1224 No    Description: Left adrenal nodule    This specimen was not marked as sent.                Findings: Nodule at superior pole of left adrenal gland removed completely grossly    Indications: The patient is a 45-year-old female who was noted to have a left adrenal nodule which had increased in size on subsequent CT scans.  We discussed the risks, benefits, and alternatives to robotic left adrenalectomy and the patient was agreeable.  Informed consent was obtained.    Description of Procedure:     After obtaining informed consent, the patient was taken to the operating room and placed in supine position. After appropriate DVT and antibiotic prophylaxis, general anesthesia was induced.  A Membreno catheter was placed under sterile conditions.  Tap blocks were performed by anesthesia.  The patient was then placed in the right lateral decubitus position with appropriate padding in place including an axillary roll.  The abdomen was prepped and draped in standard sterile fashion.    An 8 mm skin incision was made at May's point in the left upper quadrant.  A 5 mm laparoscopic port was placed into the peritoneal cavity using the Optiview technique.  The peritoneal cavity was then insufflated with carbon oxide.  A laparoscope was placed through the port and the underlying viscera was inspected with no evidence of injury.  An 8 mm robotic port was then placed lateral to the initial port under direct vision and an additional 8 mm robotic port was placed medial to the initial port under direct vision.  A 12 mm laparoscopic port was placed medial and inferior to the initial port.  The initial port was then upsized to an 8 mm robotic port.  The robot was then docked at the patient's side.    The robotic camera was then placed in the middle port.  The robotic hook cautery and robotic fenestrated grasper were placed  in the medial and lateral ports respectively, each placed under direct vision.  The splenic flexure of the colon was then mobilized from surrounding attachments using hook cautery.  The lateral attachments of the spleen were then dissected using hook cautery to allow for medial rotation of the spleen.  The adrenal gland was noted deep to this.  The peritoneum was then incised over the adrenal gland.  The adrenal nodule was noted to be at the superior pole within attachment to the normal adrenal gland.  The soft tissue attachments around the nodule were taken down using hook cautery.  The attachments to the adrenal gland were taken down using the vessel sealer.  The specimen was placed in an Endo Catch bag for later retrieval.  The area of dissection was was inspected and was found to be hemostatic.  The robotic instruments were removed and the robot was undocked from the patient's side.  The specimen was then removed from the 12 mm port site and sent to pathology as a permanent specimen.  The 12 mm port was then removed and the fascial defect was reapproximated using an 0 Vicryl stitch and the Erlin-Richelle device.  The remaining ports were then removed under direct laparoscopic vision. The skin was reapproximated all sites using subcuticular 4-0 Monocryl.  Skin glue was placed over each site.    All sponge and needle counts were correct times two at the completion of my portion of the procedure.  Please see Dr. Brady's operative note for details on the remainder of the procedure.          Assistant: Avtar Oakes PA  was responsible for performing the following activities: Retraction, Suturing, Closing, Placing Dressing, and Held/Positioned Camera and their skilled assistance was necessary for the success of this case.    Adrián Watkins MD     Date: 3/13/2025  Time: 12:40 EDT        Electronically signed by Adrián Watkins MD at 03/13/25 6361       Tae Brady MD at 03/13/25 5088   Version 1 of 1         General Surgery Operative Note    Evelia Foster  [id number]  1980    Date of Surgery:  3/13/2025 13:58 EDT    Pre-op Diagnosis: Chronic cholecystitis, biliary dyskinesia             Hepatosteatosis    Post-op Diagnosis: Chronic cholecystitis, biliary dyskinesia               Hepatosteatosis    Procedure: Laparoscopic cholecystectomy           Liver biopsy    Surgeon: Tae Brady MD    Co-surgeon: Dr. Watkins - left adrenal nodule removal (dictated by him)    Anesthesia: General    Fluids: Total 1500 mL of crystalloid    Estimated Blood Loss: Less than 25 mL    Urine Voided: Not recorded     Specimens: Gallbladder            Segment 5 liver biopsy                Complications: None apparent    History: 45-year-old lady presented to office with right upper quadrant abdominal pain.  She had a HIDA scan demonstrating a decreased ejection fraction.  She presents today for cholecystectomy and liver biopsy.      I rehashed the risks and benefits of cholecystectomy and liver biopsy, including but not limited to: bleeding, infection, injury to adjacent viscera (small bowel, large bowel, duodenum, common bile duct, hepatic artery, the liver etc), no change in preoperative symptomology, biloma/bile leak, retained stones, need for ERCP, an open operation in general, chronic pain, and medical issues from a cardiopulmonary venous thrombosis standpoint.    Procedure:      After informed consent, the patient was taken to the operating room.  Dr. Watkins completed his portion of the procedure.  The patient was placed in the supine position on the operating table.  Perioperative antibiotics were given at the beginning of the procedure.  A timeout was performed.     The operation began with a supra umbilical Ruma trocar cutdown.  The skin was anesthetized and incised, the peritoneum entered sharply under direct visualization, bilateral 0 Vicryl fascial sutures were placed, and the  blunt-tipped Ruma trocar was placed intra-abdominally.  The abdomen was insufflated and the patient was placed in the head up position and rolled slightly onto the left hand side.  I then placed a subxiphoid port and two 5 mm trocars in the right upper quadrant under direct visualization.     The right and left lobes appeared grossly fatty infiltrated.  The fundus of the gallbladder was grasped and retracted cephalad up over the liver edge.  The gallbladder appeared chronically scarred, mildly so.  The neck of the gallbladder was then retracted inferior laterally.  The peritoneal attachments to the cystic duct and cystic artery were stripped down and a meticulous dissection demonstrated the critical view.  I then placed 3 clips proximally and one distally on the cystic duct, 2 clips proximally on the cystic artery and one distally. The laparoscopic scissors were then used to transect these structures.  There was a small posterior penetrating branch of the cystic artery that was doubly clipped and divided.  The gallbladder was then removed the gallbladder from the gallbladder bed.  I placed the gallbladder into an Endo Catch bag and withdrew this from the abdomen.  I reinspected my clips, they were without bile leak or bleeding and the gallbladder fossa was similar.  Along the border of segment 5 the laparoscopic scissors were used to take a liver biopsy.  This was passed off for permanent pathology.  The biopsy bed was fulgurated with the electrocautery Bovie.  Meticulous hemostasis was obtained.  I inspected my umbilical port site and this was without obvious complication.  All trocars were removed under direct visualization without bleeding.  The periumbilical fascial defect was closed with 0 Vicryl.  All skin incisions were closed with 4-0 Monocryl, Mastisol, Steri-Strips, Telfa, and Tegaderms.       The lap and needle count was reported as correct at the end of the procedure ×2. The patient was then  transferred to the recovery room in stable condition.     Tae Brady MD     Date: 3/13/2025  Time: 13:58 EDT     Electronically signed by Tae Brady MD at 03/13/25 7789

## 2025-03-13 NOTE — ANESTHESIA PREPROCEDURE EVALUATION
Anesthesia Evaluation     Patient summary reviewed and Nursing notes reviewed   NPO Solid Status: > 8 hours  NPO Liquid Status: > 8 hours           Airway   Mallampati: II  TM distance: >3 FB  Neck ROM: full  No difficulty expected  Dental      Pulmonary - normal exam   (+) asthma,sleep apnea  Cardiovascular - normal exam    (+) hypertension, hyperlipidemia      Neuro/Psych  (+) headaches, dizziness/light headedness, numbness, psychiatric history  GI/Hepatic/Renal/Endo    (+) morbid obesity    Musculoskeletal     (+) neck pain  Abdominal    Substance History      OB/GYN          Other   arthritis,                 Anesthesia Plan    ASA 2     general     intravenous induction     Anesthetic plan, risks, benefits, and alternatives have been provided, discussed and informed consent has been obtained with: patient.    Plan discussed with CRNA.    CODE STATUS:

## 2025-03-13 NOTE — ANESTHESIA PROCEDURE NOTES
Peripheral Block    Pre-sedation assessment completed: 3/13/2025 11:39 AM    Patient reassessed immediately prior to procedure    Patient location during procedure: OR  Start time: 3/13/2025 11:39 AM  Stop time: 3/13/2025 11:44 AM  Reason for block: at surgeon's request and post-op pain management  Performed by  CRNA/CAA: Akbar Camacho, ELVIRA  Preanesthetic Checklist  Completed: patient identified, IV checked, site marked, risks and benefits discussed, surgical consent, monitors and equipment checked, pre-op evaluation and timeout performed  Prep:  Pt Position: supine  Sterile barriers:cap, gloves, mask and washed/disinfected hands  Prep: ChloraPrep  Patient monitoring: blood pressure monitoring, continuous pulse oximetry and EKG  Procedure    Sedation: yes  Performed under: general  Guidance:ultrasound guided  Images:still images obtained, printed/placed on chart    Laterality:Bilateral  Block Type:TAP  Injection Technique:single-shot  Needle Type:short-bevel and echogenic  Needle Gauge:20 G  Resistance on Injection: none    Medications Used: dexamethasone sodium phosphate injection - Injection   4 mg - 3/13/2025 11:40:00 AM  bupivacaine PF (MARCAINE) 0.25 % injection - Injection   60 mL - 3/13/2025 11:44:00 AM      Medications  Comment:Block Injection:  LA dose divided between Right and Left block        Post Assessment  Injection Assessment: negative aspiration for heme, incremental injection and no paresthesia on injection  Patient Tolerance:comfortable throughout block  Complications:no  Additional Notes    Subcostal TAPs    A high-frequency linear transducer, with sterile cover, was placed sub-xiphoid to identify Linea Alba, right and left Rectus Abdominus Muscles (AUBREE). The transducer was moved either right or left subcostally to identify the AUBREE and the Transverse Abdominus Muscle (ORTIZ). The insertion site was prepped in sterile fashion and then localized with 2-5 ml of 1% Lidocaine. Using  "ultrasound-guidance, a 20-gauge B-Araya 4\" Ultraplex 360 non-stimulating echogenic needle was advanced in plane, from medial to lateral, until the tip of the needle was in the fascial plane between the AUBREE and ORTIZ. 1-3ml of preservative free normal saline was used to hydro-dissect the fascial planes. After the fascial plane was verified, the local anesthetic (LA) was injected. The procedure was repeated on the opposite side for bilateral coverage. Aspiration every 5 ml to prevent intravascular injection. Injection was completed with negative aspiration of blood and negative intravascular injection. Injection pressures were normal with minimal resistance. The subcostal approach to the TAP nerve block ideally anesthetizes the intercostal nerves T6-T9.     Mid-Axillary/Lateral TAPs    A high-frequency linear transducer, with sterile cover, was placed in the midaxillary line between the ASIS and costal margin. The External Oblique Muscle (EOM), Internal Oblique Muscle (IOM), Transverse Abdominus Muscle (ORTIZ), and Peritoneum were identified. The insertion site was prepped in sterile fashion and then localized with 2-5 ml of 1% Lidocaine. Using ultrasound-guidance, a 20-gauge B-Araya 4\" Ultraplex 360 non-stimulating echogenic needle was advanced in plane, from medial to lateral, until the tip of the needle was in the fascial plane between the IOM and ORTIZ. 1-3ml of preservative free normal saline was used to hydro-dissect the fascial planes. After the fascial plane was verified, the local anesthetic (LA) was injected. The procedure was repeated on the opposite side for bilateral coverage. Aspiration every 5 ml to prevent intravascular injection. Injection was completed with negative aspiration of blood and negative intravascular injection. Injection pressures were normal with minimal resistance. Midaxillary TAPs should reach intercostal nerves T10- T11 and the subcostal nerve T12.    Performed by: Hayde Malin " Cint[place], CRNA

## 2025-03-14 ENCOUNTER — READMISSION MANAGEMENT (OUTPATIENT)
Dept: CALL CENTER | Facility: HOSPITAL | Age: 45
End: 2025-03-14
Payer: COMMERCIAL

## 2025-03-14 VITALS
HEART RATE: 87 BPM | HEIGHT: 65 IN | DIASTOLIC BLOOD PRESSURE: 68 MMHG | OXYGEN SATURATION: 91 % | TEMPERATURE: 98.4 F | SYSTOLIC BLOOD PRESSURE: 108 MMHG | WEIGHT: 232.59 LBS | BODY MASS INDEX: 38.75 KG/M2 | RESPIRATION RATE: 18 BRPM

## 2025-03-14 LAB
ANION GAP SERPL CALCULATED.3IONS-SCNC: 10 MMOL/L (ref 5–15)
BASOPHILS # BLD AUTO: 0.02 10*3/MM3 (ref 0–0.2)
BASOPHILS NFR BLD AUTO: 0.1 % (ref 0–1.5)
BUN SERPL-MCNC: 7 MG/DL (ref 6–20)
BUN/CREAT SERPL: 10 (ref 7–25)
CALCIUM SPEC-SCNC: 9 MG/DL (ref 8.6–10.5)
CHLORIDE SERPL-SCNC: 102 MMOL/L (ref 98–107)
CO2 SERPL-SCNC: 24 MMOL/L (ref 22–29)
CREAT SERPL-MCNC: 0.7 MG/DL (ref 0.57–1)
DEPRECATED RDW RBC AUTO: 48.3 FL (ref 37–54)
EGFRCR SERPLBLD CKD-EPI 2021: 110.2 ML/MIN/1.73
EOSINOPHIL # BLD AUTO: 0.02 10*3/MM3 (ref 0–0.4)
EOSINOPHIL NFR BLD AUTO: 0.1 % (ref 0.3–6.2)
ERYTHROCYTE [DISTWIDTH] IN BLOOD BY AUTOMATED COUNT: 13.2 % (ref 12.3–15.4)
GLUCOSE SERPL-MCNC: 95 MG/DL (ref 65–99)
HCT VFR BLD AUTO: 41.5 % (ref 34–46.6)
HGB BLD-MCNC: 13.6 G/DL (ref 12–15.9)
IMM GRANULOCYTES # BLD AUTO: 0.09 10*3/MM3 (ref 0–0.05)
IMM GRANULOCYTES NFR BLD AUTO: 0.6 % (ref 0–0.5)
LYMPHOCYTES # BLD AUTO: 2.19 10*3/MM3 (ref 0.7–3.1)
LYMPHOCYTES NFR BLD AUTO: 13.7 % (ref 19.6–45.3)
MCH RBC QN AUTO: 32.5 PG (ref 26.6–33)
MCHC RBC AUTO-ENTMCNC: 32.8 G/DL (ref 31.5–35.7)
MCV RBC AUTO: 99.3 FL (ref 79–97)
MONOCYTES # BLD AUTO: 1 10*3/MM3 (ref 0.1–0.9)
MONOCYTES NFR BLD AUTO: 6.3 % (ref 5–12)
NEUTROPHILS NFR BLD AUTO: 12.66 10*3/MM3 (ref 1.7–7)
NEUTROPHILS NFR BLD AUTO: 79.2 % (ref 42.7–76)
NRBC BLD AUTO-RTO: 0 /100 WBC (ref 0–0.2)
PLATELET # BLD AUTO: 283 10*3/MM3 (ref 140–450)
PMV BLD AUTO: 9.6 FL (ref 6–12)
POTASSIUM SERPL-SCNC: 4.4 MMOL/L (ref 3.5–5.2)
RBC # BLD AUTO: 4.18 10*6/MM3 (ref 3.77–5.28)
SODIUM SERPL-SCNC: 136 MMOL/L (ref 136–145)
WBC NRBC COR # BLD AUTO: 15.98 10*3/MM3 (ref 3.4–10.8)

## 2025-03-14 PROCEDURE — 85025 COMPLETE CBC W/AUTO DIFF WBC: CPT | Performed by: SURGERY

## 2025-03-14 PROCEDURE — 25010000002 HYDROMORPHONE 1 MG/ML SOLUTION: Performed by: SURGERY

## 2025-03-14 PROCEDURE — 80048 BASIC METABOLIC PNL TOTAL CA: CPT | Performed by: SURGERY

## 2025-03-14 RX ORDER — PSEUDOEPHEDRINE HCL 30 MG
100 TABLET ORAL 2 TIMES DAILY PRN
Qty: 30 CAPSULE | Refills: 0 | Status: SHIPPED | OUTPATIENT
Start: 2025-03-14

## 2025-03-14 RX ORDER — SIMETHICONE 80 MG
80 TABLET,CHEWABLE ORAL 4 TIMES DAILY PRN
Status: DISCONTINUED | OUTPATIENT
Start: 2025-03-14 | End: 2025-03-14 | Stop reason: HOSPADM

## 2025-03-14 RX ORDER — SIMETHICONE 80 MG
80 TABLET,CHEWABLE ORAL 4 TIMES DAILY PRN
Qty: 30 TABLET | Refills: 0 | Status: SHIPPED | OUTPATIENT
Start: 2025-03-14

## 2025-03-14 RX ORDER — HYDROCODONE BITARTRATE AND ACETAMINOPHEN 5; 325 MG/1; MG/1
1 TABLET ORAL EVERY 4 HOURS PRN
Qty: 10 TABLET | Refills: 0 | Status: SHIPPED | OUTPATIENT
Start: 2025-03-14 | End: 2025-03-19

## 2025-03-14 RX ORDER — HYDROCODONE BITARTRATE AND ACETAMINOPHEN 5; 325 MG/1; MG/1
1 TABLET ORAL EVERY 4 HOURS PRN
Refills: 0 | Status: DISCONTINUED | OUTPATIENT
Start: 2025-03-14 | End: 2025-03-14 | Stop reason: HOSPADM

## 2025-03-14 RX ADMIN — HYDROCODONE BITARTRATE AND ACETAMINOPHEN 1 TABLET: 5; 325 TABLET ORAL at 17:54

## 2025-03-14 RX ADMIN — SIMETHICONE 80 MG: 80 TABLET, CHEWABLE ORAL at 14:09

## 2025-03-14 RX ADMIN — SIMETHICONE 80 MG: 80 TABLET, CHEWABLE ORAL at 17:54

## 2025-03-14 RX ADMIN — FAMOTIDINE 20 MG: 20 TABLET, FILM COATED ORAL at 08:00

## 2025-03-14 RX ADMIN — HYDROMORPHONE HYDROCHLORIDE 0.5 MG: 1 INJECTION, SOLUTION INTRAMUSCULAR; INTRAVENOUS; SUBCUTANEOUS at 10:10

## 2025-03-14 RX ADMIN — HYDROMORPHONE HYDROCHLORIDE 0.5 MG: 1 INJECTION, SOLUTION INTRAMUSCULAR; INTRAVENOUS; SUBCUTANEOUS at 04:48

## 2025-03-14 RX ADMIN — HYDROCODONE BITARTRATE AND ACETAMINOPHEN 1 TABLET: 5; 325 TABLET ORAL at 14:09

## 2025-03-14 RX ADMIN — DOCUSATE SODIUM 100 MG: 100 CAPSULE, LIQUID FILLED ORAL at 10:15

## 2025-03-14 RX ADMIN — HYDROMORPHONE HYDROCHLORIDE 0.5 MG: 1 INJECTION, SOLUTION INTRAMUSCULAR; INTRAVENOUS; SUBCUTANEOUS at 08:00

## 2025-03-14 NOTE — PROGRESS NOTES
"General Surgery Daily Progress Note    Subjective:  Up ambulating in the room gingerly.  Denies any nausea or vomiting.  No flatus yet.  Complains of abdominal soreness.    Objective:  /77 (BP Location: Right arm)   Pulse 87   Temp 98.4 °F (36.9 °C) (Axillary)   Resp 18   Ht 165.1 cm (65\")   Wt 105 kg (232 lb 9.4 oz)   LMP 12/18/2018 Comment: last mammogram 2020  SpO2 91%   BMI 38.70 kg/m²     General Appearance: Mild distress  Eyes: Anicteric  Neck: Trachea midline   Cardiovascular:  RRR without murmur nor rub  Lungs:  Bilateral respirations unlabored   Abdomen:  Soft, appropriately tender, incisions dressed.  Extremities:  No cyanosis or edema   Skin:  No obvious rashes   Neurologic: awake and conversant     CBC:  Results from last 7 days   Lab Units 03/14/25  0416   WBC 10*3/mm3 15.98*   HEMOGLOBIN g/dL 13.6   HEMATOCRIT % 41.5   PLATELETS 10*3/mm3 283       CMP:  Results from last 7 days   Lab Units 03/14/25  0416   SODIUM mmol/L 136   POTASSIUM mmol/L 4.4   CHLORIDE mmol/L 102   CO2 mmol/L 24.0   BUN mg/dL 7   CREATININE mg/dL 0.70   CALCIUM mg/dL 9.0   GLUCOSE mg/dL 95     Status post robotic left partial adrenalectomy and laparoscopic cholecystectomy.  Advance diet as tolerated.  Mobilize.    If continues to progress over the day agree with discharge.    Tae Brady MD - 3/14/2025, 12:46 EDT         "

## 2025-03-14 NOTE — CASE MANAGEMENT/SOCIAL WORK
Discharge Planning Assessment  [facility]     Patient Name: Evelia Foster  MRN: [id number]  Today's Date: 3/14/2025    Admit Date: 3/13/2025    Plan: Home with significant other   Discharge Needs Assessment       Row Name 03/14/25 1005       Living Environment    People in Home significant other    Name(s) of People in Home Significant OtherTres [phone]    Current Living Arrangements home    Potentially Unsafe Housing Conditions none    In the past 12 months has the electric, gas, oil, or water company threatened to shut off services in your home? No    Primary Care Provided by self    Provides Primary Care For no one    Family Caregiver if Needed child(lis), adult;significant other    Quality of Family Relationships unable to assess    Able to Return to Prior Arrangements yes       Resource/Environmental Concerns    Resource/Environmental Concerns none    Transportation Concerns none       Transportation Needs    In the past 12 months, has lack of transportation kept you from medical appointments or from getting medications? no    In the past 12 months, has lack of transportation kept you from meetings, work, or from getting things needed for daily living? No       Food Insecurity    Within the past 12 months, you worried that your food would run out before you got the money to buy more. Never true    Within the past 12 months, the food you bought just didn't last and you didn't have money to get more. Never true       Transition Planning    Patient/Family Anticipates Transition to home    Patient/Family Anticipated Services at Transition     Transportation Anticipated family or friend will provide       Discharge Needs Assessment    Readmission Within the Last 30 Days no previous admission in last 30 days    Equipment Currently Used at Home cpap    Concerns to be Addressed denies needs/concerns at this time    Do you want help finding or keeping work or a job? I do not need or want help     Do you want help with school or training? For example, starting or completing job training or getting a high school diploma, GED or equivalent No                   Discharge Plan       Row Name 03/14/25 1007       Plan    Plan Home with significant other    Patient/Family in Agreement with Plan yes    Plan Comments Spoke to patient at bedside to initiate discharge planning. Patient lives at home with her significant other, [place] in [facility]. Prior to admission, she was independent with ADL's and uses a CPAP. She does not use any DME for mobility or home oxygen and is not current with home health or outpatient therapy services. PCP is Keanu Green. Verified [employer] insurance. Her plan is home with her significant other. Daughter will transport. CM will continue to follow for and discharge needs.    Final Discharge Disposition Code 01 - home or self-care                    Expected Discharge Date and Time       Expected Discharge Date Expected Discharge Time    Mar 15, 2025            Demographic Summary       Row Name 03/14/25 1005       General Information    Admission Type inpatient    Arrived From emergency department    Referral Source admission list    Reason for Consult discharge planning    Preferred Language English                   Functional Status       Row Name 03/14/25 1005       Functional Status    Usual Activity Tolerance good    Current Activity Tolerance good       Physical Activity    On average, how many days per week do you engage in moderate to strenuous exercise (like a brisk walk)? 5 days    On average, how many minutes do you engage in exercise at this level? 30 min    Number of minutes of exercise per week 150       Functional Status, IADL    Medications independent    Meal Preparation independent    Housekeeping independent    Laundry independent    Shopping independent    If for any reason you need help with day-to-day activities such as bathing, preparing meals, shopping,  managing finances, etc., do you get the help you need? I get all the help I need                   Psychosocial    No documentation.                  Abuse/Neglect    No documentation.                  Legal    No documentation.                  Substance Abuse    No documentation.                  Patient Forms    No documentation.                     Senait Kern RN

## 2025-03-14 NOTE — DISCHARGE SUMMARY
Patient Name:  Evelia Foster  YOB: 1980  [id number]      Date of Discharge: 3/14/2025    Discharge Diagnosis:   Left adrenal mass, cholelithiasis    Problem List:  Active Hospital Problems    Diagnosis  POA    **Left adrenal mass [E27.8]  Yes      Resolved Hospital Problems   No resolved problems to display.   Cholelithiasis    Presenting Problem/History of Present Illness  Left adrenal mass [E27.8]      Hospital Course  Patient is a 45 y.o. female who presented with cholelithiasis and a left adrenal nodule.  On 3/13/2025 I performed a robotic left adrenalectomy and Dr. Brady performed a laparoscopic cholecystectomy. On postoperative day one, pain was well-controlled with oral pain medications. No nausea/vomiting.  No fevers/chills. Voiding urine spontaneously. Ambulating appropriately. The patient was thus found to be safe for discharge to home.        Procedures Performed    Procedure(s):  ROBOTIC LEFT PARTIAL ADRENALECTOMY  LAPAROSCOPIC CHOLECYSTECTOMY WITH LIVER BIOPSY  -------------------       Consults:   Consults       No orders found for last 30 day(s).            Pertinent Test Results: N/A    Condition on Discharge:  Pain controlled with oral pain medication, tolerating diet, ambulating appropriately      Vital Signs  Temp:  [98.1 °F (36.7 °C)-98.4 °F (36.9 °C)] 98.4 °F (36.9 °C)  Heart Rate:  [83-97] 87  Resp:  [18] 18  BP: (108-123)/(64-77) 108/68    Discharge Disposition  Home or Self Care    Discharge Medications     Discharge Medications        New Medications        Instructions Start Date   docusate sodium 100 MG capsule   100 mg, Oral, 2 Times Daily PRN      HYDROcodone-acetaminophen 5-325 MG per tablet  Commonly known as: NORCO   1 tablet, Oral, Every 4 Hours PRN      simethicone 80 MG chewable tablet  Commonly known as: MYLICON   80 mg, Oral, 4 Times Daily PRN             Continue These Medications        Instructions Start Date   albuterol sulfate  (90 Base) MCG/ACT  inhaler  Commonly known as: PROVENTIL HFA;VENTOLIN HFA;PROAIR HFA   2 puffs, Inhalation, Every 4 Hours PRN      cefdinir 300 MG capsule  Commonly known as: OMNICEF   300 mg, Oral, 2 Times Daily      cetirizine 10 MG tablet  Commonly known as: zyrTEC   10 mg, Oral, Daily      gabapentin 300 MG capsule  Commonly known as: NEURONTIN   300 mg, Oral, 3 Times Daily, Take one capsule at bedtime for 3 days. If not overly sedating, take one at bedtime and one mid-day for 3 days. If this is well tolerated, take three times daily      ibuprofen 400 MG tablet  Commonly known as: ADVIL,MOTRIN   400 mg, Every 6 Hours PRN      metoprolol succinate XL 50 MG 24 hr tablet  Commonly known as: TOPROL-XL   50 mg, Oral, 2 Times Daily      ondansetron 4 MG tablet  Commonly known as: ZOFRAN       phenazopyridine 100 MG tablet  Commonly known as: Pyridium   100 mg, Oral, 3 Times Daily PRN               Discharge Diet       Activity at Discharge  Activity Instructions       Discharge Activity      1) No driving until no longer taking narcotics.   2) Return to school / work in 2 weeks.  3) May shower.  No tub baths.  No swimming.  4) Do not lift / push / pull more than 15 lbs.            Follow-up Appointments  Future Appointments   Date Time Provider Department Center   4/23/2025  2:30 PM Lucy Donahue PA-C [facility]     Additional Instructions for the Follow-ups that You Need to Schedule       Discharge Follow-up with Specified Provider: Dr. Watkins; 2 Weeks   As directed      To: Dr. Watkins   Follow Up: 2 Weeks   Follow Up Details: Call [phone] to make an appointment        Notify Physician or Go To The ED For the Following Conditions   As directed      Fever >100.4, increased abdominal pain, persistent nausea/vomiting, new redness/drainage from incisions    Order Comments: Fever >100.4, increased abdominal pain, persistent nausea/vomiting, new redness/drainage from incisions                 Test Results Pending at  Discharge  Pending Labs       Order Current Status    Tissue Pathology Exam In process            Time: Discharge 15 min    Adrián Watkins MD   03/14/25   17:59 EDT

## 2025-03-14 NOTE — PROGRESS NOTES
"Patient Name:  Evelia Foster  YOB: 1980  [id number]    Surgery Progress Note    Date of visit: 3/14/2025    Subjective   Incisional abdominal pain.  No nausea or vomiting.  No fevers or chills.         Objective       /66 (BP Location: Right arm, Patient Position: Lying)   Pulse 87   Temp 98.4 °F (36.9 °C) (Axillary)   Resp 18   Ht 165.1 cm (65\")   Wt 105 kg (232 lb 9.4 oz)   LMP 12/18/2018 Comment: last mammogram 2020  SpO2 91%   BMI 38.70 kg/m²     Intake/Output Summary (Last 24 hours) at 3/14/2025 0824  Last data filed at 3/14/2025 0800  Gross per 24 hour   Intake 1740 ml   Output 200 ml   Net 1540 ml       CV:  Rhythm regular and rate regular  L:  Clear to auscultation bilaterally  Abd:  Bowel sounds positive, soft, appropriately tender, dressings clean dry and intact  Ext:  No cyanosis, clubbing, edema    Recent labs and imaging that are back at this time have been reviewed.   Creatinine 0.7  WBC 16  Hemoglobin 13.6       Assessment & Plan     Patient postop day 1 from robotic left partial adrenalectomy and laparoscopic cholecystectomy.  Pain control.  Advance diet as tolerated.  Out of bed, incentive spirometer, DVT prophylaxis.  Okay for DC planning later today if pain well-controlled with oral pain medications.        Adrián Watkins MD  3/14/2025  08:24 EDT      "

## 2025-03-14 NOTE — OUTREACH NOTE
Prep Survey      Flowsheet Row Responses   [facility] patient discharged from? [place]   Is LACE score < 7 ? No   Eligibility [facility]   Date of Admission 03/13/25   Date of Discharge 03/14/25   Discharge Disposition Home or Self Care   Discharge diagnosis Left adrenal mass-Robotic left partial adrenalectomy lap cholecystectomy   Does the patient have one of the following disease processes/diagnoses(primary or secondary)? General Surgery   Does the patient have Home health ordered? No   Is there a DME ordered? No   Prep survey completed? Yes            BUD A - Registered Nurse

## 2025-03-14 NOTE — CONSULTS
I visited this patient to assist in advance care planning. I provided the patient a copy of our living will and healthcare surrogate forms. She would like to review the documents before completing. I offered her several options for completing the documents when she is ready.

## 2025-03-17 ENCOUNTER — TRANSITIONAL CARE MANAGEMENT TELEPHONE ENCOUNTER (OUTPATIENT)
Dept: CALL CENTER | Facility: HOSPITAL | Age: 45
End: 2025-03-17
Payer: COMMERCIAL

## 2025-03-17 LAB
CYTO UR: NORMAL
LAB AP CASE REPORT: NORMAL
LAB AP CLINICAL INFORMATION: NORMAL
LAB AP DIAGNOSIS COMMENT: NORMAL
PATH REPORT.FINAL DX SPEC: NORMAL
PATH REPORT.GROSS SPEC: NORMAL

## 2025-03-17 NOTE — OUTREACH NOTE
Call Center TCM Note      Flowsheet Row Responses   [facility] patient discharged from? [place]   Does the patient have one of the following disease processes/diagnoses(primary or secondary)? General Surgery   TCM attempt successful? Yes   Call start time 0949   Call end time 0955   Discharge diagnosis Left adrenal mass-Robotic left partial adrenalectomy lap cholecystectomy   Meds reviewed with patient/caregiver? Yes   Is the patient having any side effects they believe may be caused by any medication additions or changes? No   Does the patient have all medications related to this admission filled (includes all antibiotics, pain medications, etc.) Yes   Is the patient taking all medications as directed (includes completed medication regime)? Yes   Comments Pt prefers to FU with her surgeon's office only   Does the patient have an appointment with their PCP within 7-14 days of discharge? No   Nursing Interventions Patient desires to follow up with specialty only   Has home health visited the patient within 72 hours of discharge? N/A   Psychosocial issues? No   Did the patient receive a copy of their discharge instructions? Yes   Nursing interventions Reviewed instructions with patient   What is the patient's perception of their health status since discharge? Improving  [pain per pt]   Nursing interventions Nurse provided patient education   Is the patient /caregiver able to teach back basic post-op care? Continue use of incentive spirometry at least 1 week post discharge, Drive as instructed by MD in discharge instructions, Take showers only when approved by MD-sponge bathe until then, No tub bath, swimming, or hot tub until instructed by MD, Keep incision areas clean,dry and protected, Lifting as instructed by MD in discharge instructions   Is the patient/caregiver able to teach back signs and symptoms of incisional infection? Increased redness, swelling or pain at the incisonal site, Increased drainage or  bleeding, Pus or odor from incision, Incisional warmth, Fever   Is the patient/caregiver able to teach back steps to recovery at home? Set small, achievable goals for return to baseline health, Rest and rebuild strength, gradually increase activity, Practice good oral hygiene, Eat a well-balance diet, Make a list of questions for surgeon's appointment   If the patient is a current smoker, are they able to teach back resources for cessation? [id number]   Is the patient/caregiver able to teach back the hierarchy of who to call/visit for symptoms/problems? PCP, Specialist, Home health nurse, Urgent Care, ED, 911 Yes   TCM call completed? Yes   Call end time [phone]            Marichuy Alvarez, RN    3/17/2025, 09:55 EDT

## 2025-03-26 ENCOUNTER — READMISSION MANAGEMENT (OUTPATIENT)
Dept: CALL CENTER | Facility: HOSPITAL | Age: 45
End: 2025-03-26
Payer: COMMERCIAL

## 2025-03-26 NOTE — OUTREACH NOTE
General Surgery Week 2 Survey      Flowsheet Row Responses   [facility] patient discharged from? [place]   Does the patient have one of the following disease processes/diagnoses(primary or secondary)? General Surgery   Week 2 attempt successful? Yes   Call start time 1420   Call end time 1422   Discharge diagnosis Left adrenal mass-Robotic left partial adrenalectomy lap cholecystectomy   Person spoke with today (if not patient) and relationship patient   Meds reviewed with patient/caregiver? Yes   Does the patient have all medications related to this admission filled (includes all antibiotics, pain medications, etc.) Yes   Is the patient taking all medications as directed (includes completed medication regime)? Yes   Does the patient have a follow up appointment scheduled with their surgeon? Yes   Has the patient kept scheduled appointments due by today? N/A   Comments Patient will see surgeon on 03/31   Psychosocial issues? No   Did the patient receive a copy of their discharge instructions? Yes   Nursing interventions Reviewed instructions with patient   What is the patient's perception of their health status since discharge? Improving   Is the patient/caregiver able to teach back signs and symptoms of incisional infection? Increased redness, swelling or pain at the incisonal site, Increased drainage or bleeding   Is the patient/caregiver able to teach back steps to recovery at home? Set small, achievable goals for return to baseline health, Rest and rebuild strength, gradually increase activity   Is the patient/caregiver able to teach back the hierarchy of who to call/visit for symptoms/problems? PCP, Specialist, Home health nurse, Urgent Care, ED, 911 Yes   Week 2 call completed? Yes   Graduated Yes   Is the patient interested in additional calls from an ambulatory ? No   Would this patient benefit from a Referral to Amb Social Work? No   Wrap up additional comments Doing well.  She has appt  with surgeon coming up.   Call end time [phone]            BUD ZIMMERMAN - Registered Nurse

## 2025-03-27 ENCOUNTER — PATIENT MESSAGE (OUTPATIENT)
Dept: FAMILY MEDICINE CLINIC | Facility: CLINIC | Age: 45
End: 2025-03-27
Payer: COMMERCIAL

## 2025-03-31 DIAGNOSIS — M54.50 CHRONIC LEFT-SIDED LOW BACK PAIN WITHOUT SCIATICA: ICD-10-CM

## 2025-03-31 DIAGNOSIS — G89.29 CHRONIC LEFT-SIDED LOW BACK PAIN WITHOUT SCIATICA: ICD-10-CM

## 2025-03-31 RX ORDER — GABAPENTIN 300 MG/1
300 CAPSULE ORAL 3 TIMES DAILY
Qty: 60 CAPSULE | Refills: 0 | Status: SHIPPED | OUTPATIENT
Start: 2025-03-31

## 2025-03-31 NOTE — TELEPHONE ENCOUNTER
Provider:  Rowan  Surgery/Procedure:  ACDF C4-5  Surgery/Procedure Date:  5/15/24  Last visit:   1/31/25  Next visit: 4/23/25     Reason for call:  Refill request for gabapentin pending.     Damián:    2 03/14/2025 [id number] Hydrocodone Bitartrate/Ac   325MG/5MG  Adrián Watkins [facility]  10.00 2 25 04 [facility] 03/14/2025 [facility]  2 03/06/2025 [id number] Gabapentin 300MG India Mahoney [facility]  60.00 20 04 [place]  [facility] 03/06/2025 [facility]  1 05/15/2024 [id number] Oxycodone/Acetaminophen   325MG/5MG  Kalyan Vasquez [facility]  15.00 5 23 04 [facility] 05/15/2024 [facility]

## 2025-04-23 ENCOUNTER — OFFICE VISIT (OUTPATIENT)
Dept: NEUROSURGERY | Facility: CLINIC | Age: 45
End: 2025-04-23
Payer: COMMERCIAL

## 2025-04-23 VITALS — HEIGHT: 65 IN | TEMPERATURE: 97.7 F | WEIGHT: 227.5 LBS | BODY MASS INDEX: 37.9 KG/M2

## 2025-04-23 DIAGNOSIS — M54.9 MECHANICAL BACK PAIN: ICD-10-CM

## 2025-04-23 DIAGNOSIS — M54.16 LUMBAR RADICULOPATHY: Primary | ICD-10-CM

## 2025-04-23 DIAGNOSIS — M47.12 CERVICAL SPONDYLOSIS WITH MYELOPATHY: ICD-10-CM

## 2025-04-23 DIAGNOSIS — G89.29 CHRONIC LEFT-SIDED LOW BACK PAIN WITHOUT SCIATICA: ICD-10-CM

## 2025-04-23 DIAGNOSIS — M54.50 LOW BACK PAIN, UNSPECIFIED BACK PAIN LATERALITY, UNSPECIFIED CHRONICITY, UNSPECIFIED WHETHER SCIATICA PRESENT: ICD-10-CM

## 2025-04-23 DIAGNOSIS — M54.50 CHRONIC LEFT-SIDED LOW BACK PAIN WITHOUT SCIATICA: ICD-10-CM

## 2025-04-23 PROCEDURE — 99213 OFFICE O/P EST LOW 20 MIN: CPT | Performed by: PHYSICIAN ASSISTANT

## 2025-04-23 RX ORDER — GABAPENTIN 300 MG/1
300 CAPSULE ORAL 3 TIMES DAILY
Qty: 90 CAPSULE | Refills: 2 | Status: SHIPPED | OUTPATIENT
Start: 2025-04-23

## 2025-04-23 NOTE — PROGRESS NOTES
Patient: Evelia Foster  : 1980  Chart #: [id number]    Date of Service: 25    CHIEF COMPLAINT: Cervical spondylosis and disc herniation with myelopathy    History of Present Illness Patient is a 45-year-old woman seen in follow-up.  She has a history of cervical myelopathy for which she underwent ACDF C5-6 in .  She has had ongoing gait instability and some numbness in her hands.  Symptoms progressed recently and she was found to have disc protrusion with cord compromise at C4-5.  As such on 5/15/2024 she underwent ACDF at this level.  Postoperatively she has done excellent.  She has bit of neck stiffness but otherwise no complaints.  Her main concern today is ongoing low back pain that radiates down the right leg to the knee.  She is taking gabapentin and that has helped with her pain. She is not particularly interested in injections.     Past Medical History:   Diagnosis Date    Adrenal mass     LEFT    Anemia     Anxiety     Asthma     mild, rescue inhaler prn     Bladder infection     Cancer     uterine cancer, skin cancer    Degenerative disc disease, lumbar     Dizzy     Full dentures     Headache     History of cervical dysplasia     s/p hsyterectomy    HPV (human papilloma virus) infection     positive 18    Hyperlipidemia     Hypertension     Insomnia     Low back pain     Neck pain     Palpitations     Pneumonia     PTSD (post-traumatic stress disorder)     Restless leg syndrome     Sepsis     Sleep apnea     CPAP nightly    Spinal headache     SVT (supraventricular tachycardia)     Uses contact lenses     Well adult exam 2022         Current Outpatient Medications:     albuterol sulfate  (90 Base) MCG/ACT inhaler, Inhale 2 puffs Every 4 (Four) Hours As Needed for Wheezing., Disp: 18 g, Rfl: 3    cetirizine (zyrTEC) 10 MG tablet, Take 1 tablet by mouth Daily. (Patient taking differently: Take 1 tablet by mouth Daily As Needed for Allergies.), Disp: 90 tablet, Rfl: 3     gabapentin (NEURONTIN) 300 MG capsule, Take 1 capsule by mouth 3 (Three) Times a Day., Disp: 90 capsule, Rfl: 2    ibuprofen (ADVIL,MOTRIN) 400 MG tablet, Take 1 tablet by mouth Every 6 (Six) Hours As Needed for Mild Pain., Disp: , Rfl:     ondansetron (ZOFRAN) 4 MG tablet, , Disp: , Rfl:     phenazopyridine (Pyridium) 100 MG tablet, Take 1 tablet by mouth 3 (Three) Times a Day As Needed for Bladder Spasms., Disp: 15 tablet, Rfl: 0    simethicone (MYLICON) 80 MG chewable tablet, Chew 1 tablet 4 (Four) Times a Day As Needed for Flatulence., Disp: 30 tablet, Rfl: 0    Past Surgical History:   Procedure Laterality Date    ADRENALECTOMY Left 3/13/2025    Procedure: ROBOTIC LEFT PARTIAL ADRENALECTOMY;  Surgeon: Adrián Watkins MD;  Location:  [facility];  Service: Robotics - [facility];  Laterality: Left;    ANTERIOR CERVICAL DISCECTOMY W/ FUSION N/A 01/14/2021    Procedure: CERVICAL DISCECTOMY ANTERIOR WITH FUSION C5-6;  Surgeon: Kalyan Vasquez MD;  Location:  [facility];  Service: Neurosurgery;  Laterality: N/A;    ANTERIOR CERVICAL DISCECTOMY W/ FUSION N/A 5/15/2024    Procedure: CERVICAL DISCECTOMY ANTERIOR WITH FUSION C4-5;  Surgeon: Kalyan Vasquez MD;  Location:  [facility];  Service: Neurosurgery;  Laterality: N/A;    CHOLECYSTECTOMY N/A 3/13/2025    Procedure: LAPAROSCOPIC CHOLECYSTECTOMY WITH LIVER BIOPSY;  Surgeon: Tae Brady MD;  Location:  [facility];  Service: General;  Laterality: N/A;    COLONOSCOPY      CYSTOSCOPY W/ BULKING AGENT INJECTION N/A 03/01/2023    Procedure: CYSTOSCOPY WITH BULKING AGENT INJECTION;  Surgeon: Nubia Ross MD;  Location:  [facility];  Service: Urology;  Laterality: N/A;    ENDOSCOPY      FINGER/THUMB LESION/CYST EXCISION Left 09/26/2023    Thumb    SKIN CANCER EXCISION      TONSILLECTOMY AND ADENOIDECTOMY      1989    TOTAL LAPAROSCOPIC HYSTERECTOMY N/A 01/09/2019    Procedure: TOTAL LAPAROSCOPIC HYSTERECTOMY, BILATERAL SALPINGECTOMY WITH DAVINCI ROBOT;  Surgeon:  "Markie Lewis MD;  Location: [facility];  Service: [facility]    TUBAL ABDOMINAL LIGATION         Social History     Socioeconomic History    Marital status: Significant Other   Tobacco Use    Smoking status: Every Day     Current packs/day: 0.50     Average packs/day: 0.5 packs/day for 27.3 years (13.7 ttl pk-yrs)     Types: Cigarettes     Start date: 1998     Passive exposure: Current    Smokeless tobacco: Never    Tobacco comments:     tried w preg   Vaping Use    Vaping status: Every Day    Substances: Nicotine    Devices: Disposable   Substance and Sexual Activity    Alcohol use: Not Currently     Comment: occasional    Drug use: No    Sexual activity: Defer     Partners: Male     Birth control/protection: Surgical         Review of Systems    Objective   Vital Signs: Temperature 97.7 °F (36.5 °C), temperature source Infrared, height 165.1 cm (65\"), weight 103 kg (227 lb 8 oz), last menstrual period 12/18/2018, not currently breastfeeding.  Physical Exam  Vitals and nursing note reviewed.   Constitutional:       General: She is not in acute distress.     Appearance: She is well-developed.   HENT:      Head: Normocephalic and atraumatic.   Psychiatric:         Behavior: Behavior normal.         Thought Content: Thought content normal.     Myers sign is positive bilaterally        Independent review of radiographic imaging: CT of the abdomen and pelvis dated 1/18/2025 reviewed severe degenerative discitis at L4-5 and moderate degenerative discitis at L5-S1    MRI lumbar spine dated 2/14/2025 demonstrates diffuse degenerative disc disease and spondylosis.  This most pronounced at L4-5 where there is a disc protrusion eccentric to the right with recess and foraminal stenosis    Assessment & Plan   Diagnosis:   Lumbar degenerative disc disease with radiculopathy   Cervical myelopathy status post ACDF C4-5 with previous ACDF C5-6  Low back pain    Medical Decision Making: Ms. Foster is doing better while on gabapentin. "  She is still recovering from abdominal surgery.  Will continue to monitor. I renewed her medicines and will see her back in a couple months to check her progress        Diagnoses and all orders for this visit:    1. Lumbar radiculopathy (Primary)    2. Chronic left-sided low back pain without sciatica  -     gabapentin (NEURONTIN) 300 MG capsule; Take 1 capsule by mouth 3 (Three) Times a Day.  Dispense: 90 capsule; Refill: 2    3. Mechanical back pain    4. Cervical spondylosis with myelopathy    5. Low back pain, unspecified back pain laterality, unspecified chronicity, unspecified whether sciatica present                                    Lucy Donahue PA-C  Patient Care Team:  Nabil Green MD as PCP - General (Family Medicine)  Mara Bernal APRN as Nurse Practitioner (Nurse Practitioner)  Aditi Mckinley APRN as Nurse Practitioner (Cardiology)  Kalyan Vasquez MD as Consulting Physician (Neurosurgery)  Nabil Green MD as Referring Physician (Family Medicine)  Yusuf Pearson MD as Consulting Physician (Endocrinology)

## 2025-04-29 ENCOUNTER — PATIENT MESSAGE (OUTPATIENT)
Dept: FAMILY MEDICINE CLINIC | Facility: CLINIC | Age: 45
End: 2025-04-29
Payer: COMMERCIAL

## 2025-06-19 ENCOUNTER — OFFICE VISIT (OUTPATIENT)
Dept: NEUROSURGERY | Facility: CLINIC | Age: 45
End: 2025-06-19
Payer: COMMERCIAL

## 2025-06-19 ENCOUNTER — HOSPITAL ENCOUNTER (OUTPATIENT)
Dept: GENERAL RADIOLOGY | Facility: HOSPITAL | Age: 45
Discharge: HOME | End: 2025-06-19
Admitting: PHYSICIAN ASSISTANT
Payer: COMMERCIAL

## 2025-06-19 VITALS
WEIGHT: 234 LBS | SYSTOLIC BLOOD PRESSURE: 170 MMHG | HEIGHT: 65 IN | DIASTOLIC BLOOD PRESSURE: 110 MMHG | BODY MASS INDEX: 38.99 KG/M2 | TEMPERATURE: 97.3 F

## 2025-06-19 DIAGNOSIS — Z98.1 S/P CERVICAL SPINAL FUSION: ICD-10-CM

## 2025-06-19 DIAGNOSIS — G89.29 CHRONIC LEFT-SIDED LOW BACK PAIN WITHOUT SCIATICA: ICD-10-CM

## 2025-06-19 DIAGNOSIS — M51.362 DEGENERATION OF INTERVERTEBRAL DISC OF LUMBAR REGION WITH DISCOGENIC BACK PAIN AND LOWER EXTREMITY PAIN: ICD-10-CM

## 2025-06-19 DIAGNOSIS — M54.41 CHRONIC MIDLINE LOW BACK PAIN WITH RIGHT-SIDED SCIATICA: ICD-10-CM

## 2025-06-19 DIAGNOSIS — M54.50 CHRONIC LEFT-SIDED LOW BACK PAIN WITHOUT SCIATICA: ICD-10-CM

## 2025-06-19 DIAGNOSIS — G89.29 CHRONIC MIDLINE LOW BACK PAIN WITH RIGHT-SIDED SCIATICA: ICD-10-CM

## 2025-06-19 DIAGNOSIS — M47.12 CERVICAL SPONDYLOSIS WITH MYELOPATHY: ICD-10-CM

## 2025-06-19 DIAGNOSIS — M51.362 DEGENERATION OF INTERVERTEBRAL DISC OF LUMBAR REGION WITH DISCOGENIC BACK PAIN AND LOWER EXTREMITY PAIN: Primary | ICD-10-CM

## 2025-06-19 PROBLEM — M79.604 RIGHT LEG PAIN: Status: ACTIVE | Noted: 2025-06-19

## 2025-06-19 PROCEDURE — 72120 X-RAY BEND ONLY L-S SPINE: CPT

## 2025-06-19 RX ORDER — GABAPENTIN 300 MG/1
600 CAPSULE ORAL 3 TIMES DAILY
Qty: 70 CAPSULE | Refills: 2 | Status: SHIPPED | OUTPATIENT
Start: 2025-06-19

## 2025-06-19 RX ORDER — GABAPENTIN 300 MG/1
600 CAPSULE ORAL 3 TIMES DAILY
Qty: 70 CAPSULE | Refills: 2 | Status: SHIPPED | OUTPATIENT
Start: 2025-06-19 | End: 2025-06-19

## 2025-06-19 NOTE — PROGRESS NOTES
"Subjective     Chief Complaint: back pain and right leg pain     Patient ID: Evelia Foster is a 45 y.o. female is here today for follow-up.    Back Pain  Associated symptoms: leg pain    Associated symptoms: no abdominal pain, no chest pain, no dysuria, no fever, no headaches, no numbness, no pelvic pain and no weakness    Leg Pain   Pertinent negatives include no numbness.       History of Present Illness:  Ms Foster is a 45-year-old who works as a  for an arm[employer] service. She has a history of cervical myelopathy for which she underwent ACDF C5-6 in 2021.  She has had ongoing gait instability and some numbness in her hands.  Symptoms progressed recently and she was found to have disc protrusion with cord compromise at C4-5.  As such on 5/15/2024 she underwent ACDF at this level. She has some residual numbness and tingling in her hands, but has done well overall.  Recently seen in the office on 4/23/2025 with complaints of back pain radiating into her right leg. MRI from 2/14/2025 showed degenerative disc disease with spondylosis.  There was a sizable disc protrusion eccentric to the right at L4-5 and smaller 1 at L5-S1 without significant foraminal stenosis.  She has been taking gabapentin 300 mg 3 times a day and at that time this was sufficient to help control her symptoms.  About 1 week ago she got out of bed and had immediate, severe increase in pain in her right leg.  This courses from the middle of her back into her right hip, down the back of her right thigh and usually stops at the knee but occasionally will go into the back of her calf and into her foot.  When sitting or driving longer distances she gets some numbness and tingling in her foot as well.  She describes the low back pain as \"feeling like I have been punched\" in the leg pain is more like a \"lightening bolt/aching pain\".  She gets some relief with lying on her right side.  The pain is worse with walking, standing, sitting " or driving for longer periods.  The gabapentin which had been helpful before is no longer controlling her pain and she is struggling at work and during the day.  She denies any saddle anesthesia or bowel/bladder dysfunction    The following portions of the patient's history were reviewed and updated as appropriate: allergies, current medications, past family history, past medical history, past social history, past surgical history and problem list.    Past Medical History:   Diagnosis Date    Adrenal mass     LEFT    Anemia     Anxiety     Asthma     mild, rescue inhaler prn     Bladder infection     Cancer     uterine cancer, skin cancer    Degenerative disc disease, lumbar     Dizzy     Full dentures     Headache     History of cervical dysplasia     s/p hsyterectomy    HPV (human papilloma virus) infection     positive 18    Hyperlipidemia     Hypertension     Insomnia     Low back pain     Neck pain     Palpitations     Pneumonia     PTSD (post-traumatic stress disorder)     Restless leg syndrome     Sepsis     Sleep apnea     CPAP nightly    Spinal headache     SVT (supraventricular tachycardia)     Urinary incontinence     Uses contact lenses     Well adult exam 08/26/2022      Past Surgical History:   Procedure Laterality Date    ADRENALECTOMY Left 3/13/2025    Procedure: ROBOTIC LEFT PARTIAL ADRENALECTOMY;  Surgeon: Adrián Watkins MD;  Location: [facility] OR;  Service: Robotics - [facility];  Laterality: Left;    ANTERIOR CERVICAL DISCECTOMY W/ FUSION N/A 01/14/2021    Procedure: CERVICAL DISCECTOMY ANTERIOR WITH FUSION C5-6;  Surgeon: Kalyan Vasquez MD;  Location: [facility] OR;  Service: Neurosurgery;  Laterality: N/A;    ANTERIOR CERVICAL DISCECTOMY W/ FUSION N/A 5/15/2024    Procedure: CERVICAL DISCECTOMY ANTERIOR WITH FUSION C4-5;  Surgeon: Kalyan Vasqeuz MD;  Location:  [facility];  Service: Neurosurgery;  Laterality: N/A;    CHOLECYSTECTOMY N/A 3/13/2025    Procedure: LAPAROSCOPIC CHOLECYSTECTOMY WITH  LIVER BIOPSY;  Surgeon: Tae Brady MD;  Location:  [facility];  Service: General;  Laterality: N/A;    COLONOSCOPY      CYSTOSCOPY W/ BULKING AGENT INJECTION N/A 03/01/2023    Procedure: CYSTOSCOPY WITH BULKING AGENT INJECTION;  Surgeon: Nubia Ross MD;  Location:  [facility];  Service: Urology;  Laterality: N/A;    ENDOSCOPY      FINGER/THUMB LESION/CYST EXCISION Left 09/26/2023    Thumb    SKIN CANCER EXCISION      TONSILLECTOMY AND ADENOIDECTOMY      1989    TOTAL LAPAROSCOPIC HYSTERECTOMY N/A 01/09/2019    Procedure: TOTAL LAPAROSCOPIC HYSTERECTOMY, BILATERAL SALPINGECTOMY WITH DAVINCI ROBOT;  Surgeon: Markie Lewis MD;  Location:  [facility];  Service: [facility]    TUBAL ABDOMINAL LIGATION          Family history:   Family History   Problem Relation Age of Onset    Cervical cancer Mother 30    Breast cancer Mother 30    Lymphoma Mother     Congenital heart disease Mother     Ovarian cancer Mother         40s    Cancer Father     Breast cancer Sister         mastectomy -proph?    Ovarian cancer Sister 30    Cancer Sister     No Known Problems Brother     Dementia Maternal Grandmother     Graves' disease Sister     Thyroid disease Sister     Breast cancer Maternal Aunt 35    Thyroid disease Daughter     Thyroid disease Sister     No Known Problems Daughter        Social history:   Social History     Socioeconomic History    Marital status: Significant Other   Tobacco Use    Smoking status: Every Day     Current packs/day: 0.50     Average packs/day: 0.5 packs/day for 27.5 years (13.7 ttl pk-yrs)     Types: Cigarettes     Start date: 1998     Passive exposure: Current    Smokeless tobacco: Never    Tobacco comments:     tried w preg   Vaping Use    Vaping status: Every Day    Substances: Nicotine    Devices: Disposable   Substance and Sexual Activity    Alcohol use: Not Currently     Comment: occasional    Drug use: No    Sexual activity: Defer     Partners: Male     Birth control/protection: Surgical        Review of Systems   Constitutional:  Negative for activity change, appetite change, chills, diaphoresis, fatigue, fever and unexpected weight change.   HENT:  Negative for congestion, dental problem, drooling, ear discharge, ear pain, facial swelling, hearing loss, mouth sores, nosebleeds, postnasal drip, rhinorrhea, sinus pressure, sinus pain, sneezing, sore throat, tinnitus, trouble swallowing and voice change.    Eyes:  Negative for photophobia, pain, discharge, redness, itching and visual disturbance.   Respiratory:  Negative for apnea, cough, choking, chest tightness, shortness of breath, wheezing and stridor.    Cardiovascular:  Negative for chest pain, palpitations and leg swelling.   Gastrointestinal:  Negative for abdominal distention, abdominal pain, anal bleeding, blood in stool, constipation, diarrhea, nausea, rectal pain and vomiting.   Endocrine: Negative for cold intolerance, heat intolerance, polydipsia, polyphagia and polyuria.   Genitourinary:  Negative for decreased urine volume, difficulty urinating, dyspareunia, dysuria, enuresis, flank pain, frequency, genital sores, hematuria, menstrual problem, pelvic pain, urgency, vaginal bleeding, vaginal discharge and vaginal pain.   Musculoskeletal:  Positive for back pain and gait problem. Negative for arthralgias, joint swelling, myalgias, neck pain and neck stiffness.   Skin:  Negative for color change, pallor, rash and wound.   Allergic/Immunologic: Negative for environmental allergies, food allergies and immunocompromised state.   Neurological:  Negative for dizziness, tremors, seizures, syncope, facial asymmetry, speech difficulty, weakness, light-headedness, numbness and headaches.   Hematological:  Negative for adenopathy. Does not bruise/bleed easily.   Psychiatric/Behavioral:  Negative for agitation, behavioral problems, confusion, decreased concentration, dysphoric mood, hallucinations, self-injury, sleep disturbance and suicidal ideas.  "The patient is not nervous/anxious and is not hyperactive.        Objective   Blood pressure (!) 170/110, temperature 97.3 °F (36.3 °C), temperature source Infrared, height 165.1 cm (65\"), weight 106 kg (234 lb), last menstrual period 12/18/2018, not currently breastfeeding.  Body mass index is 38.94 kg/m².    Physical Exam  CONSTITUTIONAL:   - Patient is well-nourished  -she is very uncomfortable but in no distress   - Pleasant and appears stated age.  PSYCHIATRIC:  - Normal mood and affect  - Behavior is normal.  HENT:   Head: Normocephalic and Atraumatic.   Eyes:     - Pupils are equal, round, and reactive to light.     - EOM are normal.   CV:   - Regular rate and rhythm on palpable radial pulse   PULMONARY:   - Speaking in full sentences, breathing non labored  - No wheezing   SKIN:  - Clean, dry and intact   MUSCULOSKELETAL:  - back is tender to palpation    - Strength is intact in the upper and lower extremities to direct testing.  - Muscle tone is normal throughout.  - Station and gait are very slow and antalgic, favoring the right leg   - ROM in neck/back is reduced   - Straight leg raise is positive on the right     NEUROLOGICAL:  - A&Ox3  - Attention span, language function, and cognition are intact.  - Sensation is intact to light touch testing throughout.  - Deep tendon reflexes are 3+ and symmetrical. She has had myelopathy previously  - Myers's Sign is positive bilaterally.  - 1-2 beats of clonus are present bilaterally  - Coordination is intact.     Patient's Body mass index is 38.94kg/m². indicating that she meets the BMI criteria for obesity      Assessment & Plan     Independent Review of Radiographic Studies:    MRI of the lumbar spine from 2/14/25 was reviewed. This shows degenerative changes and facet arthropathy with effusion throughout the lumbar spine.  At L4-5, she has facet arthropathy, spurring of the posterior vertebral body, and a disc herniation that is eccentric to the right.  At " L5-S1 there is a broad based disc bulge with mild bilateral foraminal stenosis and mild central canal stenosis.  She was sent for flexion-extension x-rays today.  These show the degenerative changes and spurring as noted previously.  She she has very limited range of motion due to her pain.  There does not appear to be any instability on flexion versus extension but this is not definitive as she is pain limited    Medical Decision Making:    Ms. Foster is struggling with pain.  She has a right sided disc herniation at L4-5 that her symptoms are almost more in an L5-S1 distribution.  Her last MRI is from February and her posterior leg pain is new times about 1 week.  For now, I will increase her gabapentin to 300 mg in the morning, 600 mg in the afternoon after work, and 600 mg at bedtime also given her a Medrol Dosepak.  I have put in an order for physical therapy she will try to get this started in the next several days.  She already has a follow-up scheduled with our office next Friday and I will keep that in place for now as she may need a discectomy should conservative measures fail.  She is aware of signs and symptoms of cauda equina and will contact our office or go to the ER should these occur    Diagnoses and all orders for this visit:    1. Degeneration of intervertebral disc of lumbar region with discogenic back pain and lower extremity pain (Primary)  -     Ambulatory Referral to Physical Therapy for Evaluation & Treatment    2. Chronic midline low back pain with right-sided sciatica  -     Ambulatory Referral to Physical Therapy for Evaluation & Treatment    3. Cervical spondylosis with myelopathy    4. S/P cervical spinal fusion    5. Chronic left-sided low back pain without sciatica  -     Discontinue: gabapentin (NEURONTIN) 300 MG capsule; Take 2 capsules by mouth 3 (Three) Times a Day. Take one capsule (300mg) in the morning  Take two capsules (600mg) mid day after work  Take two capsules (600mg) at  bedtime.  Dispense: 70 capsule; Refill: 2  -     gabapentin (NEURONTIN) 300 MG capsule; Take 2 capsules by mouth 3 (Three) Times a Day. Take one capsule (300mg) in the morning Take two capsules (600mg) mid day after work Take two capsules (600mg) at bedtime.  Dispense: 70 capsule; Refill: 2      Return in about 8 days (around 6/27/2025).       This document signed by India Mahoney PA-C  June 19, 2025 14:54 EDT

## 2025-06-24 ENCOUNTER — TRANSCRIBE ORDERS (OUTPATIENT)
Dept: ADMINISTRATIVE | Facility: HOSPITAL | Age: 45
End: 2025-06-24
Payer: COMMERCIAL

## 2025-06-24 DIAGNOSIS — E27.9 ADRENAL NODULE: Primary | ICD-10-CM

## 2025-06-27 ENCOUNTER — OFFICE VISIT (OUTPATIENT)
Dept: NEUROSURGERY | Facility: CLINIC | Age: 45
End: 2025-06-27
Payer: COMMERCIAL

## 2025-06-27 ENCOUNTER — TELEPHONE (OUTPATIENT)
Dept: NEUROSURGERY | Facility: CLINIC | Age: 45
End: 2025-06-27
Payer: COMMERCIAL

## 2025-06-27 VITALS — HEIGHT: 65 IN | WEIGHT: 236.6 LBS | BODY MASS INDEX: 39.42 KG/M2 | TEMPERATURE: 97.5 F

## 2025-06-27 DIAGNOSIS — G89.29 CHRONIC LEFT-SIDED LOW BACK PAIN WITHOUT SCIATICA: ICD-10-CM

## 2025-06-27 DIAGNOSIS — M54.9 MECHANICAL BACK PAIN: ICD-10-CM

## 2025-06-27 DIAGNOSIS — M51.362 DEGENERATION OF INTERVERTEBRAL DISC OF LUMBAR REGION WITH DISCOGENIC BACK PAIN AND LOWER EXTREMITY PAIN: Primary | ICD-10-CM

## 2025-06-27 DIAGNOSIS — M54.50 CHRONIC LEFT-SIDED LOW BACK PAIN WITHOUT SCIATICA: ICD-10-CM

## 2025-06-27 DIAGNOSIS — M54.16 LUMBAR RADICULOPATHY: Primary | ICD-10-CM

## 2025-06-27 PROCEDURE — 99214 OFFICE O/P EST MOD 30 MIN: CPT | Performed by: PHYSICIAN ASSISTANT

## 2025-06-27 RX ORDER — GABAPENTIN 600 MG/1
600 TABLET ORAL 3 TIMES DAILY
Qty: 90 TABLET | Refills: 2 | Status: SHIPPED | OUTPATIENT
Start: 2025-06-27

## 2025-06-27 RX ORDER — OXYCODONE AND ACETAMINOPHEN 7.5; 325 MG/1; MG/1
1 TABLET ORAL EVERY 12 HOURS PRN
Qty: 30 TABLET | Refills: 0 | Status: SHIPPED | OUTPATIENT
Start: 2025-06-27

## 2025-06-27 NOTE — TELEPHONE ENCOUNTER
Documentation Only: I have sent in a PA for the patients Percocet, it came back with an approval. I have called and spoke to the patient and let her know, she was thankful for the call .

## 2025-06-27 NOTE — PROGRESS NOTES
Patient: Evelia Foster  : 1980  Chart #: [id number]    Date of Service: 25    CHIEF COMPLAINT: Cervical spondylosis and disc herniation with myelopathy    History of Present Illness Patient is a 45-year-old woman seen in follow-up.  She has a history of cervical myelopathy for which she underwent ACDF C5-6 in .  She has had ongoing gait instability and some numbness in her hands.  Symptoms progressed and she was found to have disc protrusion with cord compromise at C4-5.  As such on 5/15/2024 she underwent ACDF at this level.  Postoperatively she has done excellent.      Her main issue lately is back pain that extends down the right leg in an L5 distribution.  She has some numbness and tingling in the top of the right foot.  When seen in February with an MRI she was doing a little better.  There is evidence of recess and foraminal narrowing at L4-5 on the right.  She was prescribed gabapentin and referred for formal physical therapy.  Over the last 6 weeks symptoms dramatically worsened.  She is in constant severe pain down the leg.  She notes some weakness.  Nothing is providing relief.    Past Medical History:   Diagnosis Date    Adrenal mass     LEFT    Anemia     Anxiety     Asthma     mild, rescue inhaler prn     Bladder infection     Cancer     uterine cancer, skin cancer    Degenerative disc disease, lumbar     Dizzy     Full dentures     Headache     History of cervical dysplasia     s/p hsyterectomy    HPV (human papilloma virus) infection     positive 18    Hyperlipidemia     Hypertension     Insomnia     Low back pain     Neck pain     Palpitations     Pneumonia     PTSD (post-traumatic stress disorder)     Restless leg syndrome     Sepsis     Sleep apnea     CPAP nightly    Spinal headache     SVT (supraventricular tachycardia)     Urinary incontinence     Uses contact lenses     Well adult exam 2022         Current Outpatient Medications:     albuterol sulfate  (90  Base) MCG/ACT inhaler, Inhale 2 puffs Every 4 (Four) Hours As Needed for Wheezing., Disp: 18 g, Rfl: 3    cetirizine (zyrTEC) 10 MG tablet, Take 1 tablet by mouth Daily. (Patient taking differently: Take 1 tablet by mouth Daily As Needed for Allergies.), Disp: 90 tablet, Rfl: 3    ondansetron (ZOFRAN) 4 MG tablet, , Disp: , Rfl:     gabapentin (NEURONTIN) 600 MG tablet, Take 1 tablet by mouth 3 (Three) Times a Day., Disp: 90 tablet, Rfl: 2    Past Surgical History:   Procedure Laterality Date    ADRENALECTOMY Left 3/13/2025    Procedure: ROBOTIC LEFT PARTIAL ADRENALECTOMY;  Surgeon: Adrián Watkins MD;  [facility];  Service: Robotics - DaVinci;  Laterality: Left;    ANTERIOR CERVICAL DISCECTOMY W/ FUSION N/A 01/14/2021    Procedure: CERVICAL DISCECTOMY ANTERIOR WITH FUSION C5-6;  Surgeon: Kalyan Vasquez MD;  Location:  [facility];  Service: Neurosurgery;  Laterality: N/A;    ANTERIOR CERVICAL DISCECTOMY W/ FUSION N/A 5/15/2024    Procedure: CERVICAL DISCECTOMY ANTERIOR WITH FUSION C4-5;  Surgeon: Kalyan Vasquez MD;  Location:  [facility];  Service: Neurosurgery;  Laterality: N/A;    CHOLECYSTECTOMY N/A 3/13/2025    Procedure: LAPAROSCOPIC CHOLECYSTECTOMY WITH LIVER BIOPSY;  Surgeon: Tae Brady MD;  [facility];  Service: General;  Laterality: N/A;    COLONOSCOPY      CYSTOSCOPY W/ BULKING AGENT INJECTION N/A 03/01/2023    Procedure: CYSTOSCOPY WITH BULKING AGENT INJECTION;  Surgeon: Nubia Ross MD;  [facility];  Service: Urology;  Laterality: N/A;    ENDOSCOPY      FINGER/THUMB LESION/CYST EXCISION Left 09/26/2023    Thumb    SKIN CANCER EXCISION      TONSILLECTOMY AND ADENOIDECTOMY      1989    TOTAL LAPAROSCOPIC HYSTERECTOMY N/A 01/09/2019    Procedure: TOTAL LAPAROSCOPIC HYSTERECTOMY, BILATERAL SALPINGECTOMY WITH DAVINCI ROBOT;  Surgeon: Markie Lewis MD;  Location:  [facility];  Service: DaVinci    TUBAL ABDOMINAL LIGATION         Social History     Socioeconomic  "History    Marital status: Significant Other   Tobacco Use    Smoking status: Every Day     Current packs/day: 0.50     Average packs/day: 0.5 packs/day for 27.5 years (13.7 ttl pk-yrs)     Types: Cigarettes     Start date: 1998     Passive exposure: Current    Smokeless tobacco: Never    Tobacco comments:     tried w preg   Vaping Use    Vaping status: Former    Substances: Nicotine    Devices: Disposable   Substance and Sexual Activity    Alcohol use: Not Currently     Comment: occasional    Drug use: No    Sexual activity: Defer     Partners: Male     Birth control/protection: Surgical         Review of Systems    Objective   Vital Signs: Temperature 97.5 °F (36.4 °C), temperature source Infrared, height 165.1 cm (65\"), weight 107 kg (236 lb 9.6 oz), last menstrual period 12/18/2018, not currently breastfeeding.  Physical Exam  Vitals and nursing note reviewed.   Constitutional:       General: She is not in acute distress.     Appearance: She is well-developed.   HENT:      Head: Normocephalic and atraumatic.   Musculoskeletal:      Right lower leg: No edema.      Left lower leg: No edema.      Right foot: Foot drop present.   Neurological:      Mental Status: She is oriented to person, place, and time.      Sensory: Sensory deficit present.      Motor: Weakness present.      Deep Tendon Reflexes:      Reflex Scores:       Patellar reflexes are 0 on the right side and 1+ on the left side.       Achilles reflexes are 1+ on the right side and 1+ on the left side.  Psychiatric:         Behavior: Behavior normal.         Thought Content: Thought content normal.            Independent review of radiographic imaging: CT of the abdomen and pelvis dated 1/18/2025 reviewed severe degenerative discitis at L4-5 and moderate degenerative discitis at L5-S1    MRI lumbar spine dated 2/14/2025 demonstrates diffuse degenerative disc disease and spondylosis.  This most pronounced at L4-5 where there is a disc protrusion eccentric " to the right with recess and foraminal stenosis    Assessment & Plan   Diagnosis:   Lumbar degenerative disc disease with radiculopathy   Cervical myelopathy status post ACDF C4-5 with previous ACDF C5-6  Low back pain    Medical Decision Making: Patient symptoms dramatically worsened over the last 6 weeks which makes me concerned that she may have a new disc herniation.  She has 1 noted at L4-5 that could be source for her symptoms but given the dramatic change in her pain as well as foot weakness, we need to check an MRI of the lumbar spine without gadolinium for surgical planning purposes.  I have increased her gabapentin to 600 mg 3 times daily and I have prescribed Percocet.  She will follow-up with me as soon as we can get the MRI and further recommendations will be made.        Diagnoses and all orders for this visit:    1. Degeneration of intervertebral disc of lumbar region with discogenic back pain and lower extremity pain (Primary)  -     MRI Lumbar Spine Without Contrast; Future  -     Ambulatory Referral to Physical Therapy for Evaluation & Treatment  -     gabapentin (NEURONTIN) 600 MG tablet; Take 1 tablet by mouth 3 (Three) Times a Day.  Dispense: 90 tablet; Refill: 2    2. Mechanical back pain  -     MRI Lumbar Spine Without Contrast; Future  -     gabapentin (NEURONTIN) 600 MG tablet; Take 1 tablet by mouth 3 (Three) Times a Day.  Dispense: 90 tablet; Refill: 2    3. Chronic left-sided low back pain without sciatica  -     gabapentin (NEURONTIN) 600 MG tablet; Take 1 tablet by mouth 3 (Three) Times a Day.  Dispense: 90 tablet; Refill: 2                                      Lucy Donahue PA-C  Patient Care Team:  Nabil Green MD as PCP - General (Family Medicine)  Mara Bernal APRN as Nurse Practitioner (Nurse Practitioner)  Aditi Mckinley APRN as Nurse Practitioner (Cardiology)  Kalyan Vasquez MD as Consulting Physician (Neurosurgery)  Nabil Green MD as Referring  Physician (Family Medicine)  Yusuf Pearson MD as Consulting Physician (Endocrinology)

## 2025-07-14 ENCOUNTER — HOSPITAL ENCOUNTER (OUTPATIENT)
Dept: CT IMAGING | Facility: HOSPITAL | Age: 45
Discharge: HOME | End: 2025-07-14
Admitting: SURGERY
Payer: COMMERCIAL

## 2025-07-14 DIAGNOSIS — E27.9 ADRENAL NODULE: ICD-10-CM

## 2025-07-14 PROCEDURE — 74170 CT ABD WO CNTRST FLWD CNTRST: CPT

## 2025-07-14 PROCEDURE — 25510000001 IOPAMIDOL PER 1 ML: Performed by: SURGERY

## 2025-07-14 RX ORDER — IOPAMIDOL 755 MG/ML
100 INJECTION, SOLUTION INTRAVASCULAR
Status: COMPLETED | OUTPATIENT
Start: 2025-07-14 | End: 2025-07-14

## 2025-07-14 RX ADMIN — IOPAMIDOL 95 ML: 755 INJECTION, SOLUTION INTRAVENOUS at 09:08

## 2025-07-24 ENCOUNTER — TREATMENT (OUTPATIENT)
Dept: PHYSICAL THERAPY | Facility: CLINIC | Age: 45
End: 2025-07-24
Payer: COMMERCIAL

## 2025-07-24 DIAGNOSIS — M51.362 DEGENERATION OF INTERVERTEBRAL DISC OF LUMBAR REGION WITH DISCOGENIC BACK PAIN AND LOWER EXTREMITY PAIN: Primary | ICD-10-CM

## 2025-07-24 DIAGNOSIS — Z74.09 IMPAIRED FUNCTIONAL MOBILITY, BALANCE, GAIT, AND ENDURANCE: ICD-10-CM

## 2025-07-24 DIAGNOSIS — M54.41 CHRONIC MIDLINE LOW BACK PAIN WITH RIGHT-SIDED SCIATICA: ICD-10-CM

## 2025-07-24 DIAGNOSIS — G89.29 CHRONIC MIDLINE LOW BACK PAIN WITH RIGHT-SIDED SCIATICA: ICD-10-CM

## 2025-07-24 NOTE — PROGRESS NOTES
Physical Therapy Initial Evaluation and Plan of Care    [address] [facility]  [address]  [place] [address]  [phone]    Patient: Evelia Foster   : 1980  Diagnosis/ICD-10 Code:  Degeneration of intervertebral disc of lumbar region with discogenic back pain and lower extremity pain [M51.362]  Referring practitioner: India Mahoney PA-C  Date of Initial Visit: 2025  Today's Date: 2025  Patient seen for 1 sessions         Visit Diagnoses:    ICD-10-CM ICD-9-CM   1. Degeneration of intervertebral disc of lumbar region with discogenic back pain and lower extremity pain  M51.362 722.52   2. Impaired functional mobility, balance, gait, and endurance  Z74.09 V49.89   3. Chronic midline low back pain with right-sided sciatica  M54.41 724.2    G89.29 724.3     338.29       Subjective Questionnaire: Oswestry: 64%      Subjective Evaluation    History of Present Illness  Date of onset: 2025  Mechanism of injury: Pt reports she has noted increased low back pain for the past year, gotten significantly worse in the past month, referring down RLE. States severe pain daily down RLE, bottom of foot is pins and needles. States it depends on how much she has walked on it during the day. Pain is activity dependent, gets worse with prolonged standing, walking, and bending forward.   Most comfortable position is right side lying position with LLE over RLE.  In the past, a few episodes of RLE buckling, giving way, but not recently. States she has a cart at work to off load sometimes.     Lives on one level. Work full time, has to lift 20-25 lbs. Grandmother to 6 kids.   PMH: includes cervical fusion in  and , recent gallbladder removal 2025 and removal of adrenal.     No relief with heat, ice,  bath.     Subjective comment: Pt presents with c/o low back pain and RLE symptoms.  Patient Occupation: Works for [employer], gets out 8 routes, push/pull heavy cages. Full time.  Quality of life: good    Pain  Current pain ratin  At best pain rating: 3  At worst pain ratin  Quality: needle-like, radiating, tight and discomfort  Relieving factors: change in position and medications  Aggravating factors: prolonged positioning, squatting, ambulation, standing, stairs, overhead activity, movement and lifting  Progression: worsening    Social Support  Lives in: one-story house  Lives with: significant other    Hand dominance: right    Diagnostic Tests  MRI studies: abnormal    Treatments  No previous or current treatments  Patient Goals  Patient goals for therapy: decreased pain, increased motion and increased strength         Patient Active Problem List    Diagnosis Date Noted    Degeneration of intervertebral disc of lumbar region with discogenic back pain and lower extremity pain 2025    Right leg pain 2025    S/P cervical spinal fusion 2025    Left adrenal mass 2025    Gallbladder anomaly 2024    EJNI on CPAP 2024    Adrenal nodule 2024     Note Last Updated: 2024      Left. Was noted to be 1 cm on imaging in / 1.6 cm on imaging in  with structure consistent with adenoma   Has high dhea-s   High sanju but also high renin   Normal serum cortisol      Cervical spondylosis with myelopathy 2024    Localized edema 2024    Palpitations 2023    Dizziness 2023    Vitamin D deficiency 2023    Lesion of thumb 2023    B12 deficiency 2023    Suspected sleep apnea 2023    Class 2 severe obesity due to excess calories with serious comorbidity and body mass index (BMI) of 37.0 to 37.9 in adult 10/21/2022    Mixed hyperlipidemia 10/21/2022    Well adult exam 2022    Hypertension 2022    Suppurative hidradenitis 2022    Arthralgia of left temporomandibular joint 2022    Chest pain 2022    Asthma      Note Last Updated: 2022     mild, rescue inhaler prn       Low back  pain     Closed nondisplaced fracture of phalanx of left little finger with routine healing 11/02/2021    Abscess of female breast 11/02/2021    Current moderate episode of major depressive disorder 04/21/2021    Hot flashes 04/21/2021    Breast cancer screening, high risk patient 04/21/2021    Foul smelling urine 04/20/2021    Full dentures     Uses contact lenses     History of cervical dysplasia      Note Last Updated: 4/20/2021     s/p hsyterectomy      HPV (human papilloma virus) infection      Note Last Updated: 4/20/2021     positive 18      Herniation of cervical intervertebral disc with radiculopathy 12/15/2020    Other insomnia 07/13/2020    Bilateral hand numbness 07/13/2020    Cough 07/13/2020    Low vitamin D level 27 2019 12/02/2019    Family history of vulvar cancer Mother age 40 01/16/2019    Iron deficiency anemia 11/20/2018    Stress incontinence in female 11/20/2018    Restless leg syndrome     PTSD (post-traumatic stress disorder)     Insomnia     Anxiety        Past Medical History:   Diagnosis Date    Adrenal mass     LEFT    Anemia     Anxiety     Asthma     mild, rescue inhaler prn     Bladder infection     Cancer     uterine cancer, skin cancer    Degenerative disc disease, lumbar     Dizzy     Full dentures     Headache     History of cervical dysplasia     s/p hsyterectomy    HPV (human papilloma virus) infection     positive 18    Hyperlipidemia     Hypertension     Insomnia     Low back pain     Neck pain     Palpitations     Pneumonia     PTSD (post-traumatic stress disorder)     Restless leg syndrome     Sepsis     Sleep apnea     CPAP nightly    Spinal headache     SVT (supraventricular tachycardia)     Urinary incontinence     Uses contact lenses     Well adult exam 08/26/2022      Past Surgical History:   Procedure Laterality Date    ADRENALECTOMY Left 3/13/2025    Procedure: ROBOTIC LEFT PARTIAL ADRENALECTOMY;  Surgeon: Adrián Watkins MD;  Location: [facility];  Service:  Robotics - DaVinci;  Laterality: Left;    ANTERIOR CERVICAL DISCECTOMY W/ FUSION N/A 01/14/2021    Procedure: CERVICAL DISCECTOMY ANTERIOR WITH FUSION C5-6;  Surgeon: Kalyan Vasquez MD;  [facility];  Service: Neurosurgery;  Laterality: N/A;    ANTERIOR CERVICAL DISCECTOMY W/ FUSION N/A 5/15/2024    Procedure: CERVICAL DISCECTOMY ANTERIOR WITH FUSION C4-5;  Surgeon: Kalyan Vasquez MD;  [facility];  Service: Neurosurgery;  Laterality: N/A;    CHOLECYSTECTOMY N/A 3/13/2025    Procedure: LAPAROSCOPIC CHOLECYSTECTOMY WITH LIVER BIOPSY;  Surgeon: Tae Brady MD;  [facility];  Service: General;  Laterality: N/A;    COLONOSCOPY      CYSTOSCOPY W/ BULKING AGENT INJECTION N/A 03/01/2023    Procedure: CYSTOSCOPY WITH BULKING AGENT INJECTION;  Surgeon: Nubia Ross MD;  [facility];  Service: Urology;  Laterality: N/A;    ENDOSCOPY      FINGER/THUMB LESION/CYST EXCISION Left 09/26/2023    Thumb    SKIN CANCER EXCISION      TONSILLECTOMY AND ADENOIDECTOMY      1989    TOTAL LAPAROSCOPIC HYSTERECTOMY N/A 01/09/2019    Procedure: TOTAL LAPAROSCOPIC HYSTERECTOMY, BILATERAL SALPINGECTOMY WITH DAVINCI ROBOT;  Surgeon: Markie Lewis MD;  [facility];  Service: DaVinci    TUBAL ABDOMINAL LIGATION           Objective        Special Questions  Patient is experiencing bladder dysfunction and bowel dysfunction.     Additional Special Questions  Had a surgery 1-3 years, Magana a bend. Peed on herself twice this past weekend.     Gallbladder 03/2025 removed and part of adrenal.       Palpation     Right   Hypertonic in the lumbar paraspinals. Tenderness of the lumbar paraspinals.     Neurological Testing     Sensation     Lumbar   Left   Intact: light touch    Right   Diminished: light touch    Reflexes   Left   Patellar (L4): normal (2+)  Achilles (S1): normal (2+)    Right   Patellar (L4): normal (2+)  Achilles (S1): trace (1+)    Active Range of Motion     Lumbar   Flexion: 42  (LBP and RLE symptoms to knee) degrees with pain  Extension: 11 degrees with pain    Additional Active Range of Motion Details  Limited lateral flex and rotation bilaterally    Strength/Myotome Testing     Lumbar   Left   Normal strength    Right Hip   Planes of Motion   Flexion: 3- (pain)  Abduction: 4-    Right Knee   Flexion: 4-  Extension: 3 (neural tension, pain)    Right Ankle/Foot   Dorsiflexion: 3+    Tests       Thoracic   Positive slump.     Lumbar   Positive repeated flexion.   Negative repeated extension.     Right   Positive valsalva.   Negative passive SLR.     Right Pelvic Girdle/Sacrum   Positive: sacral spring.     Additional Tests Details  (+) neural tension RLE, L4-L5 distribution  (+) pain with LTR  (+) pain with PPT and glute set  Poor TA activation, recent abdominal surgery  (-) SLR   (-) quad set for pain, R weaker than L  Bilat hip abd WFL  Limited hip ER bilaterally, R worse than L   No change with prone prop ups; unable to perform press up today.   Pain with Prone PA glides    Ambulation     Comments   Antalgic gait pattern, decreased stride length, limited arm swing. No AD.     Did not assess stairs today.           Assessment & Plan       Assessment  Impairments: abnormal gait, abnormal muscle firing, abnormal muscle tone, abnormal or restricted ROM, activity intolerance, impaired balance, impaired physical strength, lacks appropriate home exercise program and pain with function   Functional limitations: carrying objects, lifting, sleeping, walking, pulling, pushing, uncomfortable because of pain, moving in bed, sitting and standing   Assessment details: Pt is a 46 yo female referred to PT for low back pain with radicular RLE symptoms recently. She reports about 1 year of chronic low back pain but acute onset of radicular symptoms for the past 4 weeks along with 2 episodes of recent urinary incontinence, R drop foot, and diminished S1 DTR. She had an MRI performed in 02/2025 but no  further recent imaging due to insurance denial. She recently saw neurosurgery, referred to PT. She demonstrates significant limited lumbar AROM, RLE neural tension, and decreased sensation in R foot/RLE. Pain increases with both repeated flexion and attempted PA glides. Pt did have an abdominal surgery in 03/2025 and h/o pelvic procedure, cervical fusions. Recommend skilled PT to improve spinal stabilization, proximal hip and core strength, and decrease pain. Pt educated on red flags to contact doctor about including further changes to bowel/bladder along with option/education on pelvic health PT.     Prognosis: good    Goals  Plan Goals: Short Term Goals (3-4 weeks)  1. Patient is independent with HEP for flexibility and strengthening.  2.  Patient to report pain less than or equal to 3/10  3.  Patient to report decreased pain with working for 4 hours.  4. Patient subjectively report that altered leg symptoms have decreased by 50% with frequency or intensity.    Long Term Goals (6-8 weeks)  1. Patient will demonstrate lumbar AROM WNL in all planes to improve ability to perform daily functional activities.  2. Patient is independent with long term HEP for improved postural awareness and stabilization.  3. Modified Oswestry score is improved by at least 10%.  4.Patient will demonstrate proper lifting mechanics, utilizing abdominal stabilization.  5. Patient is able to tolerate at least 30 min of standing or walking to improved tolerance to ADLs.      Plan  Therapy options: will be seen for skilled therapy services  Planned modality interventions: cryotherapy, dry needling, TENS, thermotherapy (hydrocollator packs) and traction  Planned therapy interventions: abdominal trunk stabilization, balance/weight-bearing training, body mechanics training, flexibility, functional ROM exercises, home exercise program, joint mobilization, manual therapy, neuromuscular re-education, postural training, soft tissue mobilization,  spinal/joint mobilization, strengthening, stretching, therapeutic activities and gait training  Frequency: 1x week  Duration in visits: 6  Duration in weeks: 6  Treatment plan discussed with: patient      History # of Personal factors and/or comorbidities: MODERATE (1-2)  Examination of Body System(s): # of elements: MODERATE (3)  Clinical Presentation: EVOLVING  Clinical Decision Making: MODERATE      Timed:  Manual Therapy:    0     mins  30489;  Therapeutic Exercise:    13     mins  87212;     Neuromuscular Angélica:    0    mins  26098;    Therapeutic Activity:     0     mins  41584;     Gait Trainin     mins  41634;     Ultrasound:     0     mins  20348;    Ionto   __0_  mins  01140;  Self-care     12     mins 69317;    Un-Timed:  Electrical Stimulation:    0     mins  34421 ( );  Dry Needling     0     mins self-pay  Traction  _ 0_   mins  65579  Low Eval  __0_ mins  77078  Mod Eval  _30__ mins  28152  High Eval  _0__ mins   51819    Timed Treatment:   25   mins   Total Treatment:     55   mins    PT SIGNATURE: Corinne E Perkins, PT   [place] License: [id number]      Certification Period: 2025 thru 10/21/2025  I certify that the therapy services are furnished while this patient is under my care.  The services outlined above are required by this patient, and will be reviewed every 90 days.        Physician Signature: _______________________________________________________________________________________________________     PHYSICIAN: India Mahoney PA-C   NPI: [id number]     DATE:                                             Please sign and return via fax to .[employer] . Thank you, [facility].

## 2025-08-11 ENCOUNTER — HOSPITAL ENCOUNTER (OUTPATIENT)
Facility: HOSPITAL | Age: 45
Discharge: HOME | End: 2025-08-11
Admitting: PHYSICIAN ASSISTANT
Payer: COMMERCIAL

## 2025-08-11 DIAGNOSIS — M54.9 MECHANICAL BACK PAIN: ICD-10-CM

## 2025-08-11 DIAGNOSIS — M51.362 DEGENERATION OF INTERVERTEBRAL DISC OF LUMBAR REGION WITH DISCOGENIC BACK PAIN AND LOWER EXTREMITY PAIN: ICD-10-CM

## 2025-08-11 PROCEDURE — 72148 MRI LUMBAR SPINE W/O DYE: CPT

## 2025-08-12 ENCOUNTER — TELEPHONE (OUTPATIENT)
Dept: PHYSICAL THERAPY | Facility: CLINIC | Age: 45
End: 2025-08-12

## 2025-08-13 ENCOUNTER — OFFICE VISIT (OUTPATIENT)
Dept: NEUROSURGERY | Facility: CLINIC | Age: 45
End: 2025-08-13
Payer: COMMERCIAL

## 2025-08-13 VITALS — BODY MASS INDEX: 38.35 KG/M2 | TEMPERATURE: 97.5 F | HEIGHT: 65 IN | WEIGHT: 230.2 LBS

## 2025-08-13 DIAGNOSIS — M54.9 MECHANICAL BACK PAIN: ICD-10-CM

## 2025-08-13 DIAGNOSIS — G89.29 CHRONIC MIDLINE LOW BACK PAIN WITH RIGHT-SIDED SCIATICA: ICD-10-CM

## 2025-08-13 DIAGNOSIS — M54.41 CHRONIC MIDLINE LOW BACK PAIN WITH RIGHT-SIDED SCIATICA: ICD-10-CM

## 2025-08-13 DIAGNOSIS — M51.362 DEGENERATION OF INTERVERTEBRAL DISC OF LUMBAR REGION WITH DISCOGENIC BACK PAIN AND LOWER EXTREMITY PAIN: ICD-10-CM

## 2025-08-13 DIAGNOSIS — M54.16 LUMBAR RADICULOPATHY: Primary | ICD-10-CM

## 2025-08-15 DIAGNOSIS — M47.12 CERVICAL SPONDYLOSIS WITH MYELOPATHY: ICD-10-CM

## 2025-08-15 DIAGNOSIS — M54.16 LUMBAR RADICULOPATHY: Primary | ICD-10-CM

## 2025-08-15 RX ORDER — OXYCODONE AND ACETAMINOPHEN 7.5; 325 MG/1; MG/1
1 TABLET ORAL EVERY 12 HOURS PRN
Qty: 20 TABLET | Refills: 0 | Status: SHIPPED | OUTPATIENT
Start: 2025-08-15

## 2025-08-15 RX ORDER — OXYCODONE AND ACETAMINOPHEN 7.5; 325 MG/1; MG/1
1 TABLET ORAL EVERY 12 HOURS PRN
Qty: 20 TABLET | Refills: 0 | Status: CANCELLED | OUTPATIENT
Start: 2025-08-15

## 2025-08-25 ENCOUNTER — TELEPHONE (OUTPATIENT)
Dept: INFUSION THERAPY | Facility: HOSPITAL | Age: 45
End: 2025-08-25
Payer: COMMERCIAL

## 2025-08-26 ENCOUNTER — HOSPITAL ENCOUNTER (OUTPATIENT)
Dept: GENERAL RADIOLOGY | Facility: HOSPITAL | Age: 45
Discharge: HOME | End: 2025-08-26
Payer: COMMERCIAL

## 2025-08-26 ENCOUNTER — HOSPITAL ENCOUNTER (OUTPATIENT)
Dept: NEUROLOGY | Facility: HOSPITAL | Age: 45
Discharge: HOME | End: 2025-08-26
Payer: COMMERCIAL

## 2025-08-26 ENCOUNTER — HOSPITAL ENCOUNTER (OUTPATIENT)
Dept: CT IMAGING | Facility: HOSPITAL | Age: 45
Discharge: HOME | End: 2025-08-26
Payer: COMMERCIAL

## 2025-08-26 VITALS
OXYGEN SATURATION: 97 % | SYSTOLIC BLOOD PRESSURE: 143 MMHG | WEIGHT: 230 LBS | RESPIRATION RATE: 18 BRPM | TEMPERATURE: 96.3 F | BODY MASS INDEX: 38.32 KG/M2 | HEIGHT: 65 IN | DIASTOLIC BLOOD PRESSURE: 79 MMHG | HEART RATE: 68 BPM

## 2025-08-26 DIAGNOSIS — M54.16 LUMBAR RADICULOPATHY: ICD-10-CM

## 2025-08-26 PROCEDURE — 72132 CT LUMBAR SPINE W/DYE: CPT

## 2025-08-26 PROCEDURE — 72240 MYELOGRAPHY NECK SPINE: CPT

## 2025-08-26 PROCEDURE — 25510000001 IOPAMIDOL 41 % SOLUTION: Performed by: NEUROLOGICAL SURGERY

## 2025-08-26 PROCEDURE — 62304 MYELOGRAPHY LUMBAR INJECTION: CPT

## 2025-08-26 PROCEDURE — 25010000002 LIDOCAINE PF 1% 1 % SOLUTION: Performed by: NEUROLOGICAL SURGERY

## 2025-08-26 PROCEDURE — 72120 X-RAY BEND ONLY L-S SPINE: CPT

## 2025-08-26 PROCEDURE — 95886 MUSC TEST DONE W/N TEST COMP: CPT

## 2025-08-26 PROCEDURE — 95909 NRV CNDJ TST 5-6 STUDIES: CPT

## 2025-08-26 RX ORDER — IOPAMIDOL 408 MG/ML
20 INJECTION, SOLUTION INTRATHECAL
Status: COMPLETED | OUTPATIENT
Start: 2025-08-26 | End: 2025-08-26

## 2025-08-26 RX ORDER — LIDOCAINE HYDROCHLORIDE 10 MG/ML
5 INJECTION, SOLUTION EPIDURAL; INFILTRATION; INTRACAUDAL; PERINEURAL ONCE
Status: COMPLETED | OUTPATIENT
Start: 2025-08-26 | End: 2025-08-26

## 2025-08-26 RX ORDER — ONDANSETRON 4 MG/1
4 TABLET, ORALLY DISINTEGRATING ORAL ONCE AS NEEDED
Status: DISCONTINUED | OUTPATIENT
Start: 2025-08-26 | End: 2025-08-27 | Stop reason: HOSPADM

## 2025-08-26 RX ORDER — PROMETHAZINE HYDROCHLORIDE 25 MG/1
25 TABLET ORAL ONCE AS NEEDED
Status: DISCONTINUED | OUTPATIENT
Start: 2025-08-26 | End: 2025-08-27 | Stop reason: HOSPADM

## 2025-08-26 RX ADMIN — IOPAMIDOL 20 ML: 408 INJECTION, SOLUTION INTRATHECAL at 07:46

## 2025-08-26 RX ADMIN — LIDOCAINE HYDROCHLORIDE 5 ML: 10 INJECTION, SOLUTION EPIDURAL; INFILTRATION; INTRACAUDAL; PERINEURAL at 07:45

## 2025-08-27 ENCOUNTER — TELEPHONE (OUTPATIENT)
Dept: INFUSION THERAPY | Facility: HOSPITAL | Age: 45
End: 2025-08-27
Payer: COMMERCIAL

## (undated) DEVICE — TAPE,CLOTH/SILK,CURAD,3"X10YD,LF,40/CS: Brand: CURAD

## (undated) DEVICE — 3M™ STERI-DRAPE™ INSTRUMENT POUCH 1018: Brand: STERI-DRAPE™

## (undated) DEVICE — CATHETER,URETHRAL,REDRUBBER,STRL,16FR: Brand: MEDLINE

## (undated) DEVICE — BANDAGE,GAUZE,BULKEE II,4.5"X4.1YD,STRL: Brand: MEDLINE

## (undated) DEVICE — 1LYRTR 16FR10ML100%SIL UMS SNP: Brand: MEDLINE INDUSTRIES, INC.

## (undated) DEVICE — LAPAROVUE VISIBILITY SYSTEM LAPAROSCOPIC SOLUTIONS: Brand: LAPAROVUE

## (undated) DEVICE — INSTRUMENT 7080902 PLATE HOLDING PIN

## (undated) DEVICE — ANTIBACTERIAL UNDYED BRAIDED (POLYGLACTIN 910), SYNTHETIC ABSORBABLE SUTURE: Brand: COATED VICRYL

## (undated) DEVICE — DRILL BIT 7080513 STERILE 13MM

## (undated) DEVICE — PATIENT RETURN ELECTRODE, SINGLE-USE, CONTACT QUALITY MONITORING, ADULT, WITH 9FT CORD, FOR PATIENTS WEIGING OVER 33LBS. (15KG): Brand: MEGADYNE

## (undated) DEVICE — DECANT BG O JET

## (undated) DEVICE — TIP COVER ACCESSORY

## (undated) DEVICE — SPONGE,DISSECTOR,K,XRAY,9/16"X1/4",STRL: Brand: MEDLINE

## (undated) DEVICE — SUT MNCRYL PLS ANTIB UD 3/0 PS2 27IN

## (undated) DEVICE — CANNULA SEAL

## (undated) DEVICE — SYRINGE, LUER LOCK, 5ML: Brand: MEDLINE

## (undated) DEVICE — COLUMN DRAPE

## (undated) DEVICE — NDL HYPO ECLPS SFTY 25G 1 1/2IN

## (undated) DEVICE — SUT VIC PLS CTD ANTIB BR 3/0 8/18IN 45CM

## (undated) DEVICE — TOWEL,OR,DSP,ST,BLUE,STD,4/PK,20PK/CS: Brand: MEDLINE

## (undated) DEVICE — FLTR PLUMEPORT LAP W/CONN STRL

## (undated) DEVICE — APPL CHLORAPREP W/TINT 26ML BLU

## (undated) DEVICE — VESSEL SEALER EXTEND: Brand: ENDOWRIST

## (undated) DEVICE — LAPAROSCOPIC SMOKE FILTRATION SYSTEM: Brand: PALL LAPAROSHIELD® PLUS LAPAROSCOPIC SMOKE FILTRATION SYSTEM

## (undated) DEVICE — PAD,ARMBOARD,CONV,FOAM,2X8X20",12PR/CS: Brand: MEDLINE

## (undated) DEVICE — SEAL

## (undated) DEVICE — ENDOPOUCH RETRIEVER SPECIMEN RETRIEVAL BAGS: Brand: ENDOPOUCH RETRIEVER

## (undated) DEVICE — PAD STEEP TRENDELENBURG W/RAIL STRAP INTEGR ARM PROTECT WING

## (undated) DEVICE — UNDERGLV SURG BIOGEL INDICATOR LF PF 7.5

## (undated) DEVICE — OBT BLADLES ENDOWRIST DAVINCI/S 8MM

## (undated) DEVICE — STRAP POSTN KN/BDY FM 5X72IN DISP

## (undated) DEVICE — GOWN,SIRUS,NONRNF,SETINSLV,XL,20/CS: Brand: MEDLINE

## (undated) DEVICE — PCH INST SURG INVISISHIELD 2PCKT

## (undated) DEVICE — KITTNER SPONGE: Brand: DEROYAL

## (undated) DEVICE — ELECTRD BLD EZ CLN MOD XLNG 2.75IN

## (undated) DEVICE — INSTRUMENT 875-088 14MM DSTRCT PIN STRL: Brand: MEDTRONIC REUSABLE INSTRUMENT

## (undated) DEVICE — NEEDLE, QUINCKE, 20GX3.5": Brand: MEDLINE

## (undated) DEVICE — HDRST POSTIN FM CRDL TRACH SLOT NONCOMRESS 9X8X4IN

## (undated) DEVICE — ENDOPATH XCEL BLADELESS TROCARS WITH STABILITY SLEEVES: Brand: ENDOPATH XCEL

## (undated) DEVICE — PK CYSTO-TUR BASIC 10

## (undated) DEVICE — CLTH CLENS READYCLEANSE PERI CARE PK/5

## (undated) DEVICE — SYR CONTRL LUERLOK 10CC

## (undated) DEVICE — PK LAP LASR CHOLE 10

## (undated) DEVICE — APPL CHLORAPREP TINTED 26ML TEAL

## (undated) DEVICE — BLADELESS OBTURATOR: Brand: WECK VISTA

## (undated) DEVICE — LAPAROSCOPIC SCISSORS: Brand: EPIX LAPAROSCOPIC SCISSORS

## (undated) DEVICE — SUT VIC 0 UR6 27IN VCP603H

## (undated) DEVICE — DRAPE,TOP,102X53,STERILE: Brand: MEDLINE

## (undated) DEVICE — CONN TBG Y 5 IN 1 LF STRL

## (undated) DEVICE — BLD KNIF KARLIN 46 3178

## (undated) DEVICE — TOOL MR8-12BA30 MR8 12CM BALL 3MM DIAM: Brand: MIDAS REX MR8

## (undated) DEVICE — SUT SILK 2/0 TIES 18IN A185H

## (undated) DEVICE — PK NEURO DISC 10

## (undated) DEVICE — GLV SURG BIOGEL LTX PF 7

## (undated) DEVICE — MANIP UTER RUMI TP 6.7MM 10CM GRN

## (undated) DEVICE — SUT PDS O CT1 CR/8 18IN Z740D

## (undated) DEVICE — DISPOSABLE IRRIGATION BIPOLAR CORD, M1000 TYPE: Brand: KIRWAN

## (undated) DEVICE — GLV SURG TRIUMPH CLASSIC PF LTX 8 STRL

## (undated) DEVICE — ENDOPATH XCEL BLUNT TIP TROCARS WITH SMOOTH SLEEVES: Brand: ENDOPATH XCEL

## (undated) DEVICE — DRP MICROSCOPE 4 BINOCULAR CV 54X150IN

## (undated) DEVICE — TUBING, SUCTION, 1/4" X 10', STRAIGHT: Brand: MEDLINE

## (undated) DEVICE — GLV SURG PREMIERPRO MIC LTX PF SZ6.5 BRN

## (undated) DEVICE — GOWN,PREVENTION PLUS,XXLARGE,STERILE: Brand: MEDLINE

## (undated) DEVICE — COVER,LIGHT HANDLE,FLX,1/PK: Brand: MEDLINE INDUSTRIES, INC.

## (undated) DEVICE — TROCARS: Brand: KII® OPTICAL ACCESS SYSTEM

## (undated) DEVICE — APL DUPLOSPRAYER MIS 40CM

## (undated) DEVICE — TROC BLADLES AIRSEAL/OPTI THRD 8X120MM 1P/U

## (undated) DEVICE — TOOL 12BA30 LEGEND 12CM 3MM BALL: Brand: MIDAS REX

## (undated) DEVICE — ENDOPATH XCEL UNIVERSAL TROCAR STABLILITY SLEEVES: Brand: ENDOPATH XCEL

## (undated) DEVICE — OCCL COLPO PNEUMO  STRL

## (undated) DEVICE — SUT MNCRYL PLS ANTIB UD 4/0 PS2 18IN

## (undated) DEVICE — SYR CONTRL PRESS/LO FIX/M/LL W/THMB/RNG 10ML

## (undated) DEVICE — SOL ANTISTICK CAUTRY ELECTROLUBE LF

## (undated) DEVICE — ACCY PA700 LUBRICANT DIFFUSER MR7 4 PACK: Brand: MIDAS REX

## (undated) DEVICE — GLV SURG PREMIERPRO MIC LTX PF SZ7.5 BRN

## (undated) DEVICE — SKIN AFFIX SURG ADHESIVE 72/CS 0.55ML: Brand: MEDLINE

## (undated) DEVICE — ST TBG AIRSEAL BIF FLTR W/ACT/CHARCOAL/FLTR

## (undated) DEVICE — DRP ADAPT ALLY UTER POSTN SYS 1P/U

## (undated) DEVICE — Device

## (undated) DEVICE — SUT GUT PLN 3/0 FS2 27IN 1630H

## (undated) DEVICE — PENCL SMOKE/EVAC MEGADYNE TELESCP 10FT

## (undated) DEVICE — SYR LUERLOK 30CC

## (undated) DEVICE — ADHS SKIN PREMIERPRO EXOFIN TOPICAL HI/VISC .5ML

## (undated) DEVICE — [HIGH FLOW INSUFFLATOR,  DO NOT USE IF PACKAGE IS DAMAGED,  KEEP DRY,  KEEP AWAY FROM SUNLIGHT,  PROTECT FROM HEAT AND RADIOACTIVE SOURCES.]: Brand: PNEUMOSURE

## (undated) DEVICE — BLANKT WARM UPPR/BDY ARM/OUT 57X196CM

## (undated) DEVICE — GLV SURG PREMIERPRO MIC LTX PF SZ8 BRN

## (undated) DEVICE — PK MAJ GYN DAVINCI 10

## (undated) DEVICE — SEAL CANN CAM ENDOWRIST DAVINCI/S 8.5MM

## (undated) DEVICE — ARM DRAPE

## (undated) DEVICE — INTENDED FOR TISSUE SEPARATION, AND OTHER PROCEDURES THAT REQUIRE A SHARP SURGICAL BLADE TO PUNCTURE OR CUT.: Brand: BARD-PARKER ® STAINLESS STEEL BLADES

## (undated) DEVICE — SNAP KOVER: Brand: UNBRANDED

## (undated) DEVICE — GLV SURG SIGNATURE TOUCH PF LTX 7.5 STRL BX/50

## (undated) DEVICE — DRAPE,INSTRUMENT,MAGNETIC,10X16: Brand: MEDLINE

## (undated) DEVICE — 3M™ WARMING BLANKET, LOWER BODY, 10 PER CASE, 42568: Brand: BAIR HUGGER™

## (undated) DEVICE — BLANKT WARM LOWR/BDY 100X120CM

## (undated) DEVICE — 2, DISPOSABLE SUCTION/IRRIGATOR WITHOUT DISPOSABLE TIP: Brand: STRYKEFLOW